# Patient Record
Sex: FEMALE | Race: WHITE | Employment: OTHER | URBAN - METROPOLITAN AREA
[De-identification: names, ages, dates, MRNs, and addresses within clinical notes are randomized per-mention and may not be internally consistent; named-entity substitution may affect disease eponyms.]

---

## 2017-02-16 ENCOUNTER — OFFICE VISIT (OUTPATIENT)
Dept: CARDIOLOGY CLINIC | Age: 77
End: 2017-02-16

## 2017-02-16 VITALS
HEIGHT: 65 IN | HEART RATE: 69 BPM | BODY MASS INDEX: 34.49 KG/M2 | WEIGHT: 207 LBS | SYSTOLIC BLOOD PRESSURE: 146 MMHG | DIASTOLIC BLOOD PRESSURE: 68 MMHG

## 2017-02-16 DIAGNOSIS — I49.1 PAC (PREMATURE ATRIAL CONTRACTION): ICD-10-CM

## 2017-02-16 DIAGNOSIS — I10 ESSENTIAL HYPERTENSION, BENIGN: Primary | ICD-10-CM

## 2017-02-16 DIAGNOSIS — E05.90 HYPERTHYROIDISM: ICD-10-CM

## 2017-02-16 DIAGNOSIS — E78.00 HYPERCHOLESTEROLEMIA: ICD-10-CM

## 2017-02-16 NOTE — MR AVS SNAPSHOT
Visit Information Date & Time Provider Department Dept. Phone Encounter #  
 2/16/2017 10:00 AM Guillermo Rolle MD [facility] [phone] Follow-up Instructions Return in about 6 months (around 8/16/2017). Upcoming Health Maintenance Date Due HEMOGLOBIN A1C Q6M 1938 FOOT EXAM Q1 11/28/1948 MICROALBUMIN Q1 11/28/1948 EYE EXAM RETINAL OR DILATED Q1 11/28/1948 DTaP/Tdap/Td series (1 - Tdap) 11/28/1959 ZOSTER VACCINE AGE 60> 11/28/1998 GLAUCOMA SCREENING Q2Y 11/28/2003 OSTEOPOROSIS SCREENING (DEXA) 11/28/2003 Pneumococcal 65+ Low/Medium Risk (1 of 2 - PCV13) 11/28/2003 MEDICARE YEARLY EXAM 11/28/2003 LIPID PANEL Q1 7/27/2016 INFLUENZA AGE 9 TO ADULT 8/1/2016 Allergies as of 2/16/2017  Review Complete On: 2/16/2017 By: Guillermo Rolle MD  
  
 Severity Noted Reaction Type Reactions Bacitracin    Not Reported This Time Cortisone    Not Reported This Time Erythromycin    Not Reported This Time Lisinopril    Not Reported This Time Lortab [Hydrocodone-acetaminophen]  08/04/2015    Rash Penicillins    Not Reported This Time Prednisone    Not Reported This Time Tetanus Vaccines And Toxoid    Not Reported This Time Current Immunizations  Never Reviewed No immunizations on file. Not reviewed this visit You Were Diagnosed With   
  
 Codes Comments Essential hypertension, benign    -  Primary ICD-10-CM: I10 
ICD-9-CM: 401.1 home records reviewed 
well controlled at home Hypercholesterolemia     ICD-10-CM: E78.00 ICD-9-CM: 272.0 controlled PAC (premature atrial contraction)     ICD-10-CM: I49.1 ICD-9-CM: 427.61 stable 
asymptomatic at present Hyperthyroidism     ICD-10-CM: E05.90 ICD-9-CM: 242.90 tried meds 
not able to tolerate Vitals BP Pulse Height(growth percentile) Weight(growth percentile) BMI OB Status 146/68 69 5' 5\" (1.651 m) 207 lb (93.9 kg) 34.45 kg/m2 Hysterectomy Smoking Status Never Smoker BMI and BSA Data Body Mass Index Body Surface Area 34.45 kg/m 2 2.08 m 2 Preferred Pharmacy Pharmacy Name Phone [facility] [address], [address] [facility] Your Updated Medication List  
  
   
This list is accurate as of: 2/16/17 10:04 AM.  Always use your most recent med list.  
  
  
  
  
 ASPIRIN CHILDRENS 81 mg chewable tablet Generic drug:  aspirin Take 81 mg by mouth daily. atenolol 50 mg tablet Commonly known as:  TENORMIN Take 1 Tab by mouth two (2) times a day. CALCIUM 600 + D 600-125 mg-unit Tab Generic drug:  calcium-cholecalciferol (d3) Take  by mouth daily. Patient taking 4 times a week CRANBERRY CONC-ASCORBIC ACID PO Take  by mouth. dilTIAZem  mg XR capsule Commonly known as:  DILACOR XR  
TAKE ONE CAPSULE BY MOUTH ONCE DAILY  
  
 gabapentin 300 mg capsule Commonly known as:  NEURONTIN Take 300 mg by mouth daily. GLUCOSAMINE 1500 COMPLEX PO Take  by mouth daily. indapamide 2.5 mg tablet Commonly known as:  Christinia Oxford Take  by mouth daily. losartan 50 mg tablet Commonly known as:  COZAAR  
TAKE ONE TABLET BY MOUTH ONCE DAILY  
  
 metFORMIN 500 mg Tg24 24 hour tablet Commonly known as:  GLUMETZA ER  
850 mg daily. pravastatin 40 mg tablet Commonly known as:  PRAVACHOL Take 40 mg by mouth daily. PRESERVISION AREDS PO Take  by mouth two (2) times a day. TYLENOL EXTRA STRENGTH 500 mg tablet Generic drug:  acetaminophen Take  by mouth every six (6) hours as needed. VITAMIN D3 1,000 unit Cap Generic drug:  cholecalciferol Take  by mouth daily. Follow-up Instructions Return in about 6 months (around 8/16/2017). Introducing [facility]! Dear Cortney Holliday: Thank you for requesting a [employer] account. Our records indicate that you already have an active [employer] account. You can access your account anytime at [email or web address] [address]/"[employer] Did you know that you can access your hospital and ER discharge instructions at any time in [employer]? You can also review all of your test results from your hospital stay or ER visit. Additional Information If you have questions, please visit the Frequently Asked Questions section of the [employer] website at [email or web address] [address]/"[employer]/. Remember, [employer] is NOT to be used for urgent needs. For medical emergencies, dial 911. Now available from your iPhone and Android! Please provide this summary of care documentation to your next provider. Your primary care clinician is listed as Alon Howe. If you have any questions after today's visit, please call [phone].

## 2017-02-16 NOTE — PROGRESS NOTES
1. Have you been to the ER, urgent care clinic since your last visit? Hospitalized since your last visit? yes    2. Have you seen or consulted any other health care providers outside of the [address] [place] [address] since your last visit? Include any pap smears or colon screening. Yes, pcp    3. Since your last visit, have you had any of the following symptoms? Swelling in ankles at times    4. Have you had any blood work, X-rays or cardiac testing? Yes, pcp    5. Where do you normally have your labs drawn?   pcp    6. Do you need any refills today?    no

## 2017-02-16 NOTE — PROGRESS NOTES
HISTORY OF PRESENT ILLNESS  Sonja Hodgkin is a 64 y.o. female. HPI Comments:       Hypertension   The history is provided by the patient. This is a chronic problem. The problem occurs constantly. The problem has not changed since onset. Pertinent negatives include no chest pain, no abdominal pain, no headaches and no shortness of breath. Cholesterol Problem   Pertinent negatives include no chest pain, no abdominal pain, no headaches and no shortness of breath. Palpitations    The history is provided by the patient. This is a recurrent problem. The problem has been resolved. The problem occurs rarely. Pertinent negatives include no fever, no chest pain, no claudication, no orthopnea, no PND, no abdominal pain, no nausea, no vomiting, no headaches, no dizziness, no weakness, no cough, no hemoptysis, no shortness of breath and no sputum production. Her past medical history is significant for hypertension. Review of Systems   Constitutional: Negative for chills and fever. HENT: Negative for nosebleeds. Eyes: Negative for blurred vision and double vision. Respiratory: Negative for cough, hemoptysis, sputum production, shortness of breath and wheezing. Cardiovascular: Positive for palpitations. Negative for chest pain, orthopnea, claudication, leg swelling and PND. Gastrointestinal: Negative for abdominal pain, heartburn, nausea and vomiting. Musculoskeletal: Negative for myalgias. Skin: Negative for rash. Neurological: Negative for dizziness, weakness and headaches. Endo/Heme/Allergies: Does not bruise/bleed easily.      Family History   Problem Relation Age of Onset    Heart Disease Other      positive history of ischemic heart disease       Past Medical History   Diagnosis Date    Cardiomegaly     Essential hypertension, benign      stable    Hypercholesterolemia     Obesity, unspecified      Pt has weight loss    Other and unspecified hyperlipidemia      Chol 172, ldl 82, hdl 58, tg 141    Type II or unspecified type diabetes mellitus without mention of complication, not stated as uncontrolled        Past Surgical History   Procedure Laterality Date    Hx gyn       hysterectomy       Social History   Substance Use Topics    Smoking status: Never Smoker    Smokeless tobacco: Never Used    Alcohol use No       Allergies   Allergen Reactions    Bacitracin Not Reported This Time    Cortisone Not Reported This Time    Erythromycin Not Reported This Time    Lisinopril Not Reported This Time    Lortab [Hydrocodone-Acetaminophen] Rash    Penicillins Not Reported This Time    Prednisone Not Reported This Time    Tetanus Vaccines And Toxoid Not Reported This Time       Current Outpatient Prescriptions   Medication Sig    CRANBERRY CONC-ASCORBIC ACID PO Take  by mouth.  losartan (COZAAR) 50 mg tablet TAKE ONE TABLET BY MOUTH ONCE DAILY    dilTIAZem XR (DILACOR XR) 120 mg XR capsule TAKE ONE CAPSULE BY MOUTH ONCE DAILY    atenolol (TENORMIN) 50 mg tablet Take 1 Tab by mouth two (2) times a day.  gabapentin (NEURONTIN) 300 mg capsule Take 300 mg by mouth daily.  calcium-cholecalciferol, d3, (CALCIUM 600 + D) 600-125 mg-unit tab Take  by mouth daily. Patient taking 4 times a week    Cholecalciferol, Vitamin D3, (VITAMIN D3) 1,000 unit cap Take  by mouth daily.  VIT A/VIT C/VIT E/ZINC/COPPER (PRESERVISION AREDS PO) Take  by mouth two (2) times a day.  metFORMIN (GLUMETZA ER) 500 mg TG24 24 hour tablet 850 mg daily.  pravastatin (PRAVACHOL) 40 mg tablet Take 40 mg by mouth daily.  GLUC SIMPSON/CHONDRO SIMPSON A/VIT C/MN (GLUCOSAMINE 1500 COMPLEX PO) Take  by mouth daily.  acetaminophen (TYLENOL EXTRA STRENGTH) 500 mg tablet Take  by mouth every six (6) hours as needed.  aspirin (ASPIRIN CHILDRENS) 81 mg chewable tablet Take 81 mg by mouth daily.  indapamide (LOZOL) 2.5 mg tablet Take  by mouth daily. No current facility-administered medications for this visit. Visit Vitals    /68    Pulse 69    Ht 5' 5\" (1.651 m)    Wt 93.9 kg (207 lb)    BMI 34.45 kg/m2         Physical Exam   Constitutional: She is oriented to person, place, and time. She appears well-developed and well-nourished. obese   HENT:   Head: Normocephalic and atraumatic. Eyes: Conjunctivae are normal.   Neck: Neck supple. No JVD present. No tracheal deviation present. No thyromegaly present. Cardiovascular: Normal rate and regular rhythm. PMI is not displaced. Exam reveals no gallop and no decreased pulses. Murmur heard. Early systolic murmur is present with a grade of 2/6  at the upper right sternal border  Pulmonary/Chest: No respiratory distress. She has no wheezes. She has no rales. She exhibits no tenderness. Abdominal: Soft. There is no tenderness. Musculoskeletal: She exhibits no edema. Neurological: She is alert and oriented to person, place, and time. Skin: Skin is warm. Psychiatric: She has a normal mood and affect. Ms. Leticia Parra has a reminder for a \"due or due soon\" health maintenance. I have asked that she contact her primary care provider for follow-up on this health maintenance. CARDIOLOGY STUDIES 2011 3/1/2008   Myocardial Perfusion Scan Result nl scan, EF 90% normal   Echocardiogram - Complete Result EF 78% mild lvh, normal ef   mri:2015  1. No acute hemorrhage or acute infarction. 2. White matter abnormality is nonspecific but likely ischemic. This does not suggest multiple sclerosis. 3. No other significant intracranial abnormality is appreciated. [facility]:1671:AKRT  Left ventricle: Systolic function was normal. Ejection fraction was  estimated in the range of 55 % to 60 %. There were no regional wall motion  abnormalities. Doppler parameters were consistent with abnormal left  ventricular relaxation (grade 1 diastolic dysfunction). Left atrium: The atrium was mildly dilated. Mitral valve:  There was trivial regurgitation. Aortic valve: The valve was trileaflet. Leaflets exhibited mildly  increased thickness, normal cuspal separation, and sclerosis. Tricuspid valve: There was mild regurgitation. Tricuspid regurgitation  peak velocity: 2.4 m/sec. Pulmonary artery systolic pressure: 26 mmHg. Pulmonic valve: There was trivial regurgitation. 8/2015:holter  Sr,Frequent pac. No a fib  Assessment         ICD-10-CM ICD-9-CM    1. Essential hypertension, benign I10 401.1     home records reviewed  well controlled at home   2. Hypercholesterolemia E78.00 272.0     controlled   3. PAC (premature atrial contraction) I49.1 427.61     stable  asymptomatic at present   4. Hyperthyroidism E05.90 242.90     tried meds  not able to tolerate       Medications Discontinued During This Encounter   Medication Reason    ascorbic acid (VITAMIN C) 500 mg tablet Discontinued by Another Clinician    estradiol (ESTRACE) 0.01 % (0.1 mg/gram) vaginal cream Discontinued by Another Clinician    multivitamin (ONE A DAY) tablet Discontinued by Another Clinician    Omega-3-DHA-EPA-Fish Oil 1,000 (120-180) mg cap Discontinued by Another Clinician       No orders of the defined types were placed in this encounter. Follow-up Disposition:  Return in about 6 months (around 8/16/2017).

## 2017-07-07 ENCOUNTER — OFFICE VISIT (OUTPATIENT)
Dept: CARDIOLOGY CLINIC | Age: 77
End: 2017-07-07

## 2017-07-07 VITALS
WEIGHT: 203 LBS | HEIGHT: 65 IN | DIASTOLIC BLOOD PRESSURE: 72 MMHG | HEART RATE: 75 BPM | BODY MASS INDEX: 33.82 KG/M2 | SYSTOLIC BLOOD PRESSURE: 133 MMHG

## 2017-07-07 DIAGNOSIS — I10 ESSENTIAL HYPERTENSION, BENIGN: ICD-10-CM

## 2017-07-07 DIAGNOSIS — R07.9 CHEST PAIN, UNSPECIFIED TYPE: Primary | ICD-10-CM

## 2017-07-07 DIAGNOSIS — I49.1 PAC (PREMATURE ATRIAL CONTRACTION): ICD-10-CM

## 2017-07-07 DIAGNOSIS — E78.5 OTHER AND UNSPECIFIED HYPERLIPIDEMIA: ICD-10-CM

## 2017-07-07 NOTE — MR AVS SNAPSHOT
Visit Information Date & Time Provider Department Dept. Phone Encounter #  
 7/7/2017 10:00 AM Aramis Aguilar MD [facility] [phone] [id number] Follow-up Instructions Return for F/u after tests. Your Appointments 7/18/2017  8:00 AM  
PROCEDURE with [place] NUC [facility] ([address]) Appt Note: [address] [facility] [address]  
  
   
 [address] [address] [address] [address]  
  
    
 7/21/2017 10:45 AM  
Follow Up with Aramis Aguilar MD  
[facility] ([address]) Appt Note: Post nuclear follow up  
 [address] [facility] [address]  
  
   
 [address] [id number] [address] [address] Upcoming Health Maintenance Date Due HEMOGLOBIN A1C Q6M 1938 FOOT EXAM Q1 11/28/1948 MICROALBUMIN Q1 11/28/1948 EYE EXAM RETINAL OR DILATED Q1 11/28/1948 DTaP/Tdap/Td series (1 - Tdap) 11/28/1959 ZOSTER VACCINE AGE 60> 11/28/1998 GLAUCOMA SCREENING Q2Y 11/28/2003 OSTEOPOROSIS SCREENING (DEXA) 11/28/2003 Pneumococcal 65+ Low/Medium Risk (1 of 2 - PCV13) 11/28/2003 MEDICARE YEARLY EXAM 11/28/2003 LIPID PANEL Q1 7/27/2016 INFLUENZA AGE 9 TO ADULT 8/1/2017 Allergies as of 7/7/2017  Review Complete On: 7/7/2017 By: Aramis Aguilar MD  
  
 Severity Noted Reaction Type Reactions Bacitracin    Not Reported This Time Cortisone    Not Reported This Time Erythromycin    Not Reported This Time Lisinopril    Not Reported This Time Lortab [Hydrocodone-acetaminophen]  08/04/2015    Rash Methimazole  07/07/2017    Other (comments) Neck pain/cough Penicillins    Not Reported This Time Prednisone    Not Reported This Time Propylthiouracil  07/07/2017    Other (comments) Dizziness,elevated blood pressure Tetanus Vaccines And Toxoid    Not Reported This Time Current Immunizations  Never Reviewed No immunizations on file. Not reviewed this visit You Were Diagnosed With   
  
 Codes Comments Chest pain, unspecified type    -  Primary ICD-10-CM: R07.9 ICD-9-CM: 786.50 ? angina,chest wall,  
 Essential hypertension, benign     ICD-10-CM: I10 
ICD-9-CM: 401.1 controlled Other and unspecified hyperlipidemia     ICD-10-CM: E78.5 ICD-9-CM: 272.4 stable PAC (premature atrial contraction)     ICD-10-CM: I49.1 ICD-9-CM: 427.61 occasional 
stable Vitals BP Pulse Height(growth percentile) Weight(growth percentile) BMI OB Status 133/72 75 5' 5\" (1.651 m) 203 lb (92.1 kg) 33.78 kg/m2 Hysterectomy Smoking Status Never Smoker Vitals History BMI and BSA Data Body Mass Index Body Surface Area 33.78 kg/m 2 2.06 m 2 Preferred Pharmacy Pharmacy Name Phone [facility] [address], [address] [facility] Your Updated Medication List  
  
   
This list is accurate as of: 7/7/17 10:32 AM.  Always use your most recent med list.  
  
  
  
  
 ASPIRIN CHILDRENS 81 mg chewable tablet Generic drug:  aspirin Take 81 mg by mouth daily. atenolol 50 mg tablet Commonly known as:  TENORMIN  
TAKE ONE TABLET BY MOUTH TWICE DAILY CALCIUM 600 + D 600-125 mg-unit Tab Generic drug:  calcium-cholecalciferol (d3) Take  by mouth daily. Patient taking 4 times a week CRANBERRY CONC-ASCORBIC ACID PO Take  by mouth. dilTIAZem  mg XR capsule Commonly known as:  DILACOR XR  
TAKE ONE CAPSULE BY MOUTH ONCE DAILY  
  
 gabapentin 300 mg capsule Commonly known as:  NEURONTIN Take 300 mg by mouth three (3) times daily. GLUCOSAMINE 1500 COMPLEX PO Take  by mouth daily. indapamide 2.5 mg tablet Commonly known as:  Julio Alamin Take  by mouth daily. losartan 50 mg tablet Commonly known as:  COZAAR  
TAKE ONE TABLET BY MOUTH ONCE DAILY metFORMIN 500 mg Tg24 24 hour tablet Commonly known as:  GLUMETZA ER  
850 mg daily. pravastatin 40 mg tablet Commonly known as:  PRAVACHOL Take 40 mg by mouth daily. PRESERVISION AREDS PO Take  by mouth two (2) times a day. TYLENOL EXTRA STRENGTH 500 mg tablet Generic drug:  acetaminophen Take  by mouth every six (6) hours as needed. VITAMIN D3 1,000 unit Cap Generic drug:  cholecalciferol Take  by mouth daily. Follow-up Instructions Return for F/u after tests. To-Do List   
 07/14/2017 Nursing:  SCHEDULE NUCLEAR STUDY Introducing [facility]! Dear Anish Kilgoreer: 
Thank you for requesting a [employer] account. Our records indicate that you already have an active [employer] account. You can access your account anytime at [email or web address] Did you know that you can access your hospital and ER discharge instructions at any time in [employer]? You can also review all of your test results from your hospital stay or ER visit. Additional Information If you have questions, please visit the Frequently Asked Questions section of the [employer] website at [email or web address] Remember, [employer] is NOT to be used for urgent needs. For medical emergencies, dial 911. Now available from your iPhone and Android! Please provide this summary of care documentation to your next provider. Your primary care clinician is listed as Juan Arteaga. If you have any questions after today's visit, please call [phone].

## 2017-07-07 NOTE — PROGRESS NOTES
HISTORY OF PRESENT ILLNESS  Susi Arias is a 64 y.o. female. HPI Comments:       Hypertension   The history is provided by the patient. This is a chronic problem. The problem occurs constantly. The problem has not changed since onset. Associated symptoms include chest pain. Pertinent negatives include no abdominal pain, no headaches and no shortness of breath. Cholesterol Problem   Associated symptoms include chest pain. Pertinent negatives include no abdominal pain, no headaches and no shortness of breath. Palpitations    The history is provided by the patient. This is a recurrent problem. The problem has not changed since onset. The problem occurs every several days. The problem is associated with nothing. Associated symptoms include chest pain. Pertinent negatives include no fever, no claudication, no orthopnea, no PND, no abdominal pain, no nausea, no vomiting, no headaches, no dizziness, no weakness, no cough, no hemoptysis, no shortness of breath and no sputum production. Her past medical history is significant for hypertension. Chest Pain (Angina)    The history is provided by the patient. This is a new problem. The current episode started yesterday. The problem has been resolved. The problem occurs rarely. The pain is associated with rest. The pain is present in the substernal region. The pain is mild. The quality of the pain is described as pressure-like. The pain does not radiate. Associated symptoms include palpitations. Pertinent negatives include no abdominal pain, no claudication, no cough, no dizziness, no fever, no headaches, no hemoptysis, no nausea, no orthopnea, no PND, no shortness of breath, no sputum production, no vomiting and no weakness. Review of Systems   Constitutional: Negative for chills and fever. HENT: Negative for nosebleeds. Eyes: Negative for blurred vision and double vision.    Respiratory: Negative for cough, hemoptysis, sputum production, shortness of breath and wheezing. Cardiovascular: Positive for chest pain and palpitations. Negative for orthopnea, claudication, leg swelling and PND. Gastrointestinal: Negative for abdominal pain, heartburn, nausea and vomiting. Musculoskeletal: Negative for myalgias. Skin: Negative for rash. Neurological: Negative for dizziness, weakness and headaches. Endo/Heme/Allergies: Does not bruise/bleed easily. Family History   Problem Relation Age of Onset    Heart Disease Other      positive history of ischemic heart disease       Past Medical History:   Diagnosis Date    Cardiomegaly     Essential hypertension, benign     stable    Hypercholesterolemia     Obesity, unspecified     Pt has weight loss    Other and unspecified hyperlipidemia     Chol 172, ldl 82, hdl 58, tg 141    Type II or unspecified type diabetes mellitus without mention of complication, not stated as uncontrolled        Past Surgical History:   Procedure Laterality Date    HX GYN      hysterectomy       Social History   Substance Use Topics    Smoking status: Never Smoker    Smokeless tobacco: Never Used    Alcohol use No       Allergies   Allergen Reactions    Bacitracin Not Reported This Time    Cortisone Not Reported This Time    Erythromycin Not Reported This Time    Lisinopril Not Reported This Time    Lortab [Hydrocodone-Acetaminophen] Rash    Methimazole Other (comments)     Neck pain/cough    Penicillins Not Reported This Time    Prednisone Not Reported This Time    Propylthiouracil Other (comments)     Dizziness,elevated blood pressure    Tetanus Vaccines And Toxoid Not Reported This Time       Current Outpatient Prescriptions   Medication Sig    atenolol (TENORMIN) 50 mg tablet TAKE ONE TABLET BY MOUTH TWICE DAILY    CRANBERRY CONC-ASCORBIC ACID PO Take  by mouth.     losartan (COZAAR) 50 mg tablet TAKE ONE TABLET BY MOUTH ONCE DAILY    dilTIAZem XR (DILACOR XR) 120 mg XR capsule TAKE ONE CAPSULE BY MOUTH ONCE DAILY    gabapentin (NEURONTIN) 300 mg capsule Take 300 mg by mouth three (3) times daily.  calcium-cholecalciferol, d3, (CALCIUM 600 + D) 600-125 mg-unit tab Take  by mouth daily. Patient taking 4 times a week    Cholecalciferol, Vitamin D3, (VITAMIN D3) 1,000 unit cap Take  by mouth daily.  VIT A/VIT C/VIT E/ZINC/COPPER (PRESERVISION AREDS PO) Take  by mouth two (2) times a day.  metFORMIN (GLUMETZA ER) 500 mg TG24 24 hour tablet 850 mg daily.  pravastatin (PRAVACHOL) 40 mg tablet Take 40 mg by mouth daily.  GLUC SIMPSON/CHONDRO SIMPSON A/VIT C/MN (GLUCOSAMINE 1500 COMPLEX PO) Take  by mouth daily.  acetaminophen (TYLENOL EXTRA STRENGTH) 500 mg tablet Take  by mouth every six (6) hours as needed.  aspirin (ASPIRIN CHILDRENS) 81 mg chewable tablet Take 81 mg by mouth daily.  indapamide (LOZOL) 2.5 mg tablet Take  by mouth daily. No current facility-administered medications for this visit. Visit Vitals    /72    Pulse 75    Ht 5' 5\" (1.651 m)    Wt 92.1 kg (203 lb)    BMI 33.78 kg/m2         Physical Exam   Constitutional: She is oriented to person, place, and time. She appears well-developed and well-nourished. obese   HENT:   Head: Normocephalic and atraumatic. Eyes: Conjunctivae are normal.   Neck: Neck supple. No JVD present. No tracheal deviation present. No thyromegaly present. Cardiovascular: Normal rate and regular rhythm. PMI is not displaced. Exam reveals no gallop and no decreased pulses. Murmur heard. Early systolic murmur is present with a grade of 2/6  at the upper right sternal border  Pulmonary/Chest: No respiratory distress. She has no wheezes. She has no rales. She exhibits no tenderness. Abdominal: Soft. There is no tenderness. Musculoskeletal: She exhibits no edema. Neurological: She is alert and oriented to person, place, and time. Skin: Skin is warm. Psychiatric: She has a normal mood and affect.      Ms. Nidia Pena has a reminder for a \"due or due soon\" health maintenance. I have asked that she contact her primary care provider for follow-up on this health maintenance. CARDIOLOGY STUDIES 6/1/2011 3/1/2008   Myocardial Perfusion Scan Result nl scan, EF 90% normal   Echocardiogram - Complete Result EF 78% mild lvh, normal ef   Some recent data might be hidden   mri:8/2015  1. No acute hemorrhage or acute infarction. 2. White matter abnormality is nonspecific but likely ischemic. This does not suggest multiple sclerosis. 3. No other significant intracranial abnormality is appreciated. BOVFBPU:6/1851:WEUV  Left ventricle: Systolic function was normal. Ejection fraction was  estimated in the range of 55 % to 60 %. There were no regional wall motion  abnormalities. Doppler parameters were consistent with abnormal left  ventricular relaxation (grade 1 diastolic dysfunction). Left atrium: The atrium was mildly dilated. Mitral valve: There was trivial regurgitation. Aortic valve: The valve was trileaflet. Leaflets exhibited mildly  increased thickness, normal cuspal separation, and sclerosis. Tricuspid valve: There was mild regurgitation. Tricuspid regurgitation  peak velocity: 2.4 m/sec. Pulmonary artery systolic pressure: 26 mmHg. Pulmonic valve: There was trivial regurgitation. 8/2015:holter  Sr,Frequent pac. No a fib    I have personally reviewed patients ekg done at other facility. 7/2017  sr,pac  Assessment         ICD-10-CM ICD-9-CM    1. Chest pain, unspecified type R07.9 786.50 SCHEDULE NUCLEAR STUDY    ? angina,chest wall,   2. Essential hypertension, benign I10 401.1     controlled   3. Other and unspecified hyperlipidemia E78.5 272.4     stable   4. PAC (premature atrial contraction) I49.1 427.61     occasional  stable       There are no discontinued medications.     Orders Placed This Encounter    SCHEDULE NUCLEAR STUDY     lexiscan stress test     Standing Status:   Future     Standing Expiration Date:   7/7/2018 Follow-up Disposition:  Return for F/u after tests.

## 2017-07-07 NOTE — LETTER
Audrey Parrish 1940 7/7/2017 Dear Fabby Taylor MD 
 
I had the pleasure of evaluating  Ms. Tara Cleaning in office today. Below are the relevant portions of my assessment and plan of care. ICD-10-CM ICD-9-CM 1. Chest pain, unspecified type R07.9 786.50 SCHEDULE NUCLEAR STUDY ? angina,chest wall,  
2. Essential hypertension, benign I10 401.1   
 controlled 3. Other and unspecified hyperlipidemia E78.5 272.4   
 stable 4. PAC (premature atrial contraction) I49.1 427.61   
 occasional 
stable Current Outpatient Prescriptions Medication Sig Dispense Refill  atenolol (TENORMIN) 50 mg tablet TAKE ONE TABLET BY MOUTH TWICE DAILY 180 Tab 3  CRANBERRY CONC-ASCORBIC ACID PO Take  by mouth.  losartan (COZAAR) 50 mg tablet TAKE ONE TABLET BY MOUTH ONCE DAILY 90 Tab 3  
 dilTIAZem XR (DILACOR XR) 120 mg XR capsule TAKE ONE CAPSULE BY MOUTH ONCE DAILY 90 Cap 3  
 gabapentin (NEURONTIN) 300 mg capsule Take 300 mg by mouth three (3) times daily.  calcium-cholecalciferol, d3, (CALCIUM 600 + D) 600-125 mg-unit tab Take  by mouth daily. Patient taking 4 times a week  Cholecalciferol, Vitamin D3, (VITAMIN D3) 1,000 unit cap Take  by mouth daily.  VIT A/VIT C/VIT E/ZINC/COPPER (PRESERVISION AREDS PO) Take  by mouth two (2) times a day.  metFORMIN (GLUMETZA ER) 500 mg TG24 24 hour tablet 850 mg daily.  pravastatin (PRAVACHOL) 40 mg tablet Take 40 mg by mouth daily.  GLUC SIMPSON/CHONDRO SIMPSON A/VIT C/MN (GLUCOSAMINE 1500 COMPLEX PO) Take  by mouth daily.  acetaminophen (TYLENOL EXTRA STRENGTH) 500 mg tablet Take  by mouth every six (6) hours as needed.  aspirin (ASPIRIN CHILDRENS) 81 mg chewable tablet Take 81 mg by mouth daily.  indapamide (LOZOL) 2.5 mg tablet Take  by mouth daily. Orders Placed This Encounter  SCHEDULE NUCLEAR STUDY  
  lexiscan stress test  
  Standing Status:   Future Standing Expiration Date:   7/7/2018 If you have questions, please do not hesitate to call me. I look forward to following Ms. Indigo Bernardo along with you. Sincerely, Elton Matt MD

## 2017-07-07 NOTE — PROGRESS NOTES
1. Have you been to the ER, urgent care clinic since your last visit? Hospitalized since your last visit? No    2. Have you seen or consulted any other health care providers outside of the [address] [place] [address] since your last visit? Include any pap smears or colon screening. Yes Where: Dr Kenyatta Do     3. Since your last visit, have you had any of the following symptoms? chest pains, palpitations and shortness of breath. 4.  Have you had any blood work, X-rays or cardiac testing? Yes Where: Dr Seven Montanez          5. Where do you normally have your labs drawn? Dr Kenyatta Do    6. Do you need any refills today?    No

## 2017-07-18 ENCOUNTER — CLINICAL SUPPORT (OUTPATIENT)
Dept: CARDIOLOGY CLINIC | Age: 77
End: 2017-07-18

## 2017-07-18 DIAGNOSIS — I49.1 PAC (PREMATURE ATRIAL CONTRACTION): ICD-10-CM

## 2017-07-18 DIAGNOSIS — E78.00 HYPERCHOLESTEROLEMIA: ICD-10-CM

## 2017-07-18 DIAGNOSIS — R07.9 CHEST PAIN, UNSPECIFIED: Primary | ICD-10-CM

## 2017-07-18 DIAGNOSIS — I10 ESSENTIAL HYPERTENSION, BENIGN: ICD-10-CM

## 2017-07-18 NOTE — PROGRESS NOTES
[facility]  [address], [address] [place] [address], [place], [address] [place] [address]  [phone] [place]  [phone] [place]       Name: Rubén Wayne         MRN#: [id number]        YOB: 1940   Gender: female Ht:5'5\" Wt:203 lbs       . Date of Rest/Stress Images: 7/18/2017   Referring Physician: Mary Beth Anderson MD  Ordering Physician: Brielle Lindquist. Jina Metcalf MD, [facility]  Technologist: Howard Luis. LORENA Serrano, C.N.M.T  Diagnosis:  1. Chest pain, unspecified    2. Essential hypertension, benign    3. PAC (premature atrial contraction)    4. Hypercholesterolemia          Rest/Stress Myoview SPECT Myocardial Perfusion Imaging with  Lexiscan Stress and gated SPECT Imaging      PROCEDURE:      Myocardial perfusion imaging was performed at rest approximately 30 mins following the intravenous injection,(Right hand ) of 12.1 mCi of Tc99m Myoview for evaluation of myocardial function and perfusion at rest.    Baseline Data:    Baseline EKG reveals sinus rhythm with PVCs. Baseline heart rate is 63. Baseline blood pressure is 134/76. Procedure: The patient was injected with 0.4 mg IV Lexiscan over a 30 second period. The patient had headache and stomachache with lightheadedness and then received 100 mg of IV Aminophylline with improvement. Heart rate increased from baseline to a heart rate of 96. Blood pressure increased to 166/88. Electrocardiogram showed no significant ST-T changes during the procedure. Rare PVCs. Diagnosis:   1. Negative EKG portion of Lexiscan stress test.   2. Nuclear imaging report to follow. Pharmacological:  Patient was injected with . 4 mg/mL with Lexiscan intravenously over a period 10 to 20 sec. After pharmacologic stress, the patient was injected intravenously with 38.8 mCi of Tc99m Myoview. Gating post stress tomographic imaging performed approximately 45 minutes post tracer injection.  The data was reconstructed in the short, horizontal long and vertical long axis views and tomographic slices were generated. NUCLEAR IMAGING:    Findings:   1. Stress images reveal normal Myoview distrubution in all the LV segments in short axis, vertical and horizontal long axis views. 2. Resting images have a normal uptake. 3. Gated images reveal normal wall motion and the ejection fraction is calculated to be 90%. Conclusion:   1. Normal perfusion scan. 2. Normal wall motion and ejection fraction. 3. Low risk scan. Thank you for the referral.    E-signed and Interpreting Physician:    Nivia Oro.  Dunia Flowers MD, [facility]     Date of interpretation: 7/18/2017  Date of final report: 7/18/2017

## 2017-07-21 ENCOUNTER — OFFICE VISIT (OUTPATIENT)
Dept: CARDIOLOGY CLINIC | Age: 77
End: 2017-07-21

## 2017-07-21 VITALS
WEIGHT: 203 LBS | HEIGHT: 65 IN | BODY MASS INDEX: 33.82 KG/M2 | SYSTOLIC BLOOD PRESSURE: 125 MMHG | DIASTOLIC BLOOD PRESSURE: 56 MMHG | HEART RATE: 70 BPM

## 2017-07-21 DIAGNOSIS — R07.9 CHEST PAIN, UNSPECIFIED TYPE: Primary | ICD-10-CM

## 2017-07-21 DIAGNOSIS — I49.1 PAC (PREMATURE ATRIAL CONTRACTION): ICD-10-CM

## 2017-07-21 DIAGNOSIS — I10 ESSENTIAL HYPERTENSION, BENIGN: ICD-10-CM

## 2017-07-21 RX ORDER — ASPIRIN 325 MG
81 TABLET ORAL DAILY
COMMUNITY
End: 2018-07-25

## 2017-07-21 NOTE — PROGRESS NOTES
1. Have you been to the ER, urgent care clinic since your last visit? Hospitalized since your last visit?     no  2. Have you seen or consulted any other health care providers outside of the [address] [place] [address] since your last visit? Include any pap smears or colon screening. Yes Where: pcp     3. Since your last visit, have you had any of the following symptoms?  no

## 2017-07-21 NOTE — MR AVS SNAPSHOT
Visit Information Date & Time Provider Department Dept. Phone Encounter #  
 7/21/2017 10:45 AM Pierre Richards MD [facility] [place] [phone] [id number] Follow-up Instructions Return in about 6 months (around 1/21/2018). Upcoming Health Maintenance Date Due HEMOGLOBIN A1C Q6M 1940 FOOT EXAM Q1 11/28/1948 MICROALBUMIN Q1 11/28/1948 EYE EXAM RETINAL OR DILATED Q1 11/28/1948 DTaP/Tdap/Td series (1 - Tdap) 11/28/1959 ZOSTER VACCINE AGE 60> 9/28/1998 GLAUCOMA SCREENING Q2Y 11/28/2003 OSTEOPOROSIS SCREENING (DEXA) 11/28/2003 Pneumococcal 65+ Low/Medium Risk (1 of 2 - PCV13) 11/28/2003 MEDICARE YEARLY EXAM 11/28/2003 LIPID PANEL Q1 7/27/2016 INFLUENZA AGE 9 TO ADULT 8/1/2017 Allergies as of 7/21/2017  Review Complete On: 7/21/2017 By: Pierre Richards MD  
  
 Severity Noted Reaction Type Reactions Bacitracin    Not Reported This Time Cortisone    Not Reported This Time Erythromycin    Not Reported This Time Lisinopril    Not Reported This Time Lortab [Hydrocodone-acetaminophen]  08/04/2015    Rash Methimazole  07/07/2017    Other (comments) Neck pain/cough Penicillins    Not Reported This Time Prednisone    Not Reported This Time Propylthiouracil  07/07/2017    Other (comments) Dizziness,elevated blood pressure Tetanus Vaccines And Toxoid    Not Reported This Time Current Immunizations  Never Reviewed No immunizations on file. Not reviewed this visit You Were Diagnosed With   
  
 Codes Comments Chest pain, unspecified type    -  Primary ICD-10-CM: R07.9 ICD-9-CM: 786.50 better 
negative stress test 
medicalmanagment PAC (premature atrial contraction)     ICD-10-CM: I49.1 ICD-9-CM: 427.61 occasional irregular heart beat and palpitation Essential hypertension, benign     ICD-10-CM: I10 
ICD-9-CM: 401.1 controlled Vitals BP Pulse Height(growth percentile) Weight(growth percentile) BMI OB Status 125/56 70 5' 5\" (1.651 m) 203 lb (92.1 kg) 33.78 kg/m2 Hysterectomy Smoking Status Never Smoker Vitals History BMI and BSA Data Body Mass Index Body Surface Area 33.78 kg/m 2 2.06 m 2 Preferred Pharmacy Pharmacy Name Phone [facility] [address], [address] [facility] Your Updated Medication List  
  
   
This list is accurate as of: 7/21/17 10:45 AM.  Always use your most recent med list.  
  
  
  
  
 * ASPIRIN CHILDRENS 81 mg chewable tablet Generic drug:  aspirin Take 81 mg by mouth daily. * aspirin 325 mg tablet Commonly known as:  ASPIRIN Take 325 mg by mouth daily. atenolol 50 mg tablet Commonly known as:  TENORMIN  
TAKE ONE TABLET BY MOUTH TWICE DAILY CALCIUM 600 + D 600-125 mg-unit Tab Generic drug:  calcium-cholecalciferol (d3) Take  by mouth daily. Patient taking 4 times a week CRANBERRY CONC-ASCORBIC ACID PO Take  by mouth. dilTIAZem  mg XR capsule Commonly known as:  DILACOR XR  
TAKE ONE CAPSULE BY MOUTH ONCE DAILY  
  
 gabapentin 300 mg capsule Commonly known as:  NEURONTIN Take 300 mg by mouth three (3) times daily. GLUCOSAMINE 1500 COMPLEX PO Take  by mouth daily. indapamide 2.5 mg tablet Commonly known as:  Lili Washington Take  by mouth daily. losartan 50 mg tablet Commonly known as:  COZAAR  
TAKE ONE TABLET BY MOUTH ONCE DAILY  
  
 metFORMIN 500 mg Tg24 24 hour tablet Commonly known as:  GLUMETZA ER  
850 mg daily. pravastatin 40 mg tablet Commonly known as:  PRAVACHOL Take 40 mg by mouth daily. PRESERVISION AREDS PO Take  by mouth two (2) times a day. TYLENOL EXTRA STRENGTH 500 mg tablet Generic drug:  acetaminophen Take  by mouth every six (6) hours as needed. VITAMIN D3 1,000 unit Cap Generic drug:  cholecalciferol Take  by mouth daily. * Notice: This list has 2 medication(s) that are the same as other medications prescribed for you. Read the directions carefully, and ask your doctor or other care provider to review them with you. Follow-up Instructions Return in about 6 months (around 1/21/2018). Introducing [facility]! Dear Parmjit Ortiz: 
Thank you for requesting a [facility] account. Our records indicate that you already have an active [facility] account. You can access your account anytime at [email or web address] Did you know that you can access your hospital and ER discharge instructions at any time in [employer]? You can also review all of your test results from your hospital stay or ER visit. Additional Information If you have questions, please visit the Frequently Asked Questions section of the [employer] website at [email or web address] Remember, [employer] is NOT to be used for urgent needs. For medical emergencies, dial 911. Now available from your iPhone and Android! Please provide this summary of care documentation to your next provider. Your primary care clinician is listed as Darlin Landers. If you have any questions after today's visit, please call [phone].

## 2017-07-21 NOTE — LETTER
Jess Deal 1940 7/21/2017 Dear Steff Lechuga MD 
 
I had the pleasure of evaluating  Ms. Taya Sharma in office today. Below are the relevant portions of my assessment and plan of care. ICD-10-CM ICD-9-CM 1. Chest pain, unspecified type R07.9 786.50   
 better 
negative stress test 
medicalmanagment 2. PAC (premature atrial contraction) I49.1 427.61   
 occasional irregular heart beat and palpitation 3. Essential hypertension, benign I10 401.1   
 controlled Current Outpatient Prescriptions Medication Sig Dispense Refill  aspirin (ASPIRIN) 325 mg tablet Take 325 mg by mouth daily.  atenolol (TENORMIN) 50 mg tablet TAKE ONE TABLET BY MOUTH TWICE DAILY 180 Tab 3  CRANBERRY CONC-ASCORBIC ACID PO Take  by mouth.  losartan (COZAAR) 50 mg tablet TAKE ONE TABLET BY MOUTH ONCE DAILY 90 Tab 3  
 dilTIAZem XR (DILACOR XR) 120 mg XR capsule TAKE ONE CAPSULE BY MOUTH ONCE DAILY 90 Cap 3  
 gabapentin (NEURONTIN) 300 mg capsule Take 300 mg by mouth three (3) times daily.  calcium-cholecalciferol, d3, (CALCIUM 600 + D) 600-125 mg-unit tab Take  by mouth daily. Patient taking 4 times a week  Cholecalciferol, Vitamin D3, (VITAMIN D3) 1,000 unit cap Take  by mouth daily.  VIT A/VIT C/VIT E/ZINC/COPPER (PRESERVISION AREDS PO) Take  by mouth two (2) times a day.  metFORMIN (GLUMETZA ER) 500 mg TG24 24 hour tablet 850 mg daily.  pravastatin (PRAVACHOL) 40 mg tablet Take 40 mg by mouth daily.  GLUC SIMPSON/CHONDRO SIMPSON A/VIT C/MN (GLUCOSAMINE 1500 COMPLEX PO) Take  by mouth daily.  acetaminophen (TYLENOL EXTRA STRENGTH) 500 mg tablet Take  by mouth every six (6) hours as needed.  indapamide (LOZOL) 2.5 mg tablet Take  by mouth daily.  aspirin (ASPIRIN CHILDRENS) 81 mg chewable tablet Take 81 mg by mouth daily. Orders Placed This Encounter  aspirin (ASPIRIN) 325 mg tablet Sig: Take 325 mg by mouth daily. If you have questions, please do not hesitate to call me. I look forward to following Ms. Taya Sharma along with you. Sincerely, Valentín Red MD

## 2017-07-21 NOTE — PROGRESS NOTES
HISTORY OF PRESENT ILLNESS  Jean Paul Torres is a 64 y.o. female. HPI Comments: Patient is here for follow up of diagnostic tests. Results will be discussed. Palpitations    The history is provided by the patient. This is a recurrent problem. The problem has not changed since onset. The problem occurs every several days. The problem is associated with nothing. Associated symptoms include chest pain. Pertinent negatives include no fever, no claudication, no orthopnea, no PND, no abdominal pain, no nausea, no vomiting, no headaches, no dizziness, no weakness, no cough, no hemoptysis, no shortness of breath and no sputum production. Her past medical history is significant for hypertension. Hypertension   The history is provided by the patient. This is a chronic problem. The problem occurs constantly. The problem has not changed since onset. Associated symptoms include chest pain. Pertinent negatives include no abdominal pain, no headaches and no shortness of breath. Chest Pain (Angina)    The history is provided by the patient. This is a new problem. The current episode started yesterday. The problem has been resolved. The problem occurs rarely. The pain is associated with rest. The pain is present in the substernal region. The pain is mild. The quality of the pain is described as pressure-like. The pain does not radiate. Associated symptoms include palpitations. Pertinent negatives include no abdominal pain, no claudication, no cough, no dizziness, no fever, no headaches, no hemoptysis, no nausea, no orthopnea, no PND, no shortness of breath, no sputum production, no vomiting and no weakness. Review of Systems   Constitutional: Negative for chills and fever. HENT: Negative for nosebleeds. Eyes: Negative for blurred vision and double vision. Respiratory: Negative for cough, hemoptysis, sputum production, shortness of breath and wheezing.     Cardiovascular: Positive for chest pain and palpitations. Negative for orthopnea, claudication, leg swelling and PND. Gastrointestinal: Negative for abdominal pain, heartburn, nausea and vomiting. Musculoskeletal: Negative for myalgias. Skin: Negative for rash. Neurological: Negative for dizziness, weakness and headaches. Endo/Heme/Allergies: Does not bruise/bleed easily. Family History   Problem Relation Age of Onset    Heart Disease Other      positive history of ischemic heart disease       Past Medical History:   Diagnosis Date    Cardiomegaly     Essential hypertension, benign     stable    Hypercholesterolemia     Obesity, unspecified     Pt has weight loss    Other and unspecified hyperlipidemia     Chol 172, ldl 82, hdl 58, tg 141    Type II or unspecified type diabetes mellitus without mention of complication, not stated as uncontrolled        Past Surgical History:   Procedure Laterality Date    HX GYN      hysterectomy       Social History   Substance Use Topics    Smoking status: Never Smoker    Smokeless tobacco: Never Used    Alcohol use No       Allergies   Allergen Reactions    Bacitracin Not Reported This Time    Cortisone Not Reported This Time    Erythromycin Not Reported This Time    Lisinopril Not Reported This Time    Lortab [Hydrocodone-Acetaminophen] Rash    Methimazole Other (comments)     Neck pain/cough    Penicillins Not Reported This Time    Prednisone Not Reported This Time    Propylthiouracil Other (comments)     Dizziness,elevated blood pressure    Tetanus Vaccines And Toxoid Not Reported This Time       Current Outpatient Prescriptions   Medication Sig    aspirin (ASPIRIN) 325 mg tablet Take 325 mg by mouth daily.  atenolol (TENORMIN) 50 mg tablet TAKE ONE TABLET BY MOUTH TWICE DAILY    CRANBERRY CONC-ASCORBIC ACID PO Take  by mouth.     losartan (COZAAR) 50 mg tablet TAKE ONE TABLET BY MOUTH ONCE DAILY    dilTIAZem XR (DILACOR XR) 120 mg XR capsule TAKE ONE CAPSULE BY MOUTH ONCE DAILY  gabapentin (NEURONTIN) 300 mg capsule Take 300 mg by mouth three (3) times daily.  calcium-cholecalciferol, d3, (CALCIUM 600 + D) 600-125 mg-unit tab Take  by mouth daily. Patient taking 4 times a week    Cholecalciferol, Vitamin D3, (VITAMIN D3) 1,000 unit cap Take  by mouth daily.  VIT A/VIT C/VIT E/ZINC/COPPER (PRESERVISION AREDS PO) Take  by mouth two (2) times a day.  metFORMIN (GLUMETZA ER) 500 mg TG24 24 hour tablet 850 mg daily.  pravastatin (PRAVACHOL) 40 mg tablet Take 40 mg by mouth daily.  GLUC SIMPSON/CHONDRO SIMPSON A/VIT C/MN (GLUCOSAMINE 1500 COMPLEX PO) Take  by mouth daily.  acetaminophen (TYLENOL EXTRA STRENGTH) 500 mg tablet Take  by mouth every six (6) hours as needed.  indapamide (LOZOL) 2.5 mg tablet Take  by mouth daily.  aspirin (ASPIRIN CHILDRENS) 81 mg chewable tablet Take 81 mg by mouth daily. No current facility-administered medications for this visit. Visit Vitals    /56    Pulse 70    Ht 5' 5\" (1.651 m)    Wt 92.1 kg (203 lb)    BMI 33.78 kg/m2         Physical Exam   Constitutional: She is oriented to person, place, and time. She appears well-developed and well-nourished. obese   HENT:   Head: Normocephalic and atraumatic. Eyes: Conjunctivae are normal.   Neck: Neck supple. No JVD present. No tracheal deviation present. No thyromegaly present. Cardiovascular: Normal rate and regular rhythm. PMI is not displaced. Exam reveals no gallop and no decreased pulses. Murmur heard. Early systolic murmur is present with a grade of 2/6  at the upper right sternal border  Pulmonary/Chest: No respiratory distress. She has no wheezes. She has no rales. She exhibits no tenderness. Abdominal: Soft. There is no tenderness. Musculoskeletal: She exhibits no edema. Neurological: She is alert and oriented to person, place, and time. Skin: Skin is warm. Psychiatric: She has a normal mood and affect.      Ms. Dallas Brooks has a reminder for a \"due or due soon\" health maintenance. I have asked that she contact her primary care provider for follow-up on this health maintenance. CARDIOLOGY STUDIES 2011 3/1/2008   Myocardial Perfusion Scan Result nl scan, EF 90% normal   Echocardiogram - Complete Result EF 78% mild lvh, normal ef   Some recent data might be hidden   mri:2015  1. No acute hemorrhage or acute infarction. 2. White matter abnormality is nonspecific but likely ischemic. This does not suggest multiple sclerosis. 3. No other significant intracranial abnormality is appreciated. [id number]:DTNQ  Left ventricle: Systolic function was normal. Ejection fraction was  estimated in the range of 55 % to 60 %. There were no regional wall motion  abnormalities. Doppler parameters were consistent with abnormal left  ventricular relaxation (grade 1 diastolic dysfunction). Left atrium: The atrium was mildly dilated. Mitral valve: There was trivial regurgitation. Aortic valve: The valve was trileaflet. Leaflets exhibited mildly  increased thickness, normal cuspal separation, and sclerosis. Tricuspid valve: There was mild regurgitation. Tricuspid regurgitation  peak velocity: 2.4 m/sec. Pulmonary artery systolic pressure: 26 mmHg. Pulmonic valve: There was trivial regurgitation. 2015:holter  Sr,Frequent pac. No a fib    I have personally reviewed patients ekg done at other facility. 2017  Sr,pac    NUCLEAR IMAGIN2017     Findings:   1. Stress images reveal normal Myoview distrubution in all the LV segments in short axis, vertical and horizontal long axis views. 2. Resting images have a normal uptake. 3. Gated images reveal normal wall motion and the ejection fraction is calculated to be 90%. Conclusion:   1. Normal perfusion scan. 2. Normal wall motion and ejection fraction. 3. Low risk scan. Assessment         ICD-10-CM ICD-9-CM    1.  Chest pain, unspecified type R07.9 786.50     better  negative stress test  medicalmanagment   2. PAC (premature atrial contraction) I49.1 427.61     occasional irregular heart beat and palpitation   3. Essential hypertension, benign I10 401.1     controlled       There are no discontinued medications. No orders of the defined types were placed in this encounter. Follow-up Disposition:  Return in about 6 months (around 1/21/2018).

## 2017-10-25 ENCOUNTER — TELEPHONE (OUTPATIENT)
Dept: CARDIOLOGY CLINIC | Age: 77
End: 2017-10-25

## 2017-10-25 DIAGNOSIS — R00.2 PALPITATION: Primary | ICD-10-CM

## 2017-10-25 LAB
ANION GAP SERPL CALC-SCNC: 14.7 MMOL/L
BUN SERPL-MCNC: 16 MG/DL (ref 6–22)
CALCIUM SERPL-MCNC: 10.5 MG/DL (ref 8.4–10.5)
CHLORIDE SERPL-SCNC: 99 MMOL/L (ref 98–110)
CO2 SERPL-SCNC: 25 MMOL/L (ref 20–32)
CREAT SERPL-MCNC: 0.6 MG/DL (ref 0.8–1.4)
GFRAA, 66117: >60
GFRNA, 66118: >60
GLUCOSE SERPL-MCNC: 159 MG/DL (ref 70–99)
MAGNESIUM SERPL-MCNC: 1.6 MG/DL (ref 1.6–2.5)
POTASSIUM SERPL-SCNC: 4.2 MMOL/L (ref 3.5–5.5)
SODIUM SERPL-SCNC: 139 MMOL/L (ref 133–145)

## 2017-10-26 ENCOUNTER — TELEPHONE (OUTPATIENT)
Dept: CARDIOLOGY CLINIC | Age: 77
End: 2017-10-26

## 2017-10-26 NOTE — TELEPHONE ENCOUNTER
----- Message from Wolf Vail MD sent at 10/25/2017  3:37 PM EDT -----  No significant abnormality  High glucose

## 2017-10-26 NOTE — TELEPHONE ENCOUNTER
Patient called to review labs. She voices understanding and acceptance of this advice and will call back if any further questions or concerns.

## 2017-12-13 RX ORDER — DILTIAZEM HYDROCHLORIDE 120 MG/1
CAPSULE, EXTENDED RELEASE ORAL
Qty: 90 CAP | Refills: 3 | Status: SHIPPED | OUTPATIENT
Start: 2017-12-13 | End: 2018-04-24 | Stop reason: SDUPTHER

## 2017-12-13 RX ORDER — ATENOLOL 50 MG/1
TABLET ORAL
Qty: 180 TAB | Refills: 3 | Status: SHIPPED | OUTPATIENT
Start: 2017-12-13 | End: 2018-12-18 | Stop reason: SDUPTHER

## 2017-12-13 RX ORDER — LOSARTAN POTASSIUM 50 MG/1
TABLET ORAL
Qty: 90 TAB | Refills: 3 | Status: SHIPPED | OUTPATIENT
Start: 2017-12-13 | End: 2018-12-18 | Stop reason: SDUPTHER

## 2018-01-19 ENCOUNTER — OFFICE VISIT (OUTPATIENT)
Dept: CARDIOLOGY CLINIC | Age: 78
End: 2018-01-19

## 2018-01-19 VITALS
SYSTOLIC BLOOD PRESSURE: 132 MMHG | HEART RATE: 71 BPM | WEIGHT: 205 LBS | HEIGHT: 65 IN | BODY MASS INDEX: 34.16 KG/M2 | DIASTOLIC BLOOD PRESSURE: 57 MMHG

## 2018-01-19 DIAGNOSIS — I49.1 PAC (PREMATURE ATRIAL CONTRACTION): ICD-10-CM

## 2018-01-19 DIAGNOSIS — I10 ESSENTIAL HYPERTENSION, BENIGN: Primary | ICD-10-CM

## 2018-01-19 DIAGNOSIS — E78.00 HYPERCHOLESTEROLEMIA: ICD-10-CM

## 2018-01-19 DIAGNOSIS — E11.9 TYPE 2 DIABETES MELLITUS WITHOUT COMPLICATION, WITHOUT LONG-TERM CURRENT USE OF INSULIN (HCC): ICD-10-CM

## 2018-01-19 NOTE — LETTER
Jewel Aus 1940 1/19/2018 Dear Dewayne Park MD 
 
I had the pleasure of evaluating  Ms. Emiliano Jacobs in office today. Below are the relevant portions of my assessment and plan of care. ICD-10-CM ICD-9-CM 1. Essential hypertension, benign I10 401.1   
 controlled 2. Hypercholesterolemia E78.00 272.0   
 stable 
ldl 64 
11/2017 3. PAC (premature atrial contraction) I49.1 427.61   
 stable 4. Type 2 diabetes mellitus without complication, without long-term current use of insulin (HCC) E11.9 250.00   
 hg a1c 6 Current Outpatient Prescriptions Medication Sig Dispense Refill  losartan (COZAAR) 50 mg tablet TAKE ONE TABLET BY MOUTH ONCE DAILY 90 Tab 3  
 dilTIAZem XR (DILACOR XR) 120 mg XR capsule TAKE ONE CAPSULE BY MOUTH ONCE DAILY 90 Cap 3  
 atenolol (TENORMIN) 50 mg tablet TAKE ONE TABLET BY MOUTH TWICE DAILY 180 Tab 3  
 aspirin (ASPIRIN) 325 mg tablet Take 325 mg by mouth daily.  CRANBERRY CONC-ASCORBIC ACID PO Take  by mouth.  gabapentin (NEURONTIN) 300 mg capsule Take 300 mg by mouth three (3) times daily.  calcium-cholecalciferol, d3, (CALCIUM 600 + D) 600-125 mg-unit tab Take  by mouth daily. Patient taking 4 times a week  Cholecalciferol, Vitamin D3, (VITAMIN D3) 1,000 unit cap Take  by mouth daily.  VIT A/VIT C/VIT E/ZINC/COPPER (PRESERVISION AREDS PO) Take  by mouth two (2) times a day.  metFORMIN (GLUMETZA ER) 500 mg TG24 24 hour tablet 850 mg daily.  pravastatin (PRAVACHOL) 40 mg tablet Take 40 mg by mouth daily.  GLUC SIMPSON/CHONDRO SIMPSON A/VIT C/MN (GLUCOSAMINE 1500 COMPLEX PO) Take  by mouth daily.  acetaminophen (TYLENOL EXTRA STRENGTH) 500 mg tablet Take  by mouth every six (6) hours as needed.  indapamide (LOZOL) 2.5 mg tablet Take  by mouth daily. No orders of the defined types were placed in this encounter. If you have questions, please do not hesitate to call me.   I look forward to following Ms. Mayda Moya along with you. Sincerely, Nickie Durant MD

## 2018-01-19 NOTE — PROGRESS NOTES
1. Have you been to the ER, urgent care clinic since your last visit? Hospitalized since your last visit?     no    2. Have you seen or consulted any other health care providers outside of the [place] [address] since your last visit? Include any pap smears or colon screening. Yes Where: pcp     3. Since your last visit, have you had any of the following symptoms?    no

## 2018-01-19 NOTE — MR AVS SNAPSHOT
Partha Limon 
 
 
 [address] [facility] 
[phone] Patient: Celina Weber MRN: [id number] :1940 Visit Information Date & Time Provider Department Dept. Phone Encounter #  
 2018 10:15 AM Elke Licea MD [facility] [phone] Follow-up Instructions Return in about 6 months (around 2018). Upcoming Health Maintenance Date Due HEMOGLOBIN A1C Q6M 1940 FOOT EXAM Q1 1948 MICROALBUMIN Q1 1948 EYE EXAM RETINAL OR DILATED Q1 1948 DTaP/Tdap/Td series (1 - Tdap) 1959 ZOSTER VACCINE AGE 60> 1998 GLAUCOMA SCREENING Q2Y 2003 OSTEOPOROSIS SCREENING (DEXA) 2003 Pneumococcal 65+ Low/Medium Risk (1 of 2 - PCV13) 2003 MEDICARE YEARLY EXAM 2003 LIPID PANEL Q1 2016 Influenza Age 5 to Adult 2017 Allergies as of 2018  Review Complete On: 2018 By: Elke Licea MD  
  
 Severity Noted Reaction Type Reactions Bacitracin    Not Reported This Time Cortisone    Not Reported This Time Erythromycin    Not Reported This Time Lisinopril    Not Reported This Time Lortab [Hydrocodone-acetaminophen]  2015    Rash Methimazole  2017    Other (comments) Neck pain/cough Penicillins    Not Reported This Time Prednisone    Not Reported This Time Propylthiouracil  2017    Other (comments) Dizziness,elevated blood pressure Tetanus Vaccines And Toxoid    Not Reported This Time Current Immunizations  Never Reviewed No immunizations on file. Not reviewed this visit You Were Diagnosed With   
  
 Codes Comments Essential hypertension, benign    -  Primary ICD-10-CM: I10 
ICD-9-CM: 401.1 controlled Hypercholesterolemia     ICD-10-CM: E78.00 ICD-9-CM: 272.0 stable 
ldl 64 
2017 PAC (premature atrial contraction)     ICD-10-CM: I49.1 ICD-9-CM: 427.61 stable Type 2 diabetes mellitus without complication, without long-term current use of insulin (HCC)     ICD-10-CM: E11.9 ICD-9-CM: 250.00 hg a1c 6 Vitals BP Pulse Height(growth percentile) Weight(growth percentile) BMI OB Status 132/57 71 5' 5\" (1.651 m) 205 lb (93 kg) 34.11 kg/m2 Hysterectomy Smoking Status Never Smoker Vitals History BMI and BSA Data Body Mass Index Body Surface Area  
 34.11 kg/m 2 2.07 m 2 Preferred Pharmacy Pharmacy Name Phone [address], [address] [facility] Your Updated Medication List  
  
   
This list is accurate as of: 1/19/18 10:33 AM.  Always use your most recent med list.  
  
  
  
  
 aspirin 325 mg tablet Commonly known as:  ASPIRIN Take 325 mg by mouth daily. atenolol 50 mg tablet Commonly known as:  TENORMIN  
TAKE ONE TABLET BY MOUTH TWICE DAILY CALCIUM 600 + D 600-125 mg-unit Tab Generic drug:  calcium-cholecalciferol (d3) Take  by mouth daily. Patient taking 4 times a week CRANBERRY CONC-ASCORBIC ACID PO Take  by mouth. dilTIAZem  mg XR capsule Commonly known as:  DILACOR XR  
TAKE ONE CAPSULE BY MOUTH ONCE DAILY  
  
 gabapentin 300 mg capsule Commonly known as:  NEURONTIN Take 300 mg by mouth three (3) times daily. GLUCOSAMINE 1500 COMPLEX PO Take  by mouth daily. indapamide 2.5 mg tablet Commonly known as:  Waylan Cindy Take  by mouth daily. losartan 50 mg tablet Commonly known as:  COZAAR  
TAKE ONE TABLET BY MOUTH ONCE DAILY  
  
 metFORMIN 500 mg Tg24 24 hour tablet Commonly known as:  GLUMETZA ER  
850 mg daily. pravastatin 40 mg tablet Commonly known as:  PRAVACHOL Take 40 mg by mouth daily. PRESERVISION AREDS PO Take  by mouth two (2) times a day. TYLENOL EXTRA STRENGTH 500 mg tablet Generic drug:  acetaminophen Take  by mouth every six (6) hours as needed. VITAMIN D3 1,000 unit Cap Generic drug:  cholecalciferol Take  by mouth daily. Follow-up Instructions Return in about 6 months (around 7/19/2018). Introducing [facility]! Dear Dexter Becker: 
Thank you for requesting a [employer] account. Our records indicate that you already have an active [employer] account. You can access your account anytime at [email or web address] Did you know that you can access your hospital and ER discharge instructions at any time in [employer]? You can also review all of your test results from your hospital stay or ER visit. Additional Information If you have questions, please visit the Frequently Asked Questions section of the [employer] website at [email or web address] Remember, [employer] is NOT to be used for urgent needs. For medical emergencies, dial 911. Now available from your iPhone and Android! Please provide this summary of care documentation to your next provider. Your primary care clinician is listed as Vonnie Laboy. If you have any questions after today's visit, please call [phone].

## 2018-01-19 NOTE — PROGRESS NOTES
HISTORY OF PRESENT ILLNESS  Darylene Stapler is a 76 y.o. female. HPI Comments:       Hypertension   The history is provided by the patient. This is a chronic problem. The problem occurs constantly. The problem has not changed since onset. Associated symptoms include chest pain. Pertinent negatives include no abdominal pain, no headaches and no shortness of breath. Cholesterol Problem   Associated symptoms include chest pain. Pertinent negatives include no abdominal pain, no headaches and no shortness of breath. Palpitations    The history is provided by the patient. This is a recurrent problem. The problem has not changed since onset. The problem occurs every several days. The problem is associated with nothing. Associated symptoms include chest pain. Pertinent negatives include no fever, no claudication, no orthopnea, no PND, no abdominal pain, no nausea, no vomiting, no headaches, no dizziness, no weakness, no cough, no hemoptysis, no shortness of breath and no sputum production. Her past medical history is significant for hypertension. Chest Pain (Angina)    The history is provided by the patient. This is a new problem. The current episode started yesterday. The problem has been resolved. The problem occurs rarely. The pain is associated with rest. The pain is present in the substernal region. The pain is mild. The quality of the pain is described as pressure-like. The pain does not radiate. Associated symptoms include palpitations. Pertinent negatives include no abdominal pain, no claudication, no cough, no dizziness, no fever, no headaches, no hemoptysis, no nausea, no orthopnea, no PND, no shortness of breath, no sputum production, no vomiting and no weakness. Review of Systems   Constitutional: Negative for chills and fever. HENT: Negative for nosebleeds. Eyes: Negative for blurred vision and double vision.    Respiratory: Negative for cough, hemoptysis, sputum production, shortness of breath and wheezing. Cardiovascular: Positive for chest pain and palpitations. Negative for orthopnea, claudication, leg swelling and PND. Gastrointestinal: Negative for abdominal pain, heartburn, nausea and vomiting. Musculoskeletal: Negative for myalgias. Skin: Negative for rash. Neurological: Negative for dizziness, weakness and headaches. Endo/Heme/Allergies: Does not bruise/bleed easily.      Family History   Problem Relation Age of Onset    Heart Disease Other      positive history of ischemic heart disease       Past Medical History:   Diagnosis Date    Cardiomegaly     Essential hypertension, benign     stable    Hypercholesterolemia     Obesity, unspecified     Pt has weight loss    Other and unspecified hyperlipidemia     Chol 172, ldl 82, hdl 58, tg 141    Type II or unspecified type diabetes mellitus without mention of complication, not stated as uncontrolled        Past Surgical History:   Procedure Laterality Date    HX GYN      hysterectomy       Social History   Substance Use Topics    Smoking status: Never Smoker    Smokeless tobacco: Never Used    Alcohol use No       Allergies   Allergen Reactions    Bacitracin Not Reported This Time    Cortisone Not Reported This Time    Erythromycin Not Reported This Time    Lisinopril Not Reported This Time    Lortab [Hydrocodone-Acetaminophen] Rash    Methimazole Other (comments)     Neck pain/cough    Penicillins Not Reported This Time    Prednisone Not Reported This Time    Propylthiouracil Other (comments)     Dizziness,elevated blood pressure    Tetanus Vaccines And Toxoid Not Reported This Time       Current Outpatient Prescriptions   Medication Sig    losartan (COZAAR) 50 mg tablet TAKE ONE TABLET BY MOUTH ONCE DAILY    dilTIAZem XR (DILACOR XR) 120 mg XR capsule TAKE ONE CAPSULE BY MOUTH ONCE DAILY    atenolol (TENORMIN) 50 mg tablet TAKE ONE TABLET BY MOUTH TWICE DAILY    aspirin (ASPIRIN) 325 mg tablet Take 325 mg by mouth daily.  CRANBERRY CONC-ASCORBIC ACID PO Take  by mouth.  gabapentin (NEURONTIN) 300 mg capsule Take 300 mg by mouth three (3) times daily.  calcium-cholecalciferol, d3, (CALCIUM 600 + D) 600-125 mg-unit tab Take  by mouth daily. Patient taking 4 times a week    Cholecalciferol, Vitamin D3, (VITAMIN D3) 1,000 unit cap Take  by mouth daily.  VIT A/VIT C/VIT E/ZINC/COPPER (PRESERVISION AREDS PO) Take  by mouth two (2) times a day.  metFORMIN (GLUMETZA ER) 500 mg TG24 24 hour tablet 850 mg daily.  pravastatin (PRAVACHOL) 40 mg tablet Take 40 mg by mouth daily.  GLUC SIMPSON/CHONDRO SIMPSON A/VIT C/MN (GLUCOSAMINE 1500 COMPLEX PO) Take  by mouth daily.  acetaminophen (TYLENOL EXTRA STRENGTH) 500 mg tablet Take  by mouth every six (6) hours as needed.  indapamide (LOZOL) 2.5 mg tablet Take  by mouth daily. No current facility-administered medications for this visit. Visit Vitals    /57    Pulse 71    Ht 5' 5\" (1.651 m)    Wt 93 kg (205 lb)    BMI 34.11 kg/m2         Physical Exam   Constitutional: She is oriented to person, place, and time. She appears well-developed and well-nourished. obese   HENT:   Head: Normocephalic and atraumatic. Eyes: Conjunctivae are normal.   Neck: Neck supple. No JVD present. No tracheal deviation present. No thyromegaly present. Cardiovascular: Normal rate and regular rhythm. PMI is not displaced. Exam reveals no gallop and no decreased pulses. Murmur heard. Early systolic murmur is present with a grade of 2/6  at the upper right sternal border  Pulmonary/Chest: No respiratory distress. She has no wheezes. She has no rales. She exhibits no tenderness. Abdominal: Soft. There is no tenderness. Musculoskeletal: She exhibits no edema. Neurological: She is alert and oriented to person, place, and time. Skin: Skin is warm. Psychiatric: She has a normal mood and affect.      Ms. Gabi Auguste has a reminder for a \"due or due soon\" health maintenance. I have asked that she contact her primary care provider for follow-up on this health maintenance. CARDIOLOGY STUDIES 2011 3/1/2008   Myocardial Perfusion Scan Result nl scan, EF 90% normal   Echocardiogram - Complete Result EF 78% mild lvh, normal ef   Some recent data might be hidden   mri:2015  1. No acute hemorrhage or acute infarction. 2. White matter abnormality is nonspecific but likely ischemic. This does not suggest multiple sclerosis. 3. No other significant intracranial abnormality is appreciated. [id number]  Left ventricle: Systolic function was normal. Ejection fraction was  estimated in the range of 55 % to 60 %. There were no regional wall motion  abnormalities. Doppler parameters were consistent with abnormal left  ventricular relaxation (grade 1 diastolic dysfunction). Left atrium: The atrium was mildly dilated. Mitral valve: There was trivial regurgitation. Aortic valve: The valve was trileaflet. Leaflets exhibited mildly  increased thickness, normal cuspal separation, and sclerosis. Tricuspid valve: There was mild regurgitation. Tricuspid regurgitation  peak velocity: 2.4 m/sec. Pulmonary artery systolic pressure: 26 mmHg. Pulmonic valve: There was trivial regurgitation. 2015:holter  Sr,Frequent pac. No a fib    I have personally reviewed patients ekg done at other facility. 2017  Sr,pac    NUCLEAR IMAGIN2017     Findings:   1. Stress images reveal normal Myoview distrubution in all the LV segments in short axis, vertical and horizontal long axis views. 2. Resting images have a normal uptake. 3. Gated images reveal normal wall motion and the ejection fraction is calculated to be 90%. Conclusion:   1. Normal perfusion scan. 2. Normal wall motion and ejection fraction. 3. Low risk scan. Assessment         ICD-10-CM ICD-9-CM    1. Essential hypertension, benign I10 401.1     controlled   2.  Hypercholesterolemia E78.00 272.0     stable  ldl 64  11/2017   3. PAC (premature atrial contraction) I49.1 427.61     stable   4. Type 2 diabetes mellitus without complication, without long-term current use of insulin (HCC) E11.9 250.00     hg a1c 6         Medications Discontinued During This Encounter   Medication Reason    aspirin (ASPIRIN CHILDRENS) 81 mg chewable tablet Not A Current Medication       No orders of the defined types were placed in this encounter. Follow-up Disposition:  Return in about 6 months (around 7/19/2018).

## 2018-01-29 ENCOUNTER — TELEPHONE (OUTPATIENT)
Dept: CARDIOLOGY CLINIC | Age: 78
End: 2018-01-29

## 2018-01-29 ENCOUNTER — OFFICE VISIT (OUTPATIENT)
Dept: ORTHOPEDIC SURGERY | Age: 78
End: 2018-01-29

## 2018-01-29 VITALS
WEIGHT: 207 LBS | HEIGHT: 65 IN | DIASTOLIC BLOOD PRESSURE: 67 MMHG | BODY MASS INDEX: 34.49 KG/M2 | SYSTOLIC BLOOD PRESSURE: 141 MMHG | HEART RATE: 67 BPM

## 2018-01-29 DIAGNOSIS — M54.16 LUMBAR NEURITIS: ICD-10-CM

## 2018-01-29 DIAGNOSIS — M47.817 LUMBOSACRAL SPONDYLOSIS WITHOUT MYELOPATHY: ICD-10-CM

## 2018-01-29 DIAGNOSIS — M51.36 DDD (DEGENERATIVE DISC DISEASE), LUMBAR: ICD-10-CM

## 2018-01-29 DIAGNOSIS — M54.50 LOW BACK PAIN WITHOUT SCIATICA, UNSPECIFIED BACK PAIN LATERALITY, UNSPECIFIED CHRONICITY: Primary | ICD-10-CM

## 2018-01-29 RX ORDER — GABAPENTIN 600 MG/1
600 TABLET ORAL 3 TIMES DAILY
Qty: 90 TAB | Refills: 1 | Status: SHIPPED | OUTPATIENT
Start: 2018-01-29 | End: 2018-02-26 | Stop reason: SDUPTHER

## 2018-01-29 NOTE — TELEPHONE ENCOUNTER
Dr. Kb Welsh,    Patient called stating that she saw Dr Diaz Fail today for her back pain like you suggested. Patient states that she takes  daily at night, along with Naproxen at 8 am and 8 pm and 2 extra strength tylenol at noon. Patient wants to know is this safe for her to take the naproxen and adult asa? Patient just doesn't want to have any bleeding issue on top of everything else. Please Advise.

## 2018-01-29 NOTE — MR AVS SNAPSHOT
[address] [facility] 
 
 
 [address] [facility] 
[phone] Patient: Addison Szymanski MRN: [id number] :1940 Visit Information Date & Time Provider Department Dept. Phone Encounter #  
 2018 10:35 AM Abbey Persaud  [facility], [address] [facility] - [place] [phone] Follow-up Instructions Return in about 4 weeks (around 2018). Your Appointments 2018 10:00 AM  
Follow Up with Fadi Martinez MD  
[facility] ([address] [facility]) Appt Note: 6 month follow up  
 [address] [facility] [address]  
  
   
 [address] [address] [place] [address] Upcoming Health Maintenance Date Due HEMOGLOBIN A1C Q6M 1940 FOOT EXAM Q1 1948 MICROALBUMIN Q1 1948 EYE EXAM RETINAL OR DILATED Q1 1948 DTaP/Tdap/Td series (1 - Tdap) 1959 ZOSTER VACCINE AGE 60> 1998 GLAUCOMA SCREENING Q2Y 2003 OSTEOPOROSIS SCREENING (DEXA) 2003 Pneumococcal 65+ Low/Medium Risk (1 of 2 - PCV13) 2003 MEDICARE YEARLY EXAM 2003 LIPID PANEL Q1 2016 Allergies as of 2018  Review Complete On: 2018 By: Abbey Persaud MD  
  
 Severity Noted Reaction Type Reactions Kenalog [Triamcinolone Acetonide] High 2018    Anaphylaxis Bacitracin    Not Reported This Time Cortisone    Not Reported This Time Erythromycin    Not Reported This Time Lisinopril    Not Reported This Time Lortab [Hydrocodone-acetaminophen]  2015    Rash Methimazole  2017    Other (comments) Neck pain/cough Penicillins    Not Reported This Time Prednisone    Not Reported This Time Propylthiouracil  2017    Other (comments) Dizziness,elevated blood pressure Tetanus Vaccines And Toxoid    Not Reported This Time Current Immunizations  Never Reviewed No immunizations on file. Not reviewed this visit You Were Diagnosed With   
  
 Codes Comments Low back pain without sciatica, unspecified back pain laterality, unspecified chronicity    -  Primary ICD-10-CM: M54.5 ICD-9-CM: 724.2 Lumbosacral spondylosis without myelopathy     ICD-10-CM: G56.071 ICD-9-CM: 721.3 Lumbar neuritis     ICD-10-CM: M54.16 
ICD-9-CM: 724.4 DDD (degenerative disc disease), lumbar     ICD-10-CM: M51.36 
ICD-9-CM: 722.52 Vitals BP Pulse Height(growth percentile) Weight(growth percentile) BMI OB Status 141/67 67 5' 5\" (1.651 m) 207 lb (93.9 kg) 34.45 kg/m2 Hysterectomy Smoking Status Never Smoker Vitals History BMI and BSA Data Body Mass Index Body Surface Area 34.45 kg/m 2 2.08 m 2 Preferred Pharmacy Pharmacy Name Phone [address] [facility] [address] [facility], [address] [facility] Your Updated Medication List  
  
   
This list is accurate as of: 1/29/18 12:04 PM.  Always use your most recent med list.  
  
  
  
  
 aspirin 325 mg tablet Commonly known as:  ASPIRIN Take 325 mg by mouth daily. atenolol 50 mg tablet Commonly known as:  TENORMIN  
TAKE ONE TABLET BY MOUTH TWICE DAILY CALCIUM 600 + D 600-125 mg-unit Tab Generic drug:  calcium-cholecalciferol (d3) Take  by mouth daily. Patient taking 4 times a week CRANBERRY CONC-ASCORBIC ACID PO Take  by mouth. dilTIAZem  mg XR capsule Commonly known as:  DILACOR XR  
TAKE ONE CAPSULE BY MOUTH ONCE DAILY * gabapentin 300 mg capsule Commonly known as:  NEURONTIN Take 300 mg by mouth three (3) times daily. * gabapentin 600 mg tablet Commonly known as:  NEURONTIN Take 1 Tab by mouth three (3) times daily. GLUCOSAMINE 1500 COMPLEX PO Take  by mouth daily. indapamide 2.5 mg tablet Commonly known as:  Dannis Boom Take  by mouth daily. losartan 50 mg tablet Commonly known as:  COZAAR  
TAKE ONE TABLET BY MOUTH ONCE DAILY  
  
 metFORMIN 500 mg Tg24 24 hour tablet Commonly known as:  GLUMETZA ER  
850 mg daily. pravastatin 40 mg tablet Commonly known as:  PRAVACHOL Take 40 mg by mouth daily. PRESERVISION AREDS PO Take  by mouth two (2) times a day. TYLENOL EXTRA STRENGTH 500 mg tablet Generic drug:  acetaminophen Take  by mouth every six (6) hours as needed. VITAMIN D3 1,000 unit Cap Generic drug:  cholecalciferol Take  by mouth daily. * Notice: This list has 2 medication(s) that are the same as other medications prescribed for you. Read the directions carefully, and ask your doctor or other care provider to review them with you. Prescriptions Sent to Pharmacy Refills  
 gabapentin (NEURONTIN) 600 mg tablet 1 Sig: Take 1 Tab by mouth three (3) times daily. Class: Normal  
 Pharmacy: [facility] [address] SARAI LANDERS [address] Ph #: [phone] Route: Oral  
  
We Performed the Following AMB POC XRAY, SPINE, LUMBOSACRAL; 2 O [19728 CPT(R)] REFERRAL TO PHYSICAL THERAPY [FQE76 Custom] Comments:  
 DX:LBP to RLE 
HEP 
LOCATION:In [facility] [address] 
2-3 visits/ 2-3 weeks Follow-up Instructions Return in about 4 weeks (around 2/26/2018). Referral Information Referral ID Referred By Referred To  
  
 [id number] NATHAN OLIVER III Not Available Visits Status Start Date End Date 1 New Request 1/29/18 1/29/19 If your referral has a status of pending review or denied, additional information will be sent to support the outcome of this decision. Introducing [facility]! Dear Angelica Henderson: 
Thank you for requesting a [place] account. Our records indicate that you already have an active [place] account. You can access your account anytime at [email or web address] Did you know that you can access your hospital and ER discharge instructions at any time in [facility]? You can also review all of your test results from your hospital stay or ER visit. Additional Information If you have questions, please visit the Frequently Asked Questions section of the [facility] website at [email or web address] Remember, [facility] is NOT to be used for urgent needs. For medical emergencies, dial 911. Now available from your iPhone and Android! Please provide this summary of care documentation to your next provider. Your primary care clinician is listed as Katerina Zuniga. If you have any questions after today's visit, please call [phone].

## 2018-01-29 NOTE — PROGRESS NOTES
[facility]  [address], [address]  Phone: [phone]  Fax: [phone]        INITIAL CONSULTATION      HISTORY OF PRESENT ILLNESS:  Norris Thomas is a 76 y.o. female whom is referred from Dr. Ananya Lindsay secondary to chronic low back pain extending  into the RLE in a L5/S1 distribution to the ankle x 3 weeks. She rates pain 8/10. Denies injury/trauma. Her pain is aggravated with standing, bending forwards, and extension. Patient additional c/o of right knee pain and she does not have an orthopedist at this time. She also reports numbness on her RUE. Patient had a cortisone injection in the past which she experienced redness and hot breath after the injection. She reports a similar reaction with Prednisone, however they do not appear to be true allergies. Patient takes Neurontin 300 mg TID for her neuropathy. Patient also takes Aspirin for her heart condition as prescribed by her cardiologist. She also treats her pain with Naproxen and extra strength Tylenol with questionable relief. Patient denies a h/o physical therapy/chiropractor. The patient has a history of DM and reports blood sugars are well controlled, consistently remaining below 200. Her last A1C was a 6. Patient denies change in bowel or bladder habits. Patient denies fever, weight loss, or skin changes. The patient is RHD.  reviewed. Body mass index is 34.45 kg/(m^2).         Past Medical History:   Diagnosis Date    Cardiomegaly     Essential hypertension, benign     stable    Hypercholesterolemia     Obesity, unspecified     Pt has weight loss    Other and unspecified hyperlipidemia     Chol 172, ldl 82, hdl 62, tg 141    Type II or unspecified type diabetes mellitus without mention of complication, not stated as uncontrolled           Past Surgical History:   Procedure Laterality Date    HX GYN      hysterectomy         Social History   Substance Use Topics    Smoking status: Never Smoker    Smokeless tobacco: Never Used    Alcohol use No     Work status: N/A  Marital status: The patient is . Current Outpatient Prescriptions   Medication Sig Dispense Refill    gabapentin (NEURONTIN) 600 mg tablet Take 1 Tab by mouth three (3) times daily. 90 Tab 1    losartan (COZAAR) 50 mg tablet TAKE ONE TABLET BY MOUTH ONCE DAILY 90 Tab 3    dilTIAZem XR (DILACOR XR) 120 mg XR capsule TAKE ONE CAPSULE BY MOUTH ONCE DAILY 90 Cap 3    atenolol (TENORMIN) 50 mg tablet TAKE ONE TABLET BY MOUTH TWICE DAILY 180 Tab 3    aspirin (ASPIRIN) 325 mg tablet Take 325 mg by mouth daily.  CRANBERRY CONC-ASCORBIC ACID PO Take  by mouth.  gabapentin (NEURONTIN) 300 mg capsule Take 300 mg by mouth three (3) times daily.  calcium-cholecalciferol, d3, (CALCIUM 600 + D) 600-125 mg-unit tab Take  by mouth daily. Patient taking 4 times a week      Cholecalciferol, Vitamin D3, (VITAMIN D3) 1,000 unit cap Take  by mouth daily.  VIT A/VIT C/VIT E/ZINC/COPPER (PRESERVISION AREDS PO) Take  by mouth two (2) times a day.  metFORMIN (GLUMETZA ER) 500 mg TG24 24 hour tablet 850 mg daily.  pravastatin (PRAVACHOL) 40 mg tablet Take 40 mg by mouth daily.  GLUC SIMPSON/CHONDRO SIMPSON A/VIT C/MN (GLUCOSAMINE 1500 COMPLEX PO) Take  by mouth daily.  acetaminophen (TYLENOL EXTRA STRENGTH) 500 mg tablet Take  by mouth every six (6) hours as needed.  indapamide (LOZOL) 2.5 mg tablet Take  by mouth daily.          Allergies   Allergen Reactions    Kenalog [Triamcinolone Acetonide] Anaphylaxis    Bacitracin Not Reported This Time    Cortisone Not Reported This Time    Erythromycin Not Reported This Time    Lisinopril Not Reported This Time    Lortab [Hydrocodone-Acetaminophen] Rash    Methimazole Other (comments)     Neck pain/cough    Penicillins Not Reported This Time    Prednisone Not Reported This Time    Propylthiouracil Other (comments)     Dizziness,elevated blood pressure  Tetanus Vaccines And Toxoid Not Reported This Time            Family History   Problem Relation Age of Onset    Heart Disease Other      positive history of ischemic heart disease         REVIEW OF SYSTEMS  Constitutional symptoms: Negative  Eyes: Negative  Ears, Nose, Throat, and Mouth: Negative  Cardiovascular: Negative  Respiratory: Negative  Genitourinary: Negative  Integumentary (Skin and/or breast): Negative  Musculoskeletal: Positive for low back pain, RLE, right knee  Extremities: Negative for edema. Endocrine/Rheumatologic: Negative  Hematologic/Lymphatic: Negative  Allergic/Immunologic: Negative  Psychiatric: Negative       PHYSICAL EXAMINATION    Visit Vitals    /67    Pulse 67    Ht 5' 5\" (1.651 m)    Wt 207 lb (93.9 kg)    BMI 34.45 kg/m2       CONSTITUTIONAL: NAD, A&O x 3  HEART: Regular rate and rhythm  ABDOMEN: Positive bowel sounds, soft, nontender, and nondistended  LUNGS: Clear to auscultation bilaterally. SKIN: No rashes noted. RANGE OF MOTION: The patient has full passive range of motion in all four extremities. SENSATION: Sensation is intact to light touch throughout. MOTOR:   Straight Leg Raise: Negative, bilateral  Herrera: Negative, bilateral  Deep tendon reflexes are 1+ at the brachioradialis, biceps, and triceps. Deep tendon reflexes are 0 at the knees and ankles bilaterally. Patient reports right knee pain. I offer to refer her to ortho to further evaluate her right knee, she declined. Shoulder AB/Flex Elbow Flex Wrist Ext Elbow Ext Wrist Flex Hand Intrin Tone   Right +4/5 +4/5 +4/5 +4/5 +4/5 +4/5 +4/5   Left +4/5 +4/5 +4/5 +4/5 +4/5 +4/5 +4/5              Hip Flex Knee Ext Knee Flex Ankle DF GTE Ankle PF Tone   Right +4/5 +4/5 +4/5 +4/5 +4/5 +4/5 +4/5   Left +4/5 +4/5 +4/5 +4/5 +4/5 +4/5 +4/5     RADIOGRAPHS  Preliminary reading of lumbar plain films:  Mild scoliosis convex to the left. Sasabe at L3.  Grade 1 anterolisthesis at L4 on L5 and Grade 1-2 anterolisthesis at L5 on S1. Retrolisthesis at L3 on L4. These are being sent out for official reading by Dr. Hailey Mccann. ASSESSMENT   Diagnoses and all orders for this visit:    1. Low back pain without sciatica, unspecified back pain laterality, unspecified chronicity  -     [68885] L/S 2-3 views  -     REFERRAL TO PHYSICAL THERAPY    2. Lumbosacral spondylosis without myelopathy  -     REFERRAL TO PHYSICAL THERAPY    3. Lumbar neuritis  -     REFERRAL TO PHYSICAL THERAPY    4. DDD (degenerative disc disease), lumbar  -     REFERRAL TO PHYSICAL THERAPY    Other orders  -     gabapentin (NEURONTIN) 600 mg tablet; Take 1 Tab by mouth three (3) times daily. IMPRESSIONS/RECOMMENDATIONS:  Patient present with symptoms consistent with L5/S1 radiculopathy x 3 weeks. Patient reported allergies to Prednisone and cortisone, however they do not appear to be true allergies as the symptoms she experienced appeared to be consistent with an steroid flare. I advised patient to consult with her cardiologist and evaluate her mixing Naproxen and Aspiring. I discussed multiple treatment options. Patient would like to proceed conservatively. I will increase her Neurontin to 600 mg TID. Patient advised to call the office if intolerant to higher dose. I will refer her to physical therapy with an emphasis on HEP. I will see the patient back in 1 month's time or earlier if needed. Written by Denny Reese, as dictated by Casie Urena MD  I examined the patient, reviewed and agree with the note.

## 2018-01-30 NOTE — TELEPHONE ENCOUNTER
Called and left message with patient to change aspirin to 81 mg, d/t increased risk of bleeding taking asa 325 mg and naproxyn. If any questions to call office.

## 2018-01-31 ENCOUNTER — HOSPITAL ENCOUNTER (OUTPATIENT)
Dept: PHYSICAL THERAPY | Age: 78
Discharge: HOME | End: 2018-01-31
Payer: MEDICARE

## 2018-01-31 PROCEDURE — 97110 THERAPEUTIC EXERCISES: CPT | Performed by: PHYSICAL THERAPIST

## 2018-01-31 PROCEDURE — G8979 MOBILITY GOAL STATUS: HCPCS | Performed by: PHYSICAL THERAPIST

## 2018-01-31 PROCEDURE — 97140 MANUAL THERAPY 1/> REGIONS: CPT | Performed by: PHYSICAL THERAPIST

## 2018-01-31 PROCEDURE — G8978 MOBILITY CURRENT STATUS: HCPCS | Performed by: PHYSICAL THERAPIST

## 2018-01-31 PROCEDURE — 97162 PT EVAL MOD COMPLEX 30 MIN: CPT | Performed by: PHYSICAL THERAPIST

## 2018-01-31 NOTE — PROGRESS NOTES
[facility]              [address] [facility]        [place], [address] [place]   [phone] fax    Plan of Care/ Statement of Necessity for Physical Therapy Services    Patient name: Flori Clemens Start of Care: 2018   Referral source: Kate Swenson MD : 1940    Medical Diagnosis: Lumbar pain [M54.5]  Other intervertebral disc degeneration, lumbar region [M51.36]   Onset Date:~1 month ago    Treatment Diagnosis: L sciatica   Prior Hospitalization: see medical history Provider#: [id number]   Medications: Verified on Patient summary List    Comorbidities: allergies, arthritis, back pain. BMI > 30, CHF or heart disease, DM, HBP, prior surgery, stroke/TIA   Prior Level of Function: Hx of LBP, lives w/ daughter, (I) w/ ADLs, no pain down the R LE, hx of neuropathy      The Plan of Care and following information is based on the information from the initial evaluation. Assessment/ key information: Pt is a 77 y/o female w/ c/o increased pain in the low back and R hip traveling down the lateral R leg to just above the R ankle. Reports this began about 1 month ago w/o known DIANA. Denies n/t but notes a slight change in sensation just above the R lateral ankle. Reports pain increases w/ getting up from sitting, bending, and lifting. Pain decreases w/ sitting got ~30 minutes and heat. Reports x-rays show DDD, arthritis, and scoliosis. Denies falls. Pt reports he daughter lives w/ her and can help with ADLs if necessary. Pt presents w/ mild compensated trendelenburg gait, slowed stephanie and no AD. Lumbar AROM WFL w/ pain reported when returning from [facility] and pain w/ R SB. SLS L for 15 seconds w/ 1 HHA, Unable to stand SLS R w/o 2 HHA. MMT R hip flex -4/5, abd 2+/5, ext NT, IR 2/5, ER 3+/5, knee flex -4/5, ext 4+/5; L hip flex 4/5, abd -4/5, ER 4/5, IR 3+/5, knee flex 4/5, ext 4+/5. - slump B, - SLR B, - 90/90 B, mild pain w/ supine bridge.  Noted R LE ER w/ laying supine. Pt will benefit from skilled PT to address the deficits and progress with pt goals. Evaluation Complexity History MEDIUM  Complexity : 1-2 comorbidities / personal factors will impact the outcome/ POC ; Examination MEDIUM Complexity : 3 Standardized tests and measures addressing body structure, function, activity limitation and / or participation in recreation  ;Presentation MEDIUM Complexity : Evolving with changing characteristics  ; Clinical Decision Making MEDIUM Complexity : FOTO score of 26-74  Overall Complexity Rating: MEDIUM  Problem List: pain affecting function, decrease ROM, decrease strength, impaired gait/ balance, decrease ADL/ functional abilitiies, decrease activity tolerance, decrease flexibility/ joint mobility and decrease transfer abilities   Treatment Plan may include any combination of the following: Therapeutic exercise, Therapeutic activities, Neuromuscular re-education, Physical agent/modality, Gait/balance training, Manual therapy, Patient education, Functional mobility training and Stair training  Patient / Family readiness to learn indicated by: asking questions, trying to perform skills and interest  Persons(s) to be included in education: patient (P)  Barriers to Learning/Limitations: None  Patient Goal (s): stop the pain  Patient Self Reported Health Status: good  Rehabilitation Potential: good    Short Term Goals: To be accomplished in 1 weeks:  1. Pt will be independent and complaint w/ HEP to progress gains in PT. Long Term Goals: To be accomplished in 6 weeks:  1. Pt will improve FOTO to > or = to 55 to demo improved function. 2. Pt will increase MMT R hip grossly to > or = to 4/5 for improved stability w/ standing up. 3. Pt will increase core activation as demo'd by supine bridge w/no pain x15 for ease w/ bending to  objects from floor. 4. Pt will report > or = to 60% improvement in symptoms for ease w/ return to pain free ADLs.      Frequency / Duration: Patient to be seen 2 times per week for 6 weeks. Patient/ Caregiver education and instruction: Diagnosis, prognosis, activity modification and exercises   [x]  Plan of care has been reviewed with PTA    G-Codes (GP)  Mobility   Current  CL= 60-79%   Goal  CK= 40-59%    The severity rating is based on clinical judgment and the FOTO score. Certification Period: 1/31/18 - 4/28/18  Sherita Ware, PT 1/31/2018 10:47 AM  ________________________________________________________________________    I certify that the above Therapy Services are being furnished while the patient is under my care. I agree with the treatment plan and certify that this therapy is necessary.     [de-identified] Signature:____________________  Date:____________Time: _________    Please sign and return to [facility]              [address] [facility]        [place], [address] [place]   [phone] fax

## 2018-01-31 NOTE — PROGRESS NOTES
PT DAILY TREATMENT NOTE - [facility]     Patient Name: Guille Silva  Date:2018  : 1940  [x]  Patient  Verified  Payor: [place] MEDICARE / Plan: [place] MEDICARE PART A & B / Product Type: Medicare /    In time:934  Out time:1013  Total Treatment Time (min): 39  Total Timed Codes (min): 23  1:1 Treatment Time ( [address] only): 39   Visit #: 1 of 12    Treatment Area: Lumbar pain [M54.5]  Other intervertebral disc degeneration, lumbar region [M51.36]    SUBJECTIVE  Pain Level (0-10 scale): 4/10  Any medication changes, allergies to medications, adverse drug reactions, diagnosis change, or new procedure performed?: [x] No    [] Yes (see summary sheet for update)  Subjective functional status/changes:   [] No changes reported  HPI: Pt c/o increased pain in the low back and R hip traveling down the lateral R leg to just above the R ankle. Reports this began about 1 month ago w/o known DIANA. Denies n/t but notes a slight change in sensation just above the R lateral ankle. Reports pain increases w/ getting up from sitting, bending, and lifting. Pain decreases w/ sitting got ~30 minutes and heat. Reports x-rays show DDD, arthritis, and scoliosis. Denies falls. Pt reports he daughter lives w/ her and can help with ADLs if necessary.      OBJECTIVE    Modality rationale: decrease pain and increase tissue extensibility to improve the patients ability to improve mobility and function   Min Type Additional Details    [] Estim:  []Unatt       []IFC  []Premod                        []Other:  []w/ice   []w/heat  Position:  Location:    [] Estim: []Att    []TENS instruct  []NMES                    []Other:  []w/US   []w/ice   []w/heat  Position:  Location:    []  Traction: [] Cervical       []Lumbar                       [] Prone          []Supine                       []Intermittent   []Continuous Lbs:  [] before manual  [] after manual    []  Ultrasound: []Continuous   [] Pulsed                           []1MHz   []3MHz W/cm2:  Location:    []  Iontophoresis with dexamethasone         Location: [] Take home patch   [] In clinic   10 w/ HEP instruction []  Ice     [x]  heat  []  Ice massage  []  Laser   []  Anodyne Position: supine  Location: low back and buttock    []  Laser with stim  []  Other:  Position:  Location:    []  Vasopneumatic Device Pressure:       [] lo [] med [] hi   Temperature: [] lo [] med [] hi   [] Skin assessment post-treatment:  []intact []redness- no adverse reaction    []redness  adverse reaction:     16 min [x]Eval                  []Re-Eval       12 min Therapeutic Exercise:  [] See flow sheet : instructed in and demo'd HEP, educated in mechanics and importance of core activation and stability    Rationale: increase ROM, increase strength, improve coordination and increase proprioception to improve the patients ability to decrease pain and improve activity tolerance     min Therapeutic Activity:  []  See flow sheet :   Rationale:   to improve the patients ability to       min Neuromuscular Re-education:  []  See flow sheet :   Rationale:   to improve the patients ability to     11 min Manual Therapy:  TPR/DTM to R hip ER's, piriformis, upper glutes, QL and paraspinals in s/l   Rationale: decrease pain, increase ROM, increase tissue extensibility and decrease trigger points to improve mobility and activity tolerance      min Gait Training:  ___ feet with ___ device on level surfaces with ___ level of assist   Rationale: With   [] TE   [] TA   [] neuro   [] other: Patient Education: [x] Review HEP    [] Progressed/Changed HEP based on:   [] positioning   [] body mechanics   [] transfers   [] heat/ice application    [] other:      Other Objective/Functional Measures: Pt presents w/ mild compensated trendelenburg gait, slowed stephanie and no AD. Lumbar AROM WFL w/ pain reported when returning from FF and pain w/ R SB. SLS L for 15 seconds w/ 1 HHA, Unable to stand SLS R w/o 2 HHA.  MMT R hip flex -4/5, abd 2+/5, ext NT, IR 2/5, ER 3+/5, knee flex -4/5, ext 4+/5; L hip flex 4/5, abd -4/5, ER 4/5, IR 3+/5, knee flex 4/5, ext 4+/5. - slump B, - SLR B, - 90/90 B, mild pain w/ supine bridge. Noted R LE ER w/ laying supine. Pain Level (0-10 scale) post treatment: 0/10    ASSESSMENT/Changes in Function: Focus on improving R ER flexibility and strength in the R IR's. Increase core activation and strength to improve stability and bending tolerance. Patient will continue to benefit from skilled PT services to modify and progress therapeutic interventions, address functional mobility deficits, address ROM deficits, address strength deficits, analyze and address soft tissue restrictions, analyze and cue movement patterns, analyze and modify body mechanics/ergonomics, address imbalance/dizziness and instruct in home and community integration to attain remaining goals. [x]  See Plan of Care  []  See progress note/recertification  []  See Discharge Summary         Progress towards goals / Updated goals:  Short Term Goals: To be accomplished in 1 weeks:  1. Pt will be independent and complaint w/ HEP to progress gains in PT. At eval: initiated HEP  Long Term Goals: To be accomplished in 6 weeks:  1. Pt will improve FOTO to > or = to 55 to demo improved function. At eval: FOTO = 35  2. Pt will increase MMT R hip grossly to > or = to 4/5 for improved stability w/ standing up. At eval: MMT R hip flex -4/5, abd 2+/5, ext NT, IR 2/5, ER 3+/5  3. Pt will increase core activation as demo'd by supine bridge w/no pain x15 for ease w/ bending to  objects from floor. At eval: mild pain w/ supine bridge x1  4. Pt will report > or = to 60% improvement in symptoms for ease w/ return to pain free ADLs.    At eval: 0%    PLAN  []  Upgrade activities as tolerated     []  Continue plan of care  []  Update interventions per flow sheet       []  Discharge due to:_  [x]  Other: 2x/6 weeks      Elena Wallace PT 2018  10:17 AM    Future Appointments  Date Time Provider Merlin Rosario   2018 3:30 PM Riky Dao, PTA [facility] [address]   2018 2:00 PM [address] PT [place] [facility] [address]   2018 2:00 PM Aurea Milian [facility] [address]   2018 10:30 AM Gissel Clark [facility] [address]   2018 3:00 PM Riky Dao, PTA [facility] [address]   2018 12:00 PM [address] PT [place] 1 [facility] [address]   2018 12:00 PM [address] PT [place] 1 [facility] [address]   2018 10:15 AM [address] [place] [address] Street, MD JENNIFER [facility]ORGE SCHED   2018 10:30 AM Riky Dao, PTA [facility] [address]   3/1/2018 1:30 PM Riky Dao, PTA [facility] [address]   3/6/2018 10:30 AM Riky Dao, PTA [facility] [address]   3/8/2018 1:00 PM Riky Dao, PTA [facility] [address]   2018 10:00 AM Stuart Carlos MD [id number] [place] [address]

## 2018-02-01 ENCOUNTER — HOSPITAL ENCOUNTER (OUTPATIENT)
Dept: PHYSICAL THERAPY | Age: 78
Discharge: HOME | End: 2018-02-01
Payer: MEDICARE

## 2018-02-01 PROCEDURE — 97110 THERAPEUTIC EXERCISES: CPT

## 2018-02-01 PROCEDURE — 97140 MANUAL THERAPY 1/> REGIONS: CPT

## 2018-02-01 NOTE — PROGRESS NOTES
PT DAILY TREATMENT NOTE - [facility]     Patient Name: Ian Maher  Date:2018  : 1940  [x]  Patient  Verified  Payor: VA MEDICARE / Plan: VA MEDICARE PART A & B / Product Type: Medicare /    In time:3:22  Out time:4:15  Total Treatment Time (min): 53  Total Timed Codes (min): 43  1:1 Treatment Time ( [address] only): 45   Visit #: 2 of 12    Treatment Area: Lumbar pain [M54.5]  Other intervertebral disc degeneration, lumbar region [M51.36]    SUBJECTIVE  Pain Level (0-10 scale): 7-8  Any medication changes, allergies to medications, adverse drug reactions, diagnosis change, or new procedure performed?: [x] No    [] Yes (see summary sheet for update)  Subjective functional status/changes:   [] No changes reported  Pt reports that she has been doing her home exercises. OBJECTIVE    Modality rationale: decrease pain to improve the patients ability to decrease difficulty while performing tasks.     Min Type Additional Details    [] Estim:  []Unatt       []IFC  []Premod                        []Other:  []w/ice   []w/heat  Position:  Location:    [] Estim: []Att    []TENS instruct  []NMES                    []Other:  []w/US   []w/ice   []w/heat  Position:  Location:    []  Traction: [] Cervical       []Lumbar                       [] Prone          []Supine                       []Intermittent   []Continuous Lbs:  [] before manual  [] after manual    []  Ultrasound: []Continuous   [] Pulsed                           []1MHz   []3MHz W/cm2:  Location:    []  Iontophoresis with dexamethasone         Location: [] Take home patch   [] In clinic   10 []  Ice     [x]  heat  []  Ice massage  []  Laser   []  Anodyne Position: sitting  Location:back     []  Laser with stim  []  Other:  Position:  Location:    []  Vasopneumatic Device Pressure:       [] lo [] med [] hi   Temperature: [] lo [] med [] hi   [] Skin assessment post-treatment:  []intact []redness- no adverse reaction    []redness  adverse reaction: 35 min Therapeutic Exercise:  [x] See flow sheet :   Rationale: increase ROM and increase strength to improve the patients ability to increase tolerance to activities. 8 min Manual Therapy:  TPR/DTM to R hip ER's, piriformis, upper glutes, QL and paraspinals in s/l   Rationale: decrease pain, increase ROM, increase tissue extensibility and decrease trigger points to increase ease with ADLs. With   [] TE   [] TA   [] neuro   [] other: Patient Education: [x] Review HEP    [] Progressed/Changed HEP based on:   [] positioning   [] body mechanics   [] transfers   [] heat/ice application    [] other:      Other Objective/Functional Measures: Pt reports performing HEP. Pain Level (0-10 scale) post treatment: 0    ASSESSMENT/Changes in Function: Initiated exercises per POC. Patient will continue to benefit from skilled PT services to modify and progress therapeutic interventions, address functional mobility deficits, address ROM deficits, address strength deficits and analyze and address soft tissue restrictions to attain remaining goals. []  See Plan of Care  []  See progress note/recertification  []  See Discharge Summary         Progress towards goals / Updated goals:  Short Term Goals: To be accomplished in 1 weeks:  1. Pt will be independent and complaint w/ HEP to progress gains in PT. At eval: initiated HEP  Current: Goal met: Pt reports performing HEP. 2/1/18  Long Term Goals: To be accomplished in 6 weeks:  1. Pt will improve FOTO to > or = to 55 to demo improved function. At eval: FOTO = 35  2. Pt will increase MMT R hip grossly to > or = to 4/5 for improved stability w/ standing up. At eval: MMT R hip flex -4/5, abd 2+/5, ext NT, IR 2/5, ER 3+/5  3. Pt will increase core activation as demo'd by supine bridge w/no pain x15 for ease w/ bending to  objects from floor. At eval: mild pain w/ supine bridge x1  4.  Pt will report > or = to 60% improvement in symptoms for ease w/ return to pain free ADLs.    At eval: 0%    PLAN  []  Upgrade activities as tolerated     [x]  Continue plan of care  []  Update interventions per flow sheet       []  Discharge due to:_  []  Other:_      Calli Benoit PTA 2/1/2018  3:27 PM    Future Appointments  Date Time Provider Merlin Marlene   2/1/2018 3:30 PM Calli Benoit PTA [facility]   2/7/2018 2:00 PM [facility] PT [place] 1 [facility]   2/9/2018 2:00 PM Constantino Cartwright [facility]   2/13/2018 10:30 AM Gissel Clark [facility]   2/16/2018 3:00 PM Calli Benoit PTA [facility]   2/19/2018 12:00 PM [facility] PT [place] 1 [facility]   2/21/2018 12:00 PM [facility] PT [place] 1 [facility]   2/26/2018 10:15 AM Aneesh Sharma MD [facility]   2/27/2018 10:30 AM Calli Benoit PTA [facility]   3/1/2018 1:30 PM Calli Benoit PTA [facility]   3/6/2018 10:30 AM Calli Benoit PTA [facility]   3/8/2018 1:00 PM Calli Benoit PTA [facility]   7/2/2018 10:00 AM Oleksandr Shannon MD [id number] [place] [address]

## 2018-02-07 ENCOUNTER — HOSPITAL ENCOUNTER (OUTPATIENT)
Dept: PHYSICAL THERAPY | Age: 78
Discharge: HOME | End: 2018-02-07
Payer: MEDICARE

## 2018-02-07 PROCEDURE — 97140 MANUAL THERAPY 1/> REGIONS: CPT

## 2018-02-07 PROCEDURE — 97110 THERAPEUTIC EXERCISES: CPT

## 2018-02-07 NOTE — PROGRESS NOTES
PT DAILY TREATMENT NOTE - [facility]     Patient Name: Babak Leyva  Date:2018  : 1940  [x]  Patient  Verified  Payor: VA MEDICARE / Plan: VA MEDICARE PART A & B / Product Type: Medicare /    In time:1:50  Out time:2:40  Total Treatment Time (min): 50  Total Timed Codes (min): 50  1:1 Treatment Time ( [address] only): 50   Visit #: 3 of 12    Treatment Area: Lumbar pain [M54.5]  Other intervertebral disc degeneration, lumbar region [M51.36]    SUBJECTIVE  Pain Level (0-10 scale): /10  Any medication changes, allergies to medications, adverse drug reactions, diagnosis change, or new procedure performed?: [x] No    [] Yes (see summary sheet for update)  Subjective functional status/changes:   [] No changes reported  \"I'm doing good. \"    OBJECTIVE    40 min Therapeutic Exercise:  [] See flow sheet :   Rationale: increase ROM and increase strength to improve the patients ability to perform ADLs without difficulty. 10 min Manual Therapy:  STM, TPR to right piriformis and left S/L position. Rationale: decrease pain, increase ROM, increase tissue extensibility and decrease trigger points to perform ADLs without difficulty. With   [] TE   [] TA   [] neuro   [] other: Patient Education: [x] Review HEP    [] Progressed/Changed HEP based on:   [] positioning   [] body mechanics   [] transfers   [] heat/ice application    [] other:      Other Objective/Functional Measures:      Pain Level (0-10 scale) post treatment: 1/10    ASSESSMENT/Changes in Function: Continued with current ex. Per flow sheet. No p! Was reported during therapy. Pt. Reported a decrease in pain after therapy. Patient will continue to benefit from skilled PT services to modify and progress therapeutic interventions, address functional mobility deficits, address ROM deficits, address strength deficits and analyze and address soft tissue restrictions to attain remaining goals.      []  See Plan of Care  []  See progress note/recertification  []  See Discharge Summary         Progress towards goals / Updated goals:    Short Term Goals: To be accomplished in 1 weeks:  1. Pt will be independent and complaint w/ HEP to progress gains in PT. At eval: initiated HEP  Current: Goal met: Pt reports performing HEP. 2/1/18  Long Term Goals: To be accomplished in 6 weeks:  1. Pt will improve FOTO to > or = to 55 to demo improved function. At eval: FOTO = 35  2. Pt will increase MMT R hip grossly to > or = to 4/5 for improved stability w/ standing up. At eval: MMT R hip flex -4/5, abd 2+/5, ext NT, IR 2/5, ER 3+/5  3. Pt will increase core activation as demo'd by supine bridge w/no pain x15 for ease w/ bending to  objects from floor. At eval: mild pain w/ supine bridge x1  4. Pt will report > or = to 60% improvement in symptoms for ease w/ return to pain free ADLs.    At eval: 0%     PLAN  [x]  Upgrade activities as tolerated     [x]  Continue plan of care  []  Update interventions per flow sheet       []  Discharge due to:_  []  Other:_      Amelia Iyer PTA 2/7/2018  1:40 PM    Future Appointments  Date Time Provider Merlin Rosario   2/7/2018 2:00 PM [facility] PT [place] [id number] [facility]   2/9/2018 2:00 PM Kayode Carlos [facility]   2/13/2018 10:30 AM Gissel Clark [facility]   2/16/2018 3:00 PM Demarcus Jordan PTA [facility]   2/19/2018 12:00 PM [facility] PT [place] 1 [facility]   2/21/2018 12:00 PM [facility] PT [place] 1 [facility]   2/26/2018 10:15 AM Sharmila [place] MD [facility]   2/27/2018 10:30 AM Demarcus Jordan, PTA [facility]   3/1/2018 1:30 PM Demarcus Jordan, PTA [facility]   3/6/2018 10:30 AM Demarcus Jordan, PTA [facility]   3/8/2018 1:00 PM Demarcus Jordan PTA [facility]   7/2/2018 10:00 AM Yara Tarango MD [address] [place] [address]

## 2018-02-09 ENCOUNTER — HOSPITAL ENCOUNTER (OUTPATIENT)
Dept: PHYSICAL THERAPY | Age: 78
Discharge: HOME | End: 2018-02-09
Payer: MEDICARE

## 2018-02-09 PROCEDURE — 97140 MANUAL THERAPY 1/> REGIONS: CPT

## 2018-02-09 PROCEDURE — 97112 NEUROMUSCULAR REEDUCATION: CPT

## 2018-02-09 PROCEDURE — 97110 THERAPEUTIC EXERCISES: CPT

## 2018-02-09 NOTE — PROGRESS NOTES
PT DAILY TREATMENT NOTE - [facility]     Patient Name: Flori Clemens  Date:2018  : 1940  [x]  Patient  Verified  Payor: Jovita Seo / Plan: VA MEDICARE PART A & B / Product Type: Medicare /    In time:200  Out time:238  Total Treatment Time (min): 38  Total Timed Codes (min): 38  1:1 Treatment Time ( [address] only): 45   Visit #: 4 of 12    Treatment Area: Lumbar pain [M54.5]  Other intervertebral disc degeneration, lumbar region [M51.36]    SUBJECTIVE  Pain Level (0-10 scale): 5  Any medication changes, allergies to medications, adverse drug reactions, diagnosis change, or new procedure performed?: [x] No    [] Yes (see summary sheet for update)  Subjective functional status/changes:   [] No changes reported  Patient reports discomfort primarily localized along the R posterolateral gluteal region. Patient reports noting a headache after completion of within clinic treatments with headaches reported to last ~3 hours. OBJECTIVE    10 min Therapeutic Exercise:  [x] See flow sheet : Emphasis placed on improving available hip and lumbar AROM and strength of the gluteal musculature   Rationale: increase ROM and increase strength to improve the patients ability to improve ease with household ADLs    20 min Neuromuscular Re-education:  [x]  See flow sheet : Emphasis placed on improving anterior abdominal musculature recruitment and pelvic proprioceptive awareness   Rationale: increase ROM, increase strength, improve balance and increase proprioception  to improve the patients ability to improve ease with community ambulation. 8 min Manual Therapy:    Supine, R Hip Lateral Grade III/IV Mobilization (w/ belt)  Supine, R Hip AP Grade IV Mobilization (inc deg adduction)   Rationale: decrease pain, increase ROM and increase tissue extensibility to improve ease with stair management.           With   [] TE   [] TA   [] neuro   [] other: Patient Education: [x] Review HEP    [] Progressed/Changed HEP based on:   [] positioning   [] body mechanics   [] transfers   [] heat/ice application    [] other:      Pain Level (0-10 scale) post treatment: 0    ASSESSMENT/Changes in Function: Patient noted with a (+) R hip MAVIS and FADDIR test with initiation of R hip mobilizations secondarily. Secondary to patient subjective report of pain currently experienced with completion of R piriformis stretching independently (adduction/flexion/IR) patient provided with modified stretch to improve tolerance. Patient demonstrates ability to perform supine bridge throughout 75% of available AROM without discomfort. Patient will continue to benefit from skilled PT services to modify and progress therapeutic interventions, address functional mobility deficits, address ROM deficits, address strength deficits, analyze and address soft tissue restrictions, analyze and cue movement patterns and analyze and modify body mechanics/ergonomics to attain remaining goals. []  See Plan of Care  []  See progress note/recertification  []  See Discharge Summary         Progress towards goals / Updated goals:    Short Term Goals: To be accomplished in 1 weeks:  1. Pt will be independent and complaint w/ HEP to progress gains in PT. At eval: initiated HEP  Current: Goal met: Pt reports performing HEP. 2/1/18  Long Term Goals: To be accomplished in 6 weeks:  1. Pt will improve FOTO to > or = to 55 to demo improved function. At eval: FOTO = 35  2. Pt will increase MMT R hip grossly to > or = to 4/5 for improved stability w/ standing up. At eval: MMT R hip flex -4/5, abd 2+/5, ext NT, IR 2/5, ER 3+/5  3. Pt will increase core activation as demo'd by supine bridge w/no pain x15 for ease w/ bending to  objects from floor. At eval: mild pain w/ supine bridge x1  Current: Progressing, Supine bridge x15 throughout 75% of available AROM without pain reproduction, 2/9/2018  4.  Pt will report > or = to 60% improvement in symptoms for ease w/ return to pain free ADLs.    At eval: 0%    PLAN  [x]  Upgrade activities as tolerated     [x]  Continue plan of care  []  Update interventions per flow sheet       []  Discharge due to:_  []  Other:_      Yecenia Felix, PT 2/9/2018  7:01 AM    Future Appointments  Date Time Provider Merlin Rosario   2/9/2018 2:00 PM Yecenia Felix, PT [facility]   2/13/2018 10:30 AM Shaila Grew [facility]   2/16/2018 3:00 PM Cristina Beverage, PTA [facility]   2/19/2018 12:00 PM [facility] PT [place] [facility]   2/21/2018 12:00 PM [facility] PT [place] [facility]   2/26/2018 10:15 AM Raul Hodges MD [place] [facility] SCHED   2/27/2018 10:30 AM Cristina Beverage, PTA [facility]   3/1/2018 1:30 PM Cristina Beverage, PTA [facility]   3/6/2018 10:30 AM Cristina Beverage, PTA [facility]   3/8/2018 1:00 PM Cristina Beverage, PTA [facility]   7/2/2018 10:00 AM Chey Lockett MD [id number] [place] [address]

## 2018-02-13 ENCOUNTER — HOSPITAL ENCOUNTER (OUTPATIENT)
Dept: PHYSICAL THERAPY | Age: 78
Discharge: HOME | End: 2018-02-13
Payer: MEDICARE

## 2018-02-13 PROCEDURE — 97140 MANUAL THERAPY 1/> REGIONS: CPT

## 2018-02-13 PROCEDURE — 97112 NEUROMUSCULAR REEDUCATION: CPT

## 2018-02-13 PROCEDURE — 97110 THERAPEUTIC EXERCISES: CPT

## 2018-02-13 NOTE — PROGRESS NOTES
PT DAILY TREATMENT NOTE - [facility]-16    Patient Name: Lioc Peres  Date:2018  : 1940  [x]  Patient  Verified  Payor: Vinicius Corona / Plan: [place] MEDICARE PART A & B / Product Type: Medicare /    In time:10:51  Out time:11:29  Total Treatment Time (min): 38  Total Timed Codes (min): 38  1:1 Treatment Time ( [address] only): 45   Visit #: 5 of 12    Treatment Area: Lumbar pain [M54.5]  Other intervertebral disc degeneration, lumbar region [M51.36]    SUBJECTIVE  Pain Level (0-10 scale): 3  Any medication changes, allergies to medications, adverse drug reactions, diagnosis change, or new procedure performed?: [x] No    [] Yes (see summary sheet for update)  Subjective functional status/changes:   [] No changes reported  \"Back is feeling pretty good today. \"    Patient reports that she had a reaction to Keflex, which she has been taking for her UTI, to include hives on her legs and headache pressure. She called MD yesterday and was told to stop taking medication. Presents with visible hives on legs today, though, per patient, report, \"it's much better. \"    OBJECTIVE  20 min Therapeutic Exercise:  [x] See flow sheet :   Rationale: increase ROM, increase strength and improve coordination to improve the patients ability to increase ease with ADLs    10 min Neuromuscular Re-education:  [x]  See flow sheet : pelvic awareness activities   Rationale: increase strength, improve coordination, improve balance and increase proprioception  to improve the patients ability to improve pelvic awareness and ease with ADLs     8 min Manual Therapy:  TPR/DTM to right hip external rotators, piriformis, QL and l/s paraspinals   Rationale: decrease pain, increase ROM and increase tissue extensibility to ease ADL tolerance    With   [] TE   [] TA   [] neuro   [] other: Patient Education: [x] Review HEP    [] Progressed/Changed HEP based on:   [] positioning   [] body mechanics   [] transfers   [] heat/ice application    [] other: Other Objective/Functional Measures:   Fair stability with static balance void of visual input     Pain Level (0-10 scale) post treatment: 0    ASSESSMENT/Changes in Function:   Patient reports subjective improvement and pain is low today. Presents with good carryover of cues as evidenced with able to maintain PPT with supine activities. Patient will continue to benefit from skilled PT services to modify and progress therapeutic interventions, address functional mobility deficits, address ROM deficits, address strength deficits, analyze and address soft tissue restrictions, analyze and cue movement patterns, analyze and modify body mechanics/ergonomics and assess and modify postural abnormalities to attain remaining goals. []  See Plan of Care  []  See progress note/recertification  []  See Discharge Summary         Short Term Goals: To be accomplished in 1 weeks:  1. Pt will be independent and complaint w/ HEP to progress gains in PT. At eval: initiated HEP  Current: Goal met: Pt reports performing HEP. 2/1/18  Long Term Goals: To be accomplished in 6 weeks:  1. Pt will improve FOTO to > or = to 55 to demo improved function. At eval: FOTO = 35  2. Pt will increase MMT R hip grossly to > or = to 4/5 for improved stability w/ standing up. At eval: MMT R hip flex -4/5, abd 2+/5, ext NT, IR 2/5, ER 3+/5    3. Pt will increase core activation as demo'd by supine bridge w/no pain x15 for ease w/ bending to  objects from floor. At eval: mild pain w/ supine bridge x1  Current: Progressing, Supine bridge x15 throughout 75% of available AROM without pain reproduction, 2/9/2018  4. Pt will report > or = to 60% improvement in symptoms for ease w/ return to pain free ADLs.    At eval: 0%       PLAN  []  Upgrade activities as tolerated     [x]  Continue plan of care  []  Update interventions per flow sheet       []  Discharge due to:_  []  Other:_      Nallely Pitts 2/13/2018  10:53 AM    Future Appointments  Date Time Provider Merlin Rosario   2/13/2018 11:00 AM Мария Perez [facility]   2/16/2018 3:00 PM Lucy Spivey PTA [facility]   2/19/2018 12:00 PM [facility] PT [place] [facility]   2[facility]21/2018 12:00 PM [facility] PT [place] [facility]   2/26/2018 10:15 AM Monse Ibarra MD [facility]   2/27/2018 10:30 AM Lucy Spivey PTA [facility]   3/1/2018 1:30 PM Lucy Spivey PTA [facility]   3/6/2018 10:30 AM Lucy Spivey PTA [facility]   3/8/2018 1:00 PM Lucy Spivey PTA [facility]   7/2/2018 10:00 AM Elke Licea MD [id number] [place] [address]

## 2018-02-16 ENCOUNTER — HOSPITAL ENCOUNTER (OUTPATIENT)
Dept: PHYSICAL THERAPY | Age: 78
Discharge: HOME | End: 2018-02-16
Payer: MEDICARE

## 2018-02-16 PROCEDURE — 97110 THERAPEUTIC EXERCISES: CPT

## 2018-02-16 PROCEDURE — 97140 MANUAL THERAPY 1/> REGIONS: CPT

## 2018-02-16 NOTE — PROGRESS NOTES
PT DAILY TREATMENT NOTE - [facility]     Patient Name: Coby Serna  Date:2018  : 1940  [x]  Patient  Verified  Payor: Catarina Xavier / Plan: [place] MEDICARE PART A & B / Product Type: Medicare /    In time:2:47  Out time:3:45  Total Treatment Time (min): 58  Total Timed Codes (min): 58  1:1 Treatment Time ( [address] only): 40   Visit #: 6 of 12    Treatment Area: Lumbar pain [M54.5]  Other intervertebral disc degeneration, lumbar region [M51.36]    SUBJECTIVE  Pain Level (0-10 scale): 6  Any medication changes, allergies to medications, adverse drug reactions, diagnosis change, or new procedure performed?: [x] No    [] Yes (see summary sheet for update)  Subjective functional status/changes:   [] No changes reported  Pt reports she has been sore since last therapy session.      OBJECTIVE        Min Type Additional Details    [] Estim:  []Unatt       []IFC  []Premod                        []Other:  []w/ice   []w/heat  Position:  Location:    [] Estim: []Att    []TENS instruct  []NMES                    []Other:  []w/US   []w/ice   []w/heat  Position:  Location:    []  Traction: [] Cervical       []Lumbar                       [] Prone          []Supine                       []Intermittent   []Continuous Lbs:  [] before manual  [] after manual    []  Ultrasound: []Continuous   [] Pulsed                           []1MHz   []3MHz W/cm2:  Location:    []  Iontophoresis with dexamethasone         Location: [] Take home patch   [] In clinic    []  Ice     []  heat  []  Ice massage  []  Laser   []  Anodyne Position:  Location:    []  Laser with stim  []  Other:  Position:  Location:    []  Vasopneumatic Device Pressure:       [] lo [] med [] hi   Temperature: [] lo [] med [] hi   [] Skin assessment post-treatment:  []intact []redness- no adverse reaction    []redness  adverse reaction:           50 min Therapeutic Exercise:  [x] See flow sheet :   Rationale: increase ROM and increase strength to improve the patients ability to increase tolerance to activities.          8 min Manual Therapy:  TPR/DTM to R hip ER's, piriformis, upper glutes, QL and paraspinals in s/l   Rationale: decrease pain, increase ROM, increase tissue extensibility and decrease trigger points to increase ease with ADLs. With   [] TE   [] TA   [] neuro   [] other: Patient Education: [x] Review HEP    [] Progressed/Changed HEP based on:   [] positioning   [] body mechanics   [] transfers   [] heat/ice application    [] other:      Other Objective/Functional Measures: FOTO = 44, increased by 9 since [facility]. Pain Level (0-10 scale) post treatment: 0    ASSESSMENT/Changes in Function: continue to progress pt as tolerated. Patient will continue to benefit from skilled PT services to modify and progress therapeutic interventions, address functional mobility deficits, address ROM deficits, address strength deficits and analyze and address soft tissue restrictions to attain remaining goals. []  See Plan of Care  []  See progress note/recertification  []  See Discharge Summary         Progress towards goals / Updated goals:  Short Term Goals: To be accomplished in 1 weeks:  1. Pt will be independent and complaint w/ HEP to progress gains in PT. At eval: initiated HEP  Current: Goal met: Pt reports performing HEP. 2/1/18  Long Term Goals: To be accomplished in 6 weeks:  1. Pt will improve FOTO to > or = to 55 to demo improved function. At eval: FOTO = 35  Current: Progressing; FOTO = 44, increased by 9 since [facility]. 2/16  2. Pt will increase MMT R hip grossly to > or = to 4/5 for improved stability w/ standing up. At eval: MMT R hip flex -4/5, abd 2+/5, ext NT, IR 2/5, ER 3+/5  3. Pt will increase core activation as demo'd by supine bridge w/no pain x15 for ease w/ bending to  objects from floor. At eval: mild pain w/ supine bridge x1  4.  Pt will report > or = to 60% improvement in symptoms for ease w/ return to pain free ADLs.   At eval: 0%       PLAN  []  Upgrade activities as tolerated     [x]  Continue plan of care  []  Update interventions per flow sheet       []  Discharge due to:_  []  Other:_      Andrei Eden PTA 2/16/2018  4:15 PM    Future Appointments  Date Time Provider Merlin Rosario   2/19/2018 12:00 PM [facility] PT [place] [id number] [facility]   2/21/2018 12:00 PM [facility] PT [place] [address] [facility]   2/26/2018 10:15 AM Colin Mays MD [facility]   2/27/2018 10:30 AM Andrei Eden PTA [facility]   3/1/2018 1:30 PM Andrei Eden PTA [facility]   3/6/2018 10:30 AM Andrei Eden PTA [facility]   3/8/2018 1:00 PM Andrei Eden PTA [facility]   7/2/2018 10:00 AM Mike Parra MD [id number] [place] [address]

## 2018-02-19 ENCOUNTER — HOSPITAL ENCOUNTER (OUTPATIENT)
Dept: PHYSICAL THERAPY | Age: 78
Discharge: HOME | End: 2018-02-19
Payer: MEDICARE

## 2018-02-19 PROCEDURE — 97140 MANUAL THERAPY 1/> REGIONS: CPT

## 2018-02-19 PROCEDURE — 97110 THERAPEUTIC EXERCISES: CPT

## 2018-02-19 NOTE — PROGRESS NOTES
PT DAILY TREATMENT NOTE - [facility]     Patient Name: Author Garcia  Date:2018  : 1940  [x]  Patient  Verified  Payor: Yolette Garnett / Plan: [place] MEDICARE PART A & B / Product Type: Medicare /    In time:12:00  Out time:12:40  Total Treatment Time (min): 40  Total Timed Codes (min): 40  1:1 Treatment Time ([address] only): 30   Visit #: 7 of 12    Treatment Area: Lumbar pain [M54.5]  Other intervertebral disc degeneration, lumbar region [M51.36]    SUBJECTIVE  Pain Level (0-10 scale): 310  Any medication changes, allergies to medications, adverse drug reactions, diagnosis change, or new procedure performed?: [x] No    [] Yes (see summary sheet for update)  Subjective functional status/changes:   [] No changes reported  \"Soreness. \"    OBJECTIVE    20 min Therapeutic Exercise:  [] See flow sheet :   Rationale: increase ROM and increase strength to improve the patients ability to perform ADLs without difficulty. 10 min Neuromuscular Re-education:  []  See flow sheet :balance ex. Rationale: increase strength, improve coordination, improve balance and increase proprioception  to improve the patients ability to perform ADls without difficulty. 10 min Manual Therapy:  TPR/DTM to R hip ER's, piriformis, in left S/L position. Rationale: decrease pain, increase ROM and increase tissue extensibility to perform Adls without difficulty. With   [] TE   [] TA   [] neuro   [] other: Patient Education: [x] Review HEP    [] Progressed/Changed HEP based on:   [] positioning   [] body mechanics   [] transfers   [] heat/ice application    [] other:      Other Objective/Functional Measures:      Pain Level (0-10 scale) post treatment: 2/10    ASSESSMENT/Changes in Function: Continued with ex. Per flow sheet. Pt continues to report improvement during therapy and less pain with ADLs at home.     Patient will continue to benefit from skilled PT services to modify and progress therapeutic interventions, address functional mobility deficits, address ROM deficits, address strength deficits and analyze and address soft tissue restrictions to attain remaining goals. []  See Plan of Care  []  See progress note/recertification  []  See Discharge Summary         Progress towards goals / Updated goals:    Short Term Goals: To be accomplished in 1 weeks:  1. Pt will be independent and complaint w/ HEP to progress gains in PT. At eval: initiated HEP  Current: Goal met: Pt reports performing HEP. 2/1/18  Long Term Goals: To be accomplished in 6 weeks:  1. Pt will improve FOTO to > or = to 55 to demo improved function. At eval: FOTO = 35  Current: Progressing; FOTO = 44, increased by 9 since [facility]. 2/16  2. Pt will increase MMT R hip grossly to > or = to 4/5 for improved stability w/ standing up. At eval: MMT R hip flex -4/5, abd 2+/5, ext NT, IR 2/5, ER 3+/5  3. Pt will increase core activation as demo'd by supine bridge w/no pain x15 for ease w/ bending to  objects from floor. At eval: mild pain w/ supine bridge x1  4. Pt will report > or = to 60% improvement in symptoms for ease w/ return to pain free ADLs.    At eval: 0%     PLAN  [x]  Upgrade activities as tolerated     [x]  Continue plan of care  []  Update interventions per flow sheet       []  Discharge due to:_  []  Other:_      Leeanna Carter PTA 2/19/2018  1:00 PM    Future Appointments  Date Time Provider Merlin Rosario   2/21/2018 12:00 PM [facility] PT [place] [address] [facility]   2/26/2018 10:15 AM Monse Ibarra MD [facility] SCHED   2/27/2018 10:30 AM Lucy Spivey PTA [facility]   3/1/2018 1:30 PM Lucy Spivey PTA [facility]   3/6/2018 10:30 AM Lucy Spivey PTA [facility]   3/8/2018 1:00 PM Lucy Spivey PTA [facility]   7/2/2018 10:00 AM Elke Licea  [facility]

## 2018-02-21 ENCOUNTER — HOSPITAL ENCOUNTER (OUTPATIENT)
Dept: PHYSICAL THERAPY | Age: 78
Discharge: HOME | End: 2018-02-21
Payer: MEDICARE

## 2018-02-21 PROCEDURE — 97110 THERAPEUTIC EXERCISES: CPT

## 2018-02-21 PROCEDURE — 97112 NEUROMUSCULAR REEDUCATION: CPT

## 2018-02-26 ENCOUNTER — OFFICE VISIT (OUTPATIENT)
Dept: ORTHOPEDIC SURGERY | Age: 78
End: 2018-02-26

## 2018-02-26 VITALS
RESPIRATION RATE: 16 BRPM | DIASTOLIC BLOOD PRESSURE: 65 MMHG | HEIGHT: 65 IN | SYSTOLIC BLOOD PRESSURE: 139 MMHG | WEIGHT: 207.2 LBS | BODY MASS INDEX: 34.52 KG/M2 | HEART RATE: 72 BPM

## 2018-02-26 DIAGNOSIS — M54.16 LUMBAR NEURITIS: ICD-10-CM

## 2018-02-26 DIAGNOSIS — M47.817 LUMBOSACRAL SPONDYLOSIS WITHOUT MYELOPATHY: Primary | ICD-10-CM

## 2018-02-26 DIAGNOSIS — M51.36 DDD (DEGENERATIVE DISC DISEASE), LUMBAR: ICD-10-CM

## 2018-02-26 RX ORDER — ACETAMINOPHEN 500 MG
500 TABLET ORAL
COMMUNITY

## 2018-02-26 RX ORDER — GABAPENTIN 600 MG/1
600 TABLET ORAL
Qty: 270 TAB | Refills: 0 | Status: SHIPPED | OUTPATIENT
Start: 2018-02-26 | End: 2018-02-26 | Stop reason: SDUPTHER

## 2018-02-26 RX ORDER — MULTIVITAMIN/IRON/FOLIC ACID 18MG-0.4MG
TABLET ORAL
COMMUNITY
Start: 2018-08-05 | End: 2018-08-10 | Stop reason: ALTCHOICE

## 2018-02-26 RX ORDER — GABAPENTIN 600 MG/1
600 TABLET ORAL
Qty: 270 TAB | Refills: 0 | Status: SHIPPED | OUTPATIENT
Start: 2018-02-26 | End: 2018-04-24 | Stop reason: SDUPTHER

## 2018-02-26 NOTE — PROGRESS NOTES
[facility]  [address]  [address], [place]   Phone: [phone]  Fax: [phone]        PROGRESS NOTE      HISTORY OF PRESENT ILLNESS:  The patient is a 76 y.o. female and was seen today for follow up of chronic low back pain extending  into the RLE in a L5/S1 distribution to the ankle since roughly January 2018. Denies injury/trauma. Her pain is aggravated with standing, bending forwards, and extension. She reports limitations with standing and walking tolerance. Symptoms consistent for spinal stenosis. Patient additional c/o of right knee pain and she does not have an orthopedist at this time. She also reports numbness on her RUE. Patient had a cortisone injection in the past which she experienced redness and hot breath after the injection. She reports a similar reaction with Prednisone; however they do not appear to be true allergies. Patient takes Neurontin 300 mg TID for her neuropathy. Patient also takes Aspirin for her heart condition as prescribed by her cardiologist. She also treats her pain with Naproxen and extra strength Tylenol with questionable relief. Patient denies a h/o physical therapy/chiropractor. The patient has a history of DM and reports blood sugars are well controlled, consistently remaining below 200. Her last A1C was a 6. Patient denies change in bowel or bladder habits. Patient denies fever, weight loss, or skin changes. The patient is RHD. Preliminary reading of lumbar plain films revealed; mild scoliosis convex to the left. [place] at L3. Grade 1 anterolisthesis at L4 on L5 and Grade 1-2 anterolisthesis at L5 on S1. Retrolisthesis at L3 on L4. At her last clinic appointment, patient presented with symptoms consistent with L5/S1 radiculopathy x 3 weeks. Patient reported allergies to Prednisone and cortisone, however they do not appeared to be true allergies as the symptoms she experienced appeared to be consistent with a steroid flare.  I advised patient to consult with her cardiologist and evaluate her mixing Naproxen and Aspirin. I discussed multiple treatment options. Patient would like to proceed conservatively. I increased her Neurontin to 600 mg TID. I referred her to physical therapy with an emphasis on HEP. The patient returns today with pain location and distribution remain unchanged. She rates pain 4-7/10, a decrease since her lat visit (8/10). She additional c/o of right knee pain. She denies right knee surgery. She does not have an orthopedist at this time. Therapy notes reviewed. Pt continues with physical therapy with benefit and performs her HEP daily. She states she had 2 weeks left of therapy. Pt tolerates Neurontin 600 mg TID well with improvement in symptoms.  reviewed. Body mass index is 34.48 kg/(m^2). Past Medical History:   Diagnosis Date    Cardiomegaly     Essential hypertension, benign     stable    Hypercholesterolemia     Obesity, unspecified     Pt has weight loss    Other and unspecified hyperlipidemia     Chol 172, ldl 82, hdl 62, tg 141    Type II or unspecified type diabetes mellitus without mention of complication, not stated as uncontrolled         Social History     Social History    Marital status:      Spouse name: N/A    Number of children: N/A    Years of education: N/A     Occupational History    Not on file. Social History Main Topics    Smoking status: Never Smoker    Smokeless tobacco: Never Used    Alcohol use No    Drug use: No    Sexual activity: Not on file     Other Topics Concern    Not on file     Social History Narrative       Current Outpatient Prescriptions   Medication Sig Dispense Refill    VIT C/VIT E AC/LUT/COPPER/ZINC (PRESERVISION LUTEIN PO) Take  by Mouth Twice Daily.  MELATONIN PO 5 mg.  glucosamine-chondroitin (ELATION) 1,500-1,200 mg/30 mL liqd Take  by Mouth.       acetaminophen (TYLENOL) 500 mg tablet 500 mg.      gabapentin (NEURONTIN) 600 mg tablet Take 1 Tab by mouth three (3) times daily (with meals). Indications: NEUROPATHIC PAIN 270 Tab 0    losartan (COZAAR) 50 mg tablet TAKE ONE TABLET BY MOUTH ONCE DAILY 90 Tab 3    dilTIAZem XR (DILACOR XR) 120 mg XR capsule TAKE ONE CAPSULE BY MOUTH ONCE DAILY 90 Cap 3    atenolol (TENORMIN) 50 mg tablet TAKE ONE TABLET BY MOUTH TWICE DAILY 180 Tab 3    aspirin (ASPIRIN) 325 mg tablet Take 325 mg by mouth daily.  CRANBERRY CONC-ASCORBIC ACID PO Take  by mouth.  calcium-cholecalciferol, d3, (CALCIUM 600 + D) 600-125 mg-unit tab Take  by mouth daily. Patient taking 4 times a week      Cholecalciferol, Vitamin D3, (VITAMIN D3) 1,000 unit cap Take  by mouth daily.  metFORMIN (GLUMETZA ER) 500 mg TG24 24 hour tablet 850 mg daily.  pravastatin (PRAVACHOL) 40 mg tablet Take 40 mg by mouth daily.  GLUC SIMPSON/CHONDRO SIMPSON A/VIT C/MN (GLUCOSAMINE 1500 COMPLEX PO) Take  by mouth daily.  indapamide (LOZOL) 2.5 mg tablet Take  by mouth daily.  gabapentin (NEURONTIN) 300 mg capsule Take 300 mg by mouth three (3) times daily.          Allergies   Allergen Reactions    Kenalog [Triamcinolone Acetonide] Anaphylaxis    Penicillin G Hives    Adhesive Tape-Silicones Swelling    Bacitracin Not Reported This Time    Cephalexin Rash    Ciprofloxacin Other (comments)    Cortisone Not Reported This Time    Erythromycin Not Reported This Time    Hydrocortisone Hives    Lisinopril Not Reported This Time    Lortab [Hydrocodone-Acetaminophen] Rash    Methimazole Other (comments)     Neck pain/cough    Penicillins Not Reported This Time    Prednisolone Other (comments)    Prednisone Not Reported This Time    Propylthiouracil Other (comments)     Dizziness,elevated blood pressure    Sulfamethoxazole-Trimethoprim Other (comments)    Tetanus And Diphtheria Toxoids, Adsorbed, Adult Swelling    Tetanus Vaccines And Toxoid Not Reported This Time          PHYSICAL EXAMINATION    Visit Vitals    /65    Pulse 72    Resp 16    Ht 5' 5\" (1.651 m)    Wt 207 lb 3.2 oz (94 kg)    BMI 34.48 kg/m2       CONSTITUTIONAL: NAD, A&O x 3  SENSATION: Intact to light touch throughout  RANGE OF MOTION: The patient has full passive range of motion in all four extremities. MOTOR:  Straight Leg Raise: Negative, bilateral               Hip Flex Knee Ext Knee Flex Ankle DF GTE Ankle PF Tone   Right +4/5 +4/5 +4/5 +4/5 +4/5 +4/5 +4/5   Left +4/5 +4/5 +4/5 +4/5 +4/5 +4/5 +4/5       ASSESSMENT   Diagnoses and all orders for this visit:    1. Lumbosacral spondylosis without myelopathy  -     gabapentin (NEURONTIN) 600 mg tablet; Take 1 Tab by mouth three (3) times daily (with meals). Indications: NEUROPATHIC PAIN    2. Lumbar neuritis  -     gabapentin (NEURONTIN) 600 mg tablet; Take 1 Tab by mouth three (3) times daily (with meals). Indications: NEUROPATHIC PAIN    3. DDD (degenerative disc disease), lumbar  -     gabapentin (NEURONTIN) 600 mg tablet; Take 1 Tab by mouth three (3) times daily (with meals). Indications: NEUROPATHIC PAIN          IMPRESSION AND PLAN:  Patient continues with low back pain and right sided radiculopathy. She notes an improvement in symptoms with increased Neurontin 600 mg TID. She continues describing limitations with standing and walking tolerance. Patient additionally complaint of right knee pain. I offered to refer to ortho for further evaluation of right knee complaints, she declined at this time. Patient wished to continue her current treatment and refills of her Neurontin 600 mg TID were given. She should continue with physical therapy as prescribed. I encourage patient to perform her HEP daily. I will see the patient back in 1 month's time or earlier if needed. Written by Gerhard Jacinto, as dictated by Madison Cormier MD  I examined the patient, reviewed and agree with the note.

## 2018-02-26 NOTE — MR AVS SNAPSHOT
[address] [facility] 
 
 
 [address] [facility] 
[phone] Patient: Clifford Heart MRN: [id number] :1940 Visit Information Date & Time Provider Department Dept. Phone Encounter #  
 2018 10:15 AM Manuel Alicia  [facility], [address] and [facility] [place] [phone] [id number] Follow-up Instructions Return in about 4 weeks (around 3/26/2018). Follow-up and Disposition History Your Appointments 2018 10:00 AM  
Follow Up with Fanny Milton MD  
[facility] Appt Note: 6 month follow up  
 [address] [facility] [address]  
  
   
 [address] [address] [address] [address] Upcoming Health Maintenance Date Due HEMOGLOBIN A1C Q6M 1940 FOOT EXAM Q1 1948 MICROALBUMIN Q1 1948 EYE EXAM RETINAL OR DILATED Q1 1948 DTaP/Tdap/Td series (1 - Tdap) 1959 ZOSTER VACCINE AGE 60> 1998 GLAUCOMA SCREENING Q2Y 2003 OSTEOPOROSIS SCREENING (DEXA) 2003 Pneumococcal 65+ Low/Medium Risk (1 of 2 - PCV13) 2003 MEDICARE YEARLY EXAM 2003 LIPID PANEL Q1 2016 Allergies as of 2018  Review Complete On: 2018 By: Manuel Alicia MD  
  
 Severity Noted Reaction Type Reactions Kenalog [Triamcinolone Acetonide] High 2018    Anaphylaxis Penicillin G High 2015    Hives Adhesive Tape-silicones Medium     Swelling Bacitracin    Not Reported This Time Cephalexin  2018    Rash Ciprofloxacin  2016    Other (comments) Cortisone    Not Reported This Time Erythromycin    Not Reported This Time Hydrocortisone  2015    Hives Lisinopril    Not Reported This Time Lortab [Hydrocodone-acetaminophen]  2015    Rash Methimazole  2017    Other (comments) Neck pain/cough Penicillins    Not Reported This Time Prednisolone  04/01/2015    Other (comments) Prednisone    Not Reported This Time Propylthiouracil  07/07/2017    Other (comments) Dizziness,elevated blood pressure Sulfamethoxazole-trimethoprim  11/03/2016    Other (comments) Tetanus And Diphtheria Toxoids, Adsorbed, Adult  04/01/2015    Swelling Tetanus Vaccines And Toxoid    Not Reported This Time Current Immunizations  Never Reviewed No immunizations on file. Not reviewed this visit You Were Diagnosed With   
  
 Codes Comments Lumbosacral spondylosis without myelopathy    -  Primary ICD-10-CM: G70.834 ICD-9-CM: 721.3 Lumbar neuritis     ICD-10-CM: M54.16 
ICD-9-CM: 724.4 DDD (degenerative disc disease), lumbar     ICD-10-CM: M51.36 
ICD-9-CM: 722.52 Vitals BP Pulse Resp Height(growth percentile) Weight(growth percentile) BMI  
 139/65 72 16 5' 5\" (1.651 m) 207 lb 3.2 oz (94 kg) 34.48 kg/m2 OB Status Smoking Status Hysterectomy Never Smoker BMI and BSA Data Body Mass Index Body Surface Area 34.48 kg/m 2 2.08 m 2 Preferred Pharmacy Pharmacy Name Phone [facility] [address] [facility], [address] [phone] Your Updated Medication List  
  
   
This list is accurate as of 2/26/18 11:08 AM.  Always use your most recent med list.  
  
  
  
  
 acetaminophen 500 mg tablet Commonly known as:  TYLENOL  
500 mg.  
  
 aspirin 325 mg tablet Commonly known as:  ASPIRIN Take 325 mg by mouth daily. atenolol 50 mg tablet Commonly known as:  TENORMIN  
TAKE ONE TABLET BY MOUTH TWICE DAILY CALCIUM 600 + D 600-125 mg-unit Tab Generic drug:  calcium-cholecalciferol (d3) Take  by mouth daily. Patient taking 4 times a week CRANBERRY CONC-ASCORBIC ACID PO Take  by mouth. dilTIAZem  mg XR capsule Commonly known as:  DILACOR XR  
 TAKE ONE CAPSULE BY MOUTH ONCE DAILY * gabapentin 300 mg capsule Commonly known as:  NEURONTIN Take 300 mg by mouth three (3) times daily. * gabapentin 600 mg tablet Commonly known as:  NEURONTIN Take 1 Tab by mouth three (3) times daily (with meals). Indications: NEUROPATHIC PAIN  
  
 GLUCOSAMINE 1500 COMPLEX PO Take  by mouth daily. glucosamine-chondroitin 1,500-1,200 mg/30 mL Liqd Commonly known as:  ELATION Take  by Mouth. indapamide 2.5 mg tablet Commonly known as:  Carlo Headings Take  by mouth daily. losartan 50 mg tablet Commonly known as:  COZAAR  
TAKE ONE TABLET BY MOUTH ONCE DAILY MELATONIN PO  
5 mg.  
  
 metFORMIN 500 mg Tg24 24 hour tablet Commonly known as:  GLUMETZA ER  
850 mg daily. pravastatin 40 mg tablet Commonly known as:  PRAVACHOL Take 40 mg by mouth daily. PRESERVISION LUTEIN PO Take  by Mouth Twice Daily. VITAMIN D3 1,000 unit Cap Generic drug:  cholecalciferol Take  by mouth daily. * Notice: This list has 2 medication(s) that are the same as other medications prescribed for you. Read the directions carefully, and ask your doctor or other care provider to review them with you. Prescriptions Sent to Pharmacy Refills  
 gabapentin (NEURONTIN) 600 mg tablet 0 Sig: Take 1 Tab by mouth three (3) times daily (with meals). Indications: NEUROPATHIC PAIN Class: Normal  
 Pharmacy: [facility] [address] [place] [address] Ph #: [phone] Route: Oral  
  
Follow-up Instructions Return in about 4 weeks (around 3/26/2018). To-Do List   
 02/27/2018 10:30 AM  
  Appointment with Zacarias Rey PTA at [facility]  [address]  ([phone])  
  
 03/01/2018 1:30 PM  
  Appointment with Zacarias Rey PTA at [address] [place] [address] ([phone]) 03/06/2018 10:30 AM  
  Appointment with Zacarias Rey PTA at [facility]  [address] [facility] ([phone]) 03/08/2018 1:00 PM  
  Appointment with Calli Benoit PTA at [facility] [place] ([phone]) Introducing hospitals & HEALTH SERVICES! Dear Angelica Henderson: 
Thank you for requesting a [employer] account. Our records indicate that you already have an active [employer] account. You can access your account anytime at [email or web address] Did you know that you can access your hospital and ER discharge instructions at any time in [employer]? You can also review all of your test results from your hospital stay or ER visit. Additional Information If you have questions, please visit the Frequently Asked Questions section of the [employer] website at [email or web address] Remember, [employer] is NOT to be used for urgent needs. For medical emergencies, dial 911. Now available from your iPhone and Android! Please provide this summary of care documentation to your next provider. Your primary care clinician is listed as Vicky Daniel. If you have any questions after today's visit, please call [phone].

## 2018-02-27 ENCOUNTER — HOSPITAL ENCOUNTER (OUTPATIENT)
Dept: PHYSICAL THERAPY | Age: 78
Discharge: HOME | End: 2018-02-27
Payer: MEDICARE

## 2018-02-27 PROCEDURE — 97110 THERAPEUTIC EXERCISES: CPT

## 2018-02-27 PROCEDURE — G8979 MOBILITY GOAL STATUS: HCPCS | Performed by: PHYSICAL THERAPIST

## 2018-02-27 PROCEDURE — 97112 NEUROMUSCULAR REEDUCATION: CPT

## 2018-02-27 PROCEDURE — G8978 MOBILITY CURRENT STATUS: HCPCS | Performed by: PHYSICAL THERAPIST

## 2018-02-27 NOTE — PROGRESS NOTES
[facility]              [address]        [place], [address]   [phone] fax    Medicare Progress Report    Patient name: Hildred Saint Start of Care: 18   Referral source: Kala Wright MD : 1940   Medical/Treatment Diagnosis: Lumbar pain [M54.5]  Other intervertebral disc degeneration, lumbar region [M51.36] Onset Date:~1 month prior to initial visit   Prior Hospitalization: see medical history Provider#: [id number]   Medications: Verified on Patient Summary List     Comorbidities: allergies, arthritis, back pain. BMI > 30, CHF or heart disease, DM, HBP, prior surgery, stroke/TIA   Prior Level of Function: Hx of LBP, lives w/ daughter, (I) w/ ADLs, no pain down the R LE, hx of neuropathy   Visits from Start of Care: 9                                                                              Missed Visits: 0    Reporting Period: 18 to 18    Subjective Reports: Pt reports 70% overall improvement since [facility]    Key Functional Changes:   Progress towards goals / Updated goals:  Short Term Goals: To be accomplished in 1 weeks:  1. Pt will be independent and complaint w/ HEP to progress gains in PT. At eval: initiated HEP  Current: Goal met: Pt reports performing HEP  Long Term Goals: To be accomplished in 6 weeks:  1. Pt will improve FOTO to > or = to 55 to demo improved function. At eval: FOTO = 35  Current: Progressing; FOTO = 44, increased by 9 since [facility]  2. Pt will increase MMT R hip grossly to > or = to 4/5 for improved stability w/ standing up. At eval: MMT R hip flex -4/5, abd 2+/5, ext NT, IR 2/5, ER 3+/5  Current: progressing: MMT R hip flex 4/5, abd 3+/5, ext 4/5, IR 3/5. ER 4/5  3. Pt will increase core activation as demo'd by supine bridge w/no pain x15 for ease w/ bending to  objects from floor. At eval: mild pain w/ supine bridge x1  Current: MET.  Pt reports no pain after performing 15 bridges  4. Pt will report > or = to 60% improvement in symptoms for ease w/ return to pain free ADLs. At eval: 0%  Current: MET. Pt. Reports 70% improvement since beginning therapy    Key functional changes: improved pain control and core activation       Problems/ barriers to goal attainment: n/a     Assessment / Recommendations: Pt has progress with LTGs and reports 70% improvement since [facility]. Pt has progressed with R LE strength, but continues to remain limited. Pt is able to perform supine bridges with proper form without having increase in back pain. Pt reports she is only able to stand and cook for an hour or sit and read for an hour before she has increase in back pain. Patient will continue to benefit from skilled PT services to modify and progress therapeutic interventions, address functional mobility deficits, address ROM deficits, address strength deficits and analyze and address soft tissue restrictions to attain remaining goals. Problem List: pain affecting function, decrease ROM, decrease strength, decrease ADL/ functional abilitiies, decrease activity tolerance and decrease flexibility/ joint mobility   Treatment Plan: Therapeutic exercise, Therapeutic activities, Neuromuscular re-education, Physical agent/modality, Gait/balance training, Manual therapy, Patient education and Functional mobility training    Patient Goal (s) has been updated and includes: \"have less pain w/ ADLs\"     Updated Goals to be accomplished in 3 weeks:  Continue with unmet goals per flow sheet    Frequency / Duration: Patient to be seen 2 times per week for 3 weeks:    G-Codes (GP)  Mobility  [id number] Current  CK= 40-59%   Goal  CK= 40-59%    The severity rating is based on clinical judgment and the FOTO score.       Anastacio Leung, PT 2/27/2018 1:39 PM

## 2018-02-27 NOTE — PROGRESS NOTES
PT DAILY TREATMENT NOTE - [facility]     Patient Name: Bryant Richards  Date:2018  : 1940  [x]  Patient  Verified  Payor: Dasia Stager / Plan: [place] MEDICARE PART A & B / Product Type: Medicare /    In time:10:25  Out time:11:04  Total Treatment Time (min): 39  Total Timed Codes (min): 39  1:1 Treatment Time ( [address] only): 44   Visit #: 9 of 12    Treatment Area: Lumbar pain [M54.5]  Other intervertebral disc degeneration, lumbar region [M51.36]    SUBJECTIVE  Pain Level (0-10 scale): 0  Any medication changes, allergies to medications, adverse drug reactions, diagnosis change, or new procedure performed?: [x] No    [] Yes (see summary sheet for update)  Subjective functional status/changes:   [] No changes reported  Pt reports she feels she has gotten a lot better since coming to therapy.      OBJECTIVE    Min Type Additional Details    [] Estim:  []Unatt       []IFC  []Premod                        []Other:  []w/ice   []w/heat  Position:  Location:    [] Estim: []Att    []TENS instruct  []NMES                    []Other:  []w/US   []w/ice   []w/heat  Position:  Location:    []  Traction: [] Cervical       []Lumbar                       [] Prone          []Supine                       []Intermittent   []Continuous Lbs:  [] before manual  [] after manual    []  Ultrasound: []Continuous   [] Pulsed                           []1MHz   []3MHz W/cm2:  Location:    []  Iontophoresis with dexamethasone         Location: [] Take home patch   [] In clinic    []  Ice     []  heat  []  Ice massage  []  Laser   []  Anodyne Position:  Location:    []  Laser with stim  []  Other:  Position:  Location:    []  Vasopneumatic Device Pressure:       [] lo [] med [] hi   Temperature: [] lo [] med [] hi   [] Skin assessment post-treatment:  []intact []redness- no adverse reaction    []redness  adverse reaction:       29 min Therapeutic Exercise:  [x] See flow sheet :   Rationale: increase ROM and increase strength to improve the patients ability to increase tolerance to activities. 10 min Neuromuscular Re-education:  [x]  See flow sheet :   Rationale: increase ROM and increase strength  to improve the patients ability to increase ease with ADLs. With   [] TE   [] TA   [] neuro   [] other: Patient Education: [x] Review HEP    [] Progressed/Changed HEP based on:   [] positioning   [] body mechanics   [] transfers   [] heat/ice application    [] other:      Other Objective/Functional Measures: see goals. Pain Level (0-10 scale) post treatment: 0    ASSESSMENT/Changes in Function: Pt has progress with LTGs. Pt reports 70% improvement since [facility]. Pt has progressed with R LE strength, but continues to remain limited. Pt is able to perform supine bridges with proper form without having increase in back pain. Pt reports she is only able to stand and cook for an hour or sit and read for an hour before she has increase in back pain. Patient will continue to benefit from skilled PT services to modify and progress therapeutic interventions, address functional mobility deficits, address ROM deficits, address strength deficits and analyze and address soft tissue restrictions to attain remaining goals. []  See Plan of Care  [x]  See progress note/recertification  []  See Discharge Summary         Progress towards goals / Updated goals:  Short Term Goals: To be accomplished in 1 weeks:  1. Pt will be independent and complaint w/ HEP to progress gains in PT. At eval: initiated HEP  Current: Goal met: Pt reports performing HEP. 2/1/18  Long Term Goals: To be accomplished in 6 weeks:  1. Pt will improve FOTO to > or = to 55 to demo improved function. At eval: FOTO = 35  Current: Progressing; FOTO = 44, increased by 9 since [facility]. 2/16  2. Pt will increase MMT R hip grossly to > or = to 4/5 for improved stability w/ standing up.    At eval: MMT R hip flex -4/5, abd 2+/5, ext NT, IR 2/5, ER 3+/5  Current: progressing: MMT R hip flex 4/5, abd 3+/5, ext 4/5, IR 3/5. ER 4/5 2/27/18  3. Pt will increase core activation as demo'd by supine bridge w/no pain x15 for ease w/ bending to  objects from floor. At eval: mild pain w/ supine bridge x1  Current: MET. Pt reports no pain after performing 15 bridges. 2/21/18. 4. Pt will report > or = to 60% improvement in symptoms for ease w/ return to pain free ADLs. At eval: 0%  Current: MET. Pt. Reports 70% improvement since beginning therapy. 2/21/18    PLAN  []  Upgrade activities as tolerated     [x]  Continue plan of care  []  Update interventions per flow sheet       []  Discharge due to:_  []  Other:_      Zacarias Rey PTA 2/27/2018  10:38 AM    Future Appointments  Date Time Provider Merlin Rosario   3/1/2018 1:30 PM Zacarias Rey PTA [facility]   3/6/2018 10:30 AM Zacarias Rey PTA [facility]   3/8/2018 1:00 PM Zacarias Rey PTA [facility]   3/19/2018 11:20 AM MD JENNIFER Dacosta [facility]   7/2/2018 10:00 AM Sarah Duff MD [address] [place] [address]

## 2018-03-01 ENCOUNTER — HOSPITAL ENCOUNTER (OUTPATIENT)
Dept: PHYSICAL THERAPY | Age: 78
Discharge: HOME | End: 2018-03-01
Payer: MEDICARE

## 2018-03-01 PROCEDURE — 97110 THERAPEUTIC EXERCISES: CPT

## 2018-03-01 PROCEDURE — 97112 NEUROMUSCULAR REEDUCATION: CPT

## 2018-03-01 NOTE — PROGRESS NOTES
PT DAILY TREATMENT NOTE - [facility]     Patient Name: Michael Common  Date:3/1/2018  : 1940  [x]  Patient  Verified  Payor: [place] MEDICARE / Plan: VA MEDICARE PART A & B / Product Type: Medicare /    In time:1:26  Out time:2:12  Total Treatment Time (min): 46  Total Timed Codes (min): 36  1:1 Treatment Time ( only): 36   Visit #: 1 of 6    Treatment Area: Lumbar pain [M54.5]  Other intervertebral disc degeneration, lumbar region [M51.36]    SUBJECTIVE  Pain Level (0-10 scale): 6  Any medication changes, allergies to medications, adverse drug reactions, diagnosis change, or new procedure performed?: [x] No    [] Yes (see summary sheet for update)  Subjective functional status/changes:   [] No changes reported  Pt reports she did yard work yesterday and she was standing and bending over at the waist. Pt reports she has had pain since doing yard work for 5 mins. OBJECTIVE    Modality rationale: decrease pain to improve the patients ability to decrease difficulty while performing tasks.     Min Type Additional Details    [] Estim:  []Unatt       []IFC  []Premod                        []Other:  []w/ice   []w/heat  Position:  Location:    [] Estim: []Att    []TENS instruct  []NMES                    []Other:  []w/US   []w/ice   []w/heat  Position:  Location:    []  Traction: [] Cervical       []Lumbar                       [] Prone          []Supine                       []Intermittent   []Continuous Lbs:  [] before manual  [] after manual    []  Ultrasound: []Continuous   [] Pulsed                           []1MHz   []3MHz W/cm2:  Location:    []  Iontophoresis with dexamethasone         Location: [] Take home patch   [] In clinic   10 []  Ice     [x]  heat  []  Ice massage  []  Laser   []  Anodyne Position:supine  Location:back     []  Laser with stim  []  Other:  Position:  Location:    []  Vasopneumatic Device Pressure:       [] lo [] med [] hi   Temperature: [] lo [] med [] hi   [] Skin assessment post-treatment:  []intact []redness- no adverse reaction    []redness  adverse reaction:        26 min Therapeutic Exercise:  [x] See flow sheet :   Rationale: increase ROM and increase strength to improve the patients ability to increase tolerance to activities.        10 min Neuromuscular Re-education:  [x]  See flow sheet :   Rationale: increase ROM and increase strength  to improve the patients ability to increase ease with ADLs.               With   [] TE   [] TA   [] neuro   [] other: Patient Education: [x] Review HEP    [] Progressed/Changed HEP based on:   [] positioning   [] body mechanics   [] transfers   [] heat/ice application    [] other:      Other Objective/Functional Measures:      Pain Level (0-10 scale) post treatment: 0    ASSESSMENT/Changes in Function: pt was educated on proper body mechanics while performing yard work and lifting objects. Patient will continue to benefit from skilled PT services to modify and progress therapeutic interventions, address functional mobility deficits, address ROM deficits, address strength deficits and analyze and address soft tissue restrictions to attain remaining goals. []  See Plan of Care  []  See progress note/recertification  []  See Discharge Summary         Progress towards goals / Updated goals:    Long Term Goals: To be accomplished in 6 weeks:  1. Pt will improve FOTO to > or = to 55 to demo improved function. At PN: FOTO = 44, increased by 9 since [facility]. 2/16  2. Pt will increase MMT R hip grossly to > or = to 4/5 for improved stability w/ standing up. At PN:  MMT R hip flex 4/5, abd 3+/5, ext 4/5, IR 3/5. ER 4/5 2/27/18        Goals Met   1. Pt will be independent and complaint w/ HEP to progress gains in PT. At eval: initiated HEP  Current: Goal met: Pt reports performing HEP. 2/1/18  2. Pt will report > or = to 60% improvement in symptoms for ease w/ return to pain free ADLs. At eval: 0%  Current: MET.  Pt. Reports 70% improvement since beginning therapy. 2/21/18  3. Pt will increase core activation as demo'd by supine bridge w/no pain x15 for ease w/ bending to  objects from floor. At eval: mild pain w/ supine bridge x1  Current: MET. Pt reports no pain after performing 15 bridges. 2/21/18.     PLAN  []  Upgrade activities as tolerated     [x]  Continue plan of care  []  Update interventions per flow sheet       []  Discharge due to:_  []  Other:_      Emilie Car, PTA 3/1/2018  1:20 PM    Future Appointments  Date Time Provider Merlin Rosario   3/1/2018 1:30 PM Emilie Car, PTA [facility]   3/6/2018 10:30 AM Emilie Car, PTA [facility]   3/8/2018 1:00 PM Emilie Car, PTA [facility]   3/19/2018 11:20 AM Nunu Busch MD [facility]   7/2/2018 10:00 AM Sanket Scott MD [address] [place] [address]

## 2018-03-06 ENCOUNTER — HOSPITAL ENCOUNTER (OUTPATIENT)
Dept: PHYSICAL THERAPY | Age: 78
Discharge: HOME | End: 2018-03-06
Payer: MEDICARE

## 2018-03-06 PROCEDURE — 97112 NEUROMUSCULAR REEDUCATION: CPT

## 2018-03-06 PROCEDURE — 97110 THERAPEUTIC EXERCISES: CPT

## 2018-03-06 NOTE — PROGRESS NOTES
PT DAILY TREATMENT NOTE - [facility]     Patient Name: Concepción Solis  Date:3/6/2018  : 1940  [x]  Patient  Verified  Payor: [place] MEDICARE / Plan: [place] MEDICARE PART A & B / Product Type: Medicare /    In time:10:27  Out time:11:14  Total Treatment Time (min): 47  Total Timed Codes (min): 37  1:1 Treatment Time ( [address] only): 25   Visit #: 2 of 6    Treatment Area: Lumbar pain [M54.5]  Other intervertebral disc degeneration, lumbar region [M51.36]    SUBJECTIVE  Pain Level (0-10 scale): 4  Any medication changes, allergies to medications, adverse drug reactions, diagnosis change, or new procedure performed?: [x] No    [] Yes (see summary sheet for update)  Subjective functional status/changes:   [] No changes reported  Pt reports she walked a lot with her daughter when she went shopping over the weekend. OBJECTIVE    Modality rationale: decrease pain to improve the patients ability to decrease difficulty while performing tasks.     Min Type Additional Details    [] Estim:  []Unatt       []IFC  []Premod                        []Other:  []w/ice   []w/heat  Position:  Location:    [] Estim: []Att    []TENS instruct  []NMES                    []Other:  []w/US   []w/ice   []w/heat  Position:  Location:    []  Traction: [] Cervical       []Lumbar                       [] Prone          []Supine                       []Intermittent   []Continuous Lbs:  [] before manual  [] after manual    []  Ultrasound: []Continuous   [] Pulsed                           []1MHz   []3MHz W/cm2:  Location:    []  Iontophoresis with dexamethasone         Location: [] Take home patch   [] In clinic   10 []  Ice     [x]  heat  []  Ice massage  []  Laser   []  Anodyne Position:supine  Location:back     []  Laser with stim  []  Other:  Position:  Location:    []  Vasopneumatic Device Pressure:       [] lo [] med [] hi   Temperature: [] lo [] med [] hi   [] Skin assessment post-treatment:  []intact []redness- no adverse reaction []redness  adverse reaction:         27 min Therapeutic Exercise:  [x] See flow sheet :   Rationale: increase ROM and increase strength to improve the patients ability to increase tolerance to activities.         10 min Neuromuscular Re-education:  [x]  See flow sheet :   Rationale: increase ROM and increase strength  to improve the patients ability to increase ease with ADLs.                      With   [] TE   [] TA   [] neuro   [] other: Patient Education: [x] Review HEP    [] Progressed/Changed HEP based on:   [] positioning   [] body mechanics   [] transfers   [] heat/ice application    [] other:      Other Objective/Functional Measures:      Pain Level (0-10 scale) post treatment:2    ASSESSMENT/Changes in Function: Continue to progress pt as tolerated. Patient will continue to benefit from skilled PT services to modify and progress therapeutic interventions, address functional mobility deficits, address ROM deficits, address strength deficits and analyze and address soft tissue restrictions to attain remaining goals. []  See Plan of Care  []  See progress note/recertification  []  See Discharge Summary         Progress towards goals / Updated goals:  Long Term Goals: To be accomplished in 6 weeks:  1. Pt will improve FOTO to > or = to 55 to demo improved function. At PN: FOTO = 44, increased by 9 since [facility]. 2/16  2. Pt will increase MMT R hip grossly to > or = to 4/5 for improved stability w/ standing up. At PN:  MMT R hip flex 4/5, abd 3+/5, ext 4/5, IR 3/5. ER 4/5 2/27/18           Goals Met   1. Pt will be independent and complaint w/ HEP to progress gains in PT. At eval: initiated HEP  Current: Goal met: Pt reports performing HEP. 2/1/18  2. Pt will report > or = to 60% improvement in symptoms for ease w/ return to pain free ADLs. At eval: 0%  Current: MET. Pt. Reports 70% improvement since beginning therapy. 2/21/18  3.  Pt will increase core activation as demo'd by supine bridge w/no pain x15 for ease w/ bending to  objects from floor. At eval: mild pain w/ supine bridge x1  Current: MET. Pt reports no pain after performing 15 bridges. 2/21/18.     PLAN  []  Upgrade activities as tolerated     [x]  Continue plan of care  []  Update interventions per flow sheet       []  Discharge due to:_  []  Other:_      Ginna Shelley PTA 3/6/2018  11:00 AM    Future Appointments  Date Time Provider [facility]   3/8/2018 1:00 PM Ginna Shelley PTA [facility]   3/19/2018 11:20 AM Cristina Fatima MD [facility]   7/2/2018 10:00 AM Xavier Hood MD [address] [place] [address]

## 2018-03-08 ENCOUNTER — HOSPITAL ENCOUNTER (OUTPATIENT)
Dept: PHYSICAL THERAPY | Age: 78
Discharge: HOME | End: 2018-03-08
Payer: MEDICARE

## 2018-03-08 PROCEDURE — 97110 THERAPEUTIC EXERCISES: CPT

## 2018-03-08 PROCEDURE — 97112 NEUROMUSCULAR REEDUCATION: CPT

## 2018-03-08 NOTE — PROGRESS NOTES
PT DAILY TREATMENT NOTE - [facility]     Patient Name: Bryant Richards  Date:3/8/2018  : 1940  [x]  Patient  Verified  Payor: Dasia Stager / Plan: [place] MEDICARE PART A & B / Product Type: Medicare /    In time:12:54  Out time:1:46  Total Treatment Time (min): 52  Total Timed Codes (min): 42  1:1 Treatment Time ( [address] only): 42   Visit #: 3 of 6    Treatment Area: Lumbar pain [M54.5]  Other intervertebral disc degeneration, lumbar region [M51.36]    SUBJECTIVE  Pain Level (0-10 scale): 2  Any medication changes, allergies to medications, adverse drug reactions, diagnosis change, or new procedure performed?: [x] No    [] Yes (see summary sheet for update)  Subjective functional status/changes:   [] No changes reported  Pt reports that her knee is bothering her more than her back is today. OBJECTIVE    Modality rationale: decrease pain to improve the patients ability to decrease difficulty while performing tasks.     Min Type Additional Details    [] Estim:  []Unatt       []IFC  []Premod                        []Other:  []w/ice   []w/heat  Position:  Location:    [] Estim: []Att    []TENS instruct  []NMES                    []Other:  []w/US   []w/ice   []w/heat  Position:  Location:    []  Traction: [] Cervical       []Lumbar                       [] Prone          []Supine                       []Intermittent   []Continuous Lbs:  [] before manual  [] after manual    []  Ultrasound: []Continuous   [] Pulsed                           []1MHz   []3MHz W/cm2:  Location:    []  Iontophoresis with dexamethasone         Location: [] Take home patch   [] In clinic   10 []  Ice     [x]  heat  []  Ice massage  []  Laser   []  Anodyne Position:supine  Location:back     []  Laser with stim  []  Other:  Position:  Location:    []  Vasopneumatic Device Pressure:       [] lo [] med [] hi   Temperature: [] lo [] med [] hi   [] Skin assessment post-treatment:  []intact []redness- no adverse reaction    []redness  adverse reaction:         32 min Therapeutic Exercise:  [x] See flow sheet :   Rationale: increase ROM and increase strength to improve the patients ability to increase tolerance to activities.         10 min Neuromuscular Re-education:  [x]  See flow sheet :   Rationale: increase ROM and increase strength  to improve the patients ability to increase ease with ADLs.                   With   [] TE   [] TA   [] neuro   [] other: Patient Education: [x] Review HEP    [] Progressed/Changed HEP based on:   [] positioning   [] body mechanics   [] transfers   [] heat/ice application    [] other:      Other Objective/Functional Measures: FOTO = 40, increased by 5 since [facility]. Pain Level (0-10 scale) post treatment: 1    ASSESSMENT/Changes in Function: Continue to progress pt as tolerated. Pt reports that she has difficulty performing house chores of bending. Educated pt on proper body mechanics to perform bending activities. Patient will continue to benefit from skilled PT services to modify and progress therapeutic interventions, address functional mobility deficits, address ROM deficits, address strength deficits and analyze and address soft tissue restrictions to attain remaining goals. []  See Plan of Care  []  See progress note/recertification  []  See Discharge Summary         Progress towards goals / Updated goals:  Long Term Goals: To be accomplished in 6 weeks:  1. Pt will improve FOTO to > or = to 55 to demo improved function. At PN: FOTO = 44, increased by 9 since [facility]. 2/16  Current; Progressing: FOTO = 40, increased by 5 since [facility]. 3/8/18  2. Pt will increase MMT R hip grossly to > or = to 4/5 for improved stability w/ standing up. At PN:  MMT R hip flex 4/5, abd 3+/5, ext 4/5, IR 3/5. ER 4/5 2/27/18              Goals Met   1. Pt will be independent and complaint w/ HEP to progress gains in PT. At eval: initiated HEP  Current: Goal met: Pt reports performing HEP. 2/1/18  2.  Pt will report > or = to 60% improvement in symptoms for ease w/ return to pain free ADLs. At eval: 0%  Current: MET. Pt. Reports 70% improvement since beginning therapy. 2/21/18  3. Pt will increase core activation as demo'd by supine bridge w/no pain x15 for ease w/ bending to  objects from floor. At eval: mild pain w/ supine bridge x1  Current: MET. Pt reports no pain after performing 15 bridges. 2/21/18.     PLAN  []  Upgrade activities as tolerated     [x]  Continue plan of care  []  Update interventions per flow sheet       []  Discharge due to:_  []  Other:_      Ml Bobo PTA 3/8/2018  12:59 PM    Future Appointments  Date Time Provider Merlin Magañai   3/8/2018 1:00 PM Ml Bobo PTA [facility]   3/19/2018 11:20 AM Yesi Clark MD [facility]   7/2/2018 10:00 AM Gayle Madera MD [address] [place] [address]

## 2018-03-11 NOTE — PROGRESS NOTES
PT DAILY TREATMENT NOTE - [facility]     Patient Name: Alpesh Zapata  Date:2018  : 1940  [x]  Patient  Verified  Payor: VA MEDICARE / Plan: VA MEDICARE PART A & B / Product Type: Medicare /    In time:11:50  Out time:12:32  Total Treatment Time (min): 42  Total Timed Codes (min): 42  1:1 Treatment Time ( [address] only): 30   Visit #: 8 of 12    Treatment Area: Lumbar pain [M54.5]  Other intervertebral disc degeneration, lumbar region [M51.36]    SUBJECTIVE  Pain Level (0-10 scale): 4/10  Any medication changes, allergies to medications, adverse drug reactions, diagnosis change, or new procedure performed?: [x] No    [] Yes (see summary sheet for update)  Subjective functional status/changes:   [] No changes reported  Pt reports she was doing a lot around her house yesterday for about 2 hours, which may be why she is hurting more today. OBJECTIVE      32 min Therapeutic Exercise:  [] See flow sheet :   Rationale: increase ROM and increase strength to improve the patients ability to performing ex. Per flow sheet. 10 min Neuromuscular Re-education:  []  See flow sheet :balance ex. Rationale: increase strength, improve coordination, improve balance and increase proprioception  to improve the patients ability to perform ADLs without difficulty. With   [] TE   [] TA   [] neuro   [] other: Patient Education: [x] Review HEP    [] Progressed/Changed HEP based on:   [] positioning   [] body mechanics   [] transfers   [] heat/ice application    [] other:      Other Objective/Functional Measures:      Pain Level (0-10 scale) post treatment: 0/10    ASSESSMENT/Changes in Function: Continued with current ex. Per flow sheet. No p! Was reported during or after therapy today. Balance continues to improve as demonstrated during balance ex.     Patient will continue to benefit from skilled PT services to modify and progress therapeutic interventions, address functional mobility deficits, address ROM deficits, address strength deficits, analyze and address soft tissue restrictions and analyze and cue movement patterns to attain remaining goals. []  See Plan of Care  []  See progress note/recertification  []  See Discharge Summary         Progress towards goals / Updated goals:  Short Term Goals: To be accomplished in 1 weeks:  1. Pt will be independent and complaint w/ HEP to progress gains in PT. At [facility]: initiated HEP  Current: Goal met: Pt reports performing HEP. 2/1/18  Long Term Goals: To be accomplished in 6 weeks:  1. Pt will improve FOTO to > or = to 55 to demo improved function. At [facility]: FOTO = 35  Current: Progressing; FOTO = 44, increased by 9 since [facility]. 2/16  2. Pt will increase MMT R hip grossly to > or = to 4/5 for improved stability w/ standing up. At [facility]: MMT R hip flex -4/5, abd 2+/5, ext NT, IR 2/5, ER 3+/5  3. Pt will increase core activation as demo'd by supine bridge w/no pain x15 for ease w/ bending to  objects from floor. At [facility]: mild pain w/ supine bridge x1  Current: MET. Pt reports no pain after performing 15 bridges. 2/21/18. 4. Pt will report > or = to 60% improvement in symptoms for ease w/ return to pain free ADLs. At [facility]: 0%  Current: MET. Pt. Reports 70% improvement since beginning therapy. 2/21/18  PLAN  [x]  Upgrade activities as tolerated     [x]  Continue plan of care  []  Update interventions per flow sheet       []  Discharge due to:_  []  Other:_      Hanny Robles PTA 2/21/2018  12:04 PM    Future Appointments  Date Time Provider Merlin Rosario   2/26/2018 10:15 AM MD Michael Jones   2/27/2018 10:30 AM Evonne Caldera PTA [facility]   3/1/2018 1:30 PM Evonne Caldera PTA [facility]   3/6/2018 10:30 AM Evonne Caldera PTA [facility]   3/8/2018 1:00 PM Lafaye Grams, PTA [facility]   7/2/2018 10:00 AM Lian Chun MD [id number] [place] [address] less than 2 seconds

## 2018-03-13 ENCOUNTER — APPOINTMENT (OUTPATIENT)
Dept: PHYSICAL THERAPY | Age: 78
End: 2018-03-13
Payer: MEDICARE

## 2018-03-15 ENCOUNTER — HOSPITAL ENCOUNTER (OUTPATIENT)
Dept: PHYSICAL THERAPY | Age: 78
Discharge: HOME | End: 2018-03-15
Payer: MEDICARE

## 2018-03-15 PROCEDURE — 97110 THERAPEUTIC EXERCISES: CPT

## 2018-03-15 PROCEDURE — 97112 NEUROMUSCULAR REEDUCATION: CPT

## 2018-03-15 PROCEDURE — G8979 MOBILITY GOAL STATUS: HCPCS

## 2018-03-15 PROCEDURE — G8978 MOBILITY CURRENT STATUS: HCPCS

## 2018-03-15 NOTE — PROGRESS NOTES
[facility]              [address]        [place], [address] [place]   [phone] fax    Continued Plan of Care/ Re-certification for Physical Therapy Services    Patient name: Kalie Dunn Start of Care: 2018   Referral source: Mynor Renteria MD : 1940   Medical/Treatment Diagnosis: Lumbar pain [M54.5]  Other intervertebral disc degeneration, lumbar region [M51.36] Onset Date:~1 month prior to initial visit     Prior Hospitalization: see medical history Provider#: [id number]   Medications: Verified on Patient Summary List    Comorbidities: allergies, arthritis, back pain. BMI > 30, CHF or heart disease, DM, HBP, prior surgery, stroke/TIA   Prior Level of Function: Hx of LBP, lives w/ daughter, (I) w/ ADLs, no pain down the R LE, hx of neuropathy   Visits from Start of Care: 13    Missed Visits: 1    The Plan of Care and following information is based on the patient's current status:    [address] [facility] be accomplished in 6 weeks:  1. Pt will improve FOTO to > or = to 55 to demo improved function. At Last PN: FOTO = 44, increased by 9 since [facility]. At PN: Progressing: FOTO = 40, increased by 5 since [facility]  2. Pt will increase MMT R hip grossly to > or = to 4/5 for improved stability w/ standing up. At Last PN:  MMT R hip flex 4/5, abd 3+/5, ext 4/5, IR 3/5. ER 4/5  At PN: Progressing, R Hip Flexion 5/5, Abduction 3+/5, IR 5/5, ER 5/5, Ext 4/5    Key functional changes: See above goals.       Problems/ barriers to goal attainment: None     Problem List: pain affecting function, decrease ROM, decrease strength, decrease ADL/ functional abilitiies and decrease activity tolerance    Treatment Plan: Therapeutic exercise, Therapeutic activities, Neuromuscular re-education, Physical agent/modality, Gait/balance training, Manual therapy, Patient education, Self Care training, Functional mobility training and Home safety training     Goals for this certification period to be accomplished in 4 weeks: 1. Patient with demonstrate ability to complete standing repeated lumbar flexion x10 without R posterior buttock pain </=1/10 in order to improve ease with gardening. 3/15/2018: Standing Repeated Lumbar Flexion x10 with R Posterior Buttock Pain 4/10    Frequency / Duration: Patient to be seen 2 times per week for 4 weeks:    Assessment / Recommendations:    Since SoC patient has demonstrated a significant functional improvement with patient reporting an overall self-perception of improvement in pain and mobility with 70% improvement in symptoms subjectively reported. Patient noted with continued limitations in R hip abduction and extension strength but noted with an overall improvement in LE strength with improved static stability. Patient reports continued self-perception of diminished balance with patient limiting herself secondarily. Emphasis of continuation of treatment to be placed on improving static and dynamic stability in order to improve ease with household and community ADLs and overall safety.     Patient will continue to benefit from skilled PT services to modify and progress therapeutic interventions, address functional mobility deficits, address ROM deficits, address strength deficits, analyze and address soft tissue restrictions, analyze and cue movement patterns, analyze and modify body mechanics/ergonomics and assess and modify postural abnormalities to attain remaining goals. G-Codes (GP)  Mobility  [id number] Current  CL= 60-79%  [id number] Goal  CK= 40-59%    The severity rating is based on clinical judgment and the FOTO score. Certification Period: 3/15/2018 - 5/14/2018    Rosita Geiger, PT 3/15/2018 12:55 PM    ________________________________________________________________________  I certify that the above Therapy Services are being furnished while the patient is under my care.  I agree with the treatment plan and certify that this therapy is necessary. [] I have read the above and request that my patient continue as recommended.   [] I have read the above report and request that my patient continue therapy with the following changes/special instructions: _______________________________________  [] I have read the above report and request that my patient be discharged from therapy    Physician's Signature:_______________________________Date:___________Time:__________    Please sign and return to [facility] [address]        [place], [address] [place]   [phone] fax

## 2018-03-15 NOTE — PROGRESS NOTES
PT DAILY TREATMENT NOTE - [facility]     Patient Name: Lico Peres  Date:3/15/2018  : 1940  [x]  Patient  Verified  Payor: Vinicius Corona / Plan: [place] MEDICARE PART A & B / Product Type: Medicare /    In time:1158  Out time:0101  Total Treatment Time (min): 63  Total Timed Codes (min): 53  1:1 Treatment Time ( only): 48   Visit #: 4 of 6    Treatment Area: Lumbar pain [M54.5]  Other intervertebral disc degeneration, lumbar region [M51.36]    SUBJECTIVE  Pain Level (0-10 scale): 3  Any medication changes, allergies to medications, adverse drug reactions, diagnosis change, or new procedure performed?: [x] No    [] Yes (see summary sheet for update)  Subjective functional status/changes:   [] No changes reported  Patient reports symptom provocation with forward lumbar bending with patient reporting cessation of yardwork secondarily    OBJECTIVE    Modality rationale: decrease pain and increase tissue extensibility to improve the patients ability to improve ease with sleep   Min Type Additional Details   10 []  Ice     [x]  heat  []  Ice massage Position: Supine  Location: Lumbar, Post-Tx     23 min Therapeutic Exercise:  [x] See flow sheet : Emphasis placed on improving available hip and lumbar AROM and strength of the gluteal musculature   Rationale: increase ROM and increase strength to improve the patients ability to improve ease with household ADLs     30 min Neuromuscular Re-education:  [x]  See flow sheet : Emphasis placed on improving anterior abdominal musculature recruitment and pelvic proprioceptive awareness   Rationale: increase ROM, increase strength, improve balance and increase proprioception  to improve the patients ability to improve ease with community ambulation.         With   [] TE   [] TA   [] neuro   [] other: Patient Education: [x] Review HEP    [] Progressed/Changed HEP based on:   [] positioning   [] body mechanics   [] transfers   [] heat/ice application    [] other: Pain Level (0-10 scale) post treatment: 0    ASSESSMENT/Changes in Function: Since SoC patient has demonstrated a significant functional improvement with patient reporting an overall self-perception of improvement in pain and mobility with 70% improvement in symptoms subjectively reported. Patient noted with continued limitations in R hip abduction and extension strength but noted with an overall improvement in LE strength with improved static stability. Patient reports continued self-perception of diminished balance with patient limiting herself secondarily. Emphasis of continuation of treatment to be placed on improving static and dynamic stability in order to improve ease with household and community ADLs and overall safety. Patient will continue to benefit from skilled PT services to modify and progress therapeutic interventions, address functional mobility deficits, address ROM deficits, address strength deficits, analyze and address soft tissue restrictions, analyze and cue movement patterns, analyze and modify body mechanics/ergonomics and assess and modify postural abnormalities to attain remaining goals. []  See Plan of Care  [x]  See progress note/recertification  []  See Discharge Summary         Progress towards goals / Updated goals:    Long Term Goals: To be accomplished in 6 weeks:  1. Pt will improve FOTO to > or = to 55 to demo improved function. At PN: FOTO = 44, increased by 9 since [facility]. 2/16  Current; Progressing: FOTO = 40, increased by 5 since [facility]. 3/8/18  2. Pt will increase MMT R hip grossly to > or = to 4/5 for improved stability w/ standing up. At PN:  MMT R hip flex 4/5, abd 3+/5, ext 4/5, IR 3/5. ER 4/5 2/27/18  Current: Progressing, R Hip Flexion 5/5, Abduction 3+/5, IR 5/5, ER 5/5, Ext 4/5, 3/15/2018    Updated Goals: 1. Patient with demonstrate ability to complete standing repeated lumbar flexion x10 without R posterior buttock pain </=1/10 in order to improve ease with gardening. 3/15/2018: Standing Repeated Lumbar Flexion x10 with R Posterior Buttock Pain 4/10    Goals Met   1. Pt will be independent and complaint w/ HEP to progress gains in PT. At eval: initiated HEP  Current: Goal met: Pt reports performing HEP. 2/1/18  2. Pt will report > or = to 60% improvement in symptoms for ease w/ return to pain free ADLs. At eval: 0%  Current: MET. Pt. Reports 70% improvement since beginning therapy. 2/21/18  3. Pt will increase core activation as demo'd by supine bridge w/no pain x15 for ease w/ bending to  objects from floor. At eval: mild pain w/ supine bridge x1  Current: MET. Pt reports no pain after performing 15 bridges. 2/21/18.     PLAN  [x]  Upgrade activities as tolerated     [x]  Continue plan of care  []  Update interventions per flow sheet       []  Discharge due to:_  []  Other:_      Bryant Williamson, PT 3/15/2018  7:45 AM    Future Appointments  Date Time Provider Merlin Rosario   3/15/2018 12:00 PM Tobi Holm [facility]   3/19/2018 11:20 AM [facility]   3/21/2018 11:30 AM Laura Dixon PTA [facility]   7/2/2018 10:00 AM Adam King MD [address] [place] [address]

## 2018-03-19 ENCOUNTER — OFFICE VISIT (OUTPATIENT)
Dept: ORTHOPEDIC SURGERY | Age: 78
End: 2018-03-19

## 2018-03-19 VITALS
WEIGHT: 206.6 LBS | BODY MASS INDEX: 34.42 KG/M2 | HEART RATE: 64 BPM | DIASTOLIC BLOOD PRESSURE: 68 MMHG | RESPIRATION RATE: 16 BRPM | SYSTOLIC BLOOD PRESSURE: 140 MMHG | HEIGHT: 65 IN

## 2018-03-19 DIAGNOSIS — M54.16 LUMBAR RADICULOPATHY: ICD-10-CM

## 2018-03-19 DIAGNOSIS — M51.36 DDD (DEGENERATIVE DISC DISEASE), LUMBAR: ICD-10-CM

## 2018-03-19 DIAGNOSIS — M47.817 LUMBOSACRAL SPONDYLOSIS WITHOUT MYELOPATHY: Primary | ICD-10-CM

## 2018-03-19 DIAGNOSIS — M25.561 RIGHT KNEE PAIN, UNSPECIFIED CHRONICITY: ICD-10-CM

## 2018-03-19 RX ORDER — GABAPENTIN 800 MG/1
800 TABLET ORAL
Qty: 90 TAB | Refills: 1 | Status: SHIPPED | OUTPATIENT
Start: 2018-03-19 | End: 2018-07-03 | Stop reason: SDUPTHER

## 2018-03-19 NOTE — PROGRESS NOTES
[facility] SPECIALISTS  [address], [address]  Phone: [phone]  Fax: [phone]        PROGRESS NOTE      HISTORY OF PRESENT ILLNESS:  The patient is a 76 y.o. female and was seen today for follow up of chronic low back pain extending  into the RLE in a L5/S1 distribution to the ankle since roughly January 2018. Denies injury/trauma. Her pain is aggravated with standing, bending forwards, and extension. She reports limitations with standing and walking tolerance. Symptoms consistent for spinal stenosis. Pt has an additional c/o of right knee pain. She also reports numbness on her RUE. Pt had a cortisone injection in the past which she experienced erythema with. She reports a similar reaction with Prednisone; however they do not appear to be true allergies. Patient takes Neurontin 300 mg TID for her neuropathy. Patient also takes Aspirin for her heart condition as prescribed by her cardiologist. She also treats her pain with Naproxen and extra strength Tylenol with questionable relief. Patient denies a h/o physical therapy/chiropractor. The patient has a history of DM and reports blood sugars are well controlled, consistently remaining below 200. Her last A1C was a 6. Patient denies change in bowel or bladder habits. Patient denies fever, weight loss, or skin changes. The patient is RHD. Preliminary reading of lumbar plain films revealed; mild scoliosis convex to the left. [place] at L3. Grade 1 anterolisthesis at L4 on L5 and Grade 1-2 anterolisthesis at L5 on S1. Retrolisthesis at L3 on L4. At her last clinic appointment, patient with persistent low back pain and right sided radiculopathy. She noted an improvement in symptoms with increased Neurontin 600 mg TID. She continued describing limitations with standing and walking tolerance. Patient additionally complained of right knee pain.  I offered to refer to ortho for further evaluation of right knee complaints, she declined at that time. Patient wished to continue her current treatment and refills of her Neurontin 600 mg TID were given. She should continue with physical therapy as prescribed. I encouraged patient to perform her HEP daily. The patient returns today with pain location and distribution remain unchanged. She rates pain 3-4/10, an improvement since her last visit (4-7/10). Pt continues to be enrolled in physical therapy with benefit and performs her HEP daily. She is compliant with Neurontin 600 mg TID.  reviewed. Body mass index is 34.38 kg/(m^2). Past Medical History:   Diagnosis Date    Cardiomegaly     Essential hypertension, benign     stable    Hypercholesterolemia     Obesity, unspecified     Pt has weight loss    Other and unspecified hyperlipidemia     Chol 172, ldl 82, hdl 62, tg 141    Type II or unspecified type diabetes mellitus without mention of complication, not stated as uncontrolled         Social History     Social History    Marital status:      Spouse name: N/A    Number of children: N/A    Years of education: N/A     Occupational History    Not on file. Social History Main Topics    Smoking status: Never Smoker    Smokeless tobacco: Never Used    Alcohol use No    Drug use: No    Sexual activity: Not on file     Other Topics Concern    Not on file     Social History Narrative       Current Outpatient Prescriptions   Medication Sig Dispense Refill    VIT C/VIT E AC/LUT/COPPER/ZINC (PRESERVISION LUTEIN PO) Take  by Mouth Twice Daily.  MELATONIN PO 5 mg.  glucosamine-chondroitin (ELATION) 1,500-1,200 mg/30 mL liqd Take  by Mouth.  acetaminophen (TYLENOL) 500 mg tablet 500 mg.      gabapentin (NEURONTIN) 600 mg tablet Take 1 Tab by mouth three (3) times daily (with meals).  Indications: NEUROPATHIC PAIN 270 Tab 0    losartan (COZAAR) 50 mg tablet TAKE ONE TABLET BY MOUTH ONCE DAILY 90 Tab 3    dilTIAZem XR (DILACOR XR) 120 mg XR capsule TAKE ONE CAPSULE BY MOUTH ONCE DAILY 90 Cap 3    atenolol (TENORMIN) 50 mg tablet TAKE ONE TABLET BY MOUTH TWICE DAILY 180 Tab 3    aspirin (ASPIRIN) 325 mg tablet Take 81 mg by mouth daily.  CRANBERRY CONC-ASCORBIC ACID PO Take  by mouth.  calcium-cholecalciferol, d3, (CALCIUM 600 + D) 600-125 mg-unit tab Take  by mouth daily. Patient taking 4 times a week      Cholecalciferol, Vitamin D3, (VITAMIN D3) 1,000 unit cap Take  by mouth daily.  metFORMIN (GLUMETZA ER) 500 mg TG24 24 hour tablet 850 mg daily.  pravastatin (PRAVACHOL) 40 mg tablet Take 40 mg by mouth daily.  GLUC SIMPSON/CHONDRO SIMPSON A/VIT C/MN (GLUCOSAMINE 1500 COMPLEX PO) Take  by mouth daily.  indapamide (LOZOL) 2.5 mg tablet Take  by mouth daily.  gabapentin (NEURONTIN) 300 mg capsule Take 300 mg by mouth three (3) times daily.          Allergies   Allergen Reactions    Kenalog [Triamcinolone Acetonide] Anaphylaxis    Penicillin G Hives    Adhesive Tape-Silicones Swelling    Bacitracin Not Reported This Time    Cephalexin Rash    Ciprofloxacin Other (comments)    Cortisone Not Reported This Time    Erythromycin Not Reported This Time    Hydrocortisone Hives    Lisinopril Not Reported This Time    Lortab [Hydrocodone-Acetaminophen] Rash    Methimazole Other (comments)     Neck pain/cough    Penicillins Not Reported This Time    Prednisolone Other (comments)    Prednisone Not Reported This Time    Propylthiouracil Other (comments)     Dizziness,elevated blood pressure    Sulfamethoxazole-Trimethoprim Other (comments)    Tetanus And Diphtheria Toxoids, Adsorbed, Adult Swelling    Tetanus Vaccines And Toxoid Not Reported This Time          PHYSICAL EXAMINATION    Visit Vitals    /68    Pulse 64    Resp 16    Ht 5' 5\" (1.651 m)    Wt 206 lb 9.6 oz (93.7 kg)    BMI 34.38 kg/m2       CONSTITUTIONAL: NAD, A&O x 3  SENSATION: Intact to light touch throughout  RANGE OF MOTION: The patient has full passive range of motion in all four extremities. MOTOR:  Straight Leg Raise: Negative, bilateral               Hip Flex Knee Ext Knee Flex Ankle DF GTE Ankle PF Tone   Right +4/5 +4/5 +4/5 +4/5 +4/5 +4/5 +4/5   Left +4/5 +4/5 +4/5 +4/5 +4/5 +4/5 +4/5       ASSESSMENT   Diagnoses and all orders for this visit:    1. Lumbosacral spondylosis without myelopathy  -     gabapentin (NEURONTIN) 800 mg tablet; Take 1 Tab by mouth three (3) times daily (with meals). Indications: NEUROPATHIC PAIN    2. Lumbar radiculopathy  -     gabapentin (NEURONTIN) 800 mg tablet; Take 1 Tab by mouth three (3) times daily (with meals). Indications: NEUROPATHIC PAIN    3. DDD (degenerative disc disease), lumbar  -     gabapentin (NEURONTIN) 800 mg tablet; Take 1 Tab by mouth three (3) times daily (with meals). Indications: NEUROPATHIC PAIN    4. Right knee pain, unspecified chronicity  -     gabapentin (NEURONTIN) 800 mg tablet; Take 1 Tab by mouth three (3) times daily (with meals). Indications: NEUROPATHIC PAIN    Other orders  -     REFERRAL TO ORTHOPEDICS          IMPRESSION AND PLAN:  Patient continues to have right knee and I will be referring patient to Dr. Jer Alejandre for further evaluation. Concerning her low back and RLE radicular type symptoms, I will increase her Neurontin to 800 mg TID. Patient advised to call the office if intolerant to higher dose. I have approved extended physical therapy for the patient as it has been beneficial. I will see the patient back in 1 month's time or earlier if needed. Written by Abner Telles, as dictated by She Carrasco MD  I examined the patient, reviewed and agree with the note.

## 2018-03-19 NOTE — MR AVS SNAPSHOT
[address] [facility] 
 
 
 [address] [facility] 
[phone] Patient: Darylene Stapler MRN: [id number] :1940 Visit Information Date & Time Provider Department Dept. Phone Encounter #  
 3/19/2018 11:20 AM Colin Mays MD [facility] - [facility] [phone] [id number] Follow-up Instructions Return in about 4 weeks (around 2018). Your Appointments 2018 10:00 AM  
Follow Up with Mike Parra MD  
[facility] ([address]) Appt Note: 6 month follow up  
 [address] [facility] [address]  
  
   
 [address] [address] [address] [address] Upcoming Health Maintenance Date Due HEMOGLOBIN A1C Q6M 1940 FOOT EXAM Q1 1948 MICROALBUMIN Q1 1948 EYE EXAM RETINAL OR DILATED Q1 1948 DTaP/Tdap/Td series (1 - Tdap) 1959 ZOSTER VACCINE AGE 60> 1998 GLAUCOMA SCREENING Q2Y 2003 Bone Densitometry (Dexa) Screening 2003 Pneumococcal 65+ Low/Medium Risk (1 of 2 - PCV13) 2003 MEDICARE YEARLY EXAM 2003 LIPID PANEL Q1 2016 Allergies as of 3/19/2018  Review Complete On: 3/19/2018 By: Colin Mays MD  
  
 Severity Noted Reaction Type Reactions Kenalog [Triamcinolone Acetonide] High 2018    Anaphylaxis Penicillin G High 2015    Hives Adhesive Tape-silicones Medium     Swelling Bacitracin    Not Reported This Time Cephalexin  2018    Rash Ciprofloxacin  2016    Other (comments) Cortisone    Not Reported This Time Erythromycin    Not Reported This Time Hydrocortisone  2015    Hives Lisinopril    Not Reported This Time Lortab [Hydrocodone-acetaminophen]  2015    Rash Methimazole  2017    Other (comments) Neck pain/cough Penicillins    Not Reported This Time Prednisolone  04/01/2015    Other (comments) Prednisone    Not Reported This Time Propylthiouracil  07/07/2017    Other (comments) Dizziness,elevated blood pressure Sulfamethoxazole-trimethoprim  11/03/2016    Other (comments) Tetanus And Diphtheria Toxoids, Adsorbed, Adult  04/01/2015    Swelling Tetanus Vaccines And Toxoid    Not Reported This Time Current Immunizations  Never Reviewed No immunizations on file. Not reviewed this visit You Were Diagnosed With   
  
 Codes Comments Lumbosacral spondylosis without myelopathy    -  Primary ICD-10-CM: J47.423 ICD-9-CM: 721.3 Lumbar radiculopathy     ICD-10-CM: M54.16 
ICD-9-CM: 724.4 DDD (degenerative disc disease), lumbar     ICD-10-CM: M51.36 
ICD-9-CM: 722.52 Right knee pain, unspecified chronicity     ICD-10-CM: M25.561 ICD-9-CM: 719.46 Vitals BP Pulse Resp Height(growth percentile) Weight(growth percentile) BMI  
 140/68 64 16 5' 5\" (1.651 m) 206 lb 9.6 oz (93.7 kg) 34.38 kg/m2 OB Status Smoking Status Hysterectomy Never Smoker Vitals History BMI and BSA Data Body Mass Index Body Surface Area  
 34.38 kg/m 2 2.07 m 2 [facility] [address], [address] [place] [address] Road [phone] Your Updated Medication List  
  
   
This list is accurate as of 3/19/18 12:30 PM.  Always use your most recent med list.  
  
  
  
  
 acetaminophen 500 mg tablet Commonly known as:  TYLENOL  
500 mg.  
  
 aspirin 325 mg tablet Commonly known as:  ASPIRIN Take 81 mg by mouth daily. atenolol 50 mg tablet Commonly known as:  TENORMIN  
TAKE ONE TABLET BY MOUTH TWICE DAILY CALCIUM 600 + D 600-125 mg-unit Tab Generic drug:  calcium-cholecalciferol (d3) Take  by mouth daily. Patient taking 4 times a week CRANBERRY CONC-ASCORBIC ACID PO Take  by mouth. dilTIAZem  mg XR capsule Commonly known as:  DILACOR XR  
TAKE ONE CAPSULE BY MOUTH ONCE DAILY * gabapentin 300 mg capsule Commonly known as:  NEURONTIN Take 300 mg by mouth three (3) times daily. * gabapentin 600 mg tablet Commonly known as:  NEURONTIN Take 1 Tab by mouth three (3) times daily (with meals). Indications: NEUROPATHIC PAIN  
  
 * gabapentin 800 mg tablet Commonly known as:  NEURONTIN Take 1 Tab by mouth three (3) times daily (with meals). Indications: NEUROPATHIC PAIN  
  
 GLUCOSAMINE 1500 COMPLEX PO Take  by mouth daily. glucosamine-chondroitin 1,500-1,200 mg/30 mL Liqd Commonly known as:  ELATION Take  by Mouth. indapamide 2.5 mg tablet Commonly known as:  Swetha Mass Take  by mouth daily. losartan 50 mg tablet Commonly known as:  COZAAR  
TAKE ONE TABLET BY MOUTH ONCE DAILY MELATONIN PO  
5 mg.  
  
 metFORMIN 500 mg Tg24 24 hour tablet Commonly known as:  GLUMETZA ER  
850 mg daily. pravastatin 40 mg tablet Commonly known as:  PRAVACHOL Take 40 mg by mouth daily. PRESERVISION LUTEIN PO Take  by Mouth Twice Daily. VITAMIN D3 1,000 unit Cap Generic drug:  cholecalciferol Take  by mouth daily. * Notice: This list has 3 medication(s) that are the same as other medications prescribed for you. Read the directions carefully, and ask your doctor or other care provider to review them with you. Prescriptions Printed Refills  
 gabapentin (NEURONTIN) 800 mg tablet 1 Sig: Take 1 Tab by mouth three (3) times daily (with meals). Indications: NEUROPATHIC PAIN Class: Print Route: Oral  
  
We Performed the Following REFERRAL TO ORTHOPEDICS [QLF812 Custom] Comments: EVAL AND TREAT 
RIGHT KNEE PAIN Follow-up Instructions Return in about 4 weeks (around 4/16/2018). To-Do List   
 03/21/2018 11:30 AM  
  Appointment with Maximus Harris PTA at [facility]  [address] ([phone]) 03/23/2018 9:00 AM  
  Appointment with Lara Cline at [facility] PT [place] [facility] ([phone])  
  
 03/28/2018 9:00 AM  
  Appointment with [facility] PT [place] [facility] at [facility]  N[facility]ed [facility]mel [facility] ([phone])  
  
 03/30/2018 11:00 AM  
  Appointment with Kristina Allen PT at [facility] PT [place] [facility] ([phone])  
  
 04/04/2018 2:30 PM  
  Appointment with Jay Gonzalez PTA at [address]  ([phone]) 04/06/2018 2:00 PM  
  Appointment with Jay Gonzalez PTA at [facility] PT [place] [facility] ([phone])  
  
 04/11/2018 2:00 PM  
  Appointment with Cary Giles PT at [facility] PT [place] [facility] ([phone])  
  
 04/13/2018 9:00 AM  
  Appointment with Jay Gonzalez PTA at [address]  ([phone])  
  
 04/18/2018 9:30 AM  
  Appointment with Jay Gonzalez PTA at [address]  ([phone])  
  
 04/20/2018 9:30 AM  
  Appointment with Cary Giles PT at [address]  ([phone]) Referral Information Referral ID Referred By Referred To  
  
 [id number] [place], [address] Phone: [phone] Fax: [phone] Visits Status Start Date End Date 1 New Request 3/19/18 3/19/19 If your referral has a status of pending review or denied, additional information will be sent to support the outcome of this decision. Introducing [facility] & HEALTH SERVICES! Dear Shanthi Arreguin: 
Thank you for requesting a MyChart account. Our records indicate that you already have an active MyChart account. You can access your account anytime at [email or web address] Did you know that you can access your hospital and ER discharge instructions at any time in Safety[employer]ified? You can also review all of your test results from your hospital stay or ER visit. Additional Information If you have questions, please visit the Frequently Asked Questions section of the Sx[employer]m website at [email or web address] Remember, Sxbbm is NOT to be used for urgent needs. For medical emergencies, dial 911. Now available from your iPhone and Android! Please provide this summary of care documentation to your next provider. Your primary care clinician is listed as Gilda Conde. If you have any questions after today's visit, please call [phone].

## 2018-03-21 ENCOUNTER — HOSPITAL ENCOUNTER (OUTPATIENT)
Dept: PHYSICAL THERAPY | Age: 78
Discharge: HOME | End: 2018-03-21
Payer: MEDICARE

## 2018-03-21 PROCEDURE — 97112 NEUROMUSCULAR REEDUCATION: CPT

## 2018-03-21 PROCEDURE — 97110 THERAPEUTIC EXERCISES: CPT

## 2018-03-21 NOTE — PROGRESS NOTES
PT DAILY TREATMENT NOTE - [facility]     Patient Name: Flori Clemens  Date:3/21/2018  : 1940  [x]  Patient  Verified  Payor: Jovita Seo / Plan: [place] MEDICARE PART A & B / Product Type: Medicare /    In time:11:15  Out time:12:06  Total Treatment Time (min): 51  Total Timed Codes (min): 41  1:1 Treatment Time ( [address] only): 41   Visit #: 1 of 8 (signed PN)    Treatment Area: Lumbar pain [M54.5]  Other intervertebral disc degeneration, lumbar region [M51.36]    SUBJECTIVE  Pain Level (0-10 scale): 2  Any medication changes, allergies to medications, adverse drug reactions, diagnosis change, or new procedure performed?: [x] No    [] Yes (see summary sheet for update)  Subjective functional status/changes:   [] No changes reported  Pt reports that her hip bothers her with when she has to do any bending activities. OBJECTIVE    Modality rationale: decrease pain to improve the patients ability to decrease difficulty while performing tasks.     Min Type Additional Details    [] Estim:  []Unatt       []IFC  []Premod                        []Other:  []w/ice   []w/heat  Position:  Location:    [] Estim: []Att    []TENS instruct  []NMES                    []Other:  []w/US   []w/ice   []w/heat  Position:  Location:    []  Traction: [] Cervical       []Lumbar                       [] Prone          []Supine                       []Intermittent   []Continuous Lbs:  [] before manual  [] after manual    []  Ultrasound: []Continuous   [] Pulsed                           []1MHz   []3MHz W/cm2:  Location:    []  Iontophoresis with dexamethasone         Location: [] Take home patch   [] In clinic   10 []  Ice     [x]  heat  []  Ice massage  []  Laser   []  Anodyne Position:supine  Location:back     []  Laser with stim  []  Other:  Position:  Location:    []  Vasopneumatic Device Pressure:       [] lo [] med [] hi   Temperature: [] lo [] med [] hi   [] Skin assessment post-treatment:  []intact []redness- no adverse reaction    []redness  adverse reaction:         31 min Therapeutic Exercise:  [x] See flow sheet :   Rationale: increase ROM and increase strength to improve the patients ability to increase tolerance to activities.         10 min Neuromuscular Re-education:  [x]  See flow sheet :   Rationale: increase ROM and increase strength  to improve the patients ability to increase ease with ADLs.                   With   [] TE   [] TA   [] neuro   [] other: Patient Education: [x] Review HEP    [] Progressed/Changed HEP based on:   [] positioning   [] body mechanics   [] transfers   [] heat/ice application    [] other:      Other Objective/Functional Measures:      Pain Level (0-10 scale) post treatment: 0    ASSESSMENT/Changes in Function: Continue to progress pt as tolerated. Patient will continue to benefit from skilled PT services to modify and progress therapeutic interventions, address functional mobility deficits, address ROM deficits, address strength deficits and analyze and address soft tissue restrictions to attain remaining goals. []  See Plan of Care  []  See progress note/recertification  []  See Discharge Summary         Progress towards goals / Updated goals:  Long Term Goals: To be accomplished in 6 weeks:  1. Pt will improve FOTO to > or = to 55 to demo improved function. At PN: FOTO = 44, increased by 9 since [facility]. 2/16  Current; Progressing: FOTO = 40, increased by 5 since [facility]. 3/8/18  2. Pt will increase MMT R hip grossly to > or = to 4/5 for improved stability w/ standing up. At PN:  MMT R hip flex 4/5, abd 3+/5, ext 4/5, IR 3/5. ER 4/5 2/27/18  Current: Progressing, R Hip Flexion 5/5, Abduction 3+/5, IR 5/5, ER 5/5, Ext 4/5, 3/15/2018     Updated Goals: 1. Patient with demonstrate ability to complete standing repeated lumbar flexion x10 without R posterior buttock pain </=1/10 in order to improve ease with gardening.   3/15/2018: Standing Repeated Lumbar Flexion x10 with R Posterior Buttock Pain 4/10     Goals Met   1. Pt will be independent and complaint w/ HEP to progress gains in PT. At eval: initiated HEP  Current: Goal met: Pt reports performing HEP. 2/1/18  2. Pt will report > or = to 60% improvement in symptoms for ease w/ return to pain free ADLs. At eval: 0%  Current: MET. Pt. Reports 70% improvement since beginning therapy. 2/21/18  3. Pt will increase core activation as demo'd by supine bridge w/no pain x15 for ease w/ bending to  objects from floor. At eval: mild pain w/ supine bridge x1  Current: MET. Pt reports no pain after performing 15 bridges. 2/21/18.     PLAN  []  Upgrade activities as tolerated     [x]  Continue plan of care  []  Update interventions per flow sheet       []  Discharge due to:_  []  Other:_      Kimberly Wilson PTA 3/21/2018  11:22 AM    Future Appointments  Date Time Provider Merlin Marlene   3/21/2018 11:30 AM Kimberly Wilson PTA [facility]   3/23/2018 9:00 AM Andra Rollins [facility]   3/27/2018 2:45 PM MD LASHONDA Peguero SCHED   3/28/2018 9:00 AM [facility] PT [place] 1 [facility]   3/30/2018 11:00 AM Edith Luna, PT [facility]   4/4/2018 2:30 PM Kimberly Wilson PTA [facility]   4/6/2018 2:00 PM Kimberly Wilson PTA [facility]   4/11/2018 2:00 PM Kenneth Garza, PT [facility]   4/13/2018 9:00 AM Kimberly Wilson PTA [facility]   4/16/2018 1:45 PM MD JENNIFER Brown SCHED   4/18/2018 9:30 AM Kimberly Wilson, PTA [facility]   4/20/2018 9:30 AM Kenneth Garza PT [facility]   7/2/2018 10:00 AM Karime Carrillo MD [id number] [place] [address]

## 2018-03-23 ENCOUNTER — HOSPITAL ENCOUNTER (OUTPATIENT)
Dept: PHYSICAL THERAPY | Age: 78
Discharge: HOME | End: 2018-03-23
Payer: MEDICARE

## 2018-03-23 PROCEDURE — 97110 THERAPEUTIC EXERCISES: CPT | Performed by: PHYSICAL THERAPIST

## 2018-03-23 PROCEDURE — 97112 NEUROMUSCULAR REEDUCATION: CPT | Performed by: PHYSICAL THERAPIST

## 2018-03-23 NOTE — PROGRESS NOTES
PT DAILY TREATMENT NOTE - [facility]     Patient Name: Medhat Astorga  Date:3/23/2018  : 1940  [x]  Patient  Verified  [place]or: [place] MED[place]ARE / Plan: [place] MEDICARE PART A & B / Product Type: Medicare /    In time:856  Out time:949  Total Treatment Time (min): 63  Total Timed Codes (min): 53  1:1 Treatment Time ( [address] only): 39   Visit #: 2 of 8    Treatment Area: Lumbar pain [M54.5]  Other intervertebral disc degeneration, lumbar region [M51.36]    SUBJECTIVE  Pain Level (0-10 scale): 3/10  Any medication changes, allergies to medications, adverse drug reactions, diagnosis change, or new procedure performed?: [x] No    [] Yes (see summary sheet for update)  Subjective functional status/changes:   [] No changes reported  Pt reports she still gets pain w/ standing to do dinner, reports she can stand about 30 min w/o pain.      OBJECTIVE    Modality rationale: decrease pain and increase tissue extensibility to improve the patients ability to improve activity tolerance and mobility    Min Type Additional Details    [] Estim:  []Unatt       []IFC  []Premod                        []Other:  []w/ice   []w/heat  Position:  Location:    [] Estim: []Att    []TENS instruct  []NMES                    []Other:  []w/US   []w/ice   []w/heat  Position:  Location:    []  Traction: [] Cervical       []Lumbar                       [] Prone          []Supine                       []Intermittent   []Continuous Lbs:  [] before manual  [] after manual    []  Ultrasound: []Continuous   [] Pulsed                           []1MHz   []3MHz W/cm2:  Location:    []  Iontophoresis with dexamethasone         Location: [] Take home patch   [] In clinic   10 []  Ice     [x]  heat  []  Ice massage  []  Laser   []  Anodyne Position: supine  Location: back    []  Laser with stim  []  Other:  Position:  Location:    []  Vasopneumatic Device Pressure:       [] lo [] med [] hi   Temperature: [] lo [] med [] hi   [] Skin assessment post-treatment: []intact []redness- no adverse reaction    []redness  adverse reaction:      min []Eval                  []Re-Eval       38 min Therapeutic Exercise:  [x] See flow sheet : increased per flow sheet   Rationale: increase ROM, increase strength and improve coordination to improve the patients ability to decrease pain and improve activity tolerance      min Therapeutic Activity:  []  See flow sheet :   Rationale:   to improve the patients ability to      15 min Neuromuscular Re-education:  [x]  See flow sheet : core and glute activation per flow sheet   Rationale: increase strength, improve coordination, improve balance and increase proprioception  to improve the patients ability to decrease pain and improve standing tolerance      min Manual Therapy:     Rationale:  to      min Gait Training:  ___ feet with ___ device on level surfaces with ___ level of assist   Rationale: With   [] TE   [] TA   [] neuro   [] other: Patient Education: [x] Review HEP    [] Progressed/Changed HEP based on:   [] positioning   [] body mechanics   [] transfers   [] heat/ice application    [] other:      Other Objective/Functional Measures:      Pain Level (0-10 scale) post treatment: 0/10    ASSESSMENT/Changes in Function: reinforced education on importance of core activation w/ prolonged standing to improve toelrance. Patient will continue to benefit from skilled PT services to modify and progress therapeutic interventions, address functional mobility deficits, address ROM deficits, address strength deficits, analyze and address soft tissue restrictions, analyze and cue movement patterns, analyze and modify body mechanics/ergonomics, address imbalance/dizziness and instruct in home and community integration to attain remaining goals. []  See Plan of Care  []  See progress note/recertification  []  See Discharge Summary         Progress towards goals / Updated goals:  Long Term Goals: To be accomplished in 6 weeks:  1. Pt will improve FOTO to > or = to 55 to demo improved function. At PN: FOTO = 44, increased by 9 since [facility]. 2/16  Current; Progressing: FOTO = 40, increased by 5 since [facility]. 3/8/18  2. Pt will increase MMT R hip grossly to > or = to 4/5 for improved stability w/ standing up. At PN:  MMT R hip flex 4/5, abd 3+/5, ext 4/5, IR 3/5. ER 4/5 2/27/18  Current: Progressing, R Hip Flexion 5/5, Abduction 3+/5, IR 5/5, ER 5/5, Ext 4/5, 3/15/2018      Updated Goals:   1.Patient with demonstrate ability to complete standing repeated lumbar flexion x10 without R posterior buttock pain </=1/10 in order to improve ease with gardening. 3/15/2018: Standing Repeated Lumbar Flexion x10 with R Posterior Buttock Pain 4/10  Current: progressing, reports no pain w/ lumbar ext x10 but that she was aware of the movement. 3/23/18      Goals Met   1. Pt will be independent and complaint w/ HEP to progress gains in PT. At eval: initiated HEP  Current: Goal met: Pt reports performing HEP. 2/1/18  2. Pt will report > or = to 60% improvement in symptoms for ease w/ return to pain free ADLs. At eval: 0%  Current: MET. Pt. Reports 70% improvement since beginning therapy. 2/21/18  3. Pt will increase core activation as demo'd by supine bridge w/no pain x15 for ease w/ bending to  objects from floor. At eval: mild pain w/ supine bridge x1  Current: MET. Pt reports no pain after performing 15 bridges. 2/21/18.     PLAN  [x]  Upgrade activities as tolerated     [x]  Continue plan of care  []  Update interventions per flow sheet       []  Discharge due to:_  []  Other:_      Kalie Amaro PT 3/23/2018  9:24 AM    Future Appointments  Date Time Provider Merlin Rosario   3/27/2018 2:45 PM Diana Ohara MD [facility] SCHED   3/29/2018 10:00 AM Shashi Bonner, PT [facility]   3/30/2018 11:00 AM Shashi Bonner, PT [facility]   4/4/2018 2:30 PM Zeyad Fowler, PUMA [facility]   4/6/2018 2:00 PM Zeyad Fowler PTA [facility] 4/11/2018 2:00 PM Cory Ordonez, PT [facility]   4/13/2018 9:00 AM Bernida Liner, PTA [facility]   4/16/2018 1:45 PM Bola Dsouza MD [facility]   4/18/2018 9:30 AM Bernida Liner, PTA [facility]   4/20/2018 9:30 AM Cory Ordonez, PT [facility]   7/2/2018 10:00 AM Homer Quijano MD [address] [place] [address]

## 2018-03-27 ENCOUNTER — OFFICE VISIT (OUTPATIENT)
Dept: ORTHOPEDIC SURGERY | Age: 78
End: 2018-03-27

## 2018-03-27 VITALS
HEART RATE: 72 BPM | BODY MASS INDEX: 34.55 KG/M2 | WEIGHT: 207.4 LBS | SYSTOLIC BLOOD PRESSURE: 140 MMHG | HEIGHT: 65 IN | OXYGEN SATURATION: 97 % | DIASTOLIC BLOOD PRESSURE: 70 MMHG

## 2018-03-27 DIAGNOSIS — M17.11 ARTHRITIS OF KNEE, RIGHT: ICD-10-CM

## 2018-03-27 DIAGNOSIS — M25.561 RIGHT KNEE PAIN, UNSPECIFIED CHRONICITY: Primary | ICD-10-CM

## 2018-03-27 NOTE — PROGRESS NOTES
HISTORY OF PRESENT ILLNESS: Ms. Lily Ramírez is a 64-year-old female who is here for consultation regarding right knee pain. Her pain began just a couple of weeks ago. She has been in physical therapy for Dr. Bill Dowd for her back and some radiating right leg pain. She states the therapy has now aggravated her knee. She has some known osteoarthritis of her right knee. She points to diffuse pain in the right knee. She has a history of reactions to [address] She has had reactions to most medications. She has been on Naproxen for her family physician which she can take and she takes that twice a day. This does help some of her arthritic symptoms. She has not worn a brace on the right knee. She has also reacted to Cortisone shots in the past.  She has reacted to antibiotics. 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 She does understand this. With reference to her knees, she points to diffuse pain in the right knee, not necessarily lateral or medial aspect. She does not wear a brace on the right knee. She does not utilize any ambulatory assist.    Most of her pain is more in the low back, right SI joint down the sciatic nerve. PHYSICAL EXAMINATION:  Reveals a significantly overweight, 64-year-old female in mild discomfort. She does ambulate with an antalgic gait with a decreased stance phase on the right leg. She is not utilizing any ambulatory assist.  With reference to her right knee, she does appear to have a slight valgus deformity of the right knee which is correctable in neutral position passively. She does not have however have palpable medial joint line tenderness. She has slight palpable lateral joint line tenderness. There is crepitus in the patellofemoral joint of the right knee with flexion and extension. Kenyons test is negative.   Lachman test is negative. She has good collateral, as well as cruciate ligament stability to her right knee with no opening to varus or valgus stress testing in full extension or 30º of flexion. Anterior and posterior drawer tests are negative. She has no right calf tenderness or swelling. Neurovascular testing is intact to the lower extremities, including motor strength and sensation, proximally and distally. She has a normal passive range of motion of the right hip without discomfort. Right hip roll test is negative. Sitting straight leg raise test results in pain in the right buttock and right sciatic nerve. RADIOGRAPHS:  X-rays of her knees today reveal osteoarthritis of both knees. Clinically I would have thought she had more arthritis in the lateral compartment, but actually she has more narrowing in the medial compartment. She is not quite bone-on-bone opposition in this medial compartment. Similar findings are in the left knee. She has some spurring of the tibial spines. IMPRESSION: A 49-year-old female with osteoarthritis, both knees, right knee more symptomatic than the left. RECOMMENDATIONS:  At this point her symptoms have been aggravated by the therapy for her back. Treatment remains symptomatic and conservative at this point, but I am not able to do any injections for her due to her reactions. She may continue on Naproxen medication. I have discussed with her a soft brace for the right knee. She will complete her course of therapy over the next four weeks and return to see me in a few months. If her symptoms become worse in the meantime she is to notify me. All of her questions were answered today. Ultimately she may be a candidate for knee arthroplasty surgery as her symptoms and radiographs progress.                  Vitals:    03/27/18 1419   BP: 140/70   Pulse: 72   SpO2: 97%   Weight: 94.1 kg (207 lb 6.4 oz)   Height: 5' 5\" (1.651 m)   PainSc:   5   PainLoc: Knee       Patient Active Problem List   Diagnosis Code    Essential hypertension, benign I10    Cardiomegaly I51.7    Obesity, unspecified E66.9    Other and unspecified hyperlipidemia E78.5    Type II or unspecified type diabetes mellitus without mention of complication, not stated as uncontrolled E11.9    Hypercholesterolemia E78.00    Palpitation R00.2    Hyperthyroidism E05.90    PAC (premature atrial contraction) I49.1    Type 2 diabetes mellitus without complication, without long-term current use of insulin ([facility]) E11.9    Low back pain without sciatica M54.5    Lumbosacral spondylosis without myelopathy M47.817    Lumbar neuritis M54.16    DDD (degenerative disc disease), lumbar M51.36    Lumbar radiculopathy M54.16    Right knee pain M25.561     Patient Active Problem List    Diagnosis Date Noted    Lumbar radiculopathy 03/19/2018    Right knee pain 03/19/2018    Low back pain without sciatica 01/29/2018    Lumbosacral spondylosis without myelopathy 01/29/2018    Lumbar neuritis 01/29/2018    DDD (degenerative disc disease), lumbar 01/29/2018    Type 2 diabetes mellitus without complication, without long-term current use of insulin ([facility] 01/19/2018    PAC (premature atrial contraction) 08/02/2016    Palpitation 02/05/2016    Hyperthyroidism 02/05/2016    Essential hypertension, benign     Cardiomegaly     Obesity, unspecified     Other and unspecified hyperlipidemia     Type II or unspecified type diabetes mellitus without mention of complication, not stated as uncontrolled     Hypercholesterolemia      Current Outpatient Prescriptions   Medication Sig Dispense Refill    gabapentin (NEURONTIN) 800 mg tablet Take 1 Tab by mouth three (3) times daily (with meals). Indications: NEUROPATHIC PAIN 90 Tab 1    VIT C/VIT E AC/LUT/COPPER/ZINC (PRESERVISION LUTEIN PO) Take  by Mouth Twice Daily.  MELATONIN PO 5 mg.  glucosamine-chondroitin (ELATION) 1,500-1,200 mg/30 mL liqd Take  by Mouth.  acetaminophen (TYLENOL) 500 mg tablet 500 mg.      losartan (COZAAR) 50 mg tablet TAKE ONE TABLET BY MOUTH ONCE DAILY 90 Tab 3    dilTIAZem XR (DILACOR XR) 120 mg XR capsule TAKE ONE CAPSULE BY MOUTH ONCE DAILY 90 Cap 3    atenolol (TENORMIN) 50 mg tablet TAKE ONE TABLET BY MOUTH TWICE DAILY 180 Tab 3    aspirin (ASPIRIN) 325 mg tablet Take 81 mg by mouth daily.  CRANBERRY CONC-ASCORBIC ACID PO Take  by mouth.  calcium-cholecalciferol, d3, (CALCIUM 600 + D) 600-125 mg-unit tab Take  by mouth daily. Patient taking 4 times a week      Cholecalciferol, Vitamin D3, (VITAMIN D3) 1,000 unit cap Take  by mouth daily.  metFORMIN (GLUMETZA ER) 500 mg TG24 24 hour tablet 850 mg daily.  pravastatin (PRAVACHOL) 40 mg tablet Take 40 mg by mouth daily.  GLUC SIMPSON/CHONDRO SIMPSON A/VIT C/MN (GLUCOSAMINE 1500 COMPLEX PO) Take  by mouth daily.  indapamide (LOZOL) 2.5 mg tablet Take  by mouth daily.  gabapentin (NEURONTIN) 600 mg tablet Take 1 Tab by mouth three (3) times daily (with meals). Indications: NEUROPATHIC PAIN 270 Tab 0    gabapentin (NEURONTIN) 300 mg capsule Take 300 mg by mouth three (3) times daily.        Allergies   Allergen Reactions    Kenalog [Triamcinolone Acetonide] Anaphylaxis    Penicillin G Hives    Adhesive Tape-Silicones Swelling    Bacitracin Not Reported This Time    Cephalexin Rash    Ciprofloxacin Other (comments)    Cortisone Not Reported This Time    Erythromycin Not Reported This Time    Hydrocortisone Hives    Lisinopril Not Reported This Time    Lortab [Hydrocodone-Acetaminophen] Rash    Methimazole Other (comments)     Neck pain/cough    Penicillins Not Reported This Time    Prednisolone Other (comments)    Prednisone Not Reported This Time    Propylthiouracil Other (comments)     Dizziness,elevated blood pressure    Sulfamethoxazole-Trimethoprim Other (comments)    Tetanus And Diphtheria Toxoids, Adsorbed, Adult Swelling    Tetanus Vaccines And Toxoid Not Reported This Time     Past Medical History:   Diagnosis Date    Cardiomegaly     Essential hypertension, benign     stable    Hypercholesterolemia     Obesity, unspecified     Pt has weight loss    Other and unspecified hyperlipidemia     Chol 172, ldl 82, hdl 62, tg 141    Type II or unspecified type diabetes mellitus without mention of complication, not stated as uncontrolled      Past Surgical History:   Procedure Laterality Date    HX GYN      hysterectomy     Family History   Problem Relation Age of Onset    Heart Disease Other      positive history of ischemic heart disease     Social History   Substance Use Topics    Smoking status: Never Smoker    Smokeless tobacco: Never Used    Alcohol use No

## 2018-03-27 NOTE — MR AVS SNAPSHOT
[address] [facility] 
 
 
 [address] [facility] 
[phone] Patient: Jewel Bower MRN: [id number] :1940 Visit Information Date & Time Provider Department Dept. Phone Encounter #  
 3/27/2018  2:45 PM Lia Pizano  [facility], [address] and [facility] [place] [phone] [id number] Your Appointments 2018  1:45 PM  
Follow Up with Queenie Villegas MD  
[address] [place] [address], [address] and [facility] [place] ([facility] Appt Note: 4 WEEK FOLLOW LOWER BACK  
  [place] [facility] [id number]  
[address]  
  
    
 2018 10:00 AM  
Follow Up with Fidelina Chow MD  
[facility] ([facility]) Appt Note: 6 month follow up  
 [address] [facility] [address]  
  
   
 [address] [place] [address] Upcoming Health Maintenance Date Due HEMOGLOBIN A1C Q6M 1940 FOOT EXAM Q1 1948 MICROALBUMIN Q1 1948 EYE EXAM RETINAL OR DILATED Q1 1948 DTaP/Tdap/Td series (1 - Tdap) 1959 ZOSTER VACCINE AGE 60> 1998 GLAUCOMA SCREENING Q2Y 2003 Bone Densitometry (Dexa) Screening 2003 Pneumococcal 65+ Low/Medium Risk (1 of 2 - PCV13) 2003 LIPID PANEL Q1 2016 MEDICARE YEARLY EXAM 3/19/2018 Allergies as of 3/27/2018  Review Complete On: 3/27/2018 By: Lia Pizano MD  
  
 Severity Noted Reaction Type Reactions Kenalog [Triamcinolone Acetonide] High 2018    Anaphylaxis Penicillin G High 2015    Hives Adhesive Tape-silicones Medium     Swelling Bacitracin    Not Reported This Time Cephalexin  2018    Rash Ciprofloxacin  2016    Other (comments) Cortisone    Not Reported This Time Erythromycin    Not Reported This Time Hydrocortisone  2015    Hives Lisinopril    Not Reported This Time Lortab [Hydrocodone-acetaminophen]  08/04/2015    Rash Methimazole  07/07/2017    Other (comments) Neck pain/cough Penicillins    Not Reported This Time Prednisolone  04/01/2015    Other (comments) Prednisone    Not Reported This Time Propylthiouracil  07/07/2017    Other (comments) Dizziness,elevated blood pressure Sulfamethoxazole-trimethoprim  11/03/2016    Other (comments) Tetanus And Diphtheria Toxoids, Adsorbed, Adult  04/01/2015    Swelling Tetanus Vaccines And Toxoid    Not Reported This Time Current Immunizations  Never Reviewed No immunizations on file. Not reviewed this visit You Were Diagnosed With   
  
 Codes Comments Right knee pain, unspecified chronicity    -  Primary ICD-10-CM: M25.561 ICD-9-CM: 719.46 Vitals BP Pulse Height(growth percentile) Weight(growth percentile) SpO2 BMI  
 140/70 72 5' 5\" (1.651 m) 207 lb 6.4 oz (94.1 kg) 97% 34.51 kg/m2 OB Status Smoking Status Hysterectomy Never Smoker BMI and BSA Data Body Mass Index Body Surface Area 34.51 kg/m 2 2.08 m 2 Preferred Pharmacy Pharmacy Name Phone [facility] [address] [facility], [address] [place] [address] Road [phone] Your Updated Medication List  
  
   
This list is accurate as of 3/27/18  3:09 PM.  Always use your most recent med list.  
  
  
  
  
 acetaminophen 500 mg tablet Commonly known as:  TYLENOL  
500 mg.  
  
 aspirin 325 mg tablet Commonly known as:  ASPIRIN Take 81 mg by mouth daily. atenolol 50 mg tablet Commonly known as:  TENORMIN  
TAKE ONE TABLET BY MOUTH TWICE DAILY CALCIUM 600 + D 600-125 mg-unit Tab Generic drug:  calcium-cholecalciferol (d3) Take  by mouth daily. Patient taking 4 times a week CRANBERRY CONC-ASCORBIC ACID PO Take  by mouth. dilTIAZem  mg XR capsule Commonly known as:  DILACOR XR  
 TAKE ONE CAPSULE BY MOUTH ONCE DAILY * gabapentin 300 mg capsule Commonly known as:  NEURONTIN Take 300 mg by mouth three (3) times daily. * gabapentin 600 mg tablet Commonly known as:  NEURONTIN Take 1 Tab by mouth three (3) times daily (with meals). Indications: NEUROPATHIC PAIN  
  
 * gabapentin 800 mg tablet Commonly known as:  NEURONTIN Take 1 Tab by mouth three (3) times daily (with meals). Indications: NEUROPATHIC PAIN  
  
 GLUCOSAMINE 1500 COMPLEX PO Take  by mouth daily. glucosamine-chondroitin 1,500-1,200 mg/30 mL Liqd Commonly known as:  ELATION Take  by Mouth. indapamide 2.5 mg tablet Commonly known as:  Waylan Cindy Take  by mouth daily. losartan 50 mg tablet Commonly known as:  COZAAR  
TAKE ONE TABLET BY MOUTH ONCE DAILY MELATONIN PO  
5 mg.  
  
 metFORMIN 500 mg Tg24 24 hour tablet Commonly known as:  GLUMETZA ER  
850 mg daily. pravastatin 40 mg tablet Commonly known as:  PRAVACHOL Take 40 mg by mouth daily. PRESERVISION LUTEIN PO Take  by Mouth Twice Daily. VITAMIN D3 1,000 unit Cap Generic drug:  cholecalciferol Take  by mouth daily. * Notice: This list has 3 medication(s) that are the same as other medications prescribed for you. Read the directions carefully, and ask your doctor or other care provider to review them with you. We Performed the Following AMB POC XRAY, KNEE; 1/2 VIEWS [92882 CPT(R)] To-Do List   
 03/29/2018 10:00 AM  
  Appointment with Edwardo Vallejo PT at [facility] [place] [facility] ([phone])  
  
 03/30/2018 11:00 AM  
  Appointment with Edwardo Vallejo PT at [address]  ([phone])  
  
 04/04/2018 2:30 PM  
  Appointment with Elise Prakash PTA at [address]  ([phone])  04/06/2018 2:00 PM  
  Appointment with Elise Prakash PTA at [address]  ([phone])  
  
 04/11/2018 2:00 PM  
 Appointment with Cheryl Mcarthur PT at [facility]  [address] [facility] ([phone])  
  
 04/13/2018 9:00 AM  
  Appointment with Savannah Dubon PTA at [facility]  [address] [facility] ([phone])  
  
 04/18/2018 9:30 AM  
  Appointment with Savannah Dubon PTA at [facility]  [address] [facility] ([phone])  
  
 04/20/2018 9:30 AM  
  Appointment with Cheryl Mcarthur PT at [facility]  [address] [facility] ([phone]) [facility] SERVICES! Dear Vinh Maurer: 
Thank you for requesting a [employer] account. Our records indicate that you already have an active [employer] account. You can access your account anytime at [email or web address] Did you know that you can access your hospital and ER discharge instructions at any time in [employer]? You can also review all of your test results from your hospital stay or ER visit. Additional Information If you have questions, please visit the Frequently Asked Questions section of the [employer] website at [email or web address] Remember, [employer] is NOT to be used for urgent needs. For medical emergencies, dial 911. Now available from your iPhone and Android! Please provide this summary of care documentation to your next provider. Your primary care clinician is listed as Gaurav Lorenzo. If you have any questions after today's visit, please call [phone].

## 2018-03-28 ENCOUNTER — APPOINTMENT (OUTPATIENT)
Dept: PHYSICAL THERAPY | Age: 78
End: 2018-03-28
Payer: MEDICARE

## 2018-03-29 ENCOUNTER — HOSPITAL ENCOUNTER (OUTPATIENT)
Dept: PHYSICAL THERAPY | Age: 78
Discharge: HOME | End: 2018-03-29
Payer: MEDICARE

## 2018-03-29 PROCEDURE — 97112 NEUROMUSCULAR REEDUCATION: CPT

## 2018-03-29 PROCEDURE — 97110 THERAPEUTIC EXERCISES: CPT

## 2018-03-29 NOTE — PROGRESS NOTES
PT DAILY TREATMENT NOTE - [facility]     Patient Name: Isaías Robles  Date:3/29/2018  : 1940  [x]  Patient  Verified  Payor: Tasha Whipple / Plan: [place] MED[place] & B / Product Type: Medicare /    In IEFQ:5492  Out time:0950  Total Treatment Time (min): 62  Total Timed Codes (min): 52  1:1 Treatment Time ([address] only): 34   Visit #: 3 of 8    Treatment Area: Lumbar pain [M54.5]  Other intervertebral disc degeneration, lumbar region [M51.36]    SUBJECTIVE  Pain Level (0-10 scale): 3-4  Any medication changes, allergies to medications, adverse drug reactions, diagnosis change, or new procedure performed?: [x] No    [] Yes (see summary sheet for update)  Subjective functional status/changes:   [] No changes reported  Patient reports pain primarily localized to the anterior and medial aspects of the right knee at present. OBJECTIVE    Modality rationale: decrease pain and increase tissue extensibility to improve the patients ability to improve ease with sleep   Min Type Additional Details   10 []  Ice     [x]  heat  []  Ice massage Position: Supine  Location: Lumbar, Post-Tx     26 min Therapeutic Exercise:  [x] See flow sheet : Emphasis placed on improving available hip and lumbar AROM and strength of the gluteal musculature   Rationale: increase ROM and increase strength to improve the patients ability to improve ease with household ADLs      26 min Neuromuscular Re-education:  [x]  See flow sheet : Emphasis placed on improving anterior abdominal musculature recruitment and pelvic proprioceptive awareness   Rationale: increase ROM, increase strength, improve balance and increase proprioception  to improve the patients ability to improve ease with community ambulation.         With   [] TE   [] TA   [] neuro   [] other: Patient Education: [x] Review HEP    [] Progressed/Changed HEP based on:   [] positioning   [] body mechanics   [] transfers   [] heat/ice application    [] other:       Pain Level (0-10 scale) post treatment: 0    ASSESSMENT/Changes in Function: Patient continues to report right LE pain primarily localized to right knee with patient currently under the care of orthopedist for knee OA. Have discussed with patient physical therapy PoC with patient educated that it may be of benefit to initiate PT services for knee pain to improve gait stability and reduce compensation. Patient educated regarding the potential benefit of utilization of SPC with long-distance ambulation to improve tolerance and reduce right knee pain. Patient without reproduction of concordant sign with repeated lumbar flexion. Patient will continue to benefit from skilled PT services to modify and progress therapeutic interventions, address functional mobility deficits, address ROM deficits, address strength deficits, analyze and address soft tissue restrictions, analyze and cue movement patterns and analyze and modify body mechanics/ergonomics to attain remaining goals. []  See Plan of Care  []  See progress note/recertification  []  See Discharge Summary         Progress towards goals / Updated goals:    Long Term Goals: To be accomplished in 6 weeks:  1. Pt will improve FOTO to > or = to 55 to demo improved function. At PN: FOTO = 44, increased by 9 since [facility]. 2/16  Current; Progressing: FOTO = 40, increased by 5 since [facility]. 3/8/18  2. Pt will increase MMT R hip grossly to > or = to 4/5 for improved stability w/ standing up. At PN:  MMT R hip flex 4/5, abd 3+/5, ext 4/5, IR 3/5. ER 4/5 2/27/18  Current: Progressing, R Hip Flexion 5/5, Abduction 3+/5, IR 5/5, ER 5/5, Ext 4/5, 3/15/2018      Updated Goals:   1.Patient with demonstrate ability to complete standing repeated lumbar flexion x10 without R posterior buttock pain </=1/10 in order to improve ease with gardening.   3/15/2018: Standing Repeated Lumbar Flexion x10 with R Posterior Buttock Pain 4/10  Current: Met, Standing Repeated Lumbar Flexion x10 without symptom reproduction, 3/29/2018      Goals Met   1. Pt will be independent and complaint w/ HEP to progress gains in PT. At eval: initiated HEP  Current: Goal met: Pt reports performing HEP. 2/1/18  2. Pt will report > or = to 60% improvement in symptoms for ease w/ return to pain free ADLs. At eval: 0%  Current: MET. Pt. Reports 70% improvement since beginning therapy. 2/21/18  3. Pt will increase core activation as demo'd by supine bridge w/no pain x15 for ease w/ bending to  objects from floor. At eval: mild pain w/ supine bridge x1  Current: MET. Pt reports no pain after performing 15 bridges.  2/21/18.       PLAN  [x]  Upgrade activities as tolerated     [x]  Continue plan of care  []  Update interventions per flow sheet       []  Discharge due to:_  []  Other:_      Navin Prakash, PT 3/29/2018  7:38 AM    Future Appointments  Date Time Provider Merlin Rosario   3/29/2018 9:00 AM Navin Prakash, PT [facility]   3/30/2018 11:00 AM Adina Campbell, PT [facility]   4/4/2018 2:30 PM Heriberto Perera, PTA [facility]   4/6/2018 2:00 PM Heriberto Perera, PTA [facility]   4/11/2018 2:00 PM Navin Prakash, PT [facility]   4/13/2018 9:00 AM Lakefield Lai, PTA [facility]   4/16/2018 1:45 PM MD JENNIFER Ornelas SCHED   4/18/2018 9:30 AM Heriberto Perera, PTA [facility]   4/20/2018 9:30 AM Navin Prakash, PT [facility]   4/24/2018 1:00 PM MD LASHONDA Walker SCHED   7/2/2018 10:00 AM Alon Coffey MD [id number] [facility]

## 2018-03-30 ENCOUNTER — HOSPITAL ENCOUNTER (OUTPATIENT)
Dept: PHYSICAL THERAPY | Age: 78
Discharge: HOME | End: 2018-03-30
Payer: MEDICARE

## 2018-03-30 PROCEDURE — 97112 NEUROMUSCULAR REEDUCATION: CPT

## 2018-03-30 NOTE — PROGRESS NOTES
PT DAILY TREATMENT NOTE - [facility]     Patient Name: Sherry Done  Date:3/30/2018  : 1940  [x]  Patient  Verified  Payor: [place] MEDICARE / Plan: [place] MEDICARE PART A & B / Product Type: Medicare /    In time:10:56  Out time: 11:57  Total Treatment Time (min): 61  Total Timed Codes (min): 51  1:1 Treatment Time ( [address] only): 19  Visit #: 4 of 8    Treatment Area: Lumbar pain [M54.5]  Other intervertebral disc degeneration, lumbar region [M51.36]    SUBJECTIVE  Pain Level (0-10 scale): 4  Any medication changes, allergies to medications, adverse drug reactions, diagnosis change, or new procedure performed?: [x] No    [] Yes (see summary sheet for update)  Subjective functional status/changes:   [] No changes reported  \"A little more pain today, don't know why\"    OBJECTIVE    Modality rationale: decrease pain and increase tissue extensibility to improve the patients ability to tolerate ADLs   Min Type Additional Details    [] Estim:  []Unatt       []IFC  []Premod                        []Other:  []w/ice   []w/heat  Position:  Location:    [] Estim: []Att    []TENS instruct  []NMES                    []Other:  []w/US   []w/ice   []w/heat  Position:  Location:    []  Traction: [] Cervical       []Lumbar                       [] Prone          []Supine                       []Intermittent   []Continuous Lbs:  [] before manual  [] after manual    []  Ultrasound: []Continuous   [] Pulsed                           []1MHz   []3MHz Location:  W/cm2:    []  Iontophoresis with dexamethasone         Location: [] Take home patch   [] In clinic   10 []  Ice     [x]  heat  []  Ice massage  []  Laser   []  Anodyne Position: supine  Location: back    []  Laser with stim  []  Other: Position:  Location:    []  Vasopneumatic Device Pressure:       [] lo [] med [] hi   Temperature: [] lo [] med [] hi   [] Skin assessment post-treatment:  []intact []redness- no adverse reaction    []redness  adverse reaction:     25 min Therapeutic Exercise:  [x] See flow sheet :    Rationale: increase ROM and increase strength to improve the patients ability to improve ease with household ADLs      26 min Neuromuscular Re-education:  [x]  See flow sheet :    Rationale: increase ROM, increase strength, improve balance and increase proprioception  to improve the patients ability to improve ease with community ambulation. With   [] TE   [] TA   [] neuro   [] other: Patient Education: [x] Review HEP    [] Progressed/Changed HEP based on:   [] positioning   [] body mechanics   [] transfers   [] heat/ice application    [] other:       Other Objective/Functional Measures: Added sitting right sciatic nerve glides to improve the pt's peripheral right LE pain. Pain Level (0-10 scale) post treatment: 1-2    ASSESSMENT/Changes in Function: Reported improvement in pain post session. Limited B hip EXT mobility noted with prone hip EXT exercise. Continue POC as tolerated. Patient will continue to benefit from skilled PT services to modify and progress therapeutic interventions, address functional mobility deficits, address ROM deficits, address strength deficits, analyze and address soft tissue restrictions, analyze and cue movement patterns and analyze and modify body mechanics/ergonomics to attain remaining goals. []  See Plan of Care  []  See progress note/recertification  []  See Discharge Summary         Progress towards goals / Updated goals:  Long Term Goals: To be accomplished in 6 weeks:  1. Pt will improve FOTO to > or = to 55 to demo improved function. At PN: FOTO = 44, increased by 9 since [facility]. 2/16  Current; Progressing: FOTO = 40, increased by 5 since [facility]. 3/8/18  2. Pt will increase MMT R hip grossly to > or = to 4/5 for improved stability w/ standing up. At PN:  MMT R hip flex 4/5, abd 3+/5, ext 4/5, IR 3/5.  ER 4/5 2/27/18  Current: Progressing, R Hip Flexion 5/5, Abduction 3+/5, IR 5/5, ER 5/5, Ext 4/5, 3/15/2018      Updated Goals:   1.Patient with demonstrate ability to complete standing repeated lumbar flexion x10 without R posterior buttock pain </=1/10 in order to improve ease with gardening. 3/15/2018: Standing Repeated Lumbar Flexion x10 with R Posterior Buttock Pain 4/10  Current: Met, Standing Repeated Lumbar Flexion x10 without symptom reproduction, 3/29/2018      Goals Met   1. Pt will be independent and complaint w/ HEP to progress gains in PT. At eval: initiated HEP  Current: Goal met: Pt reports performing HEP. 2/1/18  2. Pt will report > or = to 60% improvement in symptoms for ease w/ return to pain free ADLs. At eval: 0%  Current: MET. Pt. Reports 70% improvement since beginning therapy. 2/21/18  3. Pt will increase core activation as demo'd by supine bridge w/no pain x15 for ease w/ bending to  objects from floor. At eval: mild pain w/ supine bridge x1  Current: MET. Pt reports no pain after performing 15 bridges.  2/21/18.     PLAN  [x]  Upgrade activities as tolerated     [x]  Continue plan of care  [x]  Update interventions per flow sheet       []  Discharge due to:_  []  Other:_      Steven Jurado, PT 3/30/2018  11:56 AM    Future Appointments  Date Time Provider Merlin Rosario   3/30/2018 11:00 AM Steven uJrado PT [facility]   4/4/2018 2:30 PM Margarita Chan PTA [facility]   4/6/2018 2:00 PM Margarita Chan PTA [facility]   4/11/2018 2:00 PM Zaid Ray, PT [facility]   4/13/2018 9:00 AM Margarita Chan PTA [facility]   4/16/2018 1:45 PM [facility]   4/18/2018 9:30 AM Margarita Chan PTA [facility]   4/20/2018 9:30 AM Zaid Ray PT [facility]   4/24/2018 1:00 PM [facility]   7/2/2018 10:00 AM iPerre Richards MD [id number] [place] [address]

## 2018-04-04 ENCOUNTER — HOSPITAL ENCOUNTER (OUTPATIENT)
Dept: PHYSICAL THERAPY | Age: 78
Discharge: HOME | End: 2018-04-04
Payer: MEDICARE

## 2018-04-04 PROCEDURE — 97112 NEUROMUSCULAR REEDUCATION: CPT

## 2018-04-04 PROCEDURE — 97110 THERAPEUTIC EXERCISES: CPT

## 2018-04-04 NOTE — PROGRESS NOTES
PT DAILY TREATMENT NOTE - [facility]     Patient Name: Jess Deal  Date:2018  : 1940  [x]  Patient  Verified  Payor: VA MEDICARE / Plan: VA MEDICARE PART A & B / Product Type: Medicare /    In time:2:28  Out time:3:10  Total Treatment Time (min): 42  Total Timed Codes (min): 42  1:1 Treatment Time ( only): 25   Visit #: 5 of 8    Treatment Area: Lumbar pain [M54.5]  Other intervertebral disc degeneration, lumbar region [M51.36]    SUBJECTIVE  Pain Level (0-10 scale): 3  Any medication changes, allergies to medications, adverse drug reactions, diagnosis change, or new procedure performed?: [x] No    [] Yes (see summary sheet for update)  Subjective functional status/changes:   [] No changes reported  Pt reports that she is just feeling so so and does not think the pain in her back is ever going to go away.      OBJECTIVE        Min Type Additional Details    [] Estim:  []Unatt       []IFC  []Premod                        []Other:  []w/ice   []w/heat  Position:  Location:    [] Estim: []Att    []TENS instruct  []NMES                    []Other:  []w/US   []w/ice   []w/heat  Position:  Location:    []  Traction: [] Cervical       []Lumbar                       [] Prone          []Supine                       []Intermittent   []Continuous Lbs:  [] before manual  [] after manual    []  Ultrasound: []Continuous   [] Pulsed                           []1MHz   []3MHz W/cm2:  Location:    []  Iontophoresis with dexamethasone         Location: [] Take home patch   [] In clinic    []  Ice     []  heat  []  Ice massage  []  Laser   []  Anodyne Position:  Location:    []  Laser with stim  []  Other:  Position:  Location:    []  Vasopneumatic Device Pressure:       [] lo [] med [] hi   Temperature: [] lo [] med [] hi   [] Skin assessment post-treatment:  []intact []redness- no adverse reaction    []redness  adverse reaction:       32 min Therapeutic Exercise:  [x] See flow sheet :   Rationale: increase ROM and increase strength to improve the patients ability to increase tolerance to activities.         10 min Neuromuscular Re-education:  [x]  See flow sheet :balance and core activation exercises. Rationale: increase ROM and increase strength  to improve the patients ability to increase ease with ADLs.                      With   [] TE   [] TA   [] neuro   [] other: Patient Education: [x] Review HEP    [] Progressed/Changed HEP based on:   [] positioning   [] body mechanics   [] transfers   [] heat/ice application    [] other:      Other Objective/Functional Measures:      Pain Level (0-10 scale) post treatment: 1    ASSESSMENT/Changes in Function: Kept reps and exercises the same this session. Pt continues to have difficulty with prone hip extension. Patient will continue to benefit from skilled PT services to modify and progress therapeutic interventions, address functional mobility deficits, address ROM deficits, address strength deficits and analyze and address soft tissue restrictions to attain remaining goals. []  See Plan of Care  []  See progress note/recertification  []  See Discharge Summary         Progress towards goals / Updated goals:  Long Term Goals: To be accomplished in 6 weeks:  1. Pt will improve FOTO to > or = to 55 to demo improved function. At PN: FOTO = 44, increased by 9 since [facility]. 2/16  Current; Progressing: FOTO = 40, increased by 5 since [facility]. 3/8/18  2. Pt will increase MMT R hip grossly to > or = to 4/5 for improved stability w/ standing up. At PN:  MMT R hip flex 4/5, abd 3+/5, ext 4/5, IR 3/5. ER 4/5 2/27/18  Current: Progressing, R Hip Flexion 5/5, Abduction 3+/5, IR 5/5, ER 5/5, Ext 4/5, 3/15/2018      Updated Goals:   1.Patient with demonstrate ability to complete standing repeated lumbar flexion x10 without R posterior buttock pain </=1/10 in order to improve ease with gardening.   3/15/2018: Standing Repeated Lumbar Flexion x10 with R Posterior Buttock Pain 4/10  Current: Met, Standing Repeated Lumbar Flexion x10 without symptom reproduction, 3/29/2018      Goals Met   1. Pt will be independent and complaint w/ HEP to progress gains in PT. At eval: initiated HEP  Current: Goal met: Pt reports performing HEP. 2/1/18  2. Pt will report > or = to 60% improvement in symptoms for ease w/ return to pain free ADLs. At eval: 0%  Current: MET. Pt. Reports 70% improvement since beginning therapy. 2/21/18  3. Pt will increase core activation as demo'd by supine bridge w/no pain x15 for ease w/ bending to  objects from floor. At eval: mild pain w/ supine bridge x1  Current: MET. Pt reports no pain after performing 15 bridges. 2/21/18.     PLAN  []  Upgrade activities as tolerated     [x]  Continue plan of care  []  Update interventions per flow sheet       []  Discharge due to:_  []  Other:_      Paul Andrews, PTA 4/4/2018  2:28 PM    Future Appointments  Date Time Provider Merlin Rosario   4/4/2018 2:30 PM Paul Andrews, PTA [facility]   4/6/2018 2:00 PM Paul Andrews, PTA [facility]   4/11/2018 2:00 PM Viveca Old, PT [facility]   4/13/2018 9:00 AM Apul Andrews, PTA [facility]   4/16/2018 1:45 PM MD JENNIFER Ramírez [facility] SCHED   4/18/2018 9:30 AM Paul Andrews, PTA [facility]   4/20/2018 9:30 AM Viveca Old, PT [facility]   4/24/2018 1:00 PM Lyssa Vicente MD MD [facility] SCHED   7/2/2018 10:00 AM Gwen Reese MD [id number] [place] [address]

## 2018-04-06 ENCOUNTER — HOSPITAL ENCOUNTER (OUTPATIENT)
Dept: PHYSICAL THERAPY | Age: 78
Discharge: HOME | End: 2018-04-06
Payer: MEDICARE

## 2018-04-06 PROCEDURE — 97112 NEUROMUSCULAR REEDUCATION: CPT

## 2018-04-06 PROCEDURE — 97110 THERAPEUTIC EXERCISES: CPT

## 2018-04-06 NOTE — PROGRESS NOTES
PT DAILY TREATMENT NOTE - [facility]     Patient Name: Markel Andino  Date:2018  : 1940  [x]  Patient  Verified  Payor: Paz Ehsan / Plan: [place] MEDICARE PART A & B / Product Type: Medicare /    In time: 2:03 Out time:2:50  Total Treatment Time (min): 47  Total Timed Codes (min): 47  1:1 Treatment Time ( [address] only): 35   Visit #: 6 of 8    Treatment Area: Lumbar pain [M54.5]  Other intervertebral disc degeneration, lumbar region [M51.36]    SUBJECTIVE  Pain Level (0-10 scale): 2  Any medication changes, allergies to medications, adverse drug reactions, diagnosis change, or new procedure performed?: [x] No    [] Yes (see summary sheet for update)  Subjective functional status/changes:   [] No changes reported  Pt reports she always has pain in her back.     OBJECTIVE        Min Type Additional Details    [] Estim:  []Unatt       []IFC  []Premod                        []Other:  []w/ice   []w/heat  Position:  Location:    [] Estim: []Att    []TENS instruct  []NMES                    []Other:  []w/US   []w/ice   []w/heat  Position:  Location:    []  Traction: [] Cervical       []Lumbar                       [] Prone          []Supine                       []Intermittent   []Continuous Lbs:  [] before manual  [] after manual    []  Ultrasound: []Continuous   [] Pulsed                           []1MHz   []3MHz W/cm2:  Location:    []  Iontophoresis with dexamethasone         Location: [] Take home patch   [] In clinic    []  Ice     []  heat  []  Ice massage  []  Laser   []  Anodyne Position:  Location:    []  Laser with stim  []  Other:  Position:  Location:    []  Vasopneumatic Device Pressure:       [] lo [] med [] hi   Temperature: [] lo [] med [] hi   [] Skin assessment post-treatment:  []intact []redness- no adverse reaction    []redness  adverse reaction:       37 min Therapeutic Exercise:  [x] See flow sheet :   Rationale: increase ROM and increase strength to improve the patients ability to increase tolerance to activities.         10 min Neuromuscular Re-education:  [x]  See flow sheet :balance and core activation exercises. Rationale: increase ROM and increase strength  to improve the patients ability to increase ease with ADLs.               With   [] TE   [] TA   [] neuro   [] other: Patient Education: [x] Review HEP    [] Progressed/Changed HEP based on:   [] positioning   [] body mechanics   [] transfers   [] heat/ice application    [] other:      Other Objective/Functional Measures: FOTO = 38, increased by 3 since [facility]. Pain Level (0-10 scale) post treatment: 0    ASSESSMENT/Changes in Function: Continue to progress pt as tolerated. Patient will continue to benefit from skilled PT services to modify and progress therapeutic interventions, address functional mobility deficits, address ROM deficits, address strength deficits and analyze and address soft tissue restrictions to attain remaining goals. []  See Plan of Care  []  See progress note/recertification  []  See Discharge Summary         Progress towards goals / Updated goals:  Long Term Goals: To be accomplished in 6 weeks:  1. Pt will improve FOTO to > or = to 55 to demo improved function. At PN: FOTO = 44, increased by 9 since [facility]. 2/16  Current; Progressing: FOTO = 38, increased by 3 since [facility]. 4/6/18  2. Pt will increase MMT R hip grossly to > or = to 4/5 for improved stability w/ standing up. At PN:  MMT R hip flex 4/5, abd 3+/5, ext 4/5, IR 3/5. ER 4/5 2/27/18  Current: Progressing, R Hip Flexion 5/5, Abduction 3+/5, IR 5/5, ER 5/5, Ext 4/5, 3/15/2018      Updated Goals:   1.Patient with demonstrate ability to complete standing repeated lumbar flexion x10 without R posterior buttock pain </=1/10 in order to improve ease with gardening.   3/15/2018: Standing Repeated Lumbar Flexion x10 with R Posterior Buttock Pain 4/10  Current: Met, Standing Repeated Lumbar Flexion x10 without symptom reproduction, 3/29/2018      Goals Met   1. Pt will be independent and complaint w/ HEP to progress gains in PT. At eval: initiated HEP  Current: Goal met: Pt reports performing HEP. 2/1/18  2. Pt will report > or = to 60% improvement in symptoms for ease w/ return to pain free ADLs. At eval: 0%  Current: MET. Pt. Reports 70% improvement since beginning therapy. 2/21/18  3. Pt will increase core activation as demo'd by supine bridge w/no pain x15 for ease w/ bending to  objects from floor. At eval: mild pain w/ supine bridge x1  Current: MET. Pt reports no pain after performing 15 bridges.  2/21/18.       PLAN  []  Upgrade activities as tolerated     [x]  Continue plan of care  []  Update interventions per flow sheet       []  Discharge due to:_  []  Other:_      Marylene Plain, PTA 4/6/2018  2:07 PM    Future Appointments  Date Time Provider Merlin Marlene   4/11/2018 2:00 PM Timi Golden, PT [facility]   4/13/2018 9:00 AM Marylene Plain, PTA [facility]   4/16/2018 1:45 PM MD JENNIFER Adan SCHED   4/18/2018 9:30 AM Marylene Plain, PTA [facility]   4/20/2018 9:30 AM Timi Golden, PT [facility]   4/24/2018 1:00 PM Johana Chiu MD MD [facility] SCHED   7/2/2018 10:00 AM Misael Guerra MD [id number] [place] [address]

## 2018-04-11 ENCOUNTER — HOSPITAL ENCOUNTER (OUTPATIENT)
Dept: PHYSICAL THERAPY | Age: 78
Discharge: HOME | End: 2018-04-11
Payer: MEDICARE

## 2018-04-11 PROCEDURE — 97112 NEUROMUSCULAR REEDUCATION: CPT

## 2018-04-11 PROCEDURE — 97110 THERAPEUTIC EXERCISES: CPT

## 2018-04-11 NOTE — PROGRESS NOTES
PT DAILY TREATMENT NOTE - [facility]     Patient Name: Clau Oneill  Date:2018  : 1940  [x]  Patient  Verified  Payor: Aura Villarrealon / Plan: [place] MED[place] PART A & B / Product Type: Medicare /    In time:153  Out time:245  Total Treatment Time (min): 52  Total Timed Codes (min): 52  1:1 Treatment Time ( [address] only): 35   Visit #: 7 of 8    Treatment Area: Lumbar pain [M54.5]  Other intervertebral disc degeneration, lumbar region [M51.36]    SUBJECTIVE  Pain Level (0-10 scale): 3  Any medication changes, allergies to medications, adverse drug reactions, diagnosis change, or new procedure performed?: [x] No    [] Yes (see summary sheet for update)  Subjective functional status/changes:   [] No changes reported  Patient reports slight increase in pain this PM secondary to completion of prolonged heavy housework this AM with patient reporting some limitations in completion secondary to pain. OBJECTIVE    22 min Therapeutic Exercise:  [x] See flow sheet : Emphasis placed on improving available hip and lumbar AROM and strength of the gluteal musculature   Rationale: increase ROM and increase strength to improve the patients ability to improve ease with household ADLs      30 min Neuromuscular Re-education:  [x]  See flow sheet : Emphasis placed on improving anterior abdominal musculature recruitment and pelvic proprioceptive awareness   Rationale: increase ROM, increase strength, improve balance and increase proprioception  to improve the patients ability to improve ease with community ambulation.         With   [] TE   [] TA   [] neuro   [] other: Patient Education: [x] Review HEP    [] Progressed/Changed HEP based on:   [] positioning   [] body mechanics   [] transfers   [] heat/ice application    [] other:      Pain Level (0-10 scale) post treatment: 2    ASSESSMENT/Changes in Function: Patient has demonstrated some limitations with progression secondary to chronic right knee pain with patient with plans to be further evaluated by orthopedic MD at end of month secondarily. Patient in agreement to either be placed on hold or discharged after next session 4/13/2018 with patient educated that new evaluation for chronic right knee pain would be beneficial.    Patient will continue to benefit from skilled PT services to modify and progress therapeutic interventions, address functional mobility deficits, address ROM deficits, address strength deficits, analyze and address soft tissue restrictions, analyze and cue movement patterns and analyze and modify body mechanics/ergonomics to attain remaining goals. []  See Plan of Care  []  See progress note/recertification  []  See Discharge Summary         Progress towards goals / Updated goals:    Long Term Goals: To be accomplished in 6 weeks:  1. Pt will improve FOTO to > or = to 55 to demo improved function. At PN: FOTO = 44, increased by 9 since [facility]. 2/16  Current; Progressing: FOTO = 38, increased by 3 since [facility]. 4/6/18  2. Pt will increase MMT R hip grossly to > or = to 4/5 for improved stability w/ standing up. At PN:  MMT R hip flex 4/5, abd 3+/5, ext 4/5, IR 3/5. ER 4/5 2/27/18  Current: Progressing, R Hip Flexion 5/5, Abduction 3+/5, IR 5/5, ER 5/5, Ext 4/5, 4/11/2018      Updated Goals:   1.Patient with demonstrate ability to complete standing repeated lumbar flexion x10 without R posterior buttock pain </=1/10 in order to improve ease with gardening. 3/15/2018: Standing Repeated Lumbar Flexion x10 with R Posterior Buttock Pain 4/10  Current: Met, Standing Repeated Lumbar Flexion x10 without symptom reproduction, 3/29/2018      Goals Met   1. Pt will be independent and complaint w/ HEP to progress gains in PT. At eval: initiated HEP  Current: Goal met: Pt reports performing HEP. 2/1/18  2. Pt will report > or = to 60% improvement in symptoms for ease w/ return to pain free ADLs. At eval: 0%  Current: MET.  Pt. Reports 70% improvement since beginning therapy. 2/21/18  3. Pt will increase core activation as demo'd by supine bridge w/no pain x15 for ease w/ bending to  objects from floor. At eval: mild pain w/ supine bridge x1  Current: MET. Pt reports no pain after performing 15 bridges. 2/21/18.     PLAN  [x]  Upgrade activities as tolerated     [x]  Continue plan of care  []  Update interventions per flow sheet       []  Discharge due to:_  []  Other:_      Bartolome Zelaya PT 4/11/2018  1:32 PM    Future Appointments  Date Time Provider Merlin Magañai   4/11/2018 2:00 PM Bartolome Zelaya, PT [facility]   4/13/2018 9:00 AM Harrison Logan PTA [facility]   4/16/2018 1:45 PM MD ABBY BlackSS [facility] SCHED   4/18/2018 9:30 AM Harrison Logan PTA [facility]   4/20/2018 9:30 AM Bartolome Zelaya PT [facility]   4/24/2018 1:00 PM Brunilda Valera MD [facility] SCHED   7/2/2018 10:00 AM Sharon Juarez MD [id number] [place] [address]

## 2018-04-13 ENCOUNTER — HOSPITAL ENCOUNTER (OUTPATIENT)
Dept: PHYSICAL THERAPY | Age: 78
Discharge: HOME | End: 2018-04-13
Payer: MEDICARE

## 2018-04-13 PROCEDURE — 97110 THERAPEUTIC EXERCISES: CPT

## 2018-04-13 PROCEDURE — G8979 MOBILITY GOAL STATUS: HCPCS | Performed by: PHYSICAL THERAPIST

## 2018-04-13 PROCEDURE — 97112 NEUROMUSCULAR REEDUCATION: CPT

## 2018-04-13 PROCEDURE — G8980 MOBILITY D/C STATUS: HCPCS | Performed by: PHYSICAL THERAPIST

## 2018-04-13 NOTE — PROGRESS NOTES
PT DAILY TREATMENT NOTE - [facility]     Patient Name: Clau Oneill  Date:2018  : 1940  [x]  Patient  Verified  Payor: Aura Wynn / Plan: [place] MEDICARE PART A & B / Product Type: Medicare /    In time:8:57  Out time:9:55  Total Treatment Time (min): 58  Total Timed Codes (min): 48  1:1 Treatment Time ( [address] only): 48   Visit #: 8 of 8    Treatment Area: Lumbar pain [M54.5]  Other intervertebral disc degeneration, lumbar region [M51.36]    SUBJECTIVE  Pain Level (0-10 scale): 3  Any medication changes, allergies to medications, adverse drug reactions, diagnosis change, or new procedure performed?: [x] No    [] Yes (see summary sheet for update)  Subjective functional status/changes:   [] No changes reported  Pt reports that she is able to perform house hold chores easier. OBJECTIVE    Modality rationale: decrease pain to improve the patients ability to decrease difficulty while performing tasks.     Min Type Additional Details    [] Estim:  []Unatt       []IFC  []Premod                        []Other:  []w/ice   []w/heat  Position:  Location:    [] Estim: []Att    []TENS instruct  []NMES                    []Other:  []w/US   []w/ice   []w/heat  Position:  Location:    []  Traction: [] Cervical       []Lumbar                       [] Prone          []Supine                       []Intermittent   []Continuous Lbs:  [] before manual  [] after manual    []  Ultrasound: []Continuous   [] Pulsed                           []1MHz   []3MHz W/cm2:  Location:    []  Iontophoresis with dexamethasone         Location: [] Take home patch   [] In clinic   10 []  Ice     [x]  heat  []  Ice massage  []  Laser   []  Anodyne Position:sitting  Location:back     []  Laser with stim  []  Other:  Position:  Location:    []  Vasopneumatic Device Pressure:       [] lo [] med [] hi   Temperature: [] lo [] med [] hi   [] Skin assessment post-treatment:  []intact []redness- no adverse reaction    []redness  adverse reaction:         38 min Therapeutic Exercise:  [x] See flow sheet :   Rationale: increase ROM and increase strength to improve the patients ability to increase tolerance to activities.         10 min Neuromuscular Re-education:  [x]  See flow sheet :balance and core activation exercises. Rationale: increase ROM and increase strength  to improve the patients ability to increase ease with ADLs. Patricia Dickson            With   [] TE   [] TA   [] neuro   [] other: Patient Education: [x] Review HEP    [] Progressed/Changed HEP based on:   [] positioning   [] body mechanics   [] transfers   [] heat/ice application    [] other:      Other Objective/Functional Measures: see goals. Pain Level (0-10 scale) post treatment: 2    ASSESSMENT/Changes in Function: Pt has met LTGs. Pt is able perform lumbar flexion without increase in back pain. Pt reports that house hold chores have gotten easier to perform without increasing back pain. Pt has progressed with right hip strength, but continues to remain weak with hip abduction. Pt provided with an updated HEP. []  See Plan of Care  []  See progress note/recertification  [x]  See Discharge Summary         Progress towards goals / Updated goals:  Long Term Goals: To be accomplished in 6 weeks:  1. Pt will improve FOTO to > or = to 55 to demo improved function. At PN: FOTO = 44, increased by 9 since [facility]. 2/16  Current; Progressing: FOTO = 38, increased by 3 since [facility]. 4/6/18  2. Pt will increase MMT R hip grossly to > or = to 4/5 for improved stability w/ standing up. At PN:  MMT R hip flex 4/5, abd 3+/5, ext 4/5, IR 3/5. ER 4/5 2/27/18  Current: Progressing, R Hip Flexion 4+5, Abduction 3+/5, IR 5/5, ER 5/5, Ext 4/5, 4/13/2018      Updated Goals:   1.Patient with demonstrate ability to complete standing repeated lumbar flexion x10 without R posterior buttock pain </=1/10 in order to improve ease with gardening.   3/15/2018: Standing Repeated Lumbar Flexion x10 with R Posterior Buttock Pain 4/10  Current: Met, Standing Repeated Lumbar Flexion x10 without symptom reproduction, 3/29/2018      Goals Met   1. Pt will be independent and complaint w/ HEP to progress gains in PT. At eval: initiated HEP  Current: Goal met: Pt reports performing HEP. 2/1/18  2. Pt will report > or = to 60% improvement in symptoms for ease w/ return to pain free ADLs. At eval: 0%  Current: MET. Pt. Reports 70% improvement since beginning therapy. 2/21/18  3. Pt will increase core activation as demo'd by supine bridge w/no pain x15 for ease w/ bending to  objects from floor. At eval: mild pain w/ supine bridge x1  Current: MET. Pt reports no pain after performing 15 bridges. 2/21/18. PLAN  []  Upgrade activities as tolerated     [x]  Continue plan of care  []  Update interventions per flow sheet       [x]  Discharge due to:_Pt has met goals.    []  Other:_      Davis Rodrigues PTA 4/13/2018  8:48 AM    Future Appointments  Date Time Provider Department Center   4/13/2018 9:00 AM Davis Rodrigues PTA [facility]   4/16/2018 1:45 PM MD Michael Aldrich   4/24/2018 1:00 PM MD LASHONDA Torres   7/2/2018 10:00 AM Justino Sousa MD [address] [place] [address]

## 2018-04-13 NOTE — PROGRESS NOTES
[facility]              [address]        [place] [place]   [phone] fax      Discharge Summary  Patient name: Ray Chavez Start of Care: 18   Referral source: Anna Mcgee MD : 1940   Medical/Treatment Diagnosis: Lumbar pain [M54.5]  Other intervertebral disc degeneration, lumbar region [M51.36] Onset Date: ~1 month prior to initial visit     Prior Hospitalization: see medical history Provider#: [id number]   Medications: Verified on Patient Summary List    Comorbidities: allergies, arthritis, back pain. BMI > 30, CHF or heart disease, DM, HBP, prior surgery, stroke/TIA   Prior Level of Function: Hx of LBP, lives w/ daughter, (I) w/ ADLs, no pain down the R LE, hx of neuropathy   Visits from Start of Care: 21    Missed Visits: 0  Reporting Period : 18 to 18      Summary of Care:  Progress towards goals / Updated goals:  Long Term Goals: To be accomplished in 6 weeks:  1. Pt will improve FOTO to > or = to 55 to demo improved function. At PN: FOTO = 44, increased by 9 since [facility]  Current; Progressing: FOTO = 38, increased by 3 since [facility]  2. Pt will increase MMT R hip grossly to > or = to 4/5 for improved stability w/ standing up. At PN:  MMT R hip flex 4/5, abd 3+/5, ext 4/5, IR 3/5. ER 4/5   Current: Progressing, R Hip Flexion 4+5, Abduction 3+/5, IR 5/5, ER 5/5, Ext 4/5    Updated Goals:   1.Patient with demonstrate ability to complete standing repeated lumbar flexion x10 without R posterior buttock pain </=1/10 in order to improve ease with gardening. 3/15/2018: Standing Repeated Lumbar Flexion x10 with R Posterior Buttock Pain 4/10  Current: Met, Standing Repeated Lumbar Flexion x10 without symptom reproduction      Previously met Goals:   1. Pt will be independent and complaint w/ HEP to progress gains in PT. At eval: initiated HEP  Current: Goal met: Pt reports performing HEP. 18  2.  Pt will report > or = to 60% improvement in symptoms for ease w/ return to pain free ADLs. At eval: 0%  Current: MET. Pt. Reports 70% improvement since beginning therapy. 2/21/18  3. Pt will increase core activation as demo'd by supine bridge w/no pain x15 for ease w/ bending to  objects from floor. At eval: mild pain w/ supine bridge x1  Current: MET. Pt reports no pain after performing 15 bridges. 2/21/18.     Pt has progressed well with PT and met LTGs. Pt is able perform lumbar flexion without increase in back pain. Pt reports that house hold chores have gotten easier to perform without increasing back pain. Pt has progressed with right hip strength, but continues to remain weak with hip abduction. Pt provided with an updated HEP. G-Codes (GP)  Mobility   [id number] Goal  CK= 40-59%  D/C  CK= 40-59%    The severity rating is based on clinical judgment and the FOTO Score score.     ASSESSMENT/RECOMMENDATIONS:  [x]Discontinue therapy: [x]Patient has reached or is progressing toward set goals      []Patient is non-compliant or has abdicated      []Due to lack of appreciable progress towards set Juan Manuel Sultana PT 4/13/2018 1:53 PM

## 2018-04-16 ENCOUNTER — OFFICE VISIT (OUTPATIENT)
Dept: ORTHOPEDIC SURGERY | Age: 78
End: 2018-04-16

## 2018-04-16 VITALS
BODY MASS INDEX: 34.99 KG/M2 | HEART RATE: 76 BPM | HEIGHT: 65 IN | DIASTOLIC BLOOD PRESSURE: 58 MMHG | WEIGHT: 210 LBS | RESPIRATION RATE: 16 BRPM | SYSTOLIC BLOOD PRESSURE: 141 MMHG

## 2018-04-16 DIAGNOSIS — M54.16 LUMBAR RADICULOPATHY: ICD-10-CM

## 2018-04-16 DIAGNOSIS — M17.0 OSTEOARTHRITIS OF BOTH KNEES, UNSPECIFIED OSTEOARTHRITIS TYPE: ICD-10-CM

## 2018-04-16 DIAGNOSIS — M51.36 DDD (DEGENERATIVE DISC DISEASE), LUMBAR: ICD-10-CM

## 2018-04-16 DIAGNOSIS — M47.817 LUMBOSACRAL SPONDYLOSIS WITHOUT MYELOPATHY: Primary | ICD-10-CM

## 2018-04-16 NOTE — PROGRESS NOTES
[facility]  [address], [place]   Phone: [phone]  Fax: [phone]        PROGRESS NOTE      HISTORY OF PRESENT ILLNESS:  The patient is a 76 y.o. female and was seen today for follow up of chronic low back pain extending  into the RLE in a L5/S1 distribution to the ankle since roughly January 2018. Denies injury/trauma. Her pain is aggravated with standing, lumbar flexion and extension. She reports limitations with standing and walking tolerance. Symptoms consistent for spinal stenosis. Pt has an additional c/o of right knee pain. She also reports numbness on her RUE. Pt had a cortisone injection in the past which she experienced erythema with. She reports a similar reaction with Prednisone; however they do not appear to be true allergies. Pt takes Neurontin 300 mg TID for her neuropathy. Pt also takes Aspirin for her heart condition as prescribed by her cardiologist. She also treats her pain with Naproxen and extra strength Tylenol with questionable relief. Pt denies a h/o physical therapy/chiropractor. The patient has a history of DM and reports blood sugars are well controlled, consistently remaining below 200. Her last A1C was a 6%. Pt denies change in bowel or bladder habits. Pt denies fever, weight loss, or skin changes. The patient is RHD. Preliminary reading of lumbar plain films revealed; mild scoliosis convex to the left. [place] at L3. Grade 1 anterolisthesis at L4 on L5 and Grade 1-2 anterolisthesis at L5 on S1. Retrolisthesis at L3 on L4. At her last clinic appointment, patient continued to have right knee and I referred her to Dr. Manas Cline for further evaluation. Concerning her low back and RLE radicular type symptoms, I increased her Neurontin to 800 mg TID. I approved extended physical therapy for the patient as it has been beneficial.      The patient returns today with pain location and distribution remain unchanged.  She rates pain 3-6/10, elevated since her last visit (3-4/10). Note from Dr. Kamala Lindquist dated 3/27/18 indicating patient was seen with c/o right knee pain; she has osteoarthritis of both knees by x-ray, right knee more symptomatic than left knee. Of note, cortisone injections were not a great option due to her previous reactions. Pt is tolerating increased Neurontin 800 mg TID with slight additional benefit. She does have concerns of weight gain with Neurontin. Pt is completing her HEP daily. Therapy notes reviewed.  reviewed. Body mass index is 34.95 kg/(m^2). PCP: Steff Lechuga MD      Past Medical History:   Diagnosis Date    Cardiomegaly     Essential hypertension, benign     stable    Hypercholesterolemia     Obesity, unspecified     Pt has weight loss    Other and unspecified hyperlipidemia     Chol 172, ldl 82, hdl 58, tg 141    Type II or unspecified type diabetes mellitus without mention of complication, not stated as uncontrolled         Social History     Social History    Marital status:      Spouse name: N/A    Number of children: N/A    Years of education: N/A     Occupational History    Not on file. Social History Main Topics    Smoking status: Never Smoker    Smokeless tobacco: Never Used    Alcohol use No    Drug use: No    Sexual activity: Not on file     Other Topics Concern    Not on file     Social History Narrative       Current Outpatient Prescriptions   Medication Sig Dispense Refill    gabapentin (NEURONTIN) 800 mg tablet Take 1 Tab by mouth three (3) times daily (with meals). Indications: NEUROPATHIC PAIN 90 Tab 1    VIT C/VIT E AC/LUT/COPPER/ZINC (PRESERVISION LUTEIN PO) Take  by Mouth Twice Daily.  MELATONIN PO 5 mg.  glucosamine-chondroitin (ELATION) 1,500-1,200 mg/30 mL liqd Take  by Mouth.       acetaminophen (TYLENOL) 500 mg tablet 500 mg.      losartan (COZAAR) 50 mg tablet TAKE ONE TABLET BY MOUTH ONCE DAILY 90 Tab 3    dilTIAZem XR (DILACOR XR) 120 mg XR capsule TAKE ONE CAPSULE BY MOUTH ONCE DAILY 90 Cap 3    atenolol (TENORMIN) 50 mg tablet TAKE ONE TABLET BY MOUTH TWICE DAILY 180 Tab 3    aspirin (ASPIRIN) 325 mg tablet Take 81 mg by mouth daily.  CRANBERRY CONC-ASCORBIC ACID PO Take  by mouth.  calcium-cholecalciferol, d3, (CALCIUM 600 + D) 600-125 mg-unit tab Take  by mouth daily. Patient taking 4 times a week      Cholecalciferol, Vitamin D3, (VITAMIN D3) 1,000 unit cap Take  by mouth daily.  metFORMIN (GLUMETZA ER) 500 mg TG24 24 hour tablet 850 mg daily.  pravastatin (PRAVACHOL) 40 mg tablet Take 40 mg by mouth daily.  GLUC SIMPSON/CHONDRO SIMPSON A/VIT C/MN (GLUCOSAMINE 1500 COMPLEX PO) Take  by mouth daily.  indapamide (LOZOL) 2.5 mg tablet Take  by mouth daily.  gabapentin (NEURONTIN) 600 mg tablet Take 1 Tab by mouth three (3) times daily (with meals). Indications: NEUROPATHIC PAIN (Patient not taking: Reported on 4/16/2018) 270 Tab 0    gabapentin (NEURONTIN) 300 mg capsule Take 300 mg by mouth three (3) times daily.          Allergies   Allergen Reactions    Kenalog [Triamcinolone Acetonide] Anaphylaxis    Penicillin G Hives    Adhesive Tape-Silicones Swelling    Bacitracin Not Reported This Time    Cephalexin Rash    Ciprofloxacin Other (comments)    Cortisone Not Reported This Time    Erythromycin Not Reported This Time    Hydrocortisone Hives    Lisinopril Not Reported This Time    Lortab [Hydrocodone-Acetaminophen] Rash    Methimazole Other (comments)     Neck pain/cough    Penicillins Not Reported This Time    Prednisolone Other (comments)    Prednisone Not Reported This Time    Propylthiouracil Other (comments)     Dizziness,elevated blood pressure    Sulfamethoxazole-Trimethoprim Other (comments)    Tetanus And Diphtheria Toxoids, Adsorbed, Adult Swelling    Tetanus Vaccines And Toxoid Not Reported This Time          PHYSICAL EXAMINATION    Visit Vitals    /58    Pulse 76    Resp 16    Ht 5' 5\" (1.651 m)    Wt 95.3 kg (210 lb)    BMI 34.95 kg/m2       CONSTITUTIONAL: NAD, A&O x 3  SENSATION: Intact to light touch throughout  RANGE OF MOTION: The patient has full passive range of motion in all four extremities. MOTOR:  Straight Leg Raise: Negative, bilateral               Hip Flex Knee Ext Knee Flex Ankle DF GTE Ankle PF Tone   Right +4/5 +4/5 +4/5 +4/5 +4/5 +4/5 +4/5   Left +4/5 +4/5 +4/5 +4/5 +4/5 +4/5 +4/5       ASSESSMENT   Diagnoses and all orders for this visit:    1. Lumbosacral spondylosis without myelopathy    2. Lumbar radiculopathy    3. DDD (degenerative disc disease), lumbar    4. Osteoarthritis of both knees, unspecified osteoarthritis type        IMPRESSION AND PLAN:  Patient would like to continue on Neurontin 800 mg TID at this time. She does not need refills. I encourage patient to perform her HEP daily. I will see the patient back in 1 month's time or earlier if needed. Written by Denise Dixon, as dictated by Ashley Leach MD  I examined the patient, reviewed and agree with the note.

## 2018-04-16 NOTE — MR AVS SNAPSHOT
[address] [facility] 
 
 
 [address] [facility] 
[phone] Patient: Ray Chavez MRN: [id number] :1940 Visit Information Date & Time Provider Department Dept. Phone Encounter #  
 2018  1:45 PM Chantel Garcia  [address], [address] and [facility] [place] [phone] Follow-up Instructions Return in about 4 weeks (around 2018). Your Appointments 2018  1:00 PM  
Follow Up with Marlon Lindsey MD  
[address], [address] and [facility] [place] [place] ([facility]) Appt Note: LOWER BACK 4 WK FU  
  [facility] [id number]  
[address] [facility]  
  
    
 2018 10:00 AM  
Follow Up with Anahi Adair MD  
[facility] ([facility]) Appt Note: 6 month follow up  
 [address] [facility] [address]  
  
   
 [address] Marilou Lamar [address] Upcoming Health Maintenance Date Due HEMOGLOBIN A1C Q6M 1940 FOOT EXAM Q1 1948 MICROALBUMIN Q1 1948 EYE EXAM RETINAL OR DILATED Q1 1948 DTaP/Tdap/Td series (1 - Tdap) 1959 ZOSTER VACCINE AGE 60> 1998 GLAUCOMA SCREENING Q2Y 2003 Bone Densitometry (Dexa) Screening 2003 Pneumococcal 65+ Low/Medium Risk (1 of 2 - PCV13) 2003 LIPID PANEL Q1 2016 MEDICARE YEARLY EXAM 3/19/2018 Allergies as of 2018  Review Complete On: 2018 By: Chantel Garcia MD  
  
 Severity Noted Reaction Type Reactions Kenalog [Triamcinolone Acetonide] High 2018    Anaphylaxis Penicillin G High 2015    Hives Adhesive Tape-silicones Medium     Swelling Bacitracin    Not Reported This Time Cephalexin  2018    Rash Ciprofloxacin  2016    Other (comments) Cortisone    Not Reported This Time Erythromycin    Not Reported This Time Hydrocortisone  04/01/2015    Hives Lisinopril    Not Reported This Time Lortab [Hydrocodone-acetaminophen]  08/04/2015    Rash Methimazole  07/07/2017    Other (comments) Neck pain/cough Penicillins    Not Reported This Time Prednisolone  04/01/2015    Other (comments) Prednisone    Not Reported This Time Propylthiouracil  07/07/2017    Other (comments) Dizziness,elevated blood pressure Sulfamethoxazole-trimethoprim  11/03/2016    Other (comments) Tetanus And Diphtheria Toxoids, Adsorbed, Adult  04/01/2015    Swelling Tetanus Vaccines And Toxoid    Not Reported This Time Current Immunizations  Never Reviewed No immunizations on file. Not reviewed this visit You Were Diagnosed With   
  
 Codes Comments Lumbosacral spondylosis without myelopathy    -  Primary ICD-10-CM: Y92.712 ICD-9-CM: 721.3 Lumbar radiculopathy     ICD-10-CM: M54.16 
ICD-9-CM: 724.4 DDD (degenerative disc disease), lumbar     ICD-10-CM: M51.36 
ICD-9-CM: 722.52 Osteoarthritis of both knees, unspecified osteoarthritis type     ICD-10-CM: M17.0 ICD-9-CM: 715.96 Vitals BP Pulse Resp Height(growth percentile) Weight(growth percentile) BMI  
 141/58 76 16 5' 5\" (1.651 m) 210 lb (95.3 kg) 34.95 kg/m2 OB Status Smoking Status Hysterectomy Never Smoker BMI and BSA Data Body Mass Index Body Surface Area 34.95 kg/m 2 2.09 m 2 Preferred Pharmacy Pharmacy Name Phone Patricia Wilks [address] [facility], [address] [place] [address] [phone] Your Updated Medication List  
  
   
This list is accurate as of 4/16/18  2:26 PM.  Always use your most recent med list.  
  
  
  
  
 acetaminophen 500 mg tablet Commonly known as:  TYLENOL  
500 mg.  
  
 aspirin 325 mg tablet Commonly known as:  ASPIRIN Take 81 mg by mouth daily. atenolol 50 mg tablet Commonly known as:  TENORMIN  
TAKE ONE TABLET BY MOUTH TWICE DAILY CALCIUM 600 + D 600-125 mg-unit Tab Generic drug:  calcium-cholecalciferol (d3) Take  by mouth daily. Patient taking 4 times a week CRANBERRY CONC-ASCORBIC ACID PO Take  by mouth. dilTIAZem  mg XR capsule Commonly known as:  DILACOR XR  
TAKE ONE CAPSULE BY MOUTH ONCE DAILY * gabapentin 300 mg capsule Commonly known as:  NEURONTIN Take 300 mg by mouth three (3) times daily. * gabapentin 600 mg tablet Commonly known as:  NEURONTIN Take 1 Tab by mouth three (3) times daily (with meals). Indications: NEUROPATHIC PAIN  
  
 * gabapentin 800 mg tablet Commonly known as:  NEURONTIN Take 1 Tab by mouth three (3) times daily (with meals). Indications: NEUROPATHIC PAIN  
  
 GLUCOSAMINE 1500 COMPLEX PO Take  by mouth daily. glucosamine-chondroitin 1,500-1,200 mg/30 mL Liqd Commonly known as:  ELATION Take  by Mouth. indapamide 2.5 mg tablet Commonly known as:  Doralee Slough Take  by mouth daily. losartan 50 mg tablet Commonly known as:  COZAAR  
TAKE ONE TABLET BY MOUTH ONCE DAILY MELATONIN PO  
5 mg.  
  
 metFORMIN 500 mg Tg24 24 hour tablet Commonly known as:  GLUMETZA ER  
850 mg daily. pravastatin 40 mg tablet Commonly known as:  PRAVACHOL Take 40 mg by mouth daily. PRESERVISION LUTEIN PO Take  by Mouth Twice Daily. VITAMIN D3 1,000 unit Cap Generic drug:  cholecalciferol Take  by mouth daily. * Notice: This list has 3 medication(s) that are the same as other medications prescribed for you. Read the directions carefully, and ask your doctor or other care provider to review them with you. Follow-up Instructions Return in about 4 weeks (around 5/14/2018). Introducing [address]! Dear Yulia Ramirez: 
Thank you for requesting a imageloop account.   Our records indicate that you already have an active [employer] account. You can access your account anytime at [email or web address] Did you know that you can access your hospital and ER discharge instructions at any time in [employer]? You can also review all of your test results from your hospital stay or ER visit. Additional Information If you have questions, please visit the Frequently Asked Questions section of the [employer] website at [email or web address] Remember, [employer] is NOT to be used for urgent needs. For medical emergencies, dial 911. Now available from your iPhone and Android! Please provide this summary of care documentation to your next provider. Your primary care clinician is listed as Mac Cruz. If you have any questions after today's visit, please call [phone].

## 2018-04-18 ENCOUNTER — APPOINTMENT (OUTPATIENT)
Dept: PHYSICAL THERAPY | Age: 78
End: 2018-04-18
Payer: MEDICARE

## 2018-04-20 ENCOUNTER — APPOINTMENT (OUTPATIENT)
Dept: PHYSICAL THERAPY | Age: 78
End: 2018-04-20
Payer: MEDICARE

## 2018-04-24 ENCOUNTER — OFFICE VISIT (OUTPATIENT)
Dept: CARDIOLOGY CLINIC | Age: 78
End: 2018-04-24

## 2018-04-24 VITALS
BODY MASS INDEX: 34.32 KG/M2 | SYSTOLIC BLOOD PRESSURE: 161 MMHG | HEART RATE: 76 BPM | DIASTOLIC BLOOD PRESSURE: 74 MMHG | HEIGHT: 65 IN | WEIGHT: 206 LBS

## 2018-04-24 DIAGNOSIS — I10 ESSENTIAL HYPERTENSION, BENIGN: ICD-10-CM

## 2018-04-24 DIAGNOSIS — R00.2 PALPITATION: ICD-10-CM

## 2018-04-24 DIAGNOSIS — I49.1 PAC (PREMATURE ATRIAL CONTRACTION): Primary | ICD-10-CM

## 2018-04-24 RX ORDER — DILTIAZEM HYDROCHLORIDE 180 MG/1
180 CAPSULE, EXTENDED RELEASE ORAL DAILY
Qty: 90 CAP | Refills: 1 | Status: SHIPPED | OUTPATIENT
Start: 2018-04-24 | End: 2018-09-28 | Stop reason: SDUPTHER

## 2018-04-24 NOTE — LETTER
Lani Carrenodmont 1940 4/24/2018 Dear Rick Gregorio MD 
 
I had the pleasure of evaluating  Ms. Mireya Andrews in office today. Below are the relevant portions of my assessment and plan of care. ICD-10-CM ICD-9-CM 1. PAC (premature atrial contraction) I49.1 427.61   
 occ palpittaion 2. Essential hypertension, benign I10 401.1   
 elevated adjust meds 
increase diltiazem to 180 mg a day 3. Palpitation R00.2 785.1   
 recently worse Current Outpatient Prescriptions Medication Sig Dispense Refill  dilTIAZem XR (DILACOR XR) 180 mg XR capsule Take 1 Cap by mouth daily. 90 Cap 1  
 gabapentin (NEURONTIN) 800 mg tablet Take 1 Tab by mouth three (3) times daily (with meals). Indications: NEUROPATHIC PAIN 90 Tab 1  VIT C/VIT E AC/LUT/COPPER/ZINC (PRESERVISION LUTEIN PO) Take  by Mouth Twice Daily.  MELATONIN PO 5 mg as needed.  glucosamine-chondroitin (ELATION) 1,500-1,200 mg/30 mL liqd Take  by Mouth.  acetaminophen (TYLENOL) 500 mg tablet 500 mg.    
 losartan (COZAAR) 50 mg tablet TAKE ONE TABLET BY MOUTH ONCE DAILY 90 Tab 3  
 atenolol (TENORMIN) 50 mg tablet TAKE ONE TABLET BY MOUTH TWICE DAILY 180 Tab 3  
 aspirin (ASPIRIN) 325 mg tablet Take 81 mg by mouth daily.  CRANBERRY CONC-ASCORBIC ACID PO Take  by mouth.  calcium-cholecalciferol, d3, (CALCIUM 600 + D) 600-125 mg-unit tab Take  by mouth daily. Patient taking 4 times a week  Cholecalciferol, Vitamin D3, (VITAMIN D3) 1,000 unit cap Take  by mouth daily.  metFORMIN (GLUMETZA ER) 500 mg TG24 24 hour tablet 850 mg daily.  pravastatin (PRAVACHOL) 40 mg tablet Take 40 mg by mouth daily.  GLUC SIMPSON/CHONDRO SIMPSON A/VIT C/MN (GLUCOSAMINE 1500 COMPLEX PO) Take  by mouth daily.  indapamide (LOZOL) 2.5 mg tablet Take  by mouth daily. Orders Placed This Encounter  dilTIAZem XR (DILACOR XR) 180 mg XR capsule Sig: Take 1 Cap by mouth daily. Dispense:  90 Cap Refill:  1 If you have questions, please do not hesitate to call me. I look forward to following Ms. Jania Davis along with you. Sincerely, Lucio Gonzalez MD

## 2018-04-24 NOTE — PROGRESS NOTES
1. Have you been to the ER, urgent care clinic since your last visit? Hospitalized since your last visit? No    2. Have you seen or consulted any other health care providers outside of the [address] [place] [address] since your last visit? Include any pap smears or colon screening. Yes Where: [facility]     3. Since your last visit, have you had any of the following symptoms? .         4. Have you had any blood work, X-rays or cardiac testing? 5. Where do you normally have your labs drawn? 6. Do you need any refills today?

## 2018-04-24 NOTE — MR AVS SNAPSHOT
Ramón Burgos 
 
 
 [address] [facility] 
[phone] Patient: Audrey Matamoros MRN: [id number] :1940 Visit Information Date & Time Provider Department Dept. Phone Encounter #  
 2018 12:30 PM Stephanie Palencia MD [facility] [phone] Follow-up Instructions Return for old f/u. Your Appointments 2018 12:30 PM  
Office Visit with Stephanie Palencia MD  
[facility] ([facility]) Appt Note: irregular heart rate this morning and elevated bp,wants to be seen today [address] [facility] [address]  
  
   
 [address] [place] [id number] 2018  9:50 AM  
Follow Up with Sunshine Jacobsen MD  
[facility] ([facility]) Appt Note: 4 WEEK FOLLOW LOWER BACK  
 [address] [place] [id number]  
[address]  
  
    
 2018 10:00 AM  
Follow Up with Stephanie Palencia MD  
[facility] ([facility]) Appt Note: 6 month follow up  
 [address] [facility] [address]  
  
   
 [address] [place] [address] Upcoming Health Maintenance Date Due HEMOGLOBIN A1C Q6M 1940 FOOT EXAM Q1 1948 MICROALBUMIN Q1 1948 EYE EXAM RETINAL OR DILATED Q1 1948 DTaP/Tdap/Td series (1 - Tdap) 1959 ZOSTER VACCINE AGE 60> 1998 GLAUCOMA SCREENING Q2Y 2003 Bone Densitometry (Dexa) Screening 2003 Pneumococcal 65+ Low/Medium Risk (1 of 2 - PCV13) 2003 LIPID PANEL Q1 2016 MEDICARE YEARLY EXAM 3/19/2018 Allergies as of 2018  Review Complete On: 2018 By: Stephanie Palencia MD  
  
 Severity Noted Reaction Type Reactions Kenalog [Triamcinolone Acetonide] High 2018    Anaphylaxis Penicillin G High 04/01/2015    Hives Adhesive Tape-silicones Medium 54/26/0387    Swelling Bacitracin    Not Reported This Time Cephalexin  02/13/2018    Rash Ciprofloxacin  08/03/2016    Other (comments) Cortisone    Not Reported This Time Erythromycin    Not Reported This Time Hydrocortisone  04/01/2015    Hives Lisinopril    Not Reported This Time Lortab [Hydrocodone-acetaminophen]  08/04/2015    Rash Methimazole  07/07/2017    Other (comments) Neck pain/cough Penicillins    Not Reported This Time Prednisolone  04/01/2015    Other (comments) Prednisone    Not Reported This Time Propylthiouracil  07/07/2017    Other (comments) Dizziness,elevated blood pressure Sulfamethoxazole-trimethoprim  11/03/2016    Other (comments) Tetanus And Diphtheria Toxoids, Adsorbed, Adult  04/01/2015    Swelling Tetanus Vaccines And Toxoid    Not Reported This Time Current Immunizations  Never Reviewed No immunizations on file. Not reviewed this visit You Were Diagnosed With   
  
 Codes Comments PAC (premature atrial contraction)    -  Primary ICD-10-CM: I49.1 ICD-9-CM: 427.61 occ palpittaion Essential hypertension, benign     ICD-10-CM: I10 
ICD-9-CM: 401.1 elevated adjust meds 
increase diltiazem to 180 mg a day Palpitation     ICD-10-CM: R00.2 ICD-9-CM: 785.1 recently worse Vitals BP Pulse Height(growth percentile) Weight(growth percentile) BMI OB Status 161/74 76 5' 5\" (1.651 m) 206 lb (93.4 kg) 34.28 kg/m2 Hysterectomy Smoking Status Never Smoker Vitals History BMI and BSA Data Body Mass Index Body Surface Area  
 34.28 kg/m 2 2.07 m 2 Preferred Pharmacy Pharmacy Name Phone [address] [facility], [address] Your Updated Medication List  
  
   
This list is accurate as of 4/24/18 11:44 AM.  Always use your most recent med list.  
  
  
  
 acetaminophen 500 mg tablet Commonly known as:  TYLENOL  
500 mg.  
  
 aspirin 325 mg tablet Commonly known as:  ASPIRIN Take 81 mg by mouth daily. atenolol 50 mg tablet Commonly known as:  TENORMIN  
TAKE ONE TABLET BY MOUTH TWICE DAILY CALCIUM 600 + D 600-125 mg-unit Tab Generic drug:  calcium-cholecalciferol (d3) Take  by mouth daily. Patient taking 4 times a week CRANBERRY CONC-ASCORBIC ACID PO Take  by mouth. dilTIAZem  mg XR capsule Commonly known as:  DILACOR XR Take 1 Cap by mouth daily. gabapentin 800 mg tablet Commonly known as:  NEURONTIN Take 1 Tab by mouth three (3) times daily (with meals). Indications: NEUROPATHIC PAIN  
  
 GLUCOSAMINE 1500 COMPLEX PO Take  by mouth daily. glucosamine-chondroitin 1,500-1,200 mg/30 mL Liqd Commonly known as:  ELATION Take  by Mouth. indapamide 2.5 mg tablet Commonly known as:  Nanine Chute Take  by mouth daily. losartan 50 mg tablet Commonly known as:  COZAAR  
TAKE ONE TABLET BY MOUTH ONCE DAILY MELATONIN PO  
5 mg as needed. metFORMIN 500 mg Tg24 24 hour tablet Commonly known as:  GLUMETZA ER  
850 mg daily. pravastatin 40 mg tablet Commonly known as:  PRAVACHOL Take 40 mg by mouth daily. PRESERVISION LUTEIN PO Take  by Mouth Twice Daily. VITAMIN D3 1,000 unit Cap Generic drug:  cholecalciferol Take  by mouth daily. Prescriptions Sent to Pharmacy Refills  
 dilTIAZem XR (DILACOR XR) 180 mg XR capsule 1 Sig: Take 1 Cap by mouth daily. Class: Normal  
 Pharmacy: [facility] [address] SARAI LANDERS [address] Ph #: [phone] Route: Oral  
  
Follow-up Instructions Return for old f/u. Introducing [facility]! Dear Alicia Betancourt: 
Thank you for requesting a [employer] account. Our records indicate that you already have an active [employer] account.   You can access your account anytime at [email or web address] Did you know that you can access your hospital and ER discharge instructions at any time in [employer]? You can also review all of your test results from your hospital stay or ER visit. Additional Information If you have questions, please visit the Frequently Asked Questions section of the [employer] website at [email or web address] Remember, [employer] is NOT to be used for urgent needs. For medical emergencies, dial 911. Now available from your iPhone and Android! Please provide this summary of care documentation to your next provider. Your primary care clinician is listed as Jaden Noonan. If you have any questions after today's visit, please call [phone].

## 2018-04-24 NOTE — PROGRESS NOTES
HISTORY OF PRESENT ILLNESS  Jean Paul Torres is a 76 y.o. female. HPI Comments:       Hypertension   The history is provided by the patient. This is a chronic problem. The problem occurs constantly. The problem has not changed since onset. Palpitations    The history is provided by the patient. This is a recurrent problem. The problem has been gradually worsening. The problem occurs daily. The problem is associated with nothing. Associated symptoms include irregular heartbeat. Pertinent negatives include no exertional chest pressure and no lower extremity edema. Her past medical history is significant for hypertension. Cholesterol Problem         Review of Systems   Constitutional: Negative for chills. HENT: Negative for nosebleeds. Eyes: Negative for blurred vision and double vision. Respiratory: Negative for wheezing. Cardiovascular: Positive for palpitations. Negative for leg swelling. Gastrointestinal: Negative for heartburn. Musculoskeletal: Negative for myalgias. Skin: Negative for rash. Endo/Heme/Allergies: Does not bruise/bleed easily.      Family History   Problem Relation Age of Onset    Heart Disease Other      positive history of ischemic heart disease       Past Medical History:   Diagnosis Date    Cardiomegaly     Essential hypertension, benign     stable    Hypercholesterolemia     Obesity, unspecified     Pt has weight loss    Other and unspecified hyperlipidemia     Chol 172, ldl 82, hdl 58, tg 141    Type II or unspecified type diabetes mellitus without mention of complication, not stated as uncontrolled        Past Surgical History:   Procedure Laterality Date    HX GYN      hysterectomy       Social History   Substance Use Topics    Smoking status: Never Smoker    Smokeless tobacco: Never Used    Alcohol use No       Allergies   Allergen Reactions    Kenalog [Triamcinolone Acetonide] Anaphylaxis    Penicillin G Hives    Adhesive Tape-Silicones Swelling    Bacitracin Not Reported This Time    Cephalexin Rash    Ciprofloxacin Other (comments)    Cortisone Not Reported This Time    Erythromycin Not Reported This Time    Hydrocortisone Hives    Lisinopril Not Reported This Time    Lortab [Hydrocodone-Acetaminophen] Rash    Methimazole Other (comments)     Neck pain/cough    Penicillins Not Reported This Time    Prednisolone Other (comments)    Prednisone Not Reported This Time    Propylthiouracil Other (comments)     Dizziness,elevated blood pressure    Sulfamethoxazole-Trimethoprim Other (comments)    Tetanus And Diphtheria Toxoids, Adsorbed, Adult Swelling    Tetanus Vaccines And Toxoid Not Reported This Time       Current Outpatient Prescriptions   Medication Sig    dilTIAZem XR (DILACOR XR) 180 mg XR capsule Take 1 Cap by mouth daily.  gabapentin (NEURONTIN) 800 mg tablet Take 1 Tab by mouth three (3) times daily (with meals). Indications: NEUROPATHIC PAIN    VIT C/VIT E AC/LUT/COPPER/ZINC (PRESERVISION LUTEIN PO) Take  by Mouth Twice Daily.  MELATONIN PO 5 mg as needed.  glucosamine-chondroitin (ELATION) 1,500-1,200 mg/30 mL liqd Take  by Mouth.  acetaminophen (TYLENOL) 500 mg tablet 500 mg.    losartan (COZAAR) 50 mg tablet TAKE ONE TABLET BY MOUTH ONCE DAILY    atenolol (TENORMIN) 50 mg tablet TAKE ONE TABLET BY MOUTH TWICE DAILY    aspirin (ASPIRIN) 325 mg tablet Take 81 mg by mouth daily.  CRANBERRY CONC-ASCORBIC ACID PO Take  by mouth.  calcium-cholecalciferol, d3, (CALCIUM 600 + D) 600-125 mg-unit tab Take  by mouth daily. Patient taking 4 times a week    Cholecalciferol, Vitamin D3, (VITAMIN D3) 1,000 unit cap Take  by mouth daily.  metFORMIN (GLUMETZA ER) 500 mg TG24 24 hour tablet 850 mg daily.  pravastatin (PRAVACHOL) 40 mg tablet Take 40 mg by mouth daily.  GLUC SIMPSON/CHONDRO SIMPSON A/VIT C/MN (GLUCOSAMINE 1500 COMPLEX PO) Take  by mouth daily.  indapamide (LOZOL) 2.5 mg tablet Take  by mouth daily.      No current facility-administered medications for this visit. Visit Vitals    /74    Pulse 76    Ht 5' 5\" (1.651 m)    Wt 93.4 kg (206 lb)    BMI 34.28 kg/m2         Physical Exam   Constitutional: She is oriented to person, place, and time. She appears well-developed and well-nourished. obese   HENT:   Head: Normocephalic and atraumatic. Eyes: Conjunctivae are normal.   Neck: Neck supple. No JVD present. No tracheal deviation present. No thyromegaly present. Cardiovascular: Normal rate and regular rhythm. PMI is not displaced. Exam reveals no gallop and no decreased pulses. Murmur heard. Early systolic murmur is present with a grade of 2/6  at the upper right sternal border  Pulmonary/Chest: No respiratory distress. She has no wheezes. She has no rales. She exhibits no tenderness. Abdominal: Soft. There is no tenderness. Musculoskeletal: She exhibits no edema. Neurological: She is alert and oriented to person, place, and time. Skin: Skin is warm. Psychiatric: She has a normal mood and affect. Ms. Kristy Polo has a reminder for a \"due or due soon\" health maintenance. I have asked that she contact her primary care provider for follow-up on this health maintenance. CARDIOLOGY STUDIES 6/1/2011 3/1/2008   Myocardial Perfusion Scan Result nl scan, EF 90% normal   Echocardiogram - Complete Result EF 78% mild lvh, normal ef   Some recent data might be hidden   mri:8/2015  1. No acute hemorrhage or acute infarction. 2. White matter abnormality is nonspecific but likely ischemic. This does not suggest multiple sclerosis. 3. No other significant intracranial abnormality is appreciated. LLONDFE:0/3020:UCGX  Left ventricle: Systolic function was normal. Ejection fraction was  estimated in the range of 55 % to 60 %. There were no regional wall motion  abnormalities.  Doppler parameters were consistent with abnormal left  ventricular relaxation (grade 1 diastolic dysfunction). Left atrium: The atrium was mildly dilated. Mitral valve: There was trivial regurgitation. Aortic valve: The valve was trileaflet. Leaflets exhibited mildly  increased thickness, normal cuspal separation, and sclerosis. Tricuspid valve: There was mild regurgitation. Tricuspid regurgitation  peak velocity: 2.4 m/sec. Pulmonary artery systolic pressure: 26 mmHg. Pulmonic valve: There was trivial regurgitation. 2015:holter  Sr,Frequent pac. No a fib    I have personally reviewed patients ekg done at other facility. 2017  Sr,pac    NUCLEAR IMAGIN2017     Findings:   1. Stress images reveal normal Myoview distrubution in all the LV segments in short axis, vertical and horizontal long axis views. 2. Resting images have a normal uptake. 3. Gated images reveal normal wall motion and the ejection fraction is calculated to be 90%. Conclusion:   1. Normal perfusion scan. 2. Normal wall motion and ejection fraction. 3. Low risk scan. Assessment         ICD-10-CM ICD-9-CM    1. PAC (premature atrial contraction) I49.1 427.61     occ palpittaion   2. Essential hypertension, benign I10 401.1     elevated adjust meds  increase diltiazem to 180 mg a day   3. Palpitation R00.2 785.1     recently worse       Medications Discontinued During This Encounter   Medication Reason    gabapentin (NEURONTIN) 254 mg tablet Duplicate Order    gabapentin (NEURONTIN) 130 mg capsule Duplicate Order    dilTIAZem XR (DILACOR XR) 120 mg XR capsule Reorder       Orders Placed This Encounter    dilTIAZem XR (DILACOR XR) 180 mg XR capsule     Sig: Take 1 Cap by mouth daily. Dispense:  90 Cap     Refill:  1       Follow-up Disposition:  Return for old f/u.

## 2018-04-25 ENCOUNTER — TELEPHONE (OUTPATIENT)
Dept: ORTHOPEDIC SURGERY | Age: 78
End: 2018-04-25

## 2018-04-25 NOTE — TELEPHONE ENCOUNTER
Patient states something happened yesterday and she got a terrible pain on her left side above her waistline. She was bent over and went to the ER at [facility] last night. Dr. Dc Tucker is seeing her for right pain on the lower back.   Patient can be reached at [phone]

## 2018-04-25 NOTE — TELEPHONE ENCOUNTER
I could not locate ER notes but patient has a FU on 4/30 with Dr. Carie Walsh. Please have her bring the discharge summary.

## 2018-04-27 NOTE — TELEPHONE ENCOUNTER
Pt got Lidocaine patches and just put on 1st one. Pt states she will see us on Monday to discuss further options.

## 2018-04-30 ENCOUNTER — DOCUMENTATION ONLY (OUTPATIENT)
Dept: ORTHOPEDIC SURGERY | Age: 78
End: 2018-04-30

## 2018-04-30 ENCOUNTER — OFFICE VISIT (OUTPATIENT)
Dept: ORTHOPEDIC SURGERY | Age: 78
End: 2018-04-30

## 2018-04-30 VITALS
HEIGHT: 65 IN | WEIGHT: 206 LBS | BODY MASS INDEX: 34.32 KG/M2 | DIASTOLIC BLOOD PRESSURE: 67 MMHG | HEART RATE: 84 BPM | SYSTOLIC BLOOD PRESSURE: 134 MMHG

## 2018-04-30 DIAGNOSIS — M51.36 DDD (DEGENERATIVE DISC DISEASE), LUMBAR: ICD-10-CM

## 2018-04-30 DIAGNOSIS — M47.817 LUMBOSACRAL SPONDYLOSIS WITHOUT MYELOPATHY: Primary | ICD-10-CM

## 2018-04-30 DIAGNOSIS — M54.16 LUMBAR RADICULOPATHY: ICD-10-CM

## 2018-04-30 RX ORDER — LIDOCAINE 50 MG/G
PATCH TOPICAL EVERY 24 HOURS
COMMUNITY
End: 2018-08-05 | Stop reason: ALTCHOICE

## 2018-04-30 RX ORDER — DIAZEPAM 10 MG/1
TABLET ORAL
Qty: 1 TAB | Refills: 0 | Status: SHIPPED | OUTPATIENT
Start: 2018-04-30 | End: 2018-05-15 | Stop reason: ALTCHOICE

## 2018-04-30 RX ORDER — TRAMADOL HYDROCHLORIDE 50 MG/1
TABLET ORAL
Qty: 60 TAB | Refills: 0 | Status: SHIPPED | OUTPATIENT
Start: 2018-04-30 | End: 2018-06-14 | Stop reason: SDUPTHER

## 2018-04-30 NOTE — PROGRESS NOTES
MRI Lumbar without is scheduled at [facility], [id number], on 05/11/18, arrive 8:00AM, test 8:30AM. No Medicare pre-authorization required. Patient given prescription for valium at office visit.

## 2018-04-30 NOTE — MR AVS SNAPSHOT
[address] [facility] 
 
 
 [address] [facility] 
[phone] Patient: Brunilda Dowd MRN: [id number] :1940 Visit Information Date & Time Provider Department Dept. Phone Encounter #  
 2018  2:55 PM Marcos Rocha MD [facility] - [place] [phone] [id number] Follow-up Instructions Return for following MRI. Your Appointments 2018 10:00 AM  
Follow Up with Sumeet Rodríguez MD  
[facility] [place] ([facility] Appt Note: 6 month follow up  
 [address] [facility] [address]  
  
   
 [address] [address] [address] [address] Upcoming Health Maintenance Date Due HEMOGLOBIN A1C Q6M 1940 FOOT EXAM Q1 1948 MICROALBUMIN Q1 1948 EYE EXAM RETINAL OR DILATED Q1 1948 DTaP/Tdap/Td series (1 - Tdap) 1959 ZOSTER VACCINE AGE 60> 1998 GLAUCOMA SCREENING Q2Y 2003 Bone Densitometry (Dexa) Screening 2003 Pneumococcal 65+ Low/Medium Risk (1 of 2 - PCV13) 2003 LIPID PANEL Q1 2016 MEDICARE YEARLY EXAM 3/19/2018 Influenza Age 5 to Adult 2018 Allergies as of 2018  Review Complete On: 2018 By: Marcos Rocha MD  
  
 Severity Noted Reaction Type Reactions Kenalog [Triamcinolone Acetonide] High 2018    Anaphylaxis Penicillin G High 2015    Hives Adhesive Tape-silicones Medium     Swelling Bacitracin    Not Reported This Time Cephalexin  2018    Rash Ciprofloxacin  2016    Other (comments) Cortisone    Not Reported This Time Erythromycin    Not Reported This Time Hydrocortisone  2015    Hives Lisinopril    Not Reported This Time Lortab [Hydrocodone-acetaminophen]  2015    Rash Methimazole  2017    Other (comments) Neck pain/cough Penicillins    Not Reported This Time Prednisolone  04/01/2015    Other (comments) Prednisone    Not Reported This Time Propylthiouracil  07/07/2017    Other (comments) Dizziness,elevated blood pressure Sulfamethoxazole-trimethoprim  11/03/2016    Other (comments) Tetanus And Diphtheria Toxoids, Adsorbed, Adult  04/01/2015    Swelling Tetanus Vaccines And Toxoid    Not Reported This Time Current Immunizations  Never Reviewed No immunizations on file. Not reviewed this visit You Were Diagnosed With   
  
 Codes Comments Lumbosacral spondylosis without myelopathy    -  Primary ICD-10-CM: X33.627 ICD-9-CM: 721.3 Lumbar radiculopathy     ICD-10-CM: M54.16 
ICD-9-CM: 724.4 DDD (degenerative disc disease), lumbar     ICD-10-CM: M51.36 
ICD-9-CM: 722.52 Vitals BP Pulse Height(growth percentile) Weight(growth percentile) BMI OB Status 134/67 84 5' 5\" (1.651 m) 206 lb (93.4 kg) 34.28 kg/m2 Hysterectomy Smoking Status Never Smoker Vitals History BMI and BSA Data Body Mass Index Body Surface Area  
 34.28 kg/m 2 2.07 m 2 Preferred Pharmacy Pharmacy Name Phone [address] [facility] [address] [facility], [address] Your Updated Medication List  
  
   
This list is accurate as of 4/30/18  4:04 PM.  Always use your most recent med list.  
  
  
  
  
 acetaminophen 500 mg tablet Commonly known as:  TYLENOL  
500 mg.  
  
 aspirin 325 mg tablet Commonly known as:  ASPIRIN Take 81 mg by mouth daily. atenolol 50 mg tablet Commonly known as:  TENORMIN  
TAKE ONE TABLET BY MOUTH TWICE DAILY CALCIUM 600 + D 600-125 mg-unit Tab Generic drug:  calcium-cholecalciferol (d3) Take  by mouth daily. Patient taking 4 times a week CRANBERRY CONC-ASCORBIC ACID PO Take  by mouth. diazePAM 10 mg tablet Commonly known as:  VALIUM  
 Tale 1 po q 30 minutes prior to MRI. Must have . Indications: anxiety, Sedation  
  
 dilTIAZem  mg XR capsule Commonly known as:  DILACOR XR Take 1 Cap by mouth daily. gabapentin 800 mg tablet Commonly known as:  NEURONTIN Take 1 Tab by mouth three (3) times daily (with meals). Indications: NEUROPATHIC PAIN  
  
 GLUCOSAMINE 1500 COMPLEX PO Take  by mouth daily. glucosamine-chondroitin 1,500-1,200 mg/30 mL Liqd Commonly known as:  ELATION Take  by Mouth. indapamide 2.5 mg tablet Commonly known as:  Jose [place] Take  by mouth daily. lidocaine 5 % Commonly known as:  LIDODERM  
by TransDERmal route every twenty-four (24) hours. Apply patch to the affected area for 12 hours a day and remove for 12 hours a day. losartan 50 mg tablet Commonly known as:  COZAAR  
TAKE ONE TABLET BY MOUTH ONCE DAILY MELATONIN PO  
5 mg as needed. metFORMIN 500 mg Tg24 24 hour tablet Commonly known as:  GLUMETZA ER  
850 mg daily. pravastatin 40 mg tablet Commonly known as:  PRAVACHOL Take 40 mg by mouth daily. PRESERVISION LUTEIN PO Take  by Mouth Twice Daily. traMADol 50 mg tablet Commonly known as:  ULTRAM  
Take 1 po q daily- bid prn pain  Indications: Pain VITAMIN D3 1,000 unit Cap Generic drug:  cholecalciferol Take  by mouth daily. Prescriptions Printed Refills  
 traMADol (ULTRAM) 50 mg tablet 0 Sig: Take 1 po q daily- bid prn pain  Indications: Pain Class: Print  
 diazePAM (VALIUM) 10 mg tablet 0 Sig: Tale 1 po q 30 minutes prior to MRI. Must have . Indications: anxiety, Sedation Class: Print Follow-up Instructions Return for following MRI. To-Do List   
 04/30/2018 Imaging:  MRI LUMB SPINE WO CONT Introducing [facility] & HEALTH SERVICES! Dear Ashish Morrison: 
Thank you for requesting a [employer] account.   Our records indicate that you already have an active [employer] account. You can access your account anytime at [email or web address] Did you know that you can access your hospital and ER discharge instructions at any time in [employer]? You can also review all of your test results from your hospital stay or ER visit. Additional Information If you have questions, please visit the Frequently Asked Questions section of the [employer] website at [email or web address] Remember, [employer] is NOT to be used for urgent needs. For medical emergencies, dial 911. Now available from your iPhone and Android! Please provide this summary of care documentation to your next provider. Your primary care clinician is listed as Rick Gregorio. If you have any questions after today's visit, please call [phone].

## 2018-04-30 NOTE — PROGRESS NOTES
[facility]  [address], [address]  Phone: [phone]  Fax: [phone]        PROGRESS NOTE      HISTORY OF PRESENT ILLNESS:  The patient is a 76 y.o. female and was seen today for follow up of chronic low back pain extending  into the RLE in a L5/S1 distribution to the ankle since roughly January 2018. Denies injury/trauma. Her pain is aggravated with standing, lumbar flexion and extension. She reports limitations with standing and walking tolerance. Symptoms consistent for spinal stenosis. She also reports numbness on her RUE. Pt has an additional c/o of right knee pain. Note from Dr. Elissa Thomas dated 3/27/18 indicating patient was seen with c/o right knee pain; she has osteoarthritis of both knees by x-ray, right knee more symptomatic than left knee. Of note, cortisone injections were not a great option due to her previous reactions. Pt had a cortisone injection in the past which she experienced erythema with. She reports a similar reaction with Prednisone; however they do not appear to be true allergies. Pt takes Neurontin 300 mg TID for her neuropathy. Pt also takes Aspirin for her heart condition as prescribed by her cardiologist. She also treats her pain with Naproxen and extra strength Tylenol with questionable relief. Pt denies a h/o physical therapy/chiropractor. The patient has a history of DM and reports blood sugars are well controlled, consistently remaining below 200. Her last A1C was a 6%. Pt denies change in bowel or bladder habits. Pt denies fever, weight loss, or skin changes. The patient is RHD. Preliminary reading of lumbar plain films revealed; mild scoliosis convex to the left. [place] at L3. Grade 1 anterolisthesis at L4 on L5 and Grade 1-2 anterolisthesis at L5 on S1. Retrolisthesis at L3 on L4. At her last clinic appointment, patient wished to continue on Neurontin 800 mg TID at that time. She did not need refills.  I encouraged patient to perform her HEP daily. The patient returns today with increased left-sided low back pain since 4/24/18. Pt continues to have right-sided low back pain extending into the RLE in an L5/S1 distribution to the ankle. Denies recent injury/trauma. She rates pain 1-10/10, elevated since her last visit (3-6/10). Pt is accompanied by her daughter today. Pt states she has been sleeping on a recliner since 4/24/18. Pt describes dysuria with h/o recurrent UTI; currently treated by PCP for this. ER note from Yrn Chowdhury PA-C dated 4/24/18 indicating patient presented for chronic low back pain, consistent with myofascial pain. At that time, she was treated with Valium and Lidoderm patches. Pt is compliant with Neurontin 800 mg TID. She performs her HEP daily. Lumbar spine XR dated 4/24/2018 per report revealed No acute fractures or listhesis. Degenerative changes in the lower lumbar spine. Questionable pars defect at the L5 level. Facet degenerative changes at the lumbosacral junction. Alignment is maintained.  reviewed. Body mass index is 34.28 kg/(m^2). PCP: Tasha Carrion MD      Past Medical History:   Diagnosis Date    Cardiomegaly     Essential hypertension, benign     stable    Hypercholesterolemia     Obesity, unspecified     Pt has weight loss    Other and unspecified hyperlipidemia     Chol 172, ldl 82, hdl 58, tg 141    Type II or unspecified type diabetes mellitus without mention of complication, not stated as uncontrolled          Social History     Social History    Marital status:      Spouse name: N/A    Number of children: N/A    Years of education: N/A     Occupational History    Not on file.      Social History Main Topics    Smoking status: Never Smoker    Smokeless tobacco: Never Used    Alcohol use No    Drug use: No    Sexual activity: Not on file     Other Topics Concern    Not on file     Social History Narrative       Current Outpatient Prescriptions Medication Sig Dispense Refill    lidocaine (LIDODERM) 5 % by TransDERmal route every twenty-four (24) hours. Apply patch to the affected area for 12 hours a day and remove for 12 hours a day.  traMADol (ULTRAM) 50 mg tablet Take 1 po q daily- bid prn pain  Indications: Pain 60 Tab 0    diazePAM (VALIUM) 10 mg tablet Tale 1 po q 30 minutes prior to MRI. Must have . Indications: anxiety, Sedation 1 Tab 0    dilTIAZem XR (DILACOR XR) 180 mg XR capsule Take 1 Cap by mouth daily. 90 Cap 1    gabapentin (NEURONTIN) 800 mg tablet Take 1 Tab by mouth three (3) times daily (with meals). Indications: NEUROPATHIC PAIN 90 Tab 1    VIT C/VIT E AC/LUT/COPPER/ZINC (PRESERVISION LUTEIN PO) Take  by Mouth Twice Daily.  MELATONIN PO 5 mg as needed.  glucosamine-chondroitin (ELATION) 1,500-1,200 mg/30 mL liqd Take  by Mouth.  acetaminophen (TYLENOL) 500 mg tablet 500 mg.      losartan (COZAAR) 50 mg tablet TAKE ONE TABLET BY MOUTH ONCE DAILY 90 Tab 3    atenolol (TENORMIN) 50 mg tablet TAKE ONE TABLET BY MOUTH TWICE DAILY 180 Tab 3    aspirin (ASPIRIN) 325 mg tablet Take 81 mg by mouth daily.  CRANBERRY CONC-ASCORBIC ACID PO Take  by mouth.  calcium-cholecalciferol, d3, (CALCIUM 600 + D) 600-125 mg-unit tab Take  by mouth daily. Patient taking 4 times a week      Cholecalciferol, Vitamin D3, (VITAMIN D3) 1,000 unit cap Take  by mouth daily.  metFORMIN (GLUMETZA ER) 500 mg TG24 24 hour tablet 850 mg daily.  pravastatin (PRAVACHOL) 40 mg tablet Take 40 mg by mouth daily.  GLUC SIMPSON/CHONDRO SIMPSON A/VIT C/MN (GLUCOSAMINE 1500 COMPLEX PO) Take  by mouth daily.  indapamide (LOZOL) 2.5 mg tablet Take  by mouth daily.          Allergies   Allergen Reactions    Kenalog [Triamcinolone Acetonide] Anaphylaxis    Penicillin G Hives    Adhesive Tape-Silicones Swelling    Bacitracin Not Reported This Time    Cephalexin Rash    Ciprofloxacin Other (comments)    Cortisone Not Reported This Time    Erythromycin Not Reported This Time    Hydrocortisone Hives    Lisinopril Not Reported This Time    Lortab [Hydrocodone-Acetaminophen] Rash    Methimazole Other (comments)     Neck pain/cough    Penicillins Not Reported This Time    Prednisolone Other (comments)    Prednisone Not Reported This Time    Propylthiouracil Other (comments)     Dizziness,elevated blood pressure    Sulfamethoxazole-Trimethoprim Other (comments)    Tetanus And Diphtheria Toxoids, Adsorbed, Adult Swelling    Tetanus Vaccines And Toxoid Not Reported This Time        Ambulates with rolling walker. PHYSICAL EXAMINATION    Visit Vitals    /67    Pulse 84    Ht 5' 5\" (1.651 m)    Wt 93.4 kg (206 lb)    BMI 34.28 kg/m2       CONSTITUTIONAL: NAD, A&O x 3  SENSATION: Intact to light touch throughout  RANGE OF MOTION: The patient has full passive range of motion in all four extremities. MOTOR:  Straight Leg Raise: Negative, bilateral               Hip Flex Knee Ext Knee Flex Ankle DF GTE Ankle PF Tone   Right +4/5 +4/5 +4/5 +4/5 +4/5 +4/5 +4/5   Left +4/5 +4/5 +4/5 +4/5 +4/5 +4/5 +4/5       ASSESSMENT   Diagnoses and all orders for this visit:    1. Lumbosacral spondylosis without myelopathy  -     MRI LUMB SPINE WO CONT; Future  -     traMADol (ULTRAM) 50 mg tablet; Take 1 po q daily- bid prn pain  Indications: Pain  -     diazePAM (VALIUM) 10 mg tablet; Tale 1 po q 30 minutes prior to MRI. Must have . Indications: anxiety, Sedation  -     REFERRAL TO FAMILY PRACTICE    2. Lumbar radiculopathy  -     MRI LUMB SPINE WO CONT; Future  -     traMADol (ULTRAM) 50 mg tablet; Take 1 po q daily- bid prn pain  Indications: Pain  -     diazePAM (VALIUM) 10 mg tablet; Tale 1 po q 30 minutes prior to MRI. Must have .   Indications: anxiety, Sedation  -     REFERRAL TO FAMILY PRACTICE    3. DDD (degenerative disc disease), lumbar  -     MRI LUMB SPINE WO CONT; Future  -     traMADol (ULTRAM) 50 mg tablet; Take 1 po q daily- bid prn pain  Indications: Pain  -     diazePAM (VALIUM) 10 mg tablet; Tale 1 po q 30 minutes prior to MRI. Must have . Indications: anxiety, Sedation  -     REFERRAL TO FAMILY PRACTICE          IMPRESSION AND PLAN:  I will set her up for a lumbar spine w/o MRI. I advised patient to bring copies of films to next visit. I will give her a Rx for Tramadol prn. Continue on Neurontin 800 mg TID. I will see the patient back following MRI. Written by Nick Villegas, as dictated by Juanita Osborne MD  I examined the patient, reviewed and agree with the note.

## 2018-05-15 ENCOUNTER — OFFICE VISIT (OUTPATIENT)
Dept: ORTHOPEDIC SURGERY | Age: 78
End: 2018-05-15

## 2018-05-15 VITALS
HEIGHT: 65 IN | WEIGHT: 206.2 LBS | HEART RATE: 72 BPM | BODY MASS INDEX: 34.35 KG/M2 | DIASTOLIC BLOOD PRESSURE: 76 MMHG | RESPIRATION RATE: 16 BRPM | SYSTOLIC BLOOD PRESSURE: 123 MMHG

## 2018-05-15 DIAGNOSIS — M54.16 LUMBAR RADICULOPATHY: ICD-10-CM

## 2018-05-15 DIAGNOSIS — M47.817 LUMBOSACRAL SPONDYLOSIS WITHOUT MYELOPATHY: ICD-10-CM

## 2018-05-15 DIAGNOSIS — M51.36 DDD (DEGENERATIVE DISC DISEASE), LUMBAR: ICD-10-CM

## 2018-05-15 DIAGNOSIS — M51.26 LUMBAR HERNIATED DISC: Primary | ICD-10-CM

## 2018-05-15 DIAGNOSIS — M48.061 SPINAL STENOSIS, LUMBAR REGION WITHOUT NEUROGENIC CLAUDICATION: ICD-10-CM

## 2018-05-15 RX ORDER — TRIMETHOPRIM 100 MG/1
100 TABLET ORAL 2 TIMES DAILY
COMMUNITY
Start: 2018-05-03 | End: 2018-08-05 | Stop reason: ALTCHOICE

## 2018-05-15 RX ORDER — TRAMADOL HYDROCHLORIDE 50 MG/1
50 TABLET ORAL
Qty: 60 TAB | Refills: 0 | Status: SHIPPED | OUTPATIENT
Start: 2018-05-15 | End: 2018-05-29 | Stop reason: SDUPTHER

## 2018-05-15 NOTE — MR AVS SNAPSHOT
[facility] 
 
 
 [address] 200 [facility] 
[phone] Patient: Sherry Ortega MRN: [id number] :1940 Visit Information Date & Time Provider Department Dept. Phone Encounter #  
 5/15/2018  9:50 AM Ary Chamberlain  [facility] [address] [facility] - [place] [phone] Follow-up Instructions Return if symptoms worsen or fail to improve. Your Appointments 2018  9:30 AM  
SURGERY CONSULT with Tracey Boudreaux MD  
[facility]) Appt Note: Lumbar: Patient aware of date/time/loc and to bring MRI  
 [address] [place] [address]  
[phone]  
  
   
 [address] [place] [address]  
  
    
 2018 10:00 AM  
Follow Up with Jarod Osborn MD  
[facility] [place] ([facility]) Appt Note: 6 month follow up  
 [address] [facility] [address]  
  
   
 [address] [place] [address] Upcoming Health Maintenance Date Due HEMOGLOBIN A1C Q6M 1940 FOOT EXAM Q1 1948 MICROALBUMIN Q1 1948 EYE EXAM RETINAL OR DILATED Q1 1948 DTaP/Tdap/Td series (1 - Tdap) 1959 ZOSTER VACCINE AGE 60> 1998 GLAUCOMA SCREENING Q2Y 2003 Bone Densitometry (Dexa) Screening 2003 Pneumococcal 65+ Low/Medium Risk (1 of 2 - PCV13) 2003 LIPID PANEL Q1 2016 MEDICARE YEARLY EXAM 3/19/2018 Influenza Age 5 to Adult 2018 Allergies as of 5/15/2018  Review Complete On: 5/15/2018 By: Ary Chamberlain MD  
  
 Severity Noted Reaction Type Reactions Kenalog [Triamcinolone Acetonide] High 2018    Anaphylaxis Penicillin G High 2015    Hives Adhesive Tape-silicones Medium     Swelling Bacitracin    Not Reported This Time Cephalexin  02/13/2018    Rash Ciprofloxacin  08/03/2016    Other (comments) Cortisone    Not Reported This Time Erythromycin    Not Reported This Time Hydrocortisone  04/01/2015    Hives Lisinopril    Not Reported This Time Lortab [Hydrocodone-acetaminophen]  08/04/2015    Rash Methimazole  07/07/2017    Other (comments) Neck pain/cough Penicillins    Not Reported This Time Prednisolone  04/01/2015    Other (comments) Prednisone    Not Reported This Time Propylthiouracil  07/07/2017    Other (comments) Dizziness,elevated blood pressure Sulfamethoxazole-trimethoprim  11/03/2016    Other (comments) Tetanus And Diphtheria Toxoids, Adsorbed, Adult  04/01/2015    Swelling Tetanus Vaccines And Toxoid    Not Reported This Time Current Immunizations  Never Reviewed No immunizations on file. Not reviewed this visit You Were Diagnosed With   
  
 Codes Comments Lumbar herniated disc    -  Primary ICD-10-CM: M51.26 
ICD-9-CM: 722.10 Spinal stenosis, lumbar region without neurogenic claudication     ICD-10-CM: M48.061 
ICD-9-CM: 724.02 Lumbosacral spondylosis without myelopathy     ICD-10-CM: L07.393 ICD-9-CM: 721.3 Lumbar radiculopathy     ICD-10-CM: M54.16 
ICD-9-CM: 724.4 DDD (degenerative disc disease), lumbar     ICD-10-CM: M51.36 
ICD-9-CM: 722.52 Vitals BP Pulse Resp Height(growth percentile) Weight(growth percentile) BMI  
 123/76 72 16 5' 5\" (1.651 m) 206 lb 3.2 oz (93.5 kg) 34.31 kg/m2 OB Status Smoking Status Hysterectomy Never Smoker BMI and BSA Data Body Mass Index Body Surface Area  
 34.31 kg/m 2 2.07 m 2 Preferred Pharmacy Pharmacy Name Phone [address], [address] Your Updated Medication List  
  
   
This list is accurate as of 5/15/18 10:42 AM.  Always use your most recent med list.  
  
  
  
  
 acetaminophen 500 mg tablet Commonly known as:  TYLENOL  
500 mg.  
  
 aspirin 325 mg tablet Commonly known as:  ASPIRIN Take 81 mg by mouth daily. atenolol 50 mg tablet Commonly known as:  TENORMIN  
TAKE ONE TABLET BY MOUTH TWICE DAILY CALCIUM 600 + D 600-125 mg-unit Tab Generic drug:  calcium-cholecalciferol (d3) Take  by mouth daily. Patient taking 4 times a week CRANBERRY CONC-ASCORBIC ACID PO Take  by mouth. dilTIAZem  mg XR capsule Commonly known as:  DILACOR XR Take 1 Cap by mouth daily. gabapentin 800 mg tablet Commonly known as:  NEURONTIN Take 1 Tab by mouth three (3) times daily (with meals). Indications: NEUROPATHIC PAIN  
  
 GLUCOSAMINE 1500 COMPLEX PO Take  by mouth daily. glucosamine-chondroitin 1,500-1,200 mg/30 mL Liqd Commonly known as:  ELATION Take  by Mouth. indapamide 2.5 mg tablet Commonly known as:  [place] Six Take  by mouth daily. lidocaine 5 % Commonly known as:  LIDODERM  
by TransDERmal route every twenty-four (24) hours. Apply patch to the affected area for 12 hours a day and remove for 12 hours a day. losartan 50 mg tablet Commonly known as:  COZAAR  
TAKE ONE TABLET BY MOUTH ONCE DAILY MELATONIN PO  
5 mg as needed. metFORMIN 500 mg Tg24 24 hour tablet Commonly known as:  GLUMETZA ER  
850 mg daily. pravastatin 40 mg tablet Commonly known as:  PRAVACHOL Take 40 mg by mouth daily. PRESERVISION LUTEIN PO Take  by Mouth Twice Daily. * traMADol 50 mg tablet Commonly known as:  ULTRAM  
Take 1 po q daily- bid prn pain  Indications: Pain * traMADol 50 mg tablet Commonly known as:  ULTRAM  
Take 1 Tab by mouth every six (6) hours as needed for Pain. Max Daily Amount: 200 mg.  
  
 trimethoprim 100 mg tablet Commonly known as:  TRIMPEX  
100 mg. VITAMIN D3 1,000 unit Cap Generic drug:  cholecalciferol Take  by mouth daily. * Notice: This list has 2 medication(s) that are the same as other medications prescribed for you. Read the directions carefully, and ask your doctor or other care provider to review them with you. Prescriptions Printed Refills  
 traMADol (ULTRAM) 50 mg tablet 0 Sig: Take 1 Tab by mouth every six (6) hours as needed for Pain. Max Daily Amount: 200 mg. Class: Print Route: Oral  
  
Follow-up Instructions Return if symptoms worsen or fail to improve. Introducing [facility] & HEALTH SERVICES! Dear Romeo Bermeo: 
Thank you for requesting a [employer] account. Our records indicate that you already have an active [employer] account. You can access your account anytime at [email or web address] Did you know that you can access your hospital and ER discharge instructions at any time in [employer]? You can also review all of your test results from your hospital stay or ER visit. Additional Information If you have questions, please visit the Frequently Asked Questions section of the [employer] website at [email or web address] Remember, [employer] is NOT to be used for urgent needs. For medical emergencies, dial 911. Now available from your iPhone and Android! Please provide this summary of care documentation to your next provider. Your primary care clinician is listed as Corea Sessions. If you have any questions after today's visit, please call [phone].

## 2018-05-15 NOTE — PROGRESS NOTES
[facility]  [address], [place]   Phone: [phone]  Fax: [phone]        PROGRESS NOTE      HISTORY OF PRESENT ILLNESS:  The patient is a 76 y.o. female and was seen today for follow up of left-sided low back pain since 4/24/18. Her right-sided low back pain extending into the RLE in an L5/S1 distribution to the ankle has resolved. Denies recent injury/trauma. Her pain is aggravated with standing, lumbar flexion and extension. She reports limitations with standing and walking tolerance. Symptoms consistent for spinal stenosis. She also reports numbness on her RUE. Pt has an additional c/o of right knee pain. Note from Dr. Zenia Ruiz dated 3/27/18 indicating patient was seen with c/o right knee pain; she has osteoarthritis of both knees by x-ray, right knee more symptomatic than left knee. Of note, cortisone injections were not a great option due to her previous reactions. Pt had a cortisone injection in the past which she experienced erythema with. She reports a similar reaction with Prednisone; however they do not appear to be true allergies. Pt was initially taking Neurontin for her neuropathy. Pt also takes Aspirin for her heart condition as prescribed by her cardiologist. She also treats her pain with Naproxen and extra strength Tylenol with questionable relief. Pt describes dysuria with h/o recurrent UTI; currently treated by PCP for this. The patient has a history of DM and reports blood sugars are well controlled, consistently remaining below 200. Her last A1C was a 6%. Pt denies change in bowel or bladder habits. Pt denies fever, weight loss, or skin changes. The patient is RHD. Preliminary reading of lumbar plain films revealed; mild scoliosis convex to the left. [place] at L3. Grade 1 anterolisthesis at L4 on L5 and Grade 1-2 anterolisthesis at L5 on S1. Retrolisthesis at L3 on L4.  Lumbar spine XR dated 4/24/2018 per report revealed No acute fractures or listhesis. Degenerative changes in the lower lumbar spine. Questionable pars defect at the L5 level. Facet degenerative changes at the lumbosacral junction. Alignment is maintained. At her last clinic appointment, I set her up for a lumbar spine w/o MRI. I gave her a Rx for Tramadol prn. Continue on Neurontin 800 mg TID. The patient returns today with left buttocks pain without radicular sxs. She rates pain 9/10, elevated since her last visit (1-10/10). Pt is accompanied by her daughter today. She does use Tramadol BID with benefit. Pt is compliant with Neurontin 800 mg TID. She performs her HEP daily. Lumbar spine MRI dated 5/11/2018 reviewed. Per report, severe facet arthropathy at L5-S1 with grade 1 anterolisthesis, although significant nerve root impingement is doubtful at this level. Large leftward disc extrusion at L4-5 with caudal migration creating moderate to marked canal stenosis but severe left lateral recess stenosis with flattening of the left L5 nerve root. Discogenic disease at other levels appearing less likely to be significant. Large bilateral facet joint effusions at L4-5 suggesting the possibility of instability.  Interspinous ligament edema at this level may also be a manifestation of such.  reviewed. Body mass index is 34.31 kg/(m^2). PCP: Owen Rojo MD      Past Medical History:   Diagnosis Date    Cardiomegaly     Essential hypertension, benign     stable    Hypercholesterolemia     Obesity, unspecified     Pt has weight loss    Other and unspecified hyperlipidemia     Chol 172, ldl 82, hdl 58, tg 141    Type II or unspecified type diabetes mellitus without mention of complication, not stated as uncontrolled         Social History     Social History    Marital status:      Spouse name: N/A    Number of children: N/A    Years of education: N/A     Occupational History    Not on file.      Social History Main Topics    Smoking status: Never Smoker  Smokeless tobacco: Never Used    Alcohol use No    Drug use: No    Sexual activity: Not on file     Other Topics Concern    Not on file     Social History Narrative       Current Outpatient Prescriptions   Medication Sig Dispense Refill    trimethoprim (TRIMPEX) 100 mg tablet 100 mg.  traMADol (ULTRAM) 50 mg tablet Take 1 Tab by mouth every six (6) hours as needed for Pain. Max Daily Amount: 200 mg. 60 Tab 0    lidocaine (LIDODERM) 5 % by TransDERmal route every twenty-four (24) hours. Apply patch to the affected area for 12 hours a day and remove for 12 hours a day.  traMADol (ULTRAM) 50 mg tablet Take 1 po q daily- bid prn pain  Indications: Pain 60 Tab 0    dilTIAZem XR (DILACOR XR) 180 mg XR capsule Take 1 Cap by mouth daily. 90 Cap 1    gabapentin (NEURONTIN) 800 mg tablet Take 1 Tab by mouth three (3) times daily (with meals). Indications: NEUROPATHIC PAIN 90 Tab 1    VIT C/VIT E AC/LUT/COPPER/ZINC (PRESERVISION LUTEIN PO) Take  by Mouth Twice Daily.  MELATONIN PO 5 mg as needed.  glucosamine-chondroitin (ELATION) 1,500-1,200 mg/30 mL liqd Take  by Mouth.  acetaminophen (TYLENOL) 500 mg tablet 500 mg.      losartan (COZAAR) 50 mg tablet TAKE ONE TABLET BY MOUTH ONCE DAILY 90 Tab 3    atenolol (TENORMIN) 50 mg tablet TAKE ONE TABLET BY MOUTH TWICE DAILY 180 Tab 3    CRANBERRY CONC-ASCORBIC ACID PO Take  by mouth.  metFORMIN (GLUMETZA ER) 500 mg TG24 24 hour tablet 850 mg daily.  pravastatin (PRAVACHOL) 40 mg tablet Take 40 mg by mouth daily.  GLUC SIMPSON/CHONDRO SIMPSON A/VIT C/MN (GLUCOSAMINE 1500 COMPLEX PO) Take  by mouth daily.  indapamide (LOZOL) 2.5 mg tablet Take  by mouth daily.  aspirin (ASPIRIN) 325 mg tablet Take 81 mg by mouth daily.  calcium-cholecalciferol, d3, (CALCIUM 600 + D) 600-125 mg-unit tab Take  by mouth daily. Patient taking 4 times a week      Cholecalciferol, Vitamin D3, (VITAMIN D3) 1,000 unit cap Take  by mouth daily. Allergies   Allergen Reactions    Kenalog [Triamcinolone Acetonide] Anaphylaxis    Penicillin G Hives    Adhesive Tape-Silicones Swelling    Bacitracin Not Reported This Time    Cephalexin Rash    Ciprofloxacin Other (comments)    Cortisone Not Reported This Time    Erythromycin Not Reported This Time    Hydrocortisone Hives    Lisinopril Not Reported This Time    Lortab [Hydrocodone-Acetaminophen] Rash    Methimazole Other (comments)     Neck pain/cough    Penicillins Not Reported This Time    Prednisolone Other (comments)    Prednisone Not Reported This Time    Propylthiouracil Other (comments)     Dizziness,elevated blood pressure    Sulfamethoxazole-Trimethoprim Other (comments)    Tetanus And Diphtheria Toxoids, Adsorbed, Adult Swelling    Tetanus Vaccines And Toxoid Not Reported This Time        Ambulates with rolling walker. PHYSICAL EXAMINATION    Visit Vitals    /76    Pulse 72    Resp 16    Ht 5' 5\" (1.651 m)    Wt 93.5 kg (206 lb 3.2 oz)    BMI 34.31 kg/m2       CONSTITUTIONAL: NAD, A&O x 3  SENSATION: Intact to light touch throughout  RANGE OF MOTION: The patient has full passive range of motion in all four extremities. MOTOR:  Straight Leg Raise: Negative, bilateral               Hip Flex Knee Ext Knee Flex Ankle DF GTE Ankle PF Tone   Right +4/5 +4/5 +4/5 +4/5 +4/5 +4/5 +4/5   Left +4/5 +4/5 +4/5 +4/5 +4/5 +4/5 +4/5       ASSESSMENT   Diagnoses and all orders for this visit:    1. Lumbar herniated disc  -     traMADol (ULTRAM) 50 mg tablet; Take 1 Tab by mouth every six (6) hours as needed for Pain. Max Daily Amount: 200 mg.    2. Spinal stenosis, lumbar region without neurogenic claudication  -     traMADol (ULTRAM) 50 mg tablet; Take 1 Tab by mouth every six (6) hours as needed for Pain. Max Daily Amount: 200 mg.    3. Lumbosacral spondylosis without myelopathy  -     traMADol (ULTRAM) 50 mg tablet; Take 1 Tab by mouth every six (6) hours as needed for Pain. Max Daily Amount: 200 mg.    4. Lumbar radiculopathy  -     traMADol (ULTRAM) 50 mg tablet; Take 1 Tab by mouth every six (6) hours as needed for Pain. Max Daily Amount: 200 mg.    5. DDD (degenerative disc disease), lumbar  -     traMADol (ULTRAM) 50 mg tablet; Take 1 Tab by mouth every six (6) hours as needed for Pain. Max Daily Amount: 200 mg. IMPRESSION AND PLAN:  I am reluctant to administer lumbar blocks to this patient given poor reaction to Kenalog in the past (anaphylaxis). Her pain is progressive in nature. She is neurologically intact. I will refer her to Dr. Matt Smith for surgical evaluation. She was provided with refills of her Tramadol. Continue on Neurontin 800 mg TID. I encourage patient to perform her HEP daily. I will see the patient back on an as-needed basis. Written by Cele Bloom, as dictated by Sariah Ruggiero MD  I examined the patient, reviewed and agree with the note.

## 2018-05-29 ENCOUNTER — OFFICE VISIT (OUTPATIENT)
Dept: ORTHOPEDIC SURGERY | Age: 78
End: 2018-05-29

## 2018-05-29 VITALS
WEIGHT: 200 LBS | OXYGEN SATURATION: 87 % | TEMPERATURE: 96.9 F | DIASTOLIC BLOOD PRESSURE: 66 MMHG | SYSTOLIC BLOOD PRESSURE: 146 MMHG | HEART RATE: 78 BPM | HEIGHT: 65 IN | BODY MASS INDEX: 33.32 KG/M2

## 2018-05-29 DIAGNOSIS — M51.26 LUMBAR HERNIATED DISC: Primary | ICD-10-CM

## 2018-05-29 DIAGNOSIS — M43.16 SPONDYLOLISTHESIS OF LUMBAR REGION: ICD-10-CM

## 2018-05-29 DIAGNOSIS — M48.062 SPINAL STENOSIS OF LUMBAR REGION WITH NEUROGENIC CLAUDICATION: ICD-10-CM

## 2018-05-29 RX ORDER — TRAMADOL HYDROCHLORIDE 50 MG/1
50 TABLET ORAL
Qty: 60 TAB | Refills: 0 | Status: SHIPPED | OUTPATIENT
Start: 2018-05-29 | End: 2018-05-29 | Stop reason: CLARIF

## 2018-05-29 NOTE — PROGRESS NOTES
[facility]   Pre-Surgical Worksheet    Patient: Chasity Serrano                         MRN: [id number]     Age:  76 y.o.,      Sex: female    YOB: 1940           KARLY: May 29, 2018  PCP: Eden Farley MD    Allergies   Allergen Reactions    Kenalog [Triamcinolone Acetonide] Anaphylaxis    Penicillin G Hives    Adhesive Tape-Silicones Swelling    Bacitracin Not Reported This Time    Cephalexin Rash    Ciprofloxacin Other (comments)    Cortisone Not Reported This Time    Erythromycin Not Reported This Time    Hydrocortisone Hives    Lisinopril Not Reported This Time    Lortab [Hydrocodone-Acetaminophen] Rash    Methimazole Other (comments)     Neck pain/cough    Penicillins Not Reported This Time    Prednisolone Other (comments)    Prednisone Not Reported This Time    Propylthiouracil Other (comments)     Dizziness,elevated blood pressure    Sulfamethoxazole-Trimethoprim Other (comments)    Tetanus And Diphtheria Toxoids, Adsorbed, Adult Swelling    Tetanus Vaccines And Toxoid Not Reported This Time         ICD-10-CM ICD-9-CM    1. Lumbar herniated disc M51.26 722.10 DISCONTINUED: traMADol (ULTRAM) 50 mg tablet   2. Spinal stenosis of lumbar region with neurogenic claudication M48.062 724.03 DISCONTINUED: traMADol (ULTRAM) 50 mg tablet   3. Spondylolisthesis of lumbar region M43.16 738.4 DISCONTINUED: traMADol (ULTRAM) 50 mg tablet       Surgery: L4/5, S1 Lami Fusion. Pain Assessment   Pain Assessment  5/29/2018   Location of Pain Back; Ankle   Pain Location Comment -   Location Modifiers Left;Right   Severity of Pain 10   Quality of Pain Dull;Aching; Throbbing   Quality of Pain Comment -   Duration of Pain Persistent   Frequency of Pain Constant   Aggravating Factors Standing   Aggravating Factors Comment -   Limiting Behavior Yes   Relieving Factors (No Data)   Relieving Factors Comment recliner   Result of Injury No       Visit Vitals    /66  Pulse 78    Temp 96.9 °F (36.1 °C) (Oral)    Ht 5' 5\" (1.651 m)    Wt 200 lb (90.7 kg)    SpO2 (!) 87%    BMI 33.28 kg/m2       ADL Limits: uses a walker and ADL's are extremely affected and needs assistance with ADL's    Spine Surgery?: No:  When N/A. Where N/A. Spinal Injections?: No:   When N/A. Where N/A. Physical Therapy?: Yes  When 1/18. Where In [address] [place] [address]. NSAID's?: Yes    Pain Medications?: Yes  Type: Tramadol 50 mg. In Pain Management: NO, Where: N/A    Current Outpatient Prescriptions   Medication Sig    trimethoprim (TRIMPEX) 100 mg tablet 100 mg.    lidocaine (LIDODERM) 5 % by TransDERmal route every twenty-four (24) hours. Apply patch to the affected area for 12 hours a day and remove for 12 hours a day.  traMADol (ULTRAM) 50 mg tablet Take 1 po q daily- bid prn pain  Indications: Pain    dilTIAZem XR (DILACOR XR) 180 mg XR capsule Take 1 Cap by mouth daily.  gabapentin (NEURONTIN) 800 mg tablet Take 1 Tab by mouth three (3) times daily (with meals). Indications: NEUROPATHIC PAIN    VIT C/VIT E AC/LUT/COPPER/ZINC (PRESERVISION LUTEIN PO) Take  by Mouth Twice Daily.  MELATONIN PO 5 mg as needed.  glucosamine-chondroitin (ELATION) 1,500-1,200 mg/30 mL liqd Take  by Mouth.  acetaminophen (TYLENOL) 500 mg tablet 500 mg.    losartan (COZAAR) 50 mg tablet TAKE ONE TABLET BY MOUTH ONCE DAILY    atenolol (TENORMIN) 50 mg tablet TAKE ONE TABLET BY MOUTH TWICE DAILY    aspirin (ASPIRIN) 325 mg tablet Take 81 mg by mouth daily.  CRANBERRY CONC-ASCORBIC ACID PO Take  by mouth.  calcium-cholecalciferol, d3, (CALCIUM 600 + D) 600-125 mg-unit tab Take  by mouth daily. Patient taking 4 times a week    Cholecalciferol, Vitamin D3, (VITAMIN D3) 1,000 unit cap Take  by mouth daily.  metFORMIN (GLUMETZA ER) 500 mg TG24 24 hour tablet 850 mg daily.  pravastatin (PRAVACHOL) 40 mg tablet Take 40 mg by mouth daily.     GLUC SIMPSON/CHONDRO SIMPSON A/VIT C/MN (GLUCOSAMINE 1500 COMPLEX PO) Take  by mouth daily.  indapamide (LOZOL) 2.5 mg tablet Take  by mouth daily. No current facility-administered medications for this visit.         Past Medical History:   Diagnosis Date    Cardiomegaly     Essential hypertension, benign     stable    Hypercholesterolemia     Obesity, unspecified     Pt has weight loss    Other and unspecified hyperlipidemia     Chol 172, ldl 82, hdl 62, tg 141    Type II or unspecified type diabetes mellitus without mention of complication, not stated as uncontrolled        Past Surgical History:   Procedure Laterality Date    HX GYN      hysterectomy       Social History     Social History    Marital status:      Spouse name: N/A    Number of children: N/A    Years of education: N/A     Social History Main Topics    Smoking status: Never Smoker    Smokeless tobacco: Never Used    Alcohol use No    Drug use: No    Sexual activity: Not Asked     Other Topics Concern    None     Social History Narrative

## 2018-05-29 NOTE — PROGRESS NOTES
[address]  [address], [address]  [facility], [address]  Phone: [phone]  Fax: [phone]  INITIAL CONSULTATION  Patient: Louis Murillo                MRN: [id number]       SSN: [id number]  YOB: 1940        AGE: 76 y.o. SEX: female  Body mass index is 33.28 kg/(m^2). PCP: Krishna Bellamy MD  05/29/18    Chief Complaint   Patient presents with    Back Pain     LUMBAR         HISTORY OF PRESENT ILLNESS, RADIOGRAPHS, and PLAN:         HISTORY OF PRESENT ILLNESS:  Ms. Wilfredo Hassan is seen today at the request of Dr. Krishna Bellamy and Dr. Veronique Wood. Ms. Wilfredo Hassan is a pleasant, 33-year-old female who has had progressive back and leg pain currently, primarily, left leg pain. It is unresponsive to conservative treatment. She has been through injections, physical therapy, and medications. She is a diabetic. She has multiple allergies. She cannot have injections any further. Her pain is hobbling her. She uses a walker. She now recently cannot stand erect because of more progressive pain radiating down her left leg. Her studies demonstrate degenerative spondylolisthesis at both L4-5 and L5-S1. She has an extruded disc herniation on the left at L4-5, severe stenosis, severe facet arthropathy, and spinal stenosis. She suffers from morbid obesity, coronary artery disease, arrhythmia, hypercholesterolemia, hyperlipidemia, and hypertension. PHYSICAL EXAMINATION:  Her physical exam demonstrates paraspinal spasm, scoliosis, and stooped posture. She has good strength of her EHL, tib/ant, hamstrings, and quadriceps. There are no palpable pulses. She has a supple neck and normal upper extremities. ASSESSMENT/PLAN: I discussed the matter at length with her. She has clearly failed conservative treatment and is in severe pain. It is primarily back greater than leg pain.   She has two levels of degenerative spondylolisthesis with an extruded disc herniation and terrible facet arthropathy with severe synovitis at both L4-5 and L5-S1. She will require a two-level decompression and fusion. She has significant cardiac disease. Assuming she is medically clearable, surgery would be a decompression and fusion of those two segments. She has an extruded disc herniation, which we need to do a laminectomy to address that. She needs to be stabilized, decompressed, and fused. I discussed the risks, benefits, complications, and alternatives to surgery. Surgery would be a decompression and fusion at L4-5 and L5-S1. She wishes to proceed. We will proceed with an L4-5 and L5-S1 laminectomy and fusion once the appropriate approvals and clearances take place. cc: Lenord Cooks, M.D. Past Medical History:   Diagnosis Date    Cardiomegaly     Essential hypertension, benign     stable    Hypercholesterolemia     Obesity, unspecified     Pt has weight loss    Other and unspecified hyperlipidemia     Chol 172, ldl 82, hdl 58, tg 141    Type II or unspecified type diabetes mellitus without mention of complication, not stated as uncontrolled        Family History   Problem Relation Age of Onset    Heart Disease Other      positive history of ischemic heart disease       Current Outpatient Prescriptions   Medication Sig Dispense Refill    trimethoprim (TRIMPEX) 100 mg tablet 100 mg.  traMADol (ULTRAM) 50 mg tablet Take 1 Tab by mouth every six (6) hours as needed for Pain. Max Daily Amount: 200 mg. 60 Tab 0    lidocaine (LIDODERM) 5 % by TransDERmal route every twenty-four (24) hours. Apply patch to the affected area for 12 hours a day and remove for 12 hours a day.  traMADol (ULTRAM) 50 mg tablet Take 1 po q daily- bid prn pain  Indications: Pain 60 Tab 0    dilTIAZem XR (DILACOR XR) 180 mg XR capsule Take 1 Cap by mouth daily.  90 Cap 1    gabapentin (NEURONTIN) 800 mg tablet Take 1 Tab by mouth three (3) times daily (with meals). Indications: NEUROPATHIC PAIN 90 Tab 1    VIT C/VIT E AC/LUT/COPPER/ZINC (PRESERVISION LUTEIN PO) Take  by Mouth Twice Daily.  MELATONIN PO 5 mg as needed.  glucosamine-chondroitin (ELATION) 1,500-1,200 mg/30 mL liqd Take  by Mouth.  acetaminophen (TYLENOL) 500 mg tablet 500 mg.      losartan (COZAAR) 50 mg tablet TAKE ONE TABLET BY MOUTH ONCE DAILY 90 Tab 3    atenolol (TENORMIN) 50 mg tablet TAKE ONE TABLET BY MOUTH TWICE DAILY 180 Tab 3    aspirin (ASPIRIN) 325 mg tablet Take 81 mg by mouth daily.  CRANBERRY CONC-ASCORBIC ACID PO Take  by mouth.  calcium-cholecalciferol, d3, (CALCIUM 600 + D) 600-125 mg-unit tab Take  by mouth daily. Patient taking 4 times a week      Cholecalciferol, Vitamin D3, (VITAMIN D3) 1,000 unit cap Take  by mouth daily.  metFORMIN (GLUMETZA ER) 500 mg TG24 24 hour tablet 850 mg daily.  pravastatin (PRAVACHOL) 40 mg tablet Take 40 mg by mouth daily.  GLUC SIMPSON/CHONDRO SIMPSON A/VIT C/MN (GLUCOSAMINE 1500 COMPLEX PO) Take  by mouth daily.  indapamide (LOZOL) 2.5 mg tablet Take  by mouth daily.          Allergies   Allergen Reactions    Kenalog [Triamcinolone Acetonide] Anaphylaxis    Penicillin G Hives    Adhesive Tape-Silicones Swelling    Bacitracin Not Reported This Time    Cephalexin Rash    Ciprofloxacin Other (comments)    Cortisone Not Reported This Time    Erythromycin Not Reported This Time    Hydrocortisone Hives    Lisinopril Not Reported This Time    Lortab [Hydrocodone-Acetaminophen] Rash    Methimazole Other (comments)     Neck pain/cough    Penicillins Not Reported This Time    Prednisolone Other (comments)    Prednisone Not Reported This Time    Propylthiouracil Other (comments)     Dizziness,elevated blood pressure    Sulfamethoxazole-Trimethoprim Other (comments)    Tetanus And Diphtheria Toxoids, Adsorbed, Adult Swelling    Tetanus Vaccines And Toxoid Not Reported This Time Past Surgical History:   Procedure Laterality Date    HX GYN      hysterectomy       Past Medical History:   Diagnosis Date    Cardiomegaly     Essential hypertension, benign     stable    Hypercholesterolemia     Obesity, unspecified     Pt has weight loss    Other and unspecified hyperlipidemia     Chol 172, ldl 82, hdl 58, tg 141    Type II or unspecified type diabetes mellitus without mention of complication, not stated as uncontrolled        Social History     Social History    Marital status:      Spouse name: N/A    Number of children: N/A    Years of education: N/A     Occupational History    Not on file. Social History Main Topics    Smoking status: Never Smoker    Smokeless tobacco: Never Used    Alcohol use No    Drug use: No    Sexual activity: Not on file     Other Topics Concern    Not on file     Social History Narrative           REVIEW OF SYSTEMS:   CONSTITUTIONAL SYMPTOMS:  Negative. EYES:  Negative. EARS, NOSE, THROAT AND MOUTH:  Negative. CARDIOVASCULAR:  Negative. RESPIRATORY:  Negative. GENITOURINARY: Per HPI. GASTROINTESTINAL:  Per HPI. INTEGUMENTARY (SKIN AND/OR BREAST):  Negative. MUSCULOSKELETAL: Per HPI.   ENDOCRINE/RHEUMATOLOGIC:  Negative. NEUROLOGICAL:  Per HPI. HEMATOLOGIC/LYMPHATIC:  Negative. ALLERGIC/IMMUNOLOGIC:  Negative. PSYCHIATRIC:  Negative. PHYSICAL EXAMINATION:   Visit Vitals    /66    Pulse 78    Temp 96.9 °F (36.1 °C) (Oral)    Ht 5' 5\" (1.651 m)    Wt 200 lb (90.7 kg)    SpO2 (!) 87%    BMI 33.28 kg/m2    PAIN SCALE: 10 - Worst pain ever/10    CONSTITUTIONAL: The patient is in no apparent distress and is alert and oriented x 3. HEENT: Normocephalic. Hearing grossly intact. NECK: Supple and symmetric. no tenderness, or masses were felt. RESPIRATORY: No labored breathing. CARDIOVASCULAR: The carotid pulses were normal. Peripheral pulses were 2+.   CHEST: Normal AP diameter and normal contour without any kyphoscoliosis. LYMPHATIC: No lymphadenopathy was appreciated in the neck, axillae or groin. SKIN:  Negative for scars, rashes, lesions, or ulcers on the right upper, right lower, left upper, left lower and trunk. NEUROLOGICAL: Alert and oriented x 3. Ambulation with walker. FWB. EXTREMITIES:  See musculoskeletal.  MUSCULOSKELETAL:   Head and Neck:  Negative for misalignment, asymmetry, crepitation, defects, tenderness masses or effusions.  Left Upper Extremity: Inspection, percussion and palpation preformed. Herreras sign is negative.  Right Upper Extremity: Inspection, percussion and palpation preformed. Herreras sign is negative.  Spine, Ribs and Pelvis: L low back pain. Unable to stand at neutral. Inspection, percussion and palpation preformed. Negative for misalignment, asymmetry, crepitation, defects, tenderness masses or effusions.  Left Lower Extremity: Calf pain. Inspection, percussion and palpation preformed.  Right Lower Extremity: Inspection, percussion and palpation preformed. SPINE EXAM:     Lumbar spine: forward flexed. No rash, ecchymosis, or gross obliquity. No focal atrophy is noted. ASSESSMENT    ICD-10-CM ICD-9-CM    1. Lumbar herniated disc M51.26 722.10 traMADol (ULTRAM) 50 mg tablet   2. Spinal stenosis of lumbar region with neurogenic claudication M48.062 724.03 traMADol (ULTRAM) 50 mg tablet   3. Spondylolisthesis of lumbar region M43.16 738.4 traMADol (ULTRAM) 50 mg tablet       Written by Judy Coffey, as dictated by Abel Purdy MD.    I, Dr. Abel Purdy MD, confirm that all documentation is accurate.

## 2018-05-29 NOTE — PATIENT INSTRUCTIONS
Low Back Arthritis: Exercises  Your Care Instructions  Here are some examples of typical rehabilitation exercises for your condition. Start each exercise slowly. Ease off the exercise if you start to have pain. Your doctor or physical therapist will tell you when you can start these exercises and which ones will work best for you. When you are not being active, find a comfortable position for rest. Some people are comfortable on the floor or a medium-firm bed with a small pillow under their head and another under their knees. Some people prefer to lie on their side with a pillow between their knees. Don't stay in one position for too long. Take short walks (10 to 20 minutes) every 2 to 3 hours. Avoid slopes, hills, and stairs until you feel better. Walk only distances you can manage without pain, especially leg pain. How to do the exercises  Pelvic tilt    1. Lie on your back with your knees bent. 2. \"Brace\" your stomach-tighten your muscles by pulling in and imagining your belly button moving toward your spine. 3. Press your lower back into the floor. You should feel your hips and pelvis rock back. 4. Hold for 6 seconds while breathing smoothly. 5. Relax and allow your pelvis and hips to rock forward. 6. Repeat 8 to 12 times. Back stretches    1. Get down on your hands and knees on the floor. 2. Relax your head and allow it to droop. Round your back up toward the ceiling until you feel a nice stretch in your upper, middle, and lower back. Hold this stretch for as long as it feels comfortable, or about 15 to 30 seconds. 3. Return to the starting position with a flat back while you are on your hands and knees. 4. Let your back sway by pressing your stomach toward the floor. Lift your buttocks toward the ceiling. 5. Hold this position for 15 to 30 seconds. 6. Repeat 2 to 4 times. Follow-up care is a key part of your treatment and safety.  Be sure to make and go to all appointments, and call your doctor if you are having problems. It's also a good idea to know your test results and keep a list of the medicines you take. Where can you learn more? Go to http://sherly-mary.info/. Enter L864 in the search box to learn more about \"Low Back Arthritis: Exercises. \"  Current as of: March 21, 2017  Content Version: 11.4  © 2019-9813 [employer]. Care instructions adapted under license by [employer] (which disclaims liability or warranty for this information). If you have questions about a medical condition or this instruction, always ask your healthcare professional. [address] any warranty or liability for your use of this information. Back Care and Preventing Injuries: Care Instructions  Your Care Instructions    You can hurt your back doing many everyday activities: lifting a heavy box, bending down to garden, exercising at the gym, and even getting out of bed. But you can keep your back strong and healthy by doing some exercises. You also can follow a few tips for sitting, sleeping, and lifting to avoid hurting your back again. Talk to your doctor before you start an exercise program. Ask for help if you want to learn more about keeping your back healthy. Follow-up care is a key part of your treatment and safety. Be sure to make and go to all appointments, and call your doctor if you are having problems. It's also a good idea to know your test results and keep a list of the medicines you take. How can you care for yourself at home? · Stay at a healthy weight to avoid strain on your lower back. · Do not smoke. Smoking increases the risk of osteoporosis, which weakens the spine. If you need help quitting, talk to your doctor about stop-smoking programs and medicines. These can increase your chances of quitting for good.   · Make sure you sleep in a position that maintains your back's normal curves and on a mattress that feels comfortable. Sleep on your side with a pillow between your knees, or sleep on your back with a pillow under your knees. These positions can reduce strain on your back. · When you get out of bed, lie on your side and bend both knees. Drop your feet over the edge of the bed as you push up with both arms. Scoot to the edge of the bed. Make sure your feet are in line with your rear end (buttocks), and then stand up. · If you must stand for a long time, put one foot on a stool, ledge, or box. Exercise to strengthen your back and other muscles  · Get at least 30 minutes of exercise on most days of the week. Walking is a good choice. You also may want to do other activities, such as running, swimming, cycling, or playing tennis or team sports. · Stretch your back muscles. Here are few exercises to try:  Clement Alexi on your back with your knees bent and your feet flat on the floor. Gently pull one bent knee to your chest. Put that foot back on the floor, and then pull the other knee to your chest. Hold for 15 to 30 seconds. Repeat 2 to 4 times. ¨ Do pelvic tilts. Lie on your back with your knees bent. Tighten your stomach muscles. Pull your belly button (navel) in and up toward your ribs. You should feel like your back is pressing to the floor and your hips and pelvis are slightly lifting off the floor. Hold for 6 seconds while breathing smoothly. · Keep your core muscles strong. The muscles of your back, belly (abdomen), and buttocks support your spine. ¨ Pull in your belly, and imagine pulling your navel toward your spine. Hold this for 6 seconds, then relax. Remember to keep breathing normally as you tense your muscles. ¨ Do curl-ups. Always do them with your knees bent. Keep your low back on the floor, and curl your shoulders toward your knees using a smooth, slow motion.  Keep your arms folded across your chest. If this bothers your neck, try putting your hands behind your neck (not your head), with your elbows spread apart.  Keny Child on your back with your knees bent and your feet flat on the floor. Tighten your belly muscles, and then push with your feet and raise your buttocks up a few inches. Hold this position 6 seconds as you continue to breathe normally, then lower yourself slowly to the floor. Repeat 8 to 12 times. ¨ If you like group exercise, try Pilates or yoga. These classes have poses that strengthen the core muscles. Protect your back when you sit  · Place a small pillow, a rolled-up towel, or a lumbar roll in the curve of your back if you need extra support. · Sit in a chair that is low enough to let you place both feet flat on the floor with both knees nearly level with your hips. If your chair or desk is too high, use a foot rest to raise your knees. · When driving, keep your knees nearly level with your hips. Sit straight, and drive with both hands on the steering wheel. Your arms should be in a slightly bent position. · Try a kneeling chair, which helps tilt your hips forward. This takes pressure off your lower back. · Try sitting on an exercise ball. It can rock from side to side, which helps keep your back loose. Lift properly  · Squat down, bending at the hips and knees only. If you need to, put one knee to the floor and extend your other knee in front of you, bent at a right angle (half kneeling). · Press your chest straight forward. This helps keep your upper back straight while keeping a slight arch in your low back. · Hold the load as close to your body as possible, at the level of your navel. · Use your feet to change direction, taking small steps. · Lead with your hips as you change direction. Keep your shoulders in line with your hips as you move. Do not twist your body. · Set down your load carefully, squatting with your knees and hips only. When should you call for help? Watch closely for changes in your health, and be sure to contact your doctor if you have any problems.   Where can you learn more? Go to http://sherly-mary.info/. Enter S810 in the search box to learn more about \"Back Care and Preventing Injuries: Care Instructions. \"  Current as of: March 21, 2017  Content Version: 11.4  © 7679-7723 [employer]. Care instructions adapted under license by [employer] (which disclaims liability or warranty for this information). If you have questions about a medical condition or this instruction, always ask your healthcare professional. [place] [address] any warranty or liability for your use of this information.

## 2018-05-30 ENCOUNTER — TELEPHONE (OUTPATIENT)
Dept: CARDIOLOGY CLINIC | Age: 78
End: 2018-05-30

## 2018-05-30 NOTE — TELEPHONE ENCOUNTER
Called and spoke with Rachel Contreras the  at Dr. Dominic Guan office, Per Dr. Elizabeth Paulino patient is cleared for surgery with low risk. Letter will be faxed to [phone].

## 2018-05-30 NOTE — LETTER
Dear Dr. Isaías Toussaint Re: Lani Taylor : 1940 Ms. Mireya Andrews is cleared from a cardiac standpoint with low risk for back surgery scheduled on 2018. If you have any questions or any further assistance is needed please contact our office. Sincerely, Isma Banuelos M.D.  
 
cc:  Rick Gregorio MD

## 2018-05-30 NOTE — TELEPHONE ENCOUNTER
Patient is having back surgery on 6/14. Dr. Caridad Keith, surgeon   [facility] [phone]. Dr. Layla Guzman office would like to know if patient can be cleared from last office visit.

## 2018-06-07 ENCOUNTER — HOSPITAL ENCOUNTER (OUTPATIENT)
Dept: GENERAL RADIOLOGY | Age: 78
Discharge: HOME | End: 2018-06-07
Payer: MEDICARE

## 2018-06-07 ENCOUNTER — HOSPITAL ENCOUNTER (OUTPATIENT)
Dept: PREADMISSION TESTING | Age: 78
Discharge: HOME | End: 2018-06-07
Payer: MEDICARE

## 2018-06-07 DIAGNOSIS — M51.26 LUMBAR HERNIATED DISC: ICD-10-CM

## 2018-06-07 DIAGNOSIS — M48.062 SPINAL STENOSIS OF LUMBAR REGION WITH NEUROGENIC CLAUDICATION: ICD-10-CM

## 2018-06-07 DIAGNOSIS — M43.16 SPONDYLOLISTHESIS OF LUMBAR REGION: ICD-10-CM

## 2018-06-07 DIAGNOSIS — Z01.818 PREOP EXAMINATION: ICD-10-CM

## 2018-06-07 LAB
ABO + RH BLD: NORMAL
ALBUMIN SERPL-MCNC: 3.8 G/DL (ref 3.4–5)
ALBUMIN/GLOB SERPL: 1.2 {RATIO} (ref 0.8–1.7)
ALP SERPL-CCNC: 89 U/L (ref 45–117)
ALT SERPL-CCNC: 24 U/L (ref 13–56)
ANION GAP SERPL CALC-SCNC: 10 MMOL/L (ref 3–18)
AST SERPL-CCNC: 15 U/L (ref 15–37)
ATRIAL RATE: 69 BPM
BILIRUB SERPL-MCNC: 0.5 MG/DL (ref 0.2–1)
BLOOD GROUP ANTIBODIES SERPL: NORMAL
BUN SERPL-MCNC: 16 MG/DL (ref 7–18)
BUN/CREAT SERPL: 31 (ref 12–20)
CALCIUM SERPL-MCNC: 9.9 MG/DL (ref 8.5–10.1)
CALCULATED P AXIS, ECG09: 0 DEGREES
CALCULATED R AXIS, ECG10: 18 DEGREES
CALCULATED T AXIS, ECG11: 19 DEGREES
CHLORIDE SERPL-SCNC: 98 MMOL/L (ref 100–108)
CO2 SERPL-SCNC: 27 MMOL/L (ref 21–32)
CREAT SERPL-MCNC: 0.51 MG/DL (ref 0.6–1.3)
DIAGNOSIS, 93000: NORMAL
ERYTHROCYTE [DISTWIDTH] IN BLOOD BY AUTOMATED COUNT: 12.8 % (ref 11.6–14.5)
GLOBULIN SER CALC-MCNC: 3.3 G/DL (ref 2–4)
GLUCOSE SERPL-MCNC: 101 MG/DL (ref 74–99)
HCT VFR BLD AUTO: 41.7 % (ref 35–45)
HGB BLD-MCNC: 13.8 G/DL (ref 12–16)
MCH RBC QN AUTO: 27.4 PG (ref 24–34)
MCHC RBC AUTO-ENTMCNC: 33.1 G/DL (ref 31–37)
MCV RBC AUTO: 82.7 FL (ref 74–97)
P-R INTERVAL, ECG05: 256 MS
PLATELET # BLD AUTO: 318 K/UL (ref 135–420)
PMV BLD AUTO: 10.2 FL (ref 9.2–11.8)
POTASSIUM SERPL-SCNC: 3.7 MMOL/L (ref 3.5–5.5)
PROT SERPL-MCNC: 7.1 G/DL (ref 6.4–8.2)
Q-T INTERVAL, ECG07: 404 MS
QRS DURATION, ECG06: 68 MS
QTC CALCULATION (BEZET), ECG08: 432 MS
RBC # BLD AUTO: 5.04 M/UL (ref 4.2–5.3)
SODIUM SERPL-SCNC: 135 MMOL/L (ref 136–145)
SPECIMEN EXP DATE BLD: NORMAL
VENTRICULAR RATE, ECG03: 69 BPM
WBC # BLD AUTO: 7 K/UL (ref 4.6–13.2)

## 2018-06-07 PROCEDURE — 85027 COMPLETE CBC AUTOMATED: CPT | Performed by: ORTHOPAEDIC SURGERY

## 2018-06-07 PROCEDURE — 86900 BLOOD TYPING SEROLOGIC ABO: CPT | Performed by: ORTHOPAEDIC SURGERY

## 2018-06-07 PROCEDURE — 36415 COLL VENOUS BLD VENIPUNCTURE: CPT | Performed by: ORTHOPAEDIC SURGERY

## 2018-06-07 PROCEDURE — 71046 X-RAY EXAM CHEST 2 VIEWS: CPT

## 2018-06-07 PROCEDURE — 80053 COMPREHEN METABOLIC PANEL: CPT | Performed by: ORTHOPAEDIC SURGERY

## 2018-06-07 PROCEDURE — 93005 ELECTROCARDIOGRAM TRACING: CPT

## 2018-06-14 ENCOUNTER — TELEPHONE (OUTPATIENT)
Dept: ORTHOPEDIC SURGERY | Age: 78
End: 2018-06-14

## 2018-06-14 DIAGNOSIS — M51.36 DDD (DEGENERATIVE DISC DISEASE), LUMBAR: ICD-10-CM

## 2018-06-14 DIAGNOSIS — M47.817 LUMBOSACRAL SPONDYLOSIS WITHOUT MYELOPATHY: ICD-10-CM

## 2018-06-14 DIAGNOSIS — M54.16 LUMBAR RADICULOPATHY: ICD-10-CM

## 2018-06-14 RX ORDER — TRAMADOL HYDROCHLORIDE 50 MG/1
TABLET ORAL
Qty: 90 TAB | Refills: 0 | Status: SHIPPED | OUTPATIENT
Start: 2018-06-14 | End: 2018-07-03 | Stop reason: SDUPTHER

## 2018-06-14 NOTE — TELEPHONE ENCOUNTER
Pt is requesting pain medication. Her surgery was supposed to be today but got cancelled, can you find out why surgery was cancelled?

## 2018-06-14 NOTE — TELEPHONE ENCOUNTER
Phoned in Tramadol to the pharmacy on file per patient request. No further action necessary at this time.

## 2018-06-14 NOTE — TELEPHONE ENCOUNTER
Approved Tramadol for 3 week supply. Surgery was cancelled today due to UTI.  She will need to set-up an appointment with an NP to get anymore due to regulations

## 2018-07-02 ENCOUNTER — OFFICE VISIT (OUTPATIENT)
Dept: CARDIOLOGY CLINIC | Age: 78
End: 2018-07-02

## 2018-07-02 VITALS
DIASTOLIC BLOOD PRESSURE: 62 MMHG | WEIGHT: 194 LBS | HEART RATE: 66 BPM | HEIGHT: 65 IN | BODY MASS INDEX: 32.32 KG/M2 | SYSTOLIC BLOOD PRESSURE: 128 MMHG

## 2018-07-02 DIAGNOSIS — M54.50 LOW BACK PAIN WITHOUT SCIATICA, UNSPECIFIED BACK PAIN LATERALITY, UNSPECIFIED CHRONICITY: ICD-10-CM

## 2018-07-02 DIAGNOSIS — E78.00 HYPERCHOLESTEROLEMIA: ICD-10-CM

## 2018-07-02 DIAGNOSIS — I10 ESSENTIAL HYPERTENSION, BENIGN: Primary | ICD-10-CM

## 2018-07-02 DIAGNOSIS — I49.1 PAC (PREMATURE ATRIAL CONTRACTION): ICD-10-CM

## 2018-07-02 NOTE — PROGRESS NOTES
HISTORY OF PRESENT ILLNESS  Pascale Nunez is a 76 y.o. female. HPI Comments:       Hypertension   The history is provided by the patient. This is a chronic problem. The problem occurs constantly. The problem has not changed since onset. Palpitations    The history is provided by the patient. This is a recurrent problem. The problem has been gradually worsening. The problem occurs daily. The problem is associated with nothing. Associated symptoms include irregular heartbeat. Pertinent negatives include no exertional chest pressure and no lower extremity edema. Her past medical history is significant for hypertension. Cholesterol Problem   The history is provided by the patient. This is a chronic problem. The problem occurs constantly. The problem has not changed since onset. Review of Systems   Constitutional: Negative for chills. HENT: Negative for nosebleeds. Eyes: Negative for blurred vision and double vision. Respiratory: Negative for wheezing. Cardiovascular: Positive for palpitations. Negative for leg swelling. Gastrointestinal: Negative for heartburn. Musculoskeletal: Negative for myalgias. Skin: Negative for rash. Endo/Heme/Allergies: Does not bruise/bleed easily.      Family History   Problem Relation Age of Onset    Heart Disease Other      positive history of ischemic heart disease       Past Medical History:   Diagnosis Date    Cardiomegaly     Essential hypertension, benign     stable    Hypercholesterolemia     Obesity, unspecified     Pt has weight loss    Other and unspecified hyperlipidemia     Chol 172, ldl 82, hdl 58, tg 141    Type II or unspecified type diabetes mellitus without mention of complication, not stated as uncontrolled        Past Surgical History:   Procedure Laterality Date    HX GYN      hysterectomy       Social History   Substance Use Topics    Smoking status: Never Smoker    Smokeless tobacco: Never Used    Alcohol use No Allergies   Allergen Reactions    Kenalog [Triamcinolone Acetonide] Anaphylaxis    Penicillin G Hives    Adhesive Tape-Silicones Swelling    Bacitracin Not Reported This Time    Cephalexin Rash    Ciprofloxacin Other (comments)    Cortisone Not Reported This Time    Erythromycin Not Reported This Time    Hydrocortisone Hives    Lisinopril Not Reported This Time    Lortab [Hydrocodone-Acetaminophen] Rash    Macrobid [Nitrofurantoin Monohyd/M-Cryst] Other (comments)     Flushed face,leg swellinig    Methimazole Other (comments)     Neck pain/cough    Penicillins Not Reported This Time    Prednisolone Other (comments)    Prednisone Not Reported This Time    Propylthiouracil Other (comments)     Dizziness,elevated blood pressure    Sulfamethoxazole-Trimethoprim Other (comments)    Tetanus And Diphtheria Toxoids, Adsorbed, Adult Swelling    Tetanus Vaccines And Toxoid Not Reported This Time       Current Outpatient Prescriptions   Medication Sig    traMADol (ULTRAM) 50 mg tablet Take 1 po q daily- bid prn chronic pain  Indications: Pain    trimethoprim (TRIMPEX) 100 mg tablet 100 mg.    lidocaine (LIDODERM) 5 % by TransDERmal route every twenty-four (24) hours. Apply patch to the affected area for 12 hours a day and remove for 12 hours a day.  dilTIAZem XR (DILACOR XR) 180 mg XR capsule Take 1 Cap by mouth daily.  gabapentin (NEURONTIN) 800 mg tablet Take 1 Tab by mouth three (3) times daily (with meals). Indications: NEUROPATHIC PAIN    VIT C/VIT E AC/LUT/COPPER/ZINC (PRESERVISION LUTEIN PO) Take  by Mouth Twice Daily.  MELATONIN PO 5 mg as needed.  glucosamine-chondroitin (ELATION) 1,500-1,200 mg/30 mL liqd Take  by Mouth.  acetaminophen (TYLENOL) 500 mg tablet 500 mg.    losartan (COZAAR) 50 mg tablet TAKE ONE TABLET BY MOUTH ONCE DAILY    atenolol (TENORMIN) 50 mg tablet TAKE ONE TABLET BY MOUTH TWICE DAILY    aspirin (ASPIRIN) 325 mg tablet Take 81 mg by mouth daily.  CRANBERRY CONC-ASCORBIC ACID PO Take  by mouth.  calcium-cholecalciferol, d3, (CALCIUM 600 + D) 600-125 mg-unit tab Take  by mouth daily. Patient taking 4 times a week    Cholecalciferol, Vitamin D3, (VITAMIN D3) 1,000 unit cap Take  by mouth daily.  metFORMIN (GLUMETZA ER) 500 mg TG24 24 hour tablet 850 mg daily.  pravastatin (PRAVACHOL) 40 mg tablet Take 40 mg by mouth daily.  GLUC SIMPSON/CHONDRO SIMPSON A/VIT C/MN (GLUCOSAMINE 1500 COMPLEX PO) Take  by mouth daily.  indapamide (LOZOL) 2.5 mg tablet Take  by mouth daily. No current facility-administered medications for this visit. Visit Vitals    /62    Pulse 66    Ht 5' 5\" (1.651 m)    Wt 88 kg (194 lb)    BMI 32.28 kg/m2         Physical Exam   Constitutional: She is oriented to person, place, and time. She appears well-developed and well-nourished. obese   HENT:   Head: Normocephalic and atraumatic. Eyes: Conjunctivae are normal.   Neck: Neck supple. No JVD present. No tracheal deviation present. No thyromegaly present. Cardiovascular: Normal rate and regular rhythm. PMI is not displaced. Exam reveals no gallop and no decreased pulses. Murmur heard. Early systolic murmur is present with a grade of 2/6  at the upper right sternal border  Pulmonary/Chest: No respiratory distress. She has no wheezes. She has no rales. She exhibits no tenderness. Abdominal: Soft. There is no tenderness. Musculoskeletal: She exhibits no edema. Neurological: She is alert and oriented to person, place, and time. Skin: Skin is warm. Psychiatric: She has a normal mood and affect. Ms. Dariela Vera has a reminder for a \"due or due soon\" health maintenance. I have asked that she contact her primary care provider for follow-up on this health maintenance.     CARDIOLOGY STUDIES 6/1/2011 3/1/2008   Myocardial Perfusion Scan Result nl scan, EF 90% normal   Echocardiogram - Complete Result EF 78% mild lvh, normal ef   Some recent data might be hidden   mri:2015  1. No acute hemorrhage or acute infarction. 2. White matter abnormality is nonspecific but likely ischemic. This does not suggest multiple sclerosis. 3. No other significant intracranial abnormality is appreciated. [facility]:5768:ZHANECP  Left ventricle: Systolic function was normal. Ejection fraction was  estimated in the range of 55 % to 60 %. There were no regional wall motion  abnormalities. Doppler parameters were consistent with abnormal left  ventricular relaxation (grade 1 diastolic dysfunction). Left atrium: The atrium was mildly dilated. Mitral valve: There was trivial regurgitation. Aortic valve: The valve was trileaflet. Leaflets exhibited mildly  increased thickness, normal cuspal separation, and sclerosis. Tricuspid valve: There was mild regurgitation. Tricuspid regurgitation  peak velocity: 2.4 m/sec. Pulmonary artery systolic pressure: 26 mmHg. Pulmonic valve: There was trivial regurgitation. 2015:holter  Sr,Frequent pac. No a fib    I have personally reviewed patients ekg done at other facility. 2017  Sr,pac    NUCLEAR IMAGIN2017     Findings:   1. Stress images reveal normal Myoview distrubution in all the LV segments in short axis, vertical and horizontal long axis views. 2. Resting images have a normal uptake. 3. Gated images reveal normal wall motion and the ejection fraction is calculated to be 90%. Conclusion:   1. Normal perfusion scan. 2. Normal wall motion and ejection fraction. 3. Low risk scan. Assessment         ICD-10-CM ICD-9-CM    1. Essential hypertension, benign I10 401.1     stable   2. Hypercholesterolemia E78.00 272.0     on statin  lab with pcp-3/2018-controlled   3. PAC (premature atrial contraction) I49.1 427.61     stable   4.  Low back pain without sciatica, unspecified back pain laterality, unspecified chronicity M54.5 724.2     for surgery  stable cardiac status  low risk-ok to proceed       There are no discontinued medications. No orders of the defined types were placed in this encounter. Follow-up Disposition:  Return in about 6 months (around 1/2/2019) for Cleared for planned surgery, low risk.

## 2018-07-02 NOTE — LETTER
2018 10:27 AM 
 
Ms. Cyndi Stevenson [address] [address] [address] [address] 
 
 
2018 
[address] [address] [address] [address] Dear Dr. Crystal Valencia: 
 
Re: Cyndi Stevenson : 1940 Ms. Leticia Parra is cleared from a cardiac standpoint with low risk for back surgery scheduled on . If you have any questions or any further assistance is needed please contact our office. Sincerely, Kimberly Miller M.D.  
 
cc:  Ole Mcnally MD

## 2018-07-02 NOTE — MR AVS SNAPSHOT
Nhung Petmariana 
 
 
 [address] [facility] 
[phone] Patient: Pascale Nunez MRN: [id number] :1940 Visit Information Date & Time Provider Department Dept. Phone Encounter #  
 2018 10:00 AM Evelyn Acharya MD [facility] [phone] [id number] Follow-up Instructions Return in about 6 months (around 2019) for Cleared for planned surgery, low risk. Your Appointments 7/3/2018 10:00 AM  
MED REFILL with Chapin Barfield NP  
[facility] Appt Note: med refill [address] [facility] [address]  
[phone]  
  
   
 [address] [facility] [address]  [facility]  
  
    
 1/3/2019 10:00 AM  
ESTABLISHED PATIENT with Evelyn Acharya MD  
[facility] ([facility] Appt Note: 6 month [address] [facility] [address]  
  
   
 [address] [place] [address] Upcoming Health Maintenance Date Due HEMOGLOBIN A1C Q6M 1940 FOOT EXAM Q1 1948 MICROALBUMIN Q1 1948 EYE EXAM RETINAL OR DILATED Q1 1948 DTaP/Tdap/Td series (1 - Tdap) 1959 ZOSTER VACCINE AGE 60> 1998 GLAUCOMA SCREENING Q2Y 2003 Bone Densitometry (Dexa) Screening 2003 Pneumococcal 65+ Low/Medium Risk (1 of 2 - PCV13) 2003 LIPID PANEL Q1 2016 MEDICARE YEARLY EXAM 3/19/2018 Influenza Age 5 to Adult 2018 Allergies as of 2018  Review Complete On: 2018 By: Evelyn Acharya MD  
  
 Severity Noted Reaction Type Reactions Kenalog [Triamcinolone Acetonide] High 2018    Anaphylaxis Penicillin G High 2015    Hives Adhesive Tape-silicones Medium     Swelling Bacitracin    Not Reported This Time Cephalexin  2018    Rash Ciprofloxacin  08/03/2016    Other (comments) Cortisone    Not Reported This Time Erythromycin    Not Reported This Time Hydrocortisone  04/01/2015    Hives Lisinopril    Not Reported This Time Lortab [Hydrocodone-acetaminophen]  08/04/2015    Rash Bonnye Armor [Nitrofurantoin Monohyd/m-cryst]  07/02/2018    Other (comments) Flushed face,leg swellinig Methimazole  07/07/2017    Other (comments) Neck pain/cough Penicillins    Not Reported This Time Prednisolone  04/01/2015    Other (comments) Prednisone    Not Reported This Time Propylthiouracil  07/07/2017    Other (comments) Dizziness,elevated blood pressure Sulfamethoxazole-trimethoprim  11/03/2016    Other (comments) Tetanus And Diphtheria Toxoids, Adsorbed, Adult  04/01/2015    Swelling Tetanus Vaccines And Toxoid    Not Reported This Time Current Immunizations  Never Reviewed No immunizations on file. Not reviewed this visit You Were Diagnosed With   
  
 Codes Comments Essential hypertension, benign    -  Primary ICD-10-CM: I10 
ICD-9-CM: 401.1 stable Hypercholesterolemia     ICD-10-CM: E78.00 ICD-9-CM: 272.0 on statin 
lab with pcp-3/2018-controlled PAC (premature atrial contraction)     ICD-10-CM: I49.1 ICD-9-CM: 427.61 stable Low back pain without sciatica, unspecified back pain laterality, unspecified chronicity     ICD-10-CM: M54.5 ICD-9-CM: 724.2 for surgery 
stable cardiac status 
low risk-ok to proceed Vitals BP Pulse Height(growth percentile) Weight(growth percentile) BMI OB Status 128/62 66 5' 5\" (1.651 m) 194 lb (88 kg) 32.28 kg/m2 Hysterectomy Smoking Status Never Smoker Vitals History BMI and BSA Data Body Mass Index Body Surface Area  
 32.28 kg/m 2 2.01 m 2 Preferred Pharmacy Pharmacy Name Phone [address], [address] Your Updated Medication List  
  
 This list is accurate as of 7/2/18 10:31 AM.  Always use your most recent med list.  
  
  
  
  
 acetaminophen 500 mg tablet Commonly known as:  TYLENOL  
500 mg.  
  
 aspirin 325 mg tablet Commonly known as:  ASPIRIN Take 81 mg by mouth daily. atenolol 50 mg tablet Commonly known as:  TENORMIN  
TAKE ONE TABLET BY MOUTH TWICE DAILY CALCIUM 600 + D 600-125 mg-unit Tab Generic drug:  calcium-cholecalciferol (d3) Take  by mouth daily. Patient taking 4 times a week CRANBERRY CONC-ASCORBIC ACID PO Take  by mouth. dilTIAZem  mg XR capsule Commonly known as:  DILACOR XR Take 1 Cap by mouth daily. gabapentin 800 mg tablet Commonly known as:  NEURONTIN Take 1 Tab by mouth three (3) times daily (with meals). Indications: NEUROPATHIC PAIN  
  
 GLUCOSAMINE 1500 COMPLEX PO Take  by mouth daily. glucosamine-chondroitin 1,500-1,200 mg/30 mL Liqd Commonly known as:  ELATION Take  by Mouth. indapamide 2.5 mg tablet Commonly known as:  Day Sauers Take  by mouth daily. lidocaine 5 % Commonly known as:  LIDODERM  
by TransDERmal route every twenty-four (24) hours. Apply patch to the affected area for 12 hours a day and remove for 12 hours a day. losartan 50 mg tablet Commonly known as:  COZAAR  
TAKE ONE TABLET BY MOUTH ONCE DAILY MELATONIN PO  
5 mg as needed. metFORMIN 500 mg Tg24 24 hour tablet Commonly known as:  GLUMETZA ER  
850 mg daily. pravastatin 40 mg tablet Commonly known as:  PRAVACHOL Take 40 mg by mouth daily. PRESERVISION LUTEIN PO Take  by Mouth Twice Daily. traMADol 50 mg tablet Commonly known as:  ULTRAM  
Take 1 po q daily- bid prn chronic pain  Indications: Pain  
  
 trimethoprim 100 mg tablet Commonly known as:  TRIMPEX  
100 mg. VITAMIN D3 1,000 unit Cap Generic drug:  cholecalciferol Take  by mouth daily. Follow-up Instructions Return in about 6 months (around 1/2/2019) for Cleared for planned surgery, low risk. Introducing [facility]! Dear Ember Gandara: 
Thank you for requesting a [employer] account. Our records indicate that you already have an active [employer] account. You can access your account anytime at [email or web address] Did you know that you can access your hospital and ER discharge instructions at any time in [employer]? You can also review all of your test results from your hospital stay or ER visit. Additional Information If you have questions, please visit the Frequently Asked Questions section of the [employer] website at [email or web address] Remember, [employer] is NOT to be used for urgent needs. For medical emergencies, dial 911. Now available from your iPhone and Android! Please provide this summary of care documentation to your next provider. Your primary care clinician is listed as Berta Dunn. If you have any questions after today's visit, please call [phone].

## 2018-07-02 NOTE — LETTER
Cyndi Agostoay 1940 7/2/2018 Dear MD Bandar Julian MD 
 
I had the pleasure of evaluating  Ms. Leticia Parra in office today. Below are the relevant portions of my assessment and plan of care. ICD-10-CM ICD-9-CM 1. Essential hypertension, benign I10 401.1   
 stable 2. Hypercholesterolemia E78.00 272.0   
 on statin 
lab with pcp-3/2018-controlled 3. PAC (premature atrial contraction) I49.1 427.61   
 stable 4. Low back pain without sciatica, unspecified back pain laterality, unspecified chronicity M54.5 724.2   
 for surgery 
stable cardiac status 
low risk-ok to proceed Current Outpatient Prescriptions Medication Sig Dispense Refill  traMADol (ULTRAM) 50 mg tablet Take 1 po q daily- bid prn chronic pain  Indications: Pain 90 Tab 0  
 trimethoprim (TRIMPEX) 100 mg tablet 100 mg.    
 lidocaine (LIDODERM) 5 % by TransDERmal route every twenty-four (24) hours. Apply patch to the affected area for 12 hours a day and remove for 12 hours a day.  dilTIAZem XR (DILACOR XR) 180 mg XR capsule Take 1 Cap by mouth daily. 90 Cap 1  
 gabapentin (NEURONTIN) 800 mg tablet Take 1 Tab by mouth three (3) times daily (with meals). Indications: NEUROPATHIC PAIN 90 Tab 1  VIT C/VIT E AC/LUT/COPPER/ZINC (PRESERVISION LUTEIN PO) Take  by Mouth Twice Daily.  MELATONIN PO 5 mg as needed.  glucosamine-chondroitin (ELATION) 1,500-1,200 mg/30 mL liqd Take  by Mouth.  acetaminophen (TYLENOL) 500 mg tablet 500 mg.    
 losartan (COZAAR) 50 mg tablet TAKE ONE TABLET BY MOUTH ONCE DAILY 90 Tab 3  
 atenolol (TENORMIN) 50 mg tablet TAKE ONE TABLET BY MOUTH TWICE DAILY 180 Tab 3  
 aspirin (ASPIRIN) 325 mg tablet Take 81 mg by mouth daily.  CRANBERRY CONC-ASCORBIC ACID PO Take  by mouth.  calcium-cholecalciferol, d3, (CALCIUM 600 + D) 600-125 mg-unit tab Take  by mouth daily. Patient taking 4 times a week  Cholecalciferol, Vitamin D3, (VITAMIN D3) 1,000 unit cap Take  by mouth daily.  metFORMIN (GLUMETZA ER) 500 mg TG24 24 hour tablet 850 mg daily.  pravastatin (PRAVACHOL) 40 mg tablet Take 40 mg by mouth daily.  GLUC SIMPSON/CHONDRO SIMPSON A/VIT C/MN (GLUCOSAMINE 1500 COMPLEX PO) Take  by mouth daily.  indapamide (LOZOL) 2.5 mg tablet Take  by mouth daily. No orders of the defined types were placed in this encounter. If you have questions, please do not hesitate to call me. I look forward to following Ms. Norwood Free along with you. Sincerely, Luisana Oviedo MD

## 2018-07-02 NOTE — PROGRESS NOTES
1. Have you been to the ER, urgent care clinic since your last visit? Hospitalized since your last visit? No    2. Have you seen or consulted any other health care providers outside of the [address] [place] [address] since your last visit? Include any pap smears or colon screening. Yes Where: Sports Medicine     3. Since your last visit, have you had any of the following symptoms? .          4. Have you had any blood work, X-rays or cardiac testing? 5. Where do you normally have your labs drawn? 6. Do you need any refills today?

## 2018-07-03 ENCOUNTER — DOCUMENTATION ONLY (OUTPATIENT)
Dept: ORTHOPEDIC SURGERY | Age: 78
End: 2018-07-03

## 2018-07-03 ENCOUNTER — OFFICE VISIT (OUTPATIENT)
Dept: ORTHOPEDIC SURGERY | Age: 78
End: 2018-07-03

## 2018-07-03 VITALS
DIASTOLIC BLOOD PRESSURE: 76 MMHG | SYSTOLIC BLOOD PRESSURE: 152 MMHG | HEIGHT: 65 IN | HEART RATE: 66 BPM | TEMPERATURE: 97.9 F

## 2018-07-03 DIAGNOSIS — M47.817 LUMBOSACRAL SPONDYLOSIS WITHOUT MYELOPATHY: ICD-10-CM

## 2018-07-03 DIAGNOSIS — M51.26 LUMBAR HERNIATED DISC: ICD-10-CM

## 2018-07-03 DIAGNOSIS — Z79.891 LONG-TERM CURRENT USE OF OPIATE ANALGESIC: Primary | ICD-10-CM

## 2018-07-03 DIAGNOSIS — M48.061 SPINAL STENOSIS, LUMBAR REGION WITHOUT NEUROGENIC CLAUDICATION: ICD-10-CM

## 2018-07-03 DIAGNOSIS — Z79.891 LONG-TERM CURRENT USE OF OPIATE ANALGESIC: ICD-10-CM

## 2018-07-03 RX ORDER — GABAPENTIN 800 MG/1
800 TABLET ORAL
Qty: 90 TAB | Refills: 2 | Status: SHIPPED | OUTPATIENT
Start: 2018-07-03 | End: 2018-11-01 | Stop reason: ALTCHOICE

## 2018-07-03 RX ORDER — TRAMADOL HYDROCHLORIDE 50 MG/1
TABLET ORAL
Qty: 90 TAB | Refills: 0 | Status: SHIPPED | OUTPATIENT
Start: 2018-07-15 | End: 2018-07-19 | Stop reason: CLARIF

## 2018-07-03 NOTE — PROGRESS NOTES
Chief complaint/History of Present Illness:  Chief Complaint   Patient presents with    Back Pain     lower    Buttocks pain     right    Follow-up     HPI  Eden Gabriel is a  76 y.o.  female      HISTORY OF PRESENT ILLNESS:  Ms. Nancye Rubinstein comes in today for medication review. She was scheduled to have L4-5 and L5-S1 laminectomy and fusion. However, it had to be canceled due to her having a UTI. Her PCP, Dr. Abdirashid Manrique, is handling that. She states she was started on Bactrim, and she has finished it today. However, her urine has become cloudy the last 2 days. She is due to have that rechecked today. She is on gabapentin 800 mg 3 times a day and tramadol 50 mg 3 times a day. She is not having any side effects from the tramadol except she does have a little bit of constipation that she uses p.r.n. MiraLAX for. She states the medications do help with her back pain, although when she points to her back pain it is more in the right buttock. She rates her pain as 8/10 without medication, 5/10 with it. She denies bowel or bladder dysfunction. No fever. She saw her cardiologist, Dr. Chi Prado, yesterday who has cleared her for surgery. She is diabetic. Her last A1c was 5.9. She denies fever, bowel or bladder dysfunction. She is retired. She is a nonsmoker. PHYSICAL EXAMINATION:  Ms. Nancye Rubinstein is a 68-year-old female. She is alert and oriented. Normal mood and affect. She is slow to get from sitting to standing. She has a slow, full-weightbearing, slightly antalgic gait. She does have a rolling walker with her today but does not use it during the exam.  Strength is 4/5 in bilateral lower extremities. Positive straight leg raise on the right, negative on the left. ASSESSMENT/PLAN:  This is a patient with lumbar herniated disc, spondylolisthesis, and spinal stenosis. She is scheduled now for her L4-5 and L5-S1 laminectomy and fusion on 08/01/2018. Her ORT score is 1.   Her  is appropriate. Her MME is 10. We are getting a UDS today. We gave her a refill of her gabapentin and tramadol. Further refills of tramadol depend upon results of UDS. I also explained to her that, should she get stronger pain medication after surgery, not to take the tramadol with that; she verbalized understanding. Her daughter was with her today. We will see her back for surgery. Review of systems:    Past Medical History:   Diagnosis Date    Cardiomegaly     Essential hypertension, benign     stable    Hypercholesterolemia     Obesity, unspecified     Pt has weight loss    Other and unspecified hyperlipidemia     Chol 172, ldl 82, hdl 62, tg 141    Type II or unspecified type diabetes mellitus without mention of complication, not stated as uncontrolled      Past Surgical History:   Procedure Laterality Date    HX GYN      hysterectomy     Social History     Social History    Marital status:      Spouse name: N/A    Number of children: N/A    Years of education: N/A     Occupational History    Not on file. Social History Main Topics    Smoking status: Never Smoker    Smokeless tobacco: Never Used    Alcohol use No    Drug use: No    Sexual activity: Not on file     Other Topics Concern    Not on file     Social History Narrative     Family History   Problem Relation Age of Onset    Heart Disease Other      positive history of ischemic heart disease       Physical Exam:  Visit Vitals    /76    Pulse 66    Temp 97.9 °F (36.6 °C) (Oral)    Ht 5' 5\" (1.651 m)     Pain Scale: 7/10    Least pain over the last week has been 5/10. Worst pain over the last week has been 8/10. Opioid Risk Tool Reviewed: YES, Score 1  Aberrant behaviors: None. Urine Drug Screen: today. Controlled substance agreement on file: yes.      reviewed:yes  Pill count is consistent with his prescription: n/a  Concomitant use of a benzodiazepine: no  MME 10  Narcan not warranted        Risks and benefits of ongoing opiate therapy have been reviewed with the patient. No pain behaviors. Denies thoughts of harming self or others. Pt has a good risk to benefit ratio which allows the pt to function in a home environment without side effects        has been . reviewed and is appropriate    Pt has a consistent UDS in the record      Diagnoses and all orders for this visit:    1. Long-term current use of opiate analgesic  -     DRUG SCREEN UR - W/ CONFIRM; Future    2. Spinal stenosis, lumbar region without neurogenic claudication  -     DRUG SCREEN UR - W/ CONFIRM; Future    3. Lumbosacral spondylosis without myelopathy  -     DRUG SCREEN UR - W/ CONFIRM; Future  -     gabapentin (NEURONTIN) 800 mg tablet; Take 1 Tab by mouth three (3) times daily (with meals). Indications: NEUROPATHIC PAIN  -     traMADol (ULTRAM) 50 mg tablet; Take 1 po q daily- bid prn chronic pain  Indications: Pain, chronic pain    4. Lumbar herniated disc  -     DRUG SCREEN UR - W/ CONFIRM; Future            Follow-up Disposition:  Return for f/u for surgery.         We have informed Marcella Jason to notify us for immediate appointment if she has any worsening neurogical symptoms or if an emergency situation presents, then call 911

## 2018-07-03 NOTE — PROGRESS NOTES
PTS DAUGHTER NENA VARGAS DROPPED OFF [facility] PAPERWORK FOR DR Prasanth Collins TO COMPLETE. PTS DAUGHTER IS PRIMARY CAREGIVER AND MAY NEED TIME OFF TO TAKE MOTHER TO APPOINTMENTS.  PLEASE MAIL TO PTS HOME WHEN DONE

## 2018-07-06 ENCOUNTER — DOCUMENTATION ONLY (OUTPATIENT)
Dept: ORTHOPEDIC SURGERY | Age: 78
End: 2018-07-06

## 2018-07-06 NOTE — PROGRESS NOTES
Completed FMLA paper-work. Dr. Lisa Obregon reviewed and signed. Given to [facility] for processing.

## 2018-07-15 ENCOUNTER — PATIENT MESSAGE (OUTPATIENT)
Dept: ORTHOPEDIC SURGERY | Age: 78
End: 2018-07-15

## 2018-07-16 NOTE — TELEPHONE ENCOUNTER
Called and spoke with [facility] at [facility] DR SARAI LANDERS. Per Gaetano, due to patient taking medicine less than 3 months, patient will have to fill med using acute pain guidelines, meaning patient can only fill a 7 day supply. Per [facility], patient can  Rx and take to another pharmacy, or they can shred Rx and we can call med into another pharmacy with correct SIG. Please review and advise.

## 2018-07-16 NOTE — TELEPHONE ENCOUNTER
Leonila Marquez NP 7/16/2018 8:09 AM EDT    Call the pharmacy and have the directions changed to tid prn and have them go ahead and fill the rx. Let pt know when this is done please  ----- Message -----   From: Leander Notice   Sent: 7/15/2018 2:17 PM   To: o Nurses  Subject: Prescription Question     I saw Marcin Silva on 7/3/18 and she wrote my Tramadol prescription to take 1 to 2 a day for a quantity of 90. I take this 3 times daily as directed by the physician. The pharmacy will not fill it until 7/27/18. I will be out of my medicine well before then. My pharmacy is [facility] at [address] [place] [address], [address] [place] [address] [address] Their phone number is [phone]. Thank you for your assistance in this matter.

## 2018-07-17 ENCOUNTER — TELEPHONE (OUTPATIENT)
Dept: ORTHOPEDIC SURGERY | Age: 78
End: 2018-07-17

## 2018-07-17 NOTE — TELEPHONE ENCOUNTER
Patient called and is requesting we call the pharmacy and tell them she can take three pills of the Tramadol medication, due to [address] will not fill until we call them. Patient said she has sent two My Chart  messages to Taya Patel. Patient said she will be out of her medication on 7/27/18. [address]. [phone]    Patient tel. [phone].

## 2018-07-17 NOTE — TELEPHONE ENCOUNTER
Please let the patient know Abisai Rivera is out today but we will discuss this with her tomorrow. Please discuss with Abisai Rivera tomorrow. It appears patient takes 1-2 tabs BID PRN but was given #90.

## 2018-07-18 ENCOUNTER — TELEPHONE (OUTPATIENT)
Dept: CARDIOLOGY CLINIC | Age: 78
End: 2018-07-18

## 2018-07-18 NOTE — LETTER
2018 
[address] [place] [id number] [address] [address] [address]. Inman Neighbors: 
 
Re: Eden Gabriel : 1940 Ms. Nancye Rubinstein is cleared from a cardiac standpoint with low risk for laminectomy fusion surgery scheduled on 2018. If you have any questions or any further assistance is needed please contact our office. Sincerely, Yara Prado M.D.  
 
cc:  Abdirashid Manrique MD

## 2018-07-18 NOTE — TELEPHONE ENCOUNTER
Called and lm for pt to call office regarding her Rx for tramadol. Per guidelines [facility] can only issue a 7 day supply,   We can call Rx into her pharmacy of choice with correct SIG and [facility] can shred Rx.

## 2018-07-18 NOTE — TELEPHONE ENCOUNTER
Dr. Juanito Sorto office would like to know if you can clear patient for surgery on 8. 1.18 from her last office visit.  All notes from Dr Juanito Sorto is in [address] [facility] [facility]

## 2018-07-18 NOTE — TELEPHONE ENCOUNTER
Patient called again to see if previous message was reviewed. Please advise patient as soon as possible.

## 2018-07-19 ENCOUNTER — DOCUMENTATION ONLY (OUTPATIENT)
Dept: ORTHOPEDIC SURGERY | Age: 78
End: 2018-07-19

## 2018-07-19 DIAGNOSIS — G89.29 OTHER CHRONIC PAIN: Primary | ICD-10-CM

## 2018-07-19 RX ORDER — TRAMADOL HYDROCHLORIDE 50 MG/1
50 TABLET ORAL
Qty: 90 TAB | Refills: 0 | OUTPATIENT
Start: 2018-07-19 | End: 2018-08-03

## 2018-07-19 NOTE — PROGRESS NOTES
Called and spoke with Aster Pollock at [facility] DR SARAI LANDERS. Per Aster Pollock, she will destroy hard copy of Rx. No further action required at this time.

## 2018-07-19 NOTE — PROGRESS NOTES
Please call the [address] and cancel the [facility] prescription that they would not fill. Have them shred it. Also, I just called in an ultram rx for the patient to the [facility] on [address] in [place]. I had to leave a voicemail message so please call them and confirm that they got the rx and are filling it.

## 2018-07-21 ENCOUNTER — HOSPITAL ENCOUNTER (OUTPATIENT)
Dept: PREADMISSION TESTING | Age: 78
Discharge: HOME | End: 2018-07-21
Payer: MEDICARE

## 2018-07-21 DIAGNOSIS — Z01.818 PRE-OP EVALUATION: ICD-10-CM

## 2018-07-21 LAB
ABO + RH BLD: NORMAL
ANION GAP SERPL CALC-SCNC: 9 MMOL/L (ref 3–18)
BLOOD GROUP ANTIBODIES SERPL: NORMAL
BUN SERPL-MCNC: 13 MG/DL (ref 7–18)
BUN/CREAT SERPL: 24 (ref 12–20)
CALCIUM SERPL-MCNC: 9.5 MG/DL (ref 8.5–10.1)
CHLORIDE SERPL-SCNC: 97 MMOL/L (ref 100–108)
CO2 SERPL-SCNC: 29 MMOL/L (ref 21–32)
CREAT SERPL-MCNC: 0.55 MG/DL (ref 0.6–1.3)
GLUCOSE SERPL-MCNC: 97 MG/DL (ref 74–99)
POTASSIUM SERPL-SCNC: 4.3 MMOL/L (ref 3.5–5.5)
SODIUM SERPL-SCNC: 135 MMOL/L (ref 136–145)
SPECIMEN EXP DATE BLD: NORMAL

## 2018-07-21 PROCEDURE — 36415 COLL VENOUS BLD VENIPUNCTURE: CPT | Performed by: ORTHOPAEDIC SURGERY

## 2018-07-21 PROCEDURE — 86900 BLOOD TYPING SEROLOGIC ABO: CPT | Performed by: ORTHOPAEDIC SURGERY

## 2018-07-21 PROCEDURE — 80048 BASIC METABOLIC PNL TOTAL CA: CPT | Performed by: ORTHOPAEDIC SURGERY

## 2018-07-25 RX ORDER — GUAIFENESIN 100 MG/5ML
81 LIQUID (ML) ORAL DAILY
COMMUNITY
End: 2019-01-03 | Stop reason: SDUPTHER

## 2018-07-30 NOTE — H&P
Pre-Admission History and Physical 
 
Patient: Terrance Houston   MRN: [id number]   SSN: [id number] YOB: 1940   Age: 76 y.o. Sex: female Patient scheduled for: L4/5/S1 Lami/Fusion. Date of surgery: 08/01/18. Location of surgery:  [facility]. Surgeon: Ant Van MD 
 
HPI:  Terrance Houston is a 76 y.o. female with progressive back and leg pain currently, primarily, left leg pain.  It is unresponsive to conservative treatment.  She has been through injections, physical therapy, and medications. She reports a pain level of 10/10. Her studies demonstrate degenerative spondylolisthesis at both L4-5 and L5-S1.  She has an extruded disc herniation on the left at L4-5, severe stenosis, severe facet arthropathy, and spinal stenosis. Pain has impacted the patient's functional ability to perform daily activity. She is being admitted for surgical intervention.    
   
 
 
Past Medical History:  
Diagnosis Date  Cardiomegaly  Essential hypertension, benign   
 stable  Hypercholesterolemia  Hyperthyroidism   
 no meds  Obesity, unspecified Pt has weight loss  Other and unspecified hyperlipidemia Chol 172, ldl 82, hdl 62, tg 141  Type II or unspecified type diabetes mellitus without mention of complication, not stated as uncontrolled Social History Social History  Marital status:  Spouse name: N/A  
 Number of children: N/A  
 Years of education: N/A Social History Main Topics  Smoking status: Never Smoker  Smokeless tobacco: Never Used  Alcohol use No  
 Drug use: No  
 Sexual activity: Not Asked Other Topics Concern  None Social History Narrative Past Surgical History:  
Procedure Laterality Date  HX GYN    
 hysterectomy Family History Problem Relation Age of Onset  Heart Disease Other   
  positive history of ischemic heart disease Allergies Allergen Reactions  Kenalog [Triamcinolone Acetonide] Anaphylaxis  Penicillin G Hives  Adhesive Tape-Silicones Swelling  Bacitracin Not Reported This Time  Betadine [Povidone-Iodine] Other (comments) Caused uncomfortable rash on area where placed  Cephalexin Rash  Ciprofloxacin Other (comments)  Cortisone Not Reported This Time  Erythromycin Not Reported This Time  Hydrocortisone Hives  Lisinopril Not Reported This Time  Lortab [Hydrocodone-Acetaminophen] Rash  Macrobid [Nitrofurantoin Monohyd/M-Cryst] Other (comments) Flushed face,leg swellinig  Methimazole Other (comments) Neck pain/cough  Penicillins Not Reported This Time  Prednisolone Other (comments)  Prednisone Not Reported This Time  Propylthiouracil Other (comments) Dizziness,elevated blood pressure  Sulfamethoxazole-Trimethoprim Other (comments)  Tetanus And Diphtheria Toxoids, Adsorbed, Adult Swelling  Tetanus Vaccines And Toxoid Not Reported This Time Current Outpatient Prescriptions Medication Sig Dispense Refill  aspirin 81 mg chewable tablet Take 81 mg by mouth daily. Indications: myocardial infarction prevention  traMADol (ULTRAM) 50 mg tablet Take 1 Tab by mouth every eight (8) hours as needed. Max Daily Amount: 150 mg. Indications: chronic pain 90 Tab 0  
 gabapentin (NEURONTIN) 800 mg tablet Take 1 Tab by mouth three (3) times daily (with meals). Indications: NEUROPATHIC PAIN 90 Tab 2  
 trimethoprim (TRIMPEX) 100 mg tablet 100 mg two (2) times a day. Indications: BACTERIAL URINARY TRACT INFECTION    
 lidocaine (LIDODERM) 5 % by TransDERmal route every twenty-four (24) hours. Apply patch to the affected area for 12 hours a day and remove for 12 hours a day.  dilTIAZem XR (DILACOR XR) 180 mg XR capsule Take 1 Cap by mouth daily. 90 Cap 1  
 glucosamine-chondroitin (ELATION) 1,500-1,200 mg/30 mL liqd Take  by Mouth.     
 acetaminophen (TYLENOL) 500 mg tablet 500 mg.    
[address] [place] [address] losartan (COZAAR) 50 mg tablet TAKE ONE TABLET BY MOUTH ONCE DAILY 90 Tab 3  
 atenolol (TENORMIN) 50 mg tablet TAKE ONE TABLET BY MOUTH TWICE DAILY 180 Tab 3  CRANBERRY CONC-ASCORBIC ACID PO Take  by mouth.  calcium-cholecalciferol, d3, (CALCIUM 600 + D) 600-125 mg-unit tab Take  by mouth daily. Patient taking 4 times a week  Cholecalciferol, Vitamin D3, (VITAMIN D3) 1,000 unit cap Take  by mouth daily.  metFORMIN (GLUMETZA ER) 500 mg TG24 24 hour tablet 850 mg daily.  pravastatin (PRAVACHOL) 40 mg tablet Take 40 mg by mouth daily.  GLUC SIMPSON/CHONDRO SIMPSON A/VIT C/MN (GLUCOSAMINE 1500 COMPLEX PO) Take  by mouth daily.  indapamide (LOZOL) 2.5 mg tablet Take  by mouth daily.  MELATONIN PO 5 mg as needed. ROS:  Denies chills, fever,night sweats,  bowel or bladder dysfunction, unexplained weight loss/weight gain, chest pain, sob or anxiety. Physical Examination Gen: Well developed, well nourished 76 y.o. female Gen: Well developed, well nourished 76 y.o. female Visit Vitals  /66  Pulse 78  Temp 96.9 °F (36.1 °C) (Oral)  Ht 5' 5\" (1.651 m)  Wt 200 lb (90.7 kg)  SpO2 (!) 87%  BMI 33.28 kg/m2  
 PAIN SCALE: 10 - Worst pain ever/10 
   
CONSTITUTIONAL: The patient is in no apparent distress and is alert and oriented x 3.   
HEENT: Normocephalic. Hearing grossly intact. NECK: Supple and symmetric. no tenderness, or masses were felt. RESPIRATORY: No labored breathing. CARDIOVASCULAR: The carotid pulses were normal. Peripheral pulses were 2+. CHEST: Normal AP diameter and normal contour without any kyphoscoliosis. LYMPHATIC: No lymphadenopathy was appreciated in the neck, axillae or groin. SKIN:  Negative for scars, rashes, lesions, or ulcers on the right upper, right lower, left upper, left lower and trunk. NEUROLOGICAL: Alert and oriented x 3.  Ambulation with walker. FWB. EXTREMITIES:  See musculoskeletal. 
MUSCULOSKELETAL: 
· Head and Neck:  Negative for misalignment, asymmetry, crepitation, defects, tenderness masses or effusions. · Left Upper Extremity: Inspection, percussion and palpation preformed.  Herreras sign is negative. · Right Upper Extremity: Inspection, percussion and palpation preformed.   Herreras sign is negative. · Spine, Ribs and Pelvis: L low back pain. Unable to stand at neutral. Inspection, percussion and palpation preformed. Negative for misalignment, asymmetry, crepitation, defects, tenderness masses or effusions. · Left Lower Extremity: Calf pain. Inspection, percussion and palpation preformed. · Right Lower Extremity: Inspection, percussion and palpation preformed.  
   
   
SPINE EXAM:  
   
Lumbar spine: forward flexed. No rash, ecchymosis, or gross obliquity. No focal atrophy is noted. Assessment and Plan Due to the pt's persistent symptoms unrelieved by conservative measure Pascale Nunez is being admitted to [facility] to undergo surgical intervention. The post-operative plan of care consists of physical therapy, home health and a 2 week f/u office visit. We are pending medical clearance by Dr. Timothy Reilly and Dr. Whitney Paul, cardiologist.  The risks, benefits, complications and alternatives to surgery have been discussed in detail with the patient. The patient understands and agrees to proceed.   
 
Nikita oBlden NP-BC dictating for Lisseth Lee MD

## 2018-07-31 ENCOUNTER — ANESTHESIA EVENT (OUTPATIENT)
Dept: SURGERY | Age: 78
DRG: 460 | End: 2018-07-31
Payer: MEDICARE

## 2018-08-01 ENCOUNTER — HOSPITAL ENCOUNTER (INPATIENT)
Age: 78
LOS: 1 days | Discharge: HOME HEALTH | DRG: 460 | End: 2018-08-03
Attending: ORTHOPAEDIC SURGERY | Admitting: ORTHOPAEDIC SURGERY
Payer: MEDICARE

## 2018-08-01 ENCOUNTER — ANESTHESIA (OUTPATIENT)
Dept: SURGERY | Age: 78
DRG: 460 | End: 2018-08-01
Payer: MEDICARE

## 2018-08-01 ENCOUNTER — APPOINTMENT (OUTPATIENT)
Dept: GENERAL RADIOLOGY | Age: 78
DRG: 460 | End: 2018-08-01
Attending: ORTHOPAEDIC SURGERY
Payer: MEDICARE

## 2018-08-01 DIAGNOSIS — Z98.1 S/P LUMBAR FUSION: Primary | ICD-10-CM

## 2018-08-01 PROBLEM — M48.00 SPINAL STENOSIS: Status: ACTIVE | Noted: 2018-08-01

## 2018-08-01 PROBLEM — M43.10 SPONDYLISTHESIS: Status: ACTIVE | Noted: 2018-08-01

## 2018-08-01 LAB
GLUCOSE BLD STRIP.AUTO-MCNC: 122 MG/DL (ref 70–110)
GLUCOSE BLD STRIP.AUTO-MCNC: 169 MG/DL (ref 70–110)
GLUCOSE BLD STRIP.AUTO-MCNC: 178 MG/DL (ref 70–110)
GLUCOSE BLD STRIP.AUTO-MCNC: 217 MG/DL (ref 70–110)

## 2018-08-01 PROCEDURE — 74011000250 HC RX REV CODE- 250

## 2018-08-01 PROCEDURE — 97530 THERAPEUTIC ACTIVITIES: CPT

## 2018-08-01 PROCEDURE — 77030035236 HC SUT PDS STRATFX BARB J&J -B: Performed by: ORTHOPAEDIC SURGERY

## 2018-08-01 PROCEDURE — 77030002933 HC SUT MCRYL J&J -A: Performed by: ORTHOPAEDIC SURGERY

## 2018-08-01 PROCEDURE — 74011250637 HC RX REV CODE- 250/637: Performed by: ORTHOPAEDIC SURGERY

## 2018-08-01 PROCEDURE — 77030032490 HC SLV COMPR SCD KNE COVD -B: Performed by: ORTHOPAEDIC SURGERY

## 2018-08-01 PROCEDURE — 99218 HC RM OBSERVATION: CPT

## 2018-08-01 PROCEDURE — 0SG0071 FUSION OF LUMBAR VERTEBRAL JOINT WITH AUTOLOGOUS TISSUE SUBSTITUTE, POSTERIOR APPROACH, POSTERIOR COLUMN, OPEN APPROACH: ICD-10-PCS | Performed by: ORTHOPAEDIC SURGERY

## 2018-08-01 PROCEDURE — 74011000250 HC RX REV CODE- 250: Performed by: ORTHOPAEDIC SURGERY

## 2018-08-01 PROCEDURE — 77030019908 HC STETH ESOPH SIMS -A: Performed by: ANESTHESIOLOGY

## 2018-08-01 PROCEDURE — 77030034475 HC MISC IMPL SPN: Performed by: ORTHOPAEDIC SURGERY

## 2018-08-01 PROCEDURE — 77030033138 HC SUT PGA STRATFX J&J -B: Performed by: ORTHOPAEDIC SURGERY

## 2018-08-01 PROCEDURE — 74011250636 HC RX REV CODE- 250/636: Performed by: NURSE ANESTHETIST, CERTIFIED REGISTERED

## 2018-08-01 PROCEDURE — 77030013567 HC DRN WND RESERV BARD -A: Performed by: ORTHOPAEDIC SURGERY

## 2018-08-01 PROCEDURE — 77030020782 HC GWN BAIR PAWS FLX 3M -B: Performed by: ORTHOPAEDIC SURGERY

## 2018-08-01 PROCEDURE — 74011636637 HC RX REV CODE- 636/637: Performed by: NURSE ANESTHETIST, CERTIFIED REGISTERED

## 2018-08-01 PROCEDURE — 77030003029 HC SUT VCRL J&J -B: Performed by: ORTHOPAEDIC SURGERY

## 2018-08-01 PROCEDURE — 77030011640 HC PAD GRND REM COVD -A: Performed by: ORTHOPAEDIC SURGERY

## 2018-08-01 PROCEDURE — 74011000272 HC RX REV CODE- 272: Performed by: ORTHOPAEDIC SURGERY

## 2018-08-01 PROCEDURE — 74011250636 HC RX REV CODE- 250/636: Performed by: NURSE PRACTITIONER

## 2018-08-01 PROCEDURE — 77030008683 HC TU ET CUF COVD -A: Performed by: ANESTHESIOLOGY

## 2018-08-01 PROCEDURE — C1713 ANCHOR/SCREW BN/BN,TIS/BN: HCPCS | Performed by: ORTHOPAEDIC SURGERY

## 2018-08-01 PROCEDURE — 74011250636 HC RX REV CODE- 250/636: Performed by: ORTHOPAEDIC SURGERY

## 2018-08-01 PROCEDURE — 77010033678 HC OXYGEN DAILY

## 2018-08-01 PROCEDURE — 97161 PT EVAL LOW COMPLEX 20 MIN: CPT

## 2018-08-01 PROCEDURE — 76060000037 HC ANESTHESIA 3 TO 3.5 HR: Performed by: ORTHOPAEDIC SURGERY

## 2018-08-01 PROCEDURE — 0SB20ZZ EXCISION OF LUMBAR VERTEBRAL DISC, OPEN APPROACH: ICD-10-PCS | Performed by: ORTHOPAEDIC SURGERY

## 2018-08-01 PROCEDURE — 74011250636 HC RX REV CODE- 250/636

## 2018-08-01 PROCEDURE — 77030018836 HC SOL IRR NACL ICUM -A: Performed by: ORTHOPAEDIC SURGERY

## 2018-08-01 PROCEDURE — 82962 GLUCOSE BLOOD TEST: CPT

## 2018-08-01 PROCEDURE — 77030034850: Performed by: ORTHOPAEDIC SURGERY

## 2018-08-01 PROCEDURE — 77030013079 HC BLNKT BAIR HGGR 3M -A: Performed by: ANESTHESIOLOGY

## 2018-08-01 PROCEDURE — 77030012406 HC DRN WND PENRS BARD -A: Performed by: ORTHOPAEDIC SURGERY

## 2018-08-01 PROCEDURE — 74011250637 HC RX REV CODE- 250/637: Performed by: NURSE ANESTHETIST, CERTIFIED REGISTERED

## 2018-08-01 PROCEDURE — 74011636637 HC RX REV CODE- 636/637: Performed by: NURSE PRACTITIONER

## 2018-08-01 PROCEDURE — 77030012893: Performed by: ORTHOPAEDIC SURGERY

## 2018-08-01 PROCEDURE — 76010000173 HC OR TIME 3 TO 3.5 HR INTENSV-TIER 1: Performed by: ORTHOPAEDIC SURGERY

## 2018-08-01 PROCEDURE — 76210000016 HC OR PH I REC 1 TO 1.5 HR: Performed by: ORTHOPAEDIC SURGERY

## 2018-08-01 PROCEDURE — 74011636637 HC RX REV CODE- 636/637: Performed by: ORTHOPAEDIC SURGERY

## 2018-08-01 DEVICE — SCREW SPNL L45MM DIA6.5MM PEDCL POLYAX 2 LD THRD TOP LD FOR: Type: IMPLANTABLE DEVICE | Site: SPINE LUMBAR | Status: FUNCTIONAL

## 2018-08-01 DEVICE — ROD SPNL L40MM DIA5.5MM POST LUM PEDCL TI CNTOUR SMOOTH: Type: IMPLANTABLE DEVICE | Site: SPINE LUMBAR | Status: FUNCTIONAL

## 2018-08-01 DEVICE — SCREW SPNL L45MM OD5.5MM TI CO CHROM LUM PEDCL POLYAX VAR: Type: IMPLANTABLE DEVICE | Site: SPINE LUMBAR | Status: FUNCTIONAL

## 2018-08-01 RX ORDER — SODIUM CHLORIDE 0.9 % (FLUSH) 0.9 %
5-10 SYRINGE (ML) INJECTION AS NEEDED
Status: DISCONTINUED | OUTPATIENT
Start: 2018-08-01 | End: 2018-08-01 | Stop reason: HOSPADM

## 2018-08-01 RX ORDER — NITROFURANTOIN 25; 75 MG/1; MG/1
100 CAPSULE ORAL 2 TIMES DAILY WITH MEALS
Status: DISCONTINUED | OUTPATIENT
Start: 2018-08-01 | End: 2018-08-04 | Stop reason: HOSPADM

## 2018-08-01 RX ORDER — NITROFURANTOIN 25; 75 MG/1; MG/1
100 CAPSULE ORAL 2 TIMES DAILY
COMMUNITY
End: 2019-01-03

## 2018-08-01 RX ORDER — INSULIN LISPRO 100 [IU]/ML
INJECTION, SOLUTION INTRAVENOUS; SUBCUTANEOUS EVERY 6 HOURS
Status: DISCONTINUED | OUTPATIENT
Start: 2018-08-01 | End: 2018-08-02

## 2018-08-01 RX ORDER — LORAZEPAM 2 MG/ML
1 INJECTION INTRAMUSCULAR
Status: DISCONTINUED | OUTPATIENT
Start: 2018-08-01 | End: 2018-08-04 | Stop reason: HOSPADM

## 2018-08-01 RX ORDER — INSULIN LISPRO 100 [IU]/ML
INJECTION, SOLUTION INTRAVENOUS; SUBCUTANEOUS ONCE
Status: DISCONTINUED | OUTPATIENT
Start: 2018-08-01 | End: 2018-08-01 | Stop reason: HOSPADM

## 2018-08-01 RX ORDER — FENTANYL CITRATE 50 UG/ML
INJECTION, SOLUTION INTRAMUSCULAR; INTRAVENOUS AS NEEDED
Status: DISCONTINUED | OUTPATIENT
Start: 2018-08-01 | End: 2018-08-01 | Stop reason: HOSPADM

## 2018-08-01 RX ORDER — BISACODYL 5 MG
10 TABLET, DELAYED RELEASE (ENTERIC COATED) ORAL DAILY
Status: DISCONTINUED | OUTPATIENT
Start: 2018-08-02 | End: 2018-08-04 | Stop reason: HOSPADM

## 2018-08-01 RX ORDER — SUCCINYLCHOLINE CHLORIDE 20 MG/ML
INJECTION INTRAMUSCULAR; INTRAVENOUS AS NEEDED
Status: DISCONTINUED | OUTPATIENT
Start: 2018-08-01 | End: 2018-08-01 | Stop reason: HOSPADM

## 2018-08-01 RX ORDER — INDAPAMIDE 2.5 MG/1
2.5 TABLET, FILM COATED ORAL DAILY
Status: DISCONTINUED | OUTPATIENT
Start: 2018-08-02 | End: 2018-08-04 | Stop reason: HOSPADM

## 2018-08-01 RX ORDER — NALOXONE HYDROCHLORIDE 0.4 MG/ML
0.1 INJECTION, SOLUTION INTRAMUSCULAR; INTRAVENOUS; SUBCUTANEOUS AS NEEDED
Status: DISCONTINUED | OUTPATIENT
Start: 2018-08-01 | End: 2018-08-04 | Stop reason: HOSPADM

## 2018-08-01 RX ORDER — SODIUM CHLORIDE, SODIUM LACTATE, POTASSIUM CHLORIDE, CALCIUM CHLORIDE 600; 310; 30; 20 MG/100ML; MG/100ML; MG/100ML; MG/100ML
50 INJECTION, SOLUTION INTRAVENOUS CONTINUOUS
Status: DISCONTINUED | OUTPATIENT
Start: 2018-08-01 | End: 2018-08-01 | Stop reason: HOSPADM

## 2018-08-01 RX ORDER — LIDOCAINE HYDROCHLORIDE 10 MG/ML
0.1 INJECTION, SOLUTION EPIDURAL; INFILTRATION; INTRACAUDAL; PERINEURAL AS NEEDED
Status: DISCONTINUED | OUTPATIENT
Start: 2018-08-01 | End: 2018-08-01 | Stop reason: HOSPADM

## 2018-08-01 RX ORDER — SODIUM CHLORIDE 0.9 % (FLUSH) 0.9 %
5-10 SYRINGE (ML) INJECTION AS NEEDED
Status: DISCONTINUED | OUTPATIENT
Start: 2018-08-01 | End: 2018-08-04 | Stop reason: HOSPADM

## 2018-08-01 RX ORDER — ONDANSETRON 2 MG/ML
4 INJECTION INTRAMUSCULAR; INTRAVENOUS AS NEEDED
Status: DISCONTINUED | OUTPATIENT
Start: 2018-08-01 | End: 2018-08-01 | Stop reason: SDUPTHER

## 2018-08-01 RX ORDER — ALBUTEROL SULFATE 0.83 MG/ML
2.5 SOLUTION RESPIRATORY (INHALATION) AS NEEDED
Status: DISCONTINUED | OUTPATIENT
Start: 2018-08-01 | End: 2018-08-01 | Stop reason: HOSPADM

## 2018-08-01 RX ORDER — OXYCODONE HYDROCHLORIDE 5 MG/1
5-10 TABLET ORAL
Status: DISCONTINUED | OUTPATIENT
Start: 2018-08-01 | End: 2018-08-04 | Stop reason: HOSPADM

## 2018-08-01 RX ORDER — MAGNESIUM SULFATE 100 %
4 CRYSTALS MISCELLANEOUS AS NEEDED
Status: DISCONTINUED | OUTPATIENT
Start: 2018-08-01 | End: 2018-08-02 | Stop reason: SDUPTHER

## 2018-08-01 RX ORDER — DEXTROSE 50 % IN WATER (D50W) INTRAVENOUS SYRINGE
25-50 AS NEEDED
Status: DISCONTINUED | OUTPATIENT
Start: 2018-08-01 | End: 2018-08-01 | Stop reason: HOSPADM

## 2018-08-01 RX ORDER — ROCURONIUM BROMIDE 10 MG/ML
INJECTION, SOLUTION INTRAVENOUS AS NEEDED
Status: DISCONTINUED | OUTPATIENT
Start: 2018-08-01 | End: 2018-08-01 | Stop reason: HOSPADM

## 2018-08-01 RX ORDER — SODIUM CHLORIDE 0.9 % (FLUSH) 0.9 %
5-10 SYRINGE (ML) INJECTION EVERY 8 HOURS
Status: DISCONTINUED | OUTPATIENT
Start: 2018-08-01 | End: 2018-08-04 | Stop reason: HOSPADM

## 2018-08-01 RX ORDER — NEOSTIGMINE METHYLSULFATE 1 MG/ML
INJECTION INTRAVENOUS AS NEEDED
Status: DISCONTINUED | OUTPATIENT
Start: 2018-08-01 | End: 2018-08-01 | Stop reason: HOSPADM

## 2018-08-01 RX ORDER — NALOXONE HYDROCHLORIDE 0.4 MG/ML
0.4 INJECTION, SOLUTION INTRAMUSCULAR; INTRAVENOUS; SUBCUTANEOUS AS NEEDED
Status: DISCONTINUED | OUTPATIENT
Start: 2018-08-01 | End: 2018-08-04 | Stop reason: HOSPADM

## 2018-08-01 RX ORDER — EPHEDRINE SULFATE/0.9% NACL/PF 25 MG/5 ML
SYRINGE (ML) INTRAVENOUS AS NEEDED
Status: DISCONTINUED | OUTPATIENT
Start: 2018-08-01 | End: 2018-08-01 | Stop reason: HOSPADM

## 2018-08-01 RX ORDER — ONDANSETRON 2 MG/ML
INJECTION INTRAMUSCULAR; INTRAVENOUS AS NEEDED
Status: DISCONTINUED | OUTPATIENT
Start: 2018-08-01 | End: 2018-08-01 | Stop reason: HOSPADM

## 2018-08-01 RX ORDER — DEXTROSE 50 % IN WATER (D50W) INTRAVENOUS SYRINGE
25-50 AS NEEDED
Status: DISCONTINUED | OUTPATIENT
Start: 2018-08-01 | End: 2018-08-01 | Stop reason: SDUPTHER

## 2018-08-01 RX ORDER — MORPHINE SULFATE 2 MG/ML
2 INJECTION, SOLUTION INTRAMUSCULAR; INTRAVENOUS
Status: COMPLETED | OUTPATIENT
Start: 2018-08-01 | End: 2018-08-01

## 2018-08-01 RX ORDER — DILTIAZEM HYDROCHLORIDE 180 MG/1
180 CAPSULE, COATED, EXTENDED RELEASE ORAL DAILY
Status: DISCONTINUED | OUTPATIENT
Start: 2018-08-02 | End: 2018-08-04 | Stop reason: HOSPADM

## 2018-08-01 RX ORDER — LIDOCAINE HYDROCHLORIDE 20 MG/ML
INJECTION, SOLUTION EPIDURAL; INFILTRATION; INTRACAUDAL; PERINEURAL AS NEEDED
Status: DISCONTINUED | OUTPATIENT
Start: 2018-08-01 | End: 2018-08-01 | Stop reason: HOSPADM

## 2018-08-01 RX ORDER — CELECOXIB 400 MG/1
400 CAPSULE ORAL ONCE
Status: COMPLETED | OUTPATIENT
Start: 2018-08-01 | End: 2018-08-01

## 2018-08-01 RX ORDER — SODIUM CHLORIDE 9 MG/ML
INJECTION, SOLUTION INTRAVENOUS
Status: DISCONTINUED | OUTPATIENT
Start: 2018-08-01 | End: 2018-08-01 | Stop reason: HOSPADM

## 2018-08-01 RX ORDER — ATENOLOL 50 MG/1
50 TABLET ORAL DAILY
Status: DISCONTINUED | OUTPATIENT
Start: 2018-08-02 | End: 2018-08-04 | Stop reason: HOSPADM

## 2018-08-01 RX ORDER — ONDANSETRON 2 MG/ML
4 INJECTION INTRAMUSCULAR; INTRAVENOUS
Status: DISCONTINUED | OUTPATIENT
Start: 2018-08-01 | End: 2018-08-04 | Stop reason: HOSPADM

## 2018-08-01 RX ORDER — MIDAZOLAM HYDROCHLORIDE 1 MG/ML
INJECTION, SOLUTION INTRAMUSCULAR; INTRAVENOUS AS NEEDED
Status: DISCONTINUED | OUTPATIENT
Start: 2018-08-01 | End: 2018-08-01 | Stop reason: HOSPADM

## 2018-08-01 RX ORDER — LOSARTAN POTASSIUM 50 MG/1
50 TABLET ORAL DAILY
Status: DISCONTINUED | OUTPATIENT
Start: 2018-08-02 | End: 2018-08-04 | Stop reason: HOSPADM

## 2018-08-01 RX ORDER — MORPHINE SULFATE 5 MG/ML
INJECTION, SOLUTION INTRAVENOUS
Status: DISCONTINUED | OUTPATIENT
Start: 2018-08-01 | End: 2018-08-02

## 2018-08-01 RX ORDER — KETOROLAC TROMETHAMINE 30 MG/ML
INJECTION, SOLUTION INTRAMUSCULAR; INTRAVENOUS AS NEEDED
Status: DISCONTINUED | OUTPATIENT
Start: 2018-08-01 | End: 2018-08-01 | Stop reason: HOSPADM

## 2018-08-01 RX ORDER — SODIUM CHLORIDE, SODIUM LACTATE, POTASSIUM CHLORIDE, CALCIUM CHLORIDE 600; 310; 30; 20 MG/100ML; MG/100ML; MG/100ML; MG/100ML
INJECTION, SOLUTION INTRAVENOUS
Status: DISCONTINUED | OUTPATIENT
Start: 2018-08-01 | End: 2018-08-01 | Stop reason: HOSPADM

## 2018-08-01 RX ORDER — INSULIN LISPRO 100 [IU]/ML
INJECTION, SOLUTION INTRAVENOUS; SUBCUTANEOUS ONCE
Status: COMPLETED | OUTPATIENT
Start: 2018-08-01 | End: 2018-08-01

## 2018-08-01 RX ORDER — METFORMIN HYDROCHLORIDE 500 MG/1
500 TABLET, EXTENDED RELEASE ORAL
Status: CANCELLED | OUTPATIENT
Start: 2018-08-01

## 2018-08-01 RX ORDER — MORPHINE SULFATE 10 MG/ML
INJECTION, SOLUTION INTRAMUSCULAR; INTRAVENOUS AS NEEDED
Status: DISCONTINUED | OUTPATIENT
Start: 2018-08-01 | End: 2018-08-01 | Stop reason: HOSPADM

## 2018-08-01 RX ORDER — MORPHINE SULFATE 10 MG/ML
INJECTION, SOLUTION INTRAMUSCULAR; INTRAVENOUS
Status: COMPLETED
Start: 2018-08-01 | End: 2018-08-01

## 2018-08-01 RX ORDER — ONDANSETRON 2 MG/ML
4 INJECTION INTRAMUSCULAR; INTRAVENOUS ONCE
Status: DISCONTINUED | OUTPATIENT
Start: 2018-08-01 | End: 2018-08-01 | Stop reason: HOSPADM

## 2018-08-01 RX ORDER — FAMOTIDINE 20 MG/1
20 TABLET, FILM COATED ORAL ONCE
Status: COMPLETED | OUTPATIENT
Start: 2018-08-01 | End: 2018-08-01

## 2018-08-01 RX ORDER — DEXTROSE 50 % IN WATER (D50W) INTRAVENOUS SYRINGE
25-50 AS NEEDED
Status: DISCONTINUED | OUTPATIENT
Start: 2018-08-01 | End: 2018-08-04 | Stop reason: HOSPADM

## 2018-08-01 RX ORDER — INSULIN LISPRO 100 [IU]/ML
INJECTION, SOLUTION INTRAVENOUS; SUBCUTANEOUS
Status: DISCONTINUED | OUTPATIENT
Start: 2018-08-01 | End: 2018-08-04 | Stop reason: HOSPADM

## 2018-08-01 RX ORDER — SODIUM CHLORIDE 9 MG/ML
100 INJECTION, SOLUTION INTRAVENOUS CONTINUOUS
Status: DISCONTINUED | OUTPATIENT
Start: 2018-08-01 | End: 2018-08-02

## 2018-08-01 RX ORDER — MAGNESIUM SULFATE 100 %
4 CRYSTALS MISCELLANEOUS AS NEEDED
Status: DISCONTINUED | OUTPATIENT
Start: 2018-08-01 | End: 2018-08-04 | Stop reason: HOSPADM

## 2018-08-01 RX ORDER — DIPHENHYDRAMINE HYDROCHLORIDE 50 MG/ML
12.5 INJECTION, SOLUTION INTRAMUSCULAR; INTRAVENOUS
Status: DISCONTINUED | OUTPATIENT
Start: 2018-08-01 | End: 2018-08-01 | Stop reason: SDUPTHER

## 2018-08-01 RX ORDER — VANCOMYCIN HYDROCHLORIDE 1 G/20ML
INJECTION, POWDER, LYOPHILIZED, FOR SOLUTION INTRAVENOUS AS NEEDED
Status: DISCONTINUED | OUTPATIENT
Start: 2018-08-01 | End: 2018-08-01 | Stop reason: HOSPADM

## 2018-08-01 RX ORDER — NALOXONE HYDROCHLORIDE 0.4 MG/ML
0.04 INJECTION, SOLUTION INTRAMUSCULAR; INTRAVENOUS; SUBCUTANEOUS AS NEEDED
Status: DISCONTINUED | OUTPATIENT
Start: 2018-08-01 | End: 2018-08-01 | Stop reason: HOSPADM

## 2018-08-01 RX ORDER — PREGABALIN 50 MG/1
50 CAPSULE ORAL ONCE
Status: COMPLETED | OUTPATIENT
Start: 2018-08-01 | End: 2018-08-01

## 2018-08-01 RX ORDER — DIAZEPAM 5 MG/1
5 TABLET ORAL
Status: DISCONTINUED | OUTPATIENT
Start: 2018-08-01 | End: 2018-08-04 | Stop reason: HOSPADM

## 2018-08-01 RX ORDER — SODIUM CHLORIDE 0.9 % (FLUSH) 0.9 %
5-10 SYRINGE (ML) INJECTION EVERY 8 HOURS
Status: DISCONTINUED | OUTPATIENT
Start: 2018-08-01 | End: 2018-08-01 | Stop reason: HOSPADM

## 2018-08-01 RX ORDER — PROPOFOL 10 MG/ML
INJECTION, EMULSION INTRAVENOUS AS NEEDED
Status: DISCONTINUED | OUTPATIENT
Start: 2018-08-01 | End: 2018-08-01 | Stop reason: HOSPADM

## 2018-08-01 RX ORDER — MAGNESIUM SULFATE 100 %
4 CRYSTALS MISCELLANEOUS AS NEEDED
Status: DISCONTINUED | OUTPATIENT
Start: 2018-08-01 | End: 2018-08-01 | Stop reason: HOSPADM

## 2018-08-01 RX ORDER — DIPHENHYDRAMINE HYDROCHLORIDE 50 MG/ML
12.5 INJECTION, SOLUTION INTRAMUSCULAR; INTRAVENOUS
Status: DISCONTINUED | OUTPATIENT
Start: 2018-08-01 | End: 2018-08-04 | Stop reason: HOSPADM

## 2018-08-01 RX ORDER — DIPHENHYDRAMINE HYDROCHLORIDE 50 MG/ML
12.5 INJECTION, SOLUTION INTRAMUSCULAR; INTRAVENOUS
Status: DISCONTINUED | OUTPATIENT
Start: 2018-08-01 | End: 2018-08-01 | Stop reason: HOSPADM

## 2018-08-01 RX ORDER — GABAPENTIN 400 MG/1
800 CAPSULE ORAL 3 TIMES DAILY
Status: DISCONTINUED | OUTPATIENT
Start: 2018-08-01 | End: 2018-08-04 | Stop reason: HOSPADM

## 2018-08-01 RX ORDER — ACETAMINOPHEN 500 MG
1000 TABLET ORAL EVERY 6 HOURS
Status: DISCONTINUED | OUTPATIENT
Start: 2018-08-01 | End: 2018-08-04 | Stop reason: HOSPADM

## 2018-08-01 RX ORDER — GLYCOPYRROLATE 0.2 MG/ML
INJECTION INTRAMUSCULAR; INTRAVENOUS AS NEEDED
Status: DISCONTINUED | OUTPATIENT
Start: 2018-08-01 | End: 2018-08-01 | Stop reason: HOSPADM

## 2018-08-01 RX ADMIN — GLYCOPYRROLATE 0.2 MG: 0.2 INJECTION INTRAMUSCULAR; INTRAVENOUS at 07:27

## 2018-08-01 RX ADMIN — INSULIN LISPRO 4 UNITS: 100 INJECTION, SOLUTION INTRAVENOUS; SUBCUTANEOUS at 18:51

## 2018-08-01 RX ADMIN — MORPHINE SULFATE 2 MG: 2 INJECTION, SOLUTION INTRAMUSCULAR; INTRAVENOUS at 11:33

## 2018-08-01 RX ADMIN — FENTANYL CITRATE 50 MCG: 50 INJECTION, SOLUTION INTRAMUSCULAR; INTRAVENOUS at 10:14

## 2018-08-01 RX ADMIN — NEOSTIGMINE METHYLSULFATE 4 MG: 1 INJECTION INTRAVENOUS at 10:33

## 2018-08-01 RX ADMIN — GABAPENTIN 800 MG: 400 CAPSULE ORAL at 15:52

## 2018-08-01 RX ADMIN — MORPHINE SULFATE: 5 INJECTION, SOLUTION INTRAVENOUS at 11:27

## 2018-08-01 RX ADMIN — SUCCINYLCHOLINE CHLORIDE 140 MG: 20 INJECTION INTRAMUSCULAR; INTRAVENOUS at 07:32

## 2018-08-01 RX ADMIN — MORPHINE SULFATE 2 MG: 2 INJECTION, SOLUTION INTRAMUSCULAR; INTRAVENOUS at 10:53

## 2018-08-01 RX ADMIN — LIDOCAINE HYDROCHLORIDE 80 MG: 20 INJECTION, SOLUTION EPIDURAL; INFILTRATION; INTRACAUDAL; PERINEURAL at 07:32

## 2018-08-01 RX ADMIN — FENTANYL CITRATE 50 MCG: 50 INJECTION, SOLUTION INTRAMUSCULAR; INTRAVENOUS at 08:17

## 2018-08-01 RX ADMIN — INSULIN LISPRO 2 UNITS: 100 INJECTION, SOLUTION INTRAVENOUS; SUBCUTANEOUS at 22:03

## 2018-08-01 RX ADMIN — OXYCODONE HYDROCHLORIDE 10 MG: 5 TABLET ORAL at 15:52

## 2018-08-01 RX ADMIN — MORPHINE SULFATE 2 MG: 2 INJECTION, SOLUTION INTRAMUSCULAR; INTRAVENOUS at 11:03

## 2018-08-01 RX ADMIN — INSULIN LISPRO 3 UNITS: 100 INJECTION, SOLUTION INTRAVENOUS; SUBCUTANEOUS at 11:54

## 2018-08-01 RX ADMIN — PROPOFOL 160 MG: 10 INJECTION, EMULSION INTRAVENOUS at 07:32

## 2018-08-01 RX ADMIN — GLYCOPYRROLATE 0.6 MG: 0.2 INJECTION INTRAMUSCULAR; INTRAVENOUS at 10:33

## 2018-08-01 RX ADMIN — MORPHINE SULFATE 3 MG: 10 INJECTION, SOLUTION INTRAMUSCULAR; INTRAVENOUS at 10:41

## 2018-08-01 RX ADMIN — FENTANYL CITRATE 100 MCG: 50 INJECTION, SOLUTION INTRAMUSCULAR; INTRAVENOUS at 09:01

## 2018-08-01 RX ADMIN — FAMOTIDINE 20 MG: 20 TABLET ORAL at 06:35

## 2018-08-01 RX ADMIN — SODIUM CHLORIDE: 9 INJECTION, SOLUTION INTRAVENOUS at 07:38

## 2018-08-01 RX ADMIN — Medication 300 MG: at 10:00

## 2018-08-01 RX ADMIN — MORPHINE SULFATE 3 MG: 10 INJECTION, SOLUTION INTRAMUSCULAR; INTRAVENOUS at 10:26

## 2018-08-01 RX ADMIN — Medication 10 ML: at 22:07

## 2018-08-01 RX ADMIN — MORPHINE SULFATE 2 MG: 2 INJECTION, SOLUTION INTRAMUSCULAR; INTRAVENOUS at 11:23

## 2018-08-01 RX ADMIN — ACETAMINOPHEN 1000 MG: 500 TABLET, FILM COATED ORAL at 15:52

## 2018-08-01 RX ADMIN — MIDAZOLAM HYDROCHLORIDE 2 MG: 1 INJECTION, SOLUTION INTRAMUSCULAR; INTRAVENOUS at 07:27

## 2018-08-01 RX ADMIN — MORPHINE SULFATE 2 MG: 2 INJECTION, SOLUTION INTRAMUSCULAR; INTRAVENOUS at 11:13

## 2018-08-01 RX ADMIN — ROCURONIUM BROMIDE 35 MG: 10 INJECTION, SOLUTION INTRAVENOUS at 07:41

## 2018-08-01 RX ADMIN — FENTANYL CITRATE 50 MCG: 50 INJECTION, SOLUTION INTRAMUSCULAR; INTRAVENOUS at 10:19

## 2018-08-01 RX ADMIN — MORPHINE SULFATE: 5 INJECTION, SOLUTION INTRAVENOUS at 19:44

## 2018-08-01 RX ADMIN — KETOROLAC TROMETHAMINE 15 MG: 30 INJECTION, SOLUTION INTRAMUSCULAR; INTRAVENOUS at 10:33

## 2018-08-01 RX ADMIN — SODIUM CHLORIDE 1000 MG: 900 INJECTION, SOLUTION INTRAVENOUS at 18:55

## 2018-08-01 RX ADMIN — PREGABALIN 50 MG: 50 CAPSULE ORAL at 06:35

## 2018-08-01 RX ADMIN — ROCURONIUM BROMIDE 10 MG: 10 INJECTION, SOLUTION INTRAVENOUS at 08:30

## 2018-08-01 RX ADMIN — SODIUM CHLORIDE 1000 MG: 900 INJECTION, SOLUTION INTRAVENOUS at 06:35

## 2018-08-01 RX ADMIN — Medication 10 MG: at 08:01

## 2018-08-01 RX ADMIN — GABAPENTIN 800 MG: 400 CAPSULE ORAL at 22:04

## 2018-08-01 RX ADMIN — NITROFURANTOIN (MONOHYDRATE/MACROCRYSTALS) 100 MG: 75; 25 CAPSULE ORAL at 18:55

## 2018-08-01 RX ADMIN — ROCURONIUM BROMIDE 5 MG: 10 INJECTION, SOLUTION INTRAVENOUS at 07:32

## 2018-08-01 RX ADMIN — ROCURONIUM BROMIDE 10 MG: 10 INJECTION, SOLUTION INTRAVENOUS at 09:30

## 2018-08-01 RX ADMIN — SODIUM CHLORIDE, SODIUM LACTATE, POTASSIUM CHLORIDE, AND CALCIUM CHLORIDE 50 ML/HR: 600; 310; 30; 20 INJECTION, SOLUTION INTRAVENOUS at 06:35

## 2018-08-01 RX ADMIN — SODIUM CHLORIDE, SODIUM LACTATE, POTASSIUM CHLORIDE, CALCIUM CHLORIDE: 600; 310; 30; 20 INJECTION, SOLUTION INTRAVENOUS at 07:27

## 2018-08-01 RX ADMIN — CELECOXIB 400 MG: 400 CAPSULE ORAL at 06:35

## 2018-08-01 RX ADMIN — ONDANSETRON 4 MG: 2 INJECTION INTRAMUSCULAR; INTRAVENOUS at 07:27

## 2018-08-01 RX ADMIN — FENTANYL CITRATE 100 MCG: 50 INJECTION, SOLUTION INTRAMUSCULAR; INTRAVENOUS at 07:32

## 2018-08-01 RX ADMIN — ONDANSETRON 4 MG: 2 INJECTION INTRAMUSCULAR; INTRAVENOUS at 10:33

## 2018-08-01 NOTE — PROGRESS NOTES
OR today- L4-5 Fusion Hx: Cardiac Disease, DM-  post-op, pre-op 122. Diabetic orders VSS, LS clear, Apical pulse regular Dressing clean, dry and intact MEGHAN:120cc in PACU, needs to be emptied again. Bloody drainage. Order to take off suction PCA: Morphine UA: 200cc of clear yellow UA now, 150cc in PACU 
PT:due to ambulate, was using walker at home Neuro  Intact. Reports that she was back greater than BLE pain w/ RLE>LLE prior to surgery. Her leg pain feels ok now. Having incisional back pain. General baseline weakness, no focal weakness on exam. 4/5 BLE. Moving all extremities. Plan: She inquired about being DC to home vs rehab. She isn't sure what she wants. I explained that PT needs to come and eval and make recommendation and then we will go off that. She agrees. HH in am due to blood loss.  
 
Valentin Joshi NP

## 2018-08-01 NOTE — OP NOTES
[address] [place] [address] Dr  OPERATIVE REPORT    Alea Nicole  MR#: [id number]  : 1940  ACCOUNT #: [de-identified]   DATE OF SERVICE: 2018    SURGEON:  Quincy Corey MD    ASSISTANT:      PREOPERATIVE DIAGNOSES:  Spinal stenosis, spondylolisthesis, L4-L5. POSTOPERATIVE DIAGNOSES:  Spinal stenosis, spondylolisthesis, L4-L5. PROCEDURE PERFORMED:  L4-L5 laminectomy, medial facetectomy, foraminotomy L4-L5 diskectomy, L4-5 posterolateral fusion, segmental spinal instrumentation L4-L5 with K2M instrumentation. FINDINGS:  There was advanced degenerative changes of the facets with a spondylolisthesis and rotatory instability. L5-S1 had been considered to be addressed as well, but on evaluating it, there were stable facets, clearly a quiet segment for some time, I felt it was inappropriate to disturb it given the patient's age and bone quality. We will just address this most significant segment. OPERATION:  Following induction of endotracheal anesthesia, the patient was turned in prone position on spinal frame. Patient prepped and draped in usual fashion. Midline incision was made, paramedian incision was made in a limited fashion subperiosteal dissection at L4-5 and 1. The 5-1 facets occluded quiet and it was quite stiff and stable. I decided to leave it intact. The 4-5 with advanced degenerative changes, synovitis, gross spondylolisthesis and rotatory instability. Laminectomy was done with progressive medial facetectomy, traumatic severe stenosis was evident. Following meticulous dissection, the neural elements were safely decompressed. Extruded disk herniation was found on the left and decompressed. Once the neural elements were thoroughly decompressed, given the poor bony landmarks, a combination of fluoroscopic imaging, direct palpation and anatomic landmarks were utilized for placement of pedicle instrumentation.   Holes made were all palpated with ball tip probe, tapped, repalpated and instrumentation placed at 4 and 5 pedicles bilaterally and all tapped, repalpated and screws placed. Fixation was acceptable. Pedicles were small and patient's overall soft tissue quality poor. Rods were placed in the screw heads and final tightening and torquing done on the intertransverse regions that decorticated and grafted with combination of cancellous autograft and allograft. Gelfoam placed over the laminectomy site. Lumbodorsal fascia was closed after treating the area with vancomycin powder for infection prophylaxis. Fascia was closed with #1 Vicryl. Subcutaneous tissues were closed with 2-0 Vicryl, skin closed with a 4-0 Monocryl, subcuticular suture and Dermabond. A deep drain had been placed,  dressing placed upon the wound. All counts were correct. ESTIMATED BLOOD LOSS:  150-250 mL. COMPLICATIONS:  There were no complications. IMPLANTS:  k2m    SPECIMENS REMOVED:  No specimens. ANESTHESIA:  General endotracheal.      INSTRUMENTATION:  K2M pedicle screw implants. The patient was brought to recovery room in good and stable condition.       Donna Campa MD       [address] [facility] / Rick [facility]  D: 08/01/2018 10:19     T: 08/01/2018 13:04  JOB #: [id number]

## 2018-08-01 NOTE — BRIEF OP NOTE
BRIEF OPERATIVE NOTE Date of Procedure: 8/1/2018 Preoperative Diagnosis: Spondylolisthesis, lumbar region [M43.16] Spinal stenosis, lumbar region, with neurogenic claudication [M48.062] Postoperative Diagnosis: Spondylolisthesis, lumbar region [M43.16] Spinal stenosis, lumbar region, with neurogenic claudication [M48.062] Procedure(s): 
L4/5 LAMINECTOMY FUSION/C-ARM/K2M Surgeon(s) and Role: Deana Curtis MD - Primary Surgical Assistant:  
 
Surgical Staff: 
Circ-1: Vivi Castro RN 
Circ-2: Jeferson Capellan RN Radiology Technician: [facility] [employer] Sc[employer] Tech-1: Luis Pitch Surg Asst-1: Sundar Ferny Event Time In Incision Start 2931 Incision Close Anesthesia: General  
Estimated Blood Loss: 150 Specimens: * No specimens in log * Findings: severe stenosis, hnp l4/5, autofusion l5/1 Complications: 0 Implants:  
Implant Name Type Inv. Item Serial No.  Lot No. LRB No. Used Action Cancellous Chips   [id number]   N/A 1 Implanted SCR SPNE POLYAXL 6.5X45MM -- [employer] - [id number]  SCR SPNE POLYAXL 6.5X45MM -- [employer]  [employer]  N/A 1 Implanted SCR SPNE POLYAXL 5.5X45MM --  - [id number]  SCR SPNE POLYAXL 5.5X45MM --   [employer]  N/A 1 Implanted SCR SPNE POLYAXL 5.5X45MM --  - [id number]  SCR SPNE POLYAXL 5.5X45MM --   [employer]  N/A 1 Implanted SCR SPNE POLYAXL 5.5X45MM --  - [id number]  SCR SPNE POLYAXL 5.5X45MM --   [employer]  N/A 1 Implanted BRIJESH SPNE CONTRD 5.5X40MM -- VIANNEY - [id number]  BRIJESH SPNE CONTRD 5.5X40MM -- [employer]  [employer]  N/A 1 Implanted BRIJESH SPNE CONTRD 5.5X40MM -- VIANNEY - [id number]   BRIJESH SPNE CONTRD 5.5X40MM -- [employer]   [employer]   N/A 1 Implanted

## 2018-08-01 NOTE — ANESTHESIA PREPROCEDURE EVALUATION
Anesthetic History No history of anesthetic complications Review of Systems / Medical History Patient summary reviewed and pertinent labs reviewed Pulmonary Within defined limits Neuro/Psych Within defined limits Cardiovascular Hypertension: well controlled Dysrhythmias GI/Hepatic/Renal 
Within defined limits Endo/Other Diabetes: well controlled, type 2 Hypothyroidism: well controlled Obesity and arthritis Other Findings Comments: Documentation of current medication Current medications obtained, documented and obtained? YES Risk Factors for Postoperative nausea/vomiting: 
     History of postoperative nausea/vomiting? NO Female? YES Motion sickness? NO Intended opioid administration for postoperative analgesia? YES Smoking Abstinence: 
Current Smoker? NO Elective Surgery? YES Seen preoperatively by anesthesiologist or proxy prior to day of surgery? YES Pt abstained from smoking 24 hours prior to anesthesia? N/A Preventive care/screening for High Blood Pressure: 
Aged 18 years and older: YES Screened for high blood pressure: YES Patients with high blood pressure referred to primary care provider 
 for BP management: YES Physical Exam 
 
Airway Mallampati: II 
TM Distance: 4 - 6 cm Neck ROM: normal range of motion Mouth opening: Normal 
 
 Cardiovascular Dental 
No notable dental hx Pulmonary Abdominal 
GI exam deferred Other Findings Anesthetic Plan ASA: 3 Anesthesia type: general 
 
 
 
 
Induction: Intravenous Anesthetic plan and risks discussed with: Patient

## 2018-08-01 NOTE — IP AVS SNAPSHOT
[address] Patient: Grace Schilder MRN: [id number] :1940 About your hospitalization You were admitted on:  2018 You last received care in the:  [facility] 5 [facility] 1322 You were discharged on:  August 3, 2018 Why you were hospitalized Your primary diagnosis was:  Not on File Your diagnoses also included:  Spondylisthesis, Spinal Stenosis Follow-up Information Follow up With Details Comments Contact Info Allyson Jones MD   [address] [facility] [address] 706 [facility] 
[phone] Your Scheduled Appointments Wednesday August 15, 2018 11:45 AM EDT  
POST OP with Abimael Ceron NP  
[facility] ([address] [place] [address]) . [facility] [address] [facility] [address] [phone] 2018 10:45 AM EDT  
POST OP with Guillermo Warner MD  
[address] [facility], [address] [facility] [address]) [address] [facility] [address] [facility] [address]  
[phone] Discharge Orders None A check quique indicates which time of day the medication should be taken. My Medications START taking these medications Instructions Each Dose to Equal  
 Morning Noon Evening Bedtime DULoxetine 30 mg capsule Commonly known as:  CYMBALTA Your last dose was: Your next dose is: Take 1 Cap by mouth daily. Take 1Tab QHS for 2 weeks, then increase to 2tab QHS 30 mg  
    
   
   
   
  
 oxyCODONE IR 5 mg immediate release tablet Commonly known as:  Dorthea Mccjeniferdy Your last dose was: Your next dose is: Take 1-2 Tabs by mouth every four (4) hours as needed. Max Daily Amount: 60 mg.  
 5-10 mg CONTINUE taking these medications  Instructions Each Dose to Equal  
 Morning Noon Evening Bedtime  
 acetaminophen 500 mg tablet Commonly known as:  TYLENOL Your last dose was: Your next dose is:    
   
   
 500 mg.  
 500 mg  
    
   
   
   
  
 aspirin 81 mg chewable tablet Your last dose was: Your next dose is: Take 81 mg by mouth daily. Indications: myocardial infarction prevention 81 mg  
    
   
   
   
  
 atenolol 50 mg tablet Commonly known as:  TENORMIN Your last dose was: Your next dose is: TAKE ONE TABLET BY MOUTH TWICE DAILY CALCIUM 600 + D 600-125 mg-unit Tab Generic drug:  calcium-cholecalciferol (d3) Your last dose was: Your next dose is: Take  by mouth daily. Patient taking 4 times a week CRANBERRY CONC-ASCORBIC ACID PO Your last dose was: Your next dose is: Take  by mouth. dilTIAZem  mg XR capsule Commonly known as:  DILACOR XR Your last dose was: Your next dose is: Take 1 Cap by mouth daily. 180 mg  
    
   
   
   
  
 gabapentin 800 mg tablet Commonly known as:  NEURONTIN Your last dose was: Your next dose is: Take 1 Tab by mouth three (3) times daily (with meals). Indications: NEUROPATHIC PAIN  
 800 mg GLUCOSAMINE 1500 COMPLEX PO Your last dose was: Your next dose is: Take  by mouth daily. glucosamine-chondroitin 1,500-1,200 mg/30 mL Liqd Commonly known as:  ELATION Your last dose was: Your next dose is: Take  by Mouth. indapamide 2.5 mg tablet Commonly known as:  [place] Your last dose was: Your next dose is: Take  by mouth daily. lidocaine 5 % Commonly known as:  Bard Giraldo Your last dose was: Your next dose is:    
   
   
 by TransDERmal route every twenty-four (24) hours. Apply patch to the affected area for 12 hours a day and remove for 12 hours a day. losartan 50 mg tablet Commonly known as:  COZAAR Your last dose was: Your next dose is: TAKE ONE TABLET BY MOUTH ONCE DAILY MACROBID 100 mg capsule Generic drug:  nitrofurantoin (macrocrystal-monohydrate) Your last dose was: Your next dose is: Take 100 mg by mouth two (2) times a day. Indications: BACTERIAL URINARY TRACT INFECTION  
 100 mg  
    
   
   
   
  
 MELATONIN PO Your last dose was: Your next dose is:    
   
   
 5 mg as needed. 5 mg  
    
   
   
   
  
 metFORMIN 500 mg Tg24 24 hour tablet Commonly known asJodine Palms ER Your last dose was: Your next dose is:    
   
   
 850 mg daily. 850 mg  
    
   
   
   
  
 pravastatin 40 mg tablet Commonly known as:  PRAVACHOL Your last dose was: Your next dose is: Take 40 mg by mouth daily. 40 mg  
    
   
   
   
  
 trimethoprim 100 mg tablet Commonly known as:  TRIMPEX Your last dose was: Your next dose is:    
   
   
 100 mg two (2) times a day. Indications: BACTERIAL URINARY TRACT INFECTION  
 100 mg  
    
   
   
   
  
 VITAMIN D3 1,000 unit Cap Generic drug:  cholecalciferol Your last dose was: Your next dose is: Take  by mouth daily. STOP taking these medications   
 traMADol 50 mg tablet Commonly known as:  ULTRAM  
   
  
  
  
Where to Get Your Medications Information on where to get these meds will be given to you by the nurse or doctor. ! Ask your nurse or doctor about these medications DULoxetine 30 mg capsule  
 oxyCODONE IR 5 mg immediate release tablet Opioid Education Prescription Opioids: What You Need to Know: 
 
 
After general anesthesia or intravenous sedation, for 24 hours or while taking prescription Narcotics: · Limit your activities · Do not drive and operate hazardous machinery · Do not make important personal or business decisions · Do  not drink alcoholic beverages · If you have not urinated within 8 hours after discharge, please contact your surgeon on call. Report the following to your surgeon: 
· Excessive pain, swelling, redness or odor of or around the surgical area · Temperature over 100.5 · Nausea and vomiting lasting longer than 4 hours or if unable to take medications · Any signs of decreased circulation or nerve impairment to extremity: change in color, persistent  numbness, tingling, coldness or increase pain · Any questions What to do at Home: *  Please give a list of your current medications to your Primary Care Provider. *  Please update this list whenever your medications are discontinued, doses are 
    changed, or new medications (including over-the-counter products) are added. *  Please carry medication information at all times in case of emergency situations. These are general instructions for a healthy lifestyle: No smoking/ No tobacco products/ Avoid exposure to second hand smoke Surgeon General's Warning:  Quitting smoking now greatly reduces serious risk to your health. Obesity, smoking, and sedentary lifestyle greatly increases your risk for illness A healthy diet, regular physical exercise & weight monitoring are important for maintaining a healthy lifestyle You may be retaining fluid if you have a history of heart failure or if you experience any of the following symptoms:  Weight gain of 3 pounds or more overnight or 5 pounds in a week, increased swelling in our hands or feet or shortness of breath while lying flat in bed. Please call your doctor as soon as you notice any of these symptoms; do not wait until your next office visit. Recognize signs and symptoms of STROKE: 
 
F-face looks uneven A-arms unable to move or move unevenly S-speech slurred or non-existent T-time-call 911 as soon as signs and symptoms begin-DO NOT go Back to bed or wait to see if you get better-TIME IS BRAIN. Warning Signs of HEART ATTACK Call 911 if you have these symptoms: 
? Chest discomfort. Most heart attacks involve discomfort in the center of the chest that lasts more than a few minutes, or that goes away and comes back. It can feel like uncomfortable pressure, squeezing, fullness, or pain. ? Discomfort in other areas of the upper body. Symptoms can include pain or discomfort in one or both arms, the back, neck, jaw, or stomach. ? Shortness of breath with or without chest discomfort. ? Other signs may include breaking out in a cold sweat, nausea, or lightheadedness. Don't wait more than five minutes to call [address]! Fast action can save your life. Calling 911 is almost always the fastest way to get lifesaving treatment. Emergency Medical Services staff can begin treatment when they arrive  up to an hour sooner than if someone gets to the hospital by car. The discharge information has been reviewed with the patient. The patient verbalized understanding. Discharge medications reviewed with the patient and appropriate educational materials and side effects teaching were provided. ___________________________________________________________________________________________________________________________________ Post-Operative Discharge Instructions after Spine Surgery [facility] [address] [facility] 
[phone] At your 2-week post-operative visit we will remove any skin staples or sutures, if present. (Appointment was made at the time you scheduled your surgery) Call office or physician on-call for any of the following: · Fever greater than 100.5 · Uncontrollable chills · Drainage from incision · Pain not controlled by pain medication · New pain · New weakness or progressive weakness · Food sticking in throat or excessive coughing when swallowing Stop all anti-inflammatories (arthritis medication) such as Ibuprofen, Motrin, Aleve, Voltaren, Relafen, Naproxen, Arthrotec, etc. for 12 weeks ONLY if you had a neck or back Fusion. You may take Tylenol or acetaminophen over the counter. May remove JACKELYN stockings when discharged from the hospital. 
 
May shower on the third day after surgery. Leave dressing on while you shower; the dressing is waterproof as long as the edges are sealed. Change dressing after 1 week, or sooner if saturated with drainage. Replace with a gauze dressing. Abstain from driving until your first post-operative visit. If you must drive, do not take pain medications that may alter your abilities. Lumbar braces and neck collars are for comfort only. Walking is the best therapy after back surgery. Avoid extremes of bending, twisting, or lifting. All post-operative surgical patients should function within the range of the pain. \"If it hurts - do not do it. \"Post-Operative Discharge Instructions after Spine Surgery [facility] [address] [facility] 
[phone] At your 2-week post-operative visit we will remove any skin staples or sutures, if present. (Appointment was made at the time you scheduled your surgery) Call office or physician on-call for any of the following: · Fever greater than 100.5 · Uncontrollable chills · Drainage from incision · Pain not controlled by pain medication · New pain · New weakness or progressive weakness · Food sticking in throat or excessive coughing when swallowing Stop all anti-inflammatories (arthritis medication) such as Ibuprofen, Motrin, Aleve, Voltaren, Relafen, Naproxen, Arthrotec, etc. for 12 weeks ONLY if you had a neck or back Fusion. You may take Tylenol or acetaminophen over the counter. May remove JACKELYN stockings when discharged from the hospital. 
 
May shower on the third day after surgery. Leave dressing on while you shower; the dressing is waterproof as long as the edges are sealed. Change dressing after 1 week, or sooner if saturated with drainage. Replace with a gauze dressing. Abstain from driving until your first post-operative visit. If you must drive, do not take pain medications that may alter your abilities. Lumbar braces and neck collars are for comfort only. Walking is the best therapy after back surgery. Avoid extremes of bending, twisting, or lifting. All post-operative surgical patients should function within the range of the pain. \"If it hurts - do not do it. \" PATIENT DISCHARGE INSTRUCTIONS PATIENT DISCHARGE INSTRUCTIONS Marcos Tracey / [id number] : 1940 Admitted 2018 Discharged: 8/3/2018 · It is important that you take the medication exactly as they are prescribed. · Keep your medication in the bottles provided by the pharmacist and keep a list of the medication names, dosages, and times to be taken in your wallet. · Do not take other medications without consulting your doctor. What to do at [facility] Recommended Diet: Cardiac Diet Recommended Activity: No lifting, Driving, or Strenuous exercise for 2 weeks, keep dressing dry If you experience any of the following symptoms  fever greater than 100.5, wound drainage, redness at incision site, increased pain, new onset of extremity numbness/tingling/weakness or gait disturbance, bladder or bowel dysfunction. , please follow up with the spine center. Signed By: Nicholas Wong NP August 3, 2018 Little Bird Announcement We are excited to announce that we are making your provider's discharge notes available to you in Little Bird. You will see these notes when they are completed and signed by the physician that discharged you from your recent hospital stay. If you have any questions or concerns about any information you see in Little Bird, please call the Health Information Department where you were seen or reach out to your Primary Care Provider for more information about your plan of care. Introducing [facility]! Dear Hawa Marks: 
Thank you for requesting a Little Bird account. Our records indicate that you already have an active Little Bird account. You can access your account anytime at [email or web address] Did you know that you can access your hospital and ER discharge instructions at any time in Little Bird? You can also review all of your test results from your hospital stay or ER visit. Additional Information If you have questions, please visit the Frequently Asked Questions section of the Little Bird website at [email or web address] Remember, Little Bird is NOT to be used for urgent needs. For medical emergencies, dial 911. Now available from your iPhone and Android! Introducing Nitin Murray As a [facility] patient, I wanted to make you aware of our electronic visit tool called Nitin Murray. [facility] 24/7 allows you to connect within minutes with a medical provider 24 hours a day, seven days a week via a mobile device or tablet or logging into a secure website from your computer. You can access Nitin Murray from anywhere in the [place].  
 
A virtual visit might be right for you when you have a simple condition and feel like you just dont want to get out of bed, or cant get away from work for an appointment, when your regular [facility] provider is not available (evenings, weekends or holidays), or when youre out of town and need minor care. Electronic visits cost only $49 and if the [facility] 24/7 provider determines a prescription is needed to treat your condition, one can be electronically transmitted to a nearby pharmacy*. Please take a moment to enroll today if you have not already done so. The enrollment process is free and takes just a few minutes. To enroll, please download the [employer] narendra to your tablet or phone, or visit [email or web address] to enroll on your computer. And, as an [address] [place] [address] patient with a [employer] account, the results of your visits will be scanned into your electronic medical record and your primary care provider will be able to view the scanned results. We urge you to continue to see your regular [facility] provider for your ongoing medical care. And while your primary care provider may not be the one available when you seek a [employer] virtual visit, the peace of mind you get from getting a real diagnosis real time can be priceless. For more information on [employer], view our Frequently Asked Questions (FAQs) at [email or web address]. Sincerely, 
 
Polly Pimentel MD 
Chief Medical Officer Fernando Francois *:  certain medications cannot be prescribed via [employer] Unresulted Labs-Please follow up with your PCP about these lab tests Order Current Status NC XR TECHNOLOGIST SERVICE In process Providers Seen During Your Hospitalization Provider Specialty Primary office phone Jose Antonio Barraza MD Orthopedic Surgery [phone] Your Primary Care Physician (PCP) Primary Care Physician Office Phone Office Fax Alison Hernandez [phone] You are allergic to the following Allergen Reactions Kenalog (Triamcinolone Acetonide) Anaphylaxis Penicillin G Hives Adhesive Tape-Silicones Swelling Bacitracin Not Reported This Time Betadine (Povidone-Iodine) Other (comments) Caused uncomfortable rash on area where placed Cephalexin Rash Ciprofloxacin Other (comments) Cortisone Not Reported This Time Erythromycin Not Reported This Time Hydrocortisone Hives Lisinopril Not Reported This Time Lortab (Hydrocodone-Acetaminophen) Rash Macrobid (Nitrofurantoin Monohyd/M-Cryst) Other (comments) Flushed face,leg swellinig Methimazole Other (comments) Neck pain/cough Penicillins Not Reported This Time Prednisolone Other (comments) Prednisone Not Reported This Time Propylthiouracil Other (comments) Dizziness,elevated blood pressure Sulfamethoxazole-Trimethoprim Other (comments) Tetanus And Diphtheria Toxoids, Adsorbed, Adult Swelling Tetanus Vaccines And Toxoid Not Reported This Time Recent Documentation Height Weight Breastfeeding? BMI OB Status Smoking Status 1.651 m 84.5 kg No 31.01 kg/m2 Hysterectomy Never Smoker Emergency Contacts Name Discharge Info Relation Home Work Mobile [address] [employer] [3] Daughter [21] [phone] Patient Belongings The following personal items are in your possession at time of discharge: 
  Dental Appliances: None  Visual Aid: Glasses      Home Medications: None   Jewelry: None  Clothing: Undergarments, Footwear, Shirt, Pants    Other Valuables: None Please provide this summary of care documentation to your next provider. Signatures-by signing, you are acknowledging that this After Visit Summary has been reviewed with you and you have received a copy.   
  
 
  
    
    
 Patient Signature: ____________________________________________________________ Date:  ____________________________________________________________  
  
Scharlene Alosa Provider Signature:  ____________________________________________________________ Date:  ____________________________________________________________

## 2018-08-01 NOTE — PROGRESS NOTES
Problem: Mobility Impaired (Adult and Pediatric) Goal: *Acute Goals and Plan of Care (Insert Text) Physical Therapy Goals Initiated 8/1/2018 and to be accomplished within 7 day(s) 1. Patient will move from supine to sit and sit to supine , scoot up and down and roll side to side in bed with supervision/set-up. 2.  Patient will transfer from bed to chair and chair to bed with supervision/set-up using the least restrictive device. 3.  Patient will perform sit to stand with supervision/set-up. 4.  Patient will ambulate with supervision/set-up for >150 feet with the least restrictive device. 5.  Patient will ascend/descend 5 stairs with 1-2 handrail(s) with minimal assistance/contact guard assist. 
 
Outcome: Progressing Towards Goal 
physical Therapy EVALUATION Patient: Ilene Powerstingham (86 y.o. female) Date: 8/1/2018 Primary Diagnosis: Spondylolisthesis, lumbar region [M43.16] Spinal stenosis, lumbar region, with neurogenic claudication [M48.062] Procedure(s) (LRB): 
L4/5 LAMINECTOMY FUSION/C-ARM/K2M (N/A) Day of Surgery Precautions:   Fall, Spinal 
 
ASSESSMENT : 
PT orders received and patient cleared by nursing to participate with therapy. Patient is a 76 y.o. female admitted to the hospital due to s/;p L4/5 laminectomy fusion Dr. Milena Rose 8/1/18. Patient consents to PT evaluation and treatment. Pt educated on back precautions, log rolling, ankle pumps, and OOB with nursing assistance 3-5 times each day. Pt performed supine to sit log rolling CGA. Pt initially dizzy and has nausea. Pt also had increased pain to her R hip in sitting as well as at end of therapy with nursing informed. Pt performed sit to stands CGA. Gait training 5 feet to the chair RW CGA for safety with cues for sequencing. Pt ended therapy sitting in recliner with ice donned, pillow under knees for support/decreased pain, and all needs met. Pt's nurse was present at end of therapy.   
 
Patient will benefit from skilled intervention to address the above impairments and increase functional independence. Patients rehabilitation potential is considered to be Good Factors which may influence rehabilitation potential include:  
[]         None noted 
[]         Mental ability/status []         Medical condition 
[]         Home/family situation and support systems 
[]         Safety awareness 
[]         Pain tolerance/management 
[]         Other: PLAN : 
Recommendations and Planned Interventions: 
[x]           Bed Mobility Training             [x]    Neuromuscular Re-Education 
[x]           Transfer Training                   []    Orthotic/Prosthetic Training 
[x]           Gait Training                          []    Modalities [x]           Therapeutic Exercises          []    Edema Management/Control 
[x]           Therapeutic Activities            [x]    Patient and Family Training/Education 
[]           Other (comment): Frequency/Duration: Patient will be followed by physical therapy 1-2 times per day to address goals. Discharge Recommendations: Home Health Further Equipment Recommendations for Discharge: Pt has RW SUBJECTIVE:  
Patient stated I haven't eaten yet.  OBJECTIVE DATA SUMMARY:  
 
Past Medical History:  
Diagnosis Date  Cardiomegaly  Essential hypertension, benign   
 stable  Hypercholesterolemia  Hyperthyroidism   
 no meds  Obesity, unspecified Pt has weight loss  Other and unspecified hyperlipidemia Chol 172, ldl 82, hdl 62, tg 141  Type II or unspecified type diabetes mellitus without mention of complication, not stated as uncontrolled Past Surgical History:  
Procedure Laterality Date  HX GYN    
 hysterectomy Barriers to Learning/Limitations: yes;  altered mental status (i.e.Sedation, Confusion) Compensate with: Visual Cues, Verbal Cues and Tactile Cues Prior Level of Function/Home Situation: Independent with mobility including gait using a RW over the last 2 months due to back pain but prior no AD. Home Situation Home Environment: Private residence # Steps to Enter: 5 Rails to Enter: Yes Hand Rails : Bilateral 
One/Two Story Residence: One story Living Alone: No 
Support Systems:  (lives with daughter) Patient Expects to be Discharged to[de-identified] Private residence Current DME Used/Available at Home: Walker, rolling, Shower chair, Grab bars Critical Behavior: 
Neurologic State: Alert Psychosocial 
Patient Behaviors: Calm; Cooperative Family  Behaviors: Calm; Cooperative Strength:   
Strength: Within functional limits (B LE) Tone & Sensation:  
Tone: Normal (B LE) Sensation: Intact (B LE) Range Of Motion: 
AROM: Within functional limits (B LE) Functional Mobility: 
Bed Mobility: 
Supine to Sit: Contact guard assistance Transfers: 
Sit to Stand: Contact guard assistance Stand to Sit: Contact guard assistance Balance:  
Sitting: Intact Standing: Impaired; With support Standing - Static: Fair Standing - Dynamic : Fair Ambulation/Gait Training: 
Assistive Device: Walker, rolling Ambulation - Level of Assistance: Contact guard assistance Base of Support: Center of gravity altered Speed/Emily: Pace decreased (<100 feet/min) Therapeutic Exercises:  
Reviewed ankle pumps Pain: 
Pre: 4-5/10 back Post: 10/10 R hip and back-nursing informed Activity Tolerance:  
fair Please refer to the flowsheet for vital signs taken during this treatment. After treatment:  
[x] Patient left in no apparent distress sitting up in chair 
[] Patient left sitting on EOB [] Patient left in no apparent distress in bed 
[] Patient declined to be OOB at this time due to   
[x] Call bell left within reach [x] Nursing notified(Sandra) [x] Caregiver present 
[] Bed alarm activated 
[x] Personal items in reach COMMUNICATION/EDUCATION:  
[x]         Fall prevention education was provided and the patient/caregiver indicated understanding.  
[x] Patient/family have participated as able in goal setting and plan of care. [x]         Patient/family agree to work toward stated goals and plan of care. []         Patient understands intent and goals of therapy, but is neutral about his/her participation. []         Patient is unable to participate in goal setting and plan of care. Thank you for this referral. 
Nuvia Rondon, PT, DPT Time Calculation: 23 mins Mobility [id number] Current  CJ= 20-39%   Goal  CI= 1-19%. The severity rating is based on the Level of Assistance required for Functional Mobility and ADLs. Eval Complexity: History: MEDIUM  Complexity : 1-2 comorbidities / personal factors will impact the outcome/ POC Exam:HIGH Complexity : 4+ Standardized tests and measures addressing body structure, function, activity limitation and / or participation in recreation  Presentation: LOW Complexity : Stable, uncomplicated  Clinical Decision Making:Low Complexity   Overall Complexity:LOW

## 2018-08-01 NOTE — PERIOP NOTES
TRANSFER - OUT REPORT: 
 
Verbal report given to [facility] RN(name) on Miles Munoz  being transferred to [address] [place] [address](unit) for routine post - op Report consisted of patients Situation, Background, Assessment and  
Recommendations(SBAR). Information from the following report(s) SBAR, OR Summary, Procedure Summary, Intake/Output and MAR was reviewed with the receiving nurse. Lines:  
Peripheral IV 08/01/18 Right Hand (Active) Site Assessment Clean, dry, & intact 8/1/2018  6:30 AM  
Phlebitis Assessment 0 8/1/2018  6:30 AM  
Infiltration Assessment 0 8/1/2018  6:30 AM  
Dressing Status Clean, dry, & intact 8/1/2018  6:30 AM  
Dressing Type Tape;Transparent 8/1/2018  6:30 AM  
Hub Color/Line Status Pink; Infusing 8/1/2018  6:30 AM  
   
Peripheral IV 08/01/18 Left Hand (Active) Site Assessment Clean, dry, & intact 8/1/2018  8:48 AM  
Phlebitis Assessment 0 8/1/2018  8:48 AM  
Infiltration Assessment 0 8/1/2018  8:48 AM  
Dressing Status Clean, dry, & intact 8/1/2018  8:48 AM  
Dressing Type Tape 8/1/2018  8:48 AM  
Hub Color/Line Status Pink;Patent 8/1/2018  8:48 AM  
  
 
Opportunity for questions and clarification was provided. Patient transported with: 
 O2 @ 3 liters Tech

## 2018-08-01 NOTE — PROGRESS NOTES
conducted a pre-surgery visit with Hollis Burr, who is a 76 y.o.,female. The  provided the following Interventions: 
Initiated a relationship of care and support. Offered prayer and assurance of continued prayers on patient's behalf. Plan: 
Chaplains will continue to follow and will provide pastoral care on an as needed/requested basis.  recommends bedside caregivers page  on duty if patient shows signs of acute spiritual or emotional distress. Britney Campoverde Board Certified [employer] [facility]  
[phone]

## 2018-08-01 NOTE — ANESTHESIA POSTPROCEDURE EVALUATION
Post-Anesthesia Evaluation and Assessment Patient: Sonja Hodgkin MRN: [id number]  SSN: [id number] YOB: 1940  Age: 76 y.o. Sex: female Cardiovascular Function/Vital Signs Visit Vitals  /63  Pulse 72  Temp 36.4 °C (97.6 °F)  Resp 14  
 Ht 5' 5\" (1.651 m)  Wt 84.5 kg (186 lb 6 oz)  SpO2 98%  BMI 31.01 kg/m2 Patient is status post general anesthesia for Procedure(s): 
L4/5 LAMINECTOMY FUSION/C-ARM/K2M. Nausea/Vomiting: None Postoperative hydration reviewed and adequate. Pain: 
Pain Scale 1: Numeric (0 - 10) (08/01/18 1206) Pain Intensity 1: 3 (08/01/18 1206) Managed Neurological Status:  
Neuro (WDL): Within Defined Limits (08/01/18 1042) Neuro LUE Motor Response: Purposeful (08/01/18 1042) LLE Motor Response: Purposeful (08/01/18 1042) RUE Motor Response: Purposeful (08/01/18 1042) RLE Motor Response: Purposeful (08/01/18 1042) At baseline Mental Status and Level of Consciousness: Arousable Pulmonary Status:  
O2 Device: Nasal cannula (08/01/18 1103) Adequate oxygenation and airway patent Complications related to anesthesia: None Post-anesthesia assessment completed. No concerns Signed By: Damian Lara MD   
 August 1, 2018

## 2018-08-01 NOTE — INTERVAL H&P NOTE
H&P Update: 
Jan Tinsley was seen and examined. History and physical has been reviewed. The patient has been examined.  There have been no significant clinical changes since the completion of the originally dated History and Physical. 
 
Signed By: Ivan Ford MD   
 August 1, 2018 6:51 AM

## 2018-08-02 LAB
ANION GAP SERPL CALC-SCNC: 10 MMOL/L (ref 3–18)
BUN SERPL-MCNC: 9 MG/DL (ref 7–18)
BUN/CREAT SERPL: 25 (ref 12–20)
CALCIUM SERPL-MCNC: 7.6 MG/DL (ref 8.5–10.1)
CHLORIDE SERPL-SCNC: 101 MMOL/L (ref 100–108)
CO2 SERPL-SCNC: 27 MMOL/L (ref 21–32)
CREAT SERPL-MCNC: 0.36 MG/DL (ref 0.6–1.3)
ERYTHROCYTE [DISTWIDTH] IN BLOOD BY AUTOMATED COUNT: 14.1 % (ref 11.6–14.5)
EST. AVERAGE GLUCOSE BLD GHB EST-MCNC: 128 MG/DL
GLUCOSE BLD STRIP.AUTO-MCNC: 113 MG/DL (ref 70–110)
GLUCOSE BLD STRIP.AUTO-MCNC: 115 MG/DL (ref 70–110)
GLUCOSE BLD STRIP.AUTO-MCNC: 133 MG/DL (ref 70–110)
GLUCOSE BLD STRIP.AUTO-MCNC: 92 MG/DL (ref 70–110)
GLUCOSE SERPL-MCNC: 111 MG/DL (ref 74–99)
HBA1C MFR BLD: 6.1 % (ref 4.2–5.6)
HCT VFR BLD AUTO: 29.2 % (ref 35–45)
HGB BLD-MCNC: 9.8 G/DL (ref 12–16)
MCH RBC QN AUTO: 27.9 PG (ref 24–34)
MCHC RBC AUTO-ENTMCNC: 33.6 G/DL (ref 31–37)
MCV RBC AUTO: 83.2 FL (ref 74–97)
PLATELET # BLD AUTO: 236 K/UL (ref 135–420)
PMV BLD AUTO: 9.8 FL (ref 9.2–11.8)
POTASSIUM SERPL-SCNC: 2.8 MMOL/L (ref 3.5–5.5)
RBC # BLD AUTO: 3.51 M/UL (ref 4.2–5.3)
SODIUM SERPL-SCNC: 138 MMOL/L (ref 136–145)
WBC # BLD AUTO: 9.3 K/UL (ref 4.6–13.2)

## 2018-08-02 PROCEDURE — 97165 OT EVAL LOW COMPLEX 30 MIN: CPT

## 2018-08-02 PROCEDURE — 74011250636 HC RX REV CODE- 250/636: Performed by: ORTHOPAEDIC SURGERY

## 2018-08-02 PROCEDURE — 99218 HC RM OBSERVATION: CPT

## 2018-08-02 PROCEDURE — 97116 GAIT TRAINING THERAPY: CPT

## 2018-08-02 PROCEDURE — 80048 BASIC METABOLIC PNL TOTAL CA: CPT | Performed by: ORTHOPAEDIC SURGERY

## 2018-08-02 PROCEDURE — 74011250637 HC RX REV CODE- 250/637: Performed by: ORTHOPAEDIC SURGERY

## 2018-08-02 PROCEDURE — G8989 SELF CARE D/C STATUS: HCPCS

## 2018-08-02 PROCEDURE — G8988 SELF CARE GOAL STATUS: HCPCS

## 2018-08-02 PROCEDURE — 74011636637 HC RX REV CODE- 636/637: Performed by: ORTHOPAEDIC SURGERY

## 2018-08-02 PROCEDURE — G8987 SELF CARE CURRENT STATUS: HCPCS

## 2018-08-02 PROCEDURE — 97530 THERAPEUTIC ACTIVITIES: CPT

## 2018-08-02 PROCEDURE — 97535 SELF CARE MNGMENT TRAINING: CPT

## 2018-08-02 PROCEDURE — 83036 HEMOGLOBIN GLYCOSYLATED A1C: CPT | Performed by: NURSE PRACTITIONER

## 2018-08-02 PROCEDURE — 85027 COMPLETE CBC AUTOMATED: CPT | Performed by: ORTHOPAEDIC SURGERY

## 2018-08-02 PROCEDURE — 36415 COLL VENOUS BLD VENIPUNCTURE: CPT | Performed by: ORTHOPAEDIC SURGERY

## 2018-08-02 PROCEDURE — 82962 GLUCOSE BLOOD TEST: CPT

## 2018-08-02 RX ORDER — POTASSIUM CHLORIDE 20 MEQ/1
40 TABLET, EXTENDED RELEASE ORAL 2 TIMES DAILY
Status: DISCONTINUED | OUTPATIENT
Start: 2018-08-02 | End: 2018-08-02

## 2018-08-02 RX ORDER — POTASSIUM CHLORIDE 14.9 MG/ML
20 INJECTION INTRAVENOUS ONCE
Status: DISCONTINUED | OUTPATIENT
Start: 2018-08-02 | End: 2018-08-02

## 2018-08-02 RX ORDER — POTASSIUM CHLORIDE 7.45 MG/ML
10 INJECTION INTRAVENOUS
Status: COMPLETED | OUTPATIENT
Start: 2018-08-02 | End: 2018-08-02

## 2018-08-02 RX ORDER — POTASSIUM CHLORIDE 20 MEQ/1
40 TABLET, EXTENDED RELEASE ORAL EVERY 4 HOURS
Status: COMPLETED | OUTPATIENT
Start: 2018-08-02 | End: 2018-08-02

## 2018-08-02 RX ADMIN — Medication 10 ML: at 05:25

## 2018-08-02 RX ADMIN — Medication 10 ML: at 22:00

## 2018-08-02 RX ADMIN — Medication 10 ML: at 14:00

## 2018-08-02 RX ADMIN — POTASSIUM CHLORIDE 40 MEQ: 20 TABLET, EXTENDED RELEASE ORAL at 12:05

## 2018-08-02 RX ADMIN — POTASSIUM CHLORIDE 10 MEQ: 10 INJECTION, SOLUTION INTRAVENOUS at 09:00

## 2018-08-02 RX ADMIN — OXYCODONE HYDROCHLORIDE 10 MG: 5 TABLET ORAL at 11:01

## 2018-08-02 RX ADMIN — GABAPENTIN 800 MG: 400 CAPSULE ORAL at 21:41

## 2018-08-02 RX ADMIN — POTASSIUM CHLORIDE 10 MEQ: 10 INJECTION, SOLUTION INTRAVENOUS at 08:00

## 2018-08-02 RX ADMIN — ATENOLOL 50 MG: 50 TABLET ORAL at 08:21

## 2018-08-02 RX ADMIN — GABAPENTIN 800 MG: 400 CAPSULE ORAL at 08:20

## 2018-08-02 RX ADMIN — DILTIAZEM HYDROCHLORIDE 180 MG: 180 CAPSULE, COATED, EXTENDED RELEASE ORAL at 08:20

## 2018-08-02 RX ADMIN — POTASSIUM CHLORIDE 40 MEQ: 1500 TABLET, EXTENDED RELEASE ORAL at 08:20

## 2018-08-02 RX ADMIN — BISACODYL 10 MG: 5 TABLET, DELAYED RELEASE ORAL at 08:21

## 2018-08-02 RX ADMIN — LOSARTAN POTASSIUM 50 MG: 50 TABLET ORAL at 08:21

## 2018-08-02 RX ADMIN — OXYCODONE HYDROCHLORIDE 10 MG: 5 TABLET ORAL at 17:12

## 2018-08-02 RX ADMIN — ACETAMINOPHEN 1000 MG: 500 TABLET, FILM COATED ORAL at 17:12

## 2018-08-02 RX ADMIN — NITROFURANTOIN (MONOHYDRATE/MACROCRYSTALS) 100 MG: 75; 25 CAPSULE ORAL at 08:20

## 2018-08-02 RX ADMIN — Medication 10 ML: at 05:23

## 2018-08-02 RX ADMIN — ACETAMINOPHEN 1000 MG: 500 TABLET, FILM COATED ORAL at 11:58

## 2018-08-02 RX ADMIN — SODIUM CHLORIDE 1000 MG: 900 INJECTION, SOLUTION INTRAVENOUS at 05:23

## 2018-08-02 RX ADMIN — NITROFURANTOIN (MONOHYDRATE/MACROCRYSTALS) 100 MG: 75; 25 CAPSULE ORAL at 17:12

## 2018-08-02 RX ADMIN — INSULIN LISPRO 2 UNITS: 100 INJECTION, SOLUTION INTRAVENOUS; SUBCUTANEOUS at 00:00

## 2018-08-02 RX ADMIN — ACETAMINOPHEN 1000 MG: 500 TABLET, FILM COATED ORAL at 05:22

## 2018-08-02 RX ADMIN — GABAPENTIN 800 MG: 400 CAPSULE ORAL at 17:12

## 2018-08-02 RX ADMIN — ACETAMINOPHEN 1000 MG: 500 TABLET, FILM COATED ORAL at 00:47

## 2018-08-02 RX ADMIN — OXYCODONE HYDROCHLORIDE 10 MG: 5 TABLET ORAL at 21:41

## 2018-08-02 RX ADMIN — INDAPAMIDE 2.5 MG: 2.5 TABLET, FILM COATED ORAL at 08:20

## 2018-08-02 NOTE — PROGRESS NOTES
Problem: Self Care Deficits Care Plan (Adult) Goal: *Acute Goals and Plan of Care (Insert Text) Outcome: Resolved/Met Date Met: 08/02/18 Occupational Therapy EVALUATION/discharge Patient: Edelmira Ruiz (46 y.o. female) Date: 8/2/2018 Primary Diagnosis: Spondylolisthesis, lumbar region [M43.16] Spinal stenosis, lumbar region, with neurogenic claudication [M48.062] Procedure(s) (LRB): 
L4/5 LAMINECTOMY FUSION/C-ARM/K2M (N/A) 1 Day Post-Op Precautions:   Fall, Back ASSESSMENT AND RECOMMENDATIONS: 
Based on the objective data described below, the patient presents with is able to perform basic self care tasks without assistance using AE (reacher, LHS, sock aid, [facility], [facility]) given after demonstration/practice. Supervision given for functional standing and transfers. Will defer to PT for mobility training. Back precautions reviewed and patient verbalized/demonstrated understanding. She has a supportive daughter at home during the evenings to assist her prn and all needed DME for bathroom safety. Skilled occupational therapy is not indicated at this time. Discharge Recommendations: None Further Equipment Recommendations for Discharge: N/A Barriers to Learning/Limitations: None Compensate with: visual, verbal, tactile, kinesthetic cues/model COMPLEXITY Eval Complexity: History: LOW Complexity : Brief history review ; Examination: LOW Complexity : 1-3 performance deficits relating to physical, cognitive , or psychosocial skils that result in activity limitations and / or participation restrictions ; Decision Making:LOW Complexity : No comorbidities that affect functional and no verbal or physical assistance needed to complete eval tasks  Assessment: Low Complexity G-CODES:  
 
Self Care  Current  CI= 1-19%  Goal  CI= 1-19%  D/C  CI= 1-19%. The severity rating is based on the Level of Assistance required for Functional Mobility and ADLs.  
 
SUBJECTIVE:  
Patient stated I live with my daughter but she works during the day.  OBJECTIVE DATA SUMMARY:  
 
Past Medical History:  
Diagnosis Date  Cardiomegaly  Essential hypertension, benign   
 stable  Hypercholesterolemia  Hyperthyroidism   
 no meds  Obesity, unspecified Pt has weight loss  Other and unspecified hyperlipidemia Chol 172, ldl 82, hdl 62, tg 141  Type II or unspecified type diabetes mellitus without mention of complication, not stated as uncontrolled Past Surgical History:  
Procedure Laterality Date  HX GYN    
 hysterectomy Prior Level of Function/Home Situation: Pt was independent with basic self care tasks and used a RW for functional mobility PTA. Home Situation Home Environment: Private residence # Steps to Enter: 5 Rails to Enter: Yes Hand Rails : Bilateral 
One/Two Story Residence: One story Living Alone: No 
Support Systems: Child(venkat), Shinto / henrry community, Family member(s), Friends \ neighbors (lives with daughter) Patient Expects to be Discharged to[de-identified] Private residence Current DME Used/Available at Home: Walker, rolling, Shower chair, Grab bars Tub or Shower Type: Tub/Shower combination (with seat/grab bar; grab bar by toilet with raised seat) [x]     Right hand dominant   []     Left hand dominant Cognitive/Behavioral Status: 
Neurologic State: Alert Orientation Level: Oriented X4 Cognition: Appropriate decision making; Follows commands Safety/Judgement: Awareness of environment; Fall prevention Skin: Intact on UEs Edema: None noted in UEs Vision/Perceptual:   
Acuity: Within Defined Limits Coordination: 
Fine Motor Skills-Upper: Left Intact; Right Intact Gross Motor Skills-Upper: Left Intact; Right Intact Balance: 
Sitting: Intact Standing: Impaired; With support Standing - Static: Good Standing - Dynamic : Fair (+) Strength: 
Strength: Within functional limits (UEs) Tone & Sensation: 
Tone: Normal (UEs) Sensation: Intact (UEs) Range of Motion: 
AROM: Within functional limits (UEs) Functional Mobility and Transfers for ADLs: 
Bed Mobility: 
Supine to Sit: Minimum assistance Sit to Supine: Stand-by assistance;Contact guard assistance Transfers: 
Sit to Stand: Supervision Toilet Transfer : Supervision ADL Assessment: 
Feeding: Independent Oral Facial Hygiene/Grooming: Independent Bathing: Supervision Upper Body Dressing: Independent Lower Body Dressing: Supervision Toileting: Modified independent ADL Intervention: 
Patient practiced LB dressing with use of AE given (reacher, sock aid) after demonstration while seated. She stood with supervision to pull underwear up over her hips. No assist needed after practice. Patient also given a long handled sponge and long handled shoe horn after demonstration. She maneuvered to the bathroom with supervision for toileting. Patient managed her clothing and performed hygiene without assistance. Cognitive Retraining Safety/Judgement: Awareness of environment; Fall prevention Pain: 
Pt reports 5/10 pain or discomfort prior to treatment, in RLE. Pain meds given prior to session.   
Pt reports 5/10 pain or discomfort post treatment, in RLE. Patient resting in chair at end of session. Activity Tolerance:  
Good Please refer to the flowsheet for vital signs taken during this treatment. After treatment:  
[x]  Patient left in no apparent distress sitting up in chair 
[]  Patient left in no apparent distress in bed 
[x]  Call bell left within reach [x]  Nursing notified 
[]  Caregiver present 
[]  Bed alarm activated COMMUNICATION/EDUCATION:  
Communication/Collaboration: 
[x]      Home safety education was provided and the patient/caregiver indicated understanding. [x]      Patient/family have participated as able and agree with findings and recommendations.  
[]      Patient is unable to participate in plan of care at this time. 
 
Isabel Elva, MS OTR/L Time Calculation: 40 mins

## 2018-08-02 NOTE — PROGRESS NOTES
Problem: Mobility Impaired (Adult and Pediatric) Goal: *Acute Goals and Plan of Care (Insert Text) Physical Therapy Goals Initiated 8/1/2018 and to be accomplished within 7 day(s) 1. Patient will move from supine to sit and sit to supine , scoot up and down and roll side to side in bed with supervision/set-up. 2.  Patient will transfer from bed to chair and chair to bed with supervision/set-up using the least restrictive device. 3.  Patient will perform sit to stand with supervision/set-up. 4.  Patient will ambulate with supervision/set-up for >150 feet with the least restrictive device. 5.  Patient will ascend/descend 5 stairs with 1-2 handrail(s) with minimal assistance/contact guard assist. 
  
Outcome: Progressing Towards Goal 
physical Therapy TREATMENT Patient: Pascale Nunez (26 y.o. female) Date: 8/2/2018 Diagnosis: Spondylolisthesis, lumbar region [M43.16] Spinal stenosis, lumbar region, with neurogenic claudication [M48.062] <principal problem not specified> Procedure(s) (LRB): 
L4/5 LAMINECTOMY FUSION/C-ARM/K2M (N/A) 1 Day Post-Op Precautions: Fall, Back Chart, physical therapy assessment, plan of care and goals were reviewed. OBJECTIVE/ ASSESSMENT: 
Patient found sitting in b/s chair willing to work with PT. Pt ambulated into hallway this tx with RW. Pt is able to increase distance greatly and attempt stair negotiation. Pt ascends stairs with her left LE first as she reports pain into her right hip. Pt descends stairs with right LE first. Pt returned to room where she practiced log roll technique. Pt performs well when given tactile cues. Pt was returned to supine post tx and left with needs in reach, and icepack in place as pt is reporting back pain across her LB. Pt progressing well this tx and plans to DC home with help from family. Education: transfers, gait, log roll. Progression toward goals: 
[x]      Improving appropriately and progressing toward goals [] Improving slowly and progressing toward goals 
[]      Not making progress toward goals and plan of care will be adjusted PLAN: 
Patient continues to benefit from skilled intervention to address the above impairments. Continue treatment per established plan of care. Discharge Recommendations:  Home Health Further Equipment Recommendations for Discharge:  rolling walker SUBJECTIVE:  
Patient stated I'm afraid this pain is coming from my hip.  OBJECTIVE DATA SUMMARY:  
Critical Behavior: 
Neurologic State: Alert Orientation Level: Oriented X4 Cognition: Appropriate decision making, Follows commands Safety/Judgement: Awareness of environment, Fall prevention Functional Mobility Training: 
Bed Mobility: 
Supine to Sit: Minimum assistance Sit to Supine: Stand-by assistance;Contact guard assistance Transfers: 
Sit to Stand: Supervision Stand to Sit: Supervision Balance: 
Sitting: Intact Standing: Impaired; With support Standing - Static: Good Standing - Dynamic : Fair (+) Ambulation/Gait Training: 
Distance (ft): 150 Feet (ft) Assistive Device: Walker, rolling Ambulation - Level of Assistance: Stand-by assistance;Supervision Gait Abnormalities: Decreased step clearance Base of Support: Center of gravity altered Speed/Emily: Slow;Pace decreased (<100 feet/min) Step Length: Left shortened;Right shortened Pain: 
Pre tx pain: 8 Post tx pain: 8 Intervention: Ice and rest 
Activity Tolerance:  
Fair Please refer to the flowsheet for vital signs taken during this treatment. After treatment:  
[] Patient left in no apparent distress sitting up in chair 
[x] Patient left in no apparent distress in bed 
[x] Call bell left within reach 
[] Nursing notified 
[] Caregiver present 
[] Bed alarm activated 
[x] SCDs applied 
[x] Ice applied Ab Burris PTA Time Calculation: 42 mins Mobility [id number] Current  CJ= 20-39%.   The severity rating is based on the Level of Assistance required for Functional Mobility and ADLs. Mobility   Goal  CI= 1-19%. The severity rating is based on the Level of Assistance required for Functional Mobility and ADLs.

## 2018-08-02 NOTE — ROUTINE PROCESS
Patient is alert and oriented times three with no signs or symptoms of distress. Dressing is clean dry and intact and neuro is intact.

## 2018-08-02 NOTE — ROUTINE PROCESS
Patient is alert and oriented times three with no signs or symptoms of distress. Dressing is clean dry and intact. And neuro is intact

## 2018-08-02 NOTE — ROUTINE PROCESS
Bedside and Verbal shift change report given to MATHEW Ramírez (oncoming nurse) by Arjun Salvador RN 
 (offgoing nurse). Report included the following information SBAR, Kardex, Intake/Output and MAR.

## 2018-08-02 NOTE — PROGRESS NOTES
Patients arrives via bed alert awake and oriented, area to back dressing of op site dry and intact draining serosanguinous drainage. No s/s of distress, CMS+

## 2018-08-02 NOTE — PROGRESS NOTES
Care Management Specialist reviewed over the [facility] MATA and Observation Letter with patient. Patient signed the letter. Patient given a copy of letter by bedside. Signed copy placed into patient chart.

## 2018-08-02 NOTE — ROUTINE PROCESS
Patient is alert and oriented times three with no signs or symptoms of distress. Neuro intact and dressing is clean dry and intact.

## 2018-08-02 NOTE — PROGRESS NOTES
Surgery: Post op day #1, L4-5 fusion VSS Wound: Dressing clean, dry and intact MEGHAN: 90 out today, we will leave this inplace over night. PT: 150 w/ RW. DC recommendation: HH w/ RW. Neuro intact. Moving all extremities. 4/5 strength to BLE. Pre op pain: Reports that she had back pain greater than BLE pain w/ RLE>LLE prior to surgery. Post op pain: Patient states she had a sudden onset of right leg pain, very severe from her buttock to her ankle when she stood up this morning. She states it lasted about 3 hours. It took about 30 minutes for the pain to decrease after her pain medication. Dr. Diogo Braswell was on the floor and saw the patient also. We will observe the patient over night. If the pain returns or worsens, we will consider a Lumbar CT. Delonte Tarango NP

## 2018-08-02 NOTE — ROUTINE PROCESS
Bedside and Verbal shift change report given to Billie (oncoming nurse) by  [employer], RN (offgoing nurse). Report included the following information SBAR, Kardex, Intake/Output and MAR.

## 2018-08-02 NOTE — ROUTINE PROCESS
Bedside and Verbal shift change report given to Yfn Montalvo RN (oncoming nurse) by US Shearer RN (offgoing nurse). Report included the following information SBAR, Kardex, Intake/Output and MAR.

## 2018-08-03 ENCOUNTER — APPOINTMENT (OUTPATIENT)
Dept: CT IMAGING | Age: 78
DRG: 460 | End: 2018-08-03
Attending: ORTHOPAEDIC SURGERY
Payer: MEDICARE

## 2018-08-03 VITALS
HEIGHT: 65 IN | HEART RATE: 87 BPM | OXYGEN SATURATION: 98 % | RESPIRATION RATE: 16 BRPM | DIASTOLIC BLOOD PRESSURE: 89 MMHG | BODY MASS INDEX: 31.05 KG/M2 | WEIGHT: 186.38 LBS | SYSTOLIC BLOOD PRESSURE: 168 MMHG | TEMPERATURE: 98.3 F

## 2018-08-03 LAB
ANION GAP SERPL CALC-SCNC: 5 MMOL/L (ref 3–18)
BUN SERPL-MCNC: 7 MG/DL (ref 7–18)
BUN/CREAT SERPL: 18 (ref 12–20)
CALCIUM SERPL-MCNC: 8.3 MG/DL (ref 8.5–10.1)
CHLORIDE SERPL-SCNC: 105 MMOL/L (ref 100–108)
CO2 SERPL-SCNC: 29 MMOL/L (ref 21–32)
CREAT SERPL-MCNC: 0.38 MG/DL (ref 0.6–1.3)
GLUCOSE BLD STRIP.AUTO-MCNC: 122 MG/DL (ref 70–110)
GLUCOSE SERPL-MCNC: 117 MG/DL (ref 74–99)
POTASSIUM SERPL-SCNC: 3.3 MMOL/L (ref 3.5–5.5)
SODIUM SERPL-SCNC: 139 MMOL/L (ref 136–145)

## 2018-08-03 PROCEDURE — 65270000029 HC RM PRIVATE

## 2018-08-03 PROCEDURE — 80048 BASIC METABOLIC PNL TOTAL CA: CPT | Performed by: ORTHOPAEDIC SURGERY

## 2018-08-03 PROCEDURE — 36415 COLL VENOUS BLD VENIPUNCTURE: CPT | Performed by: ORTHOPAEDIC SURGERY

## 2018-08-03 PROCEDURE — 72131 CT LUMBAR SPINE W/O DYE: CPT

## 2018-08-03 PROCEDURE — 99218 HC RM OBSERVATION: CPT

## 2018-08-03 PROCEDURE — 82962 GLUCOSE BLOOD TEST: CPT

## 2018-08-03 PROCEDURE — 74011250637 HC RX REV CODE- 250/637: Performed by: ORTHOPAEDIC SURGERY

## 2018-08-03 RX ORDER — OXYCODONE HYDROCHLORIDE 5 MG/1
5-10 TABLET ORAL
Qty: 30 TAB | Refills: 0 | Status: SHIPPED | OUTPATIENT
Start: 2018-08-03 | End: 2018-08-07 | Stop reason: SDUPTHER

## 2018-08-03 RX ORDER — DULOXETIN HYDROCHLORIDE 30 MG/1
30 CAPSULE, DELAYED RELEASE ORAL DAILY
Qty: 60 CAP | Refills: 2 | Status: SHIPPED | OUTPATIENT
Start: 2018-08-03 | End: 2018-09-18 | Stop reason: SINTOL

## 2018-08-03 RX ADMIN — OXYCODONE HYDROCHLORIDE 10 MG: 5 TABLET ORAL at 03:49

## 2018-08-03 RX ADMIN — ACETAMINOPHEN 1000 MG: 500 TABLET, FILM COATED ORAL at 12:15

## 2018-08-03 RX ADMIN — GABAPENTIN 800 MG: 400 CAPSULE ORAL at 08:58

## 2018-08-03 RX ADMIN — OXYCODONE HYDROCHLORIDE 10 MG: 5 TABLET ORAL at 14:13

## 2018-08-03 RX ADMIN — BISACODYL 10 MG: 5 TABLET, DELAYED RELEASE ORAL at 08:58

## 2018-08-03 RX ADMIN — DILTIAZEM HYDROCHLORIDE 180 MG: 180 CAPSULE, COATED, EXTENDED RELEASE ORAL at 08:59

## 2018-08-03 RX ADMIN — Medication 10 ML: at 06:00

## 2018-08-03 RX ADMIN — OXYCODONE HYDROCHLORIDE 10 MG: 5 TABLET ORAL at 08:59

## 2018-08-03 RX ADMIN — NITROFURANTOIN (MONOHYDRATE/MACROCRYSTALS) 100 MG: 75; 25 CAPSULE ORAL at 08:58

## 2018-08-03 RX ADMIN — ACETAMINOPHEN 1000 MG: 500 TABLET, FILM COATED ORAL at 07:17

## 2018-08-03 NOTE — PROGRESS NOTES
conducted a Follow up consultation and Spiritual Assessment for Gregory Jaeger, who is a 76 y.o.,female. The  provided the following Interventions: 
Continued the relationship of care and support. Listened empathically. Offered prayer and assurance of continued prayer on patients behalf. Chart reviewed. The following outcomes were achieved: 
Patient expressed gratitude for 's visit. Assessment: 
There are no further spiritual or Spiritism issues which require Spiritual Care Services interventions at this time. Plan: 
Chaplains will continue to follow and will provide pastoral care on an as needed/requested basis.  recommends bedside caregivers page  on duty if patient shows signs of acute spiritual or emotional distress. [address] [facility] Certified [place] [facility]  
[phone]

## 2018-08-03 NOTE — PROGRESS NOTES
Problem: Mobility Impaired (Adult and Pediatric) Goal: *Acute Goals and Plan of Care (Insert Text) Physical Therapy Goals Initiated 8/1/2018 and to be accomplished within 7 day(s) 1. Patient will move from supine to sit and sit to supine , scoot up and down and roll side to side in bed with supervision/set-up. 2.  Patient will transfer from bed to chair and chair to bed with supervision/set-up using the least restrictive device. 3.  Patient will perform sit to stand with supervision/set-up. 4.  Patient will ambulate with supervision/set-up for >150 feet with the least restrictive device. 5.  Patient will ascend/descend 5 stairs with 1-2 handrail(s) with minimal assistance/contact guard assist. 
  
 
1st attempt at 747. Pt scheduled for ct this am to check instrumentation. Will follow up w/ pt once results of ct are received and reviewed. LETTY Choi

## 2018-08-03 NOTE — DISCHARGE INSTRUCTIONS
DISCHARGE SUMMARY from Nurse    PATIENT INSTRUCTIONS:    After general anesthesia or intravenous sedation, for 24 hours or while taking prescription Narcotics:  · Limit your activities  · Do not drive and operate hazardous machinery  · Do not make important personal or business decisions  · Do  not drink alcoholic beverages  · If you have not urinated within 8 hours after discharge, please contact your surgeon on call. Report the following to your surgeon:  · Excessive pain, swelling, redness or odor of or around the surgical area  · Temperature over 100.5  · Nausea and vomiting lasting longer than 4 hours or if unable to take medications  · Any signs of decreased circulation or nerve impairment to extremity: change in color, persistent  numbness, tingling, coldness or increase pain  · Any questions    What to do at Home:      *  Please give a list of your current medications to your Primary Care Provider. *  Please update this list whenever your medications are discontinued, doses are      changed, or new medications (including over-the-counter products) are added. *  Please carry medication information at all times in case of emergency situations. These are general instructions for a healthy lifestyle:    No smoking/ No tobacco products/ Avoid exposure to second hand smoke  Surgeon General's Warning:  Quitting smoking now greatly reduces serious risk to your health. Obesity, smoking, and sedentary lifestyle greatly increases your risk for illness    A healthy diet, regular physical exercise & weight monitoring are important for maintaining a healthy lifestyle    You may be retaining fluid if you have a history of heart failure or if you experience any of the following symptoms:  Weight gain of 3 pounds or more overnight or 5 pounds in a week, increased swelling in our hands or feet or shortness of breath while lying flat in bed.   Please call your doctor as soon as you notice any of these symptoms; do not wait until your next office visit. Recognize signs and symptoms of STROKE:    F-face looks uneven    A-arms unable to move or move unevenly    S-speech slurred or non-existent    T-time-call 911 as soon as signs and symptoms begin-DO NOT go       Back to bed or wait to see if you get better-TIME IS BRAIN. Warning Signs of HEART ATTACK     Call 911 if you have these symptoms:   Chest discomfort. Most heart attacks involve discomfort in the center of the chest that lasts more than a few minutes, or that goes away and comes back. It can feel like uncomfortable pressure, squeezing, fullness, or pain.  Discomfort in other areas of the upper body. Symptoms can include pain or discomfort in one or both arms, the back, neck, jaw, or stomach.  Shortness of breath with or without chest discomfort.  Other signs may include breaking out in a cold sweat, nausea, or lightheadedness. Don't wait more than five minutes to call 911 - MINUTES MATTER! Fast action can save your life. Calling 911 is almost always the fastest way to get lifesaving treatment. Emergency Medical Services staff can begin treatment when they arrive -- up to an hour sooner than if someone gets to the hospital by car. The discharge information has been reviewed with the patient. The patient verbalized understanding. Discharge medications reviewed with the patient and appropriate educational materials and side effects teaching were provided. ___________________________________________________________________________________________________________________________________  Post-Operative Discharge Instructions after [address] [place] [address] [facility]  [phone]      At your 2-week post-operative visit we will remove any skin staples or sutures, if present.   (Appointment was made at the time you scheduled your surgery)    Call office or physician on-call for any of the following:  · Fever greater than 100.5  · Uncontrollable chills  · Drainage from incision  · Pain not controlled by pain medication  · New pain  · New weakness or progressive weakness  · Food sticking in throat or excessive coughing when swallowing    Stop all anti-inflammatories (arthritis medication) such as Ibuprofen, Motrin, Aleve, Voltaren, Relafen, Naproxen, Arthrotec, etc. for 12 weeks ONLY if you had a neck or back Fusion. You may take Tylenol or acetaminophen over the counter. May remove JACKELYN stockings when discharged from the hospital.    May shower on the third day after surgery. Leave dressing on while you shower; the dressing is waterproof as long as the edges are sealed. Change dressing after 1 week, or sooner if saturated with drainage. Replace with a gauze dressing. Abstain from driving until your first post-operative visit. If you must drive, do not take pain medications that may alter your abilities. Lumbar braces and neck collars are for comfort only. Walking is the best therapy after back surgery. Avoid extremes of bending, twisting, or lifting. All post-operative surgical patients should function within the range of the pain. \"If it hurts - do not do it. \"Post-Operative Discharge Instructions after [address] [place] [address] [facility]  [phone]      At your 2-week post-operative visit we will remove any skin staples or sutures, if present. (Appointment was made at the time you scheduled your surgery)    Call office or physician on-call for any of the following:  · Fever greater than 100.5  · Uncontrollable chills  · Drainage from incision  · Pain not controlled by pain medication  · New pain  · New weakness or progressive weakness  · Food sticking in throat or excessive coughing when swallowing    Stop all anti-inflammatories (arthritis medication) such as Ibuprofen, Motrin, Aleve, Voltaren, Relafen, Naproxen, Arthrotec, etc. for 12 weeks ONLY if you had a neck or back Fusion.  You may take Tylenol or acetaminophen over the counter. May remove JACKELYN stockings when discharged from the hospital.    May shower on the third day after surgery. Leave dressing on while you shower; the dressing is waterproof as long as the edges are sealed. Change dressing after 1 week, or sooner if saturated with drainage. Replace with a gauze dressing. Abstain from driving until your first post-operative visit. If you must drive, do not take pain medications that may alter your abilities. Lumbar braces and neck collars are for comfort only. Walking is the best therapy after back surgery. Avoid extremes of bending, twisting, or lifting. All post-operative surgical patients should function within the range of the pain. \"If it hurts - do not do it. \"    PATIENT DISCHARGE INSTRUCTIONS      PATIENT DISCHARGE INSTRUCTIONS    Antione Garcia / [id number] : 1940    Admitted 2018 Discharged: 8/3/2018       · It is important that you take the medication exactly as they are prescribed. · Keep your medication in the bottles provided by the pharmacist and keep a list of the medication names, dosages, and times to be taken in your wallet. · Do not take other medications without consulting your doctor. What to do at Home    Recommended Diet: Cardiac Diet    Recommended Activity: No lifting, Driving, or Strenuous exercise for 2 weeks, keep dressing dry    If you experience any of the following symptoms  fever greater than 100.5, wound drainage, redness at incision site, increased pain, new onset of extremity numbness/tingling/weakness or gait disturbance, bladder or bowel dysfunction. , please follow up with the spine center.         Signed By: Ismael Sharpe NP     August 3, 2018

## 2018-08-03 NOTE — PROGRESS NOTES
Reason for Admission:  SPONTANEOUS LUMBAR  
                
RRAT Score:  12 Plan for utilizing home health:   ORDERED Likelihood of Readmission:  LOW Transition of Care Plan:      [address] Care Management Interventions PCP Verified by CM: Yes (DR. Rosa Maria Teixeira) Palliative Care Criteria Met (RRAT>21 & CHF Dx)?: No 
Mode of Transport at Discharge: Other (see comment) (FAMILY WILL TRANSPORT) Transition of Care Consult (CM Consult): Home Health [address]: Yes Physical Therapy Consult: Yes Occupational Therapy Consult: Yes Current Support Network: Own Home, Family Lives Nearby (PT'S ADULT DAUGHTER LIVES WITH DAUGHTER) Confirm Follow Up Transport: Family (DAUGHTER ) Plan discussed with Pt/Family/Caregiver: Yes (PT BEING DISCHARGE TO [address] [place] [address] AND FAMILY SUPPORT ) [place] of Choice Offered: Yes [employer] Information Provided?: No 
Discharge Location Discharge Placement: Home with home health [address] pt   Provided pt chose [address] [facility]. Pt's family at bedside will provide assistance at discharge. Pt is placed in referral for home health and request faxed to [facility] for notification as well. Pt's family will transport this pt home. Home health request faxed to [phone].

## 2018-08-03 NOTE — DISCHARGE SUMMARY
Discharge  Summary     Patient: Leander Palacio MRN: [id number]  SSN: [id number]    YOB: 1940  Age: 76 y.o.   Sex: female       Admit Date: 8/1/2018    Discharge Date: 8/3/2018      Admission Diagnoses: Spondylolisthesis, lumbar region [M43.16]  Spinal stenosis, lumbar region, with neurogenic claudication [M48.062]    Discharge Diagnoses:   Problem List as of 8/3/2018  Date Reviewed: 7/3/2018          Codes Class Noted - Resolved    Spondylisthesis ICD-10-CM: M43.10  ICD-9-CM: 756.12  8/1/2018 - Present        Spinal stenosis ICD-10-CM: M48.00  ICD-9-CM: 724.00  8/1/2018 - Present        Long-term current use of opiate analgesic ICD-10-CM: Z79.891  ICD-9-CM: V58.69  7/3/2018 - Present        Spinal stenosis of lumbar region with neurogenic claudication ICD-10-CM: M48.062  ICD-9-CM: 724.03  5/29/2018 - Present        Spondylolisthesis of lumbar region ICD-10-CM: M43.16  ICD-9-CM: 738.4  5/29/2018 - Present        Lumbar herniated disc ICD-10-CM: M51.26  ICD-9-CM: 722.10  5/15/2018 - Present        Spinal stenosis, lumbar region without neurogenic claudication ICD-10-CM: M48.061  ICD-9-CM: 724.02  5/15/2018 - Present        Osteoarthritis of both knees ICD-10-CM: M17.0  ICD-9-CM: 715.96  4/16/2018 - Present        Lumbar radiculopathy ICD-10-CM: M54.16  ICD-9-CM: 724.4  3/19/2018 - Present        Right knee pain ICD-10-CM: M25.561  ICD-9-CM: 719.46  3/19/2018 - Present        Low back pain without sciatica ICD-10-CM: M54.5  ICD-9-CM: 724.2  1/29/2018 - Present        Lumbosacral spondylosis without myelopathy ICD-10-CM: M47.817  ICD-9-CM: 721.3  1/29/2018 - Present        DDD (degenerative disc disease), lumbar ICD-10-CM: M51.36  ICD-9-CM: 722.52  1/29/2018 - Present        Type 2 diabetes mellitus without complication, without long-term current use of insulin ([facility] ICD-10-CM: E11.9  ICD-9-CM: 250.00  1/19/2018 - Present    Overview Signed 1/19/2018 10:29 AM by Raulito Cruz MD     hg a1c 6               PAC (premature atrial contraction) ICD-10-CM: I49.1  ICD-9-CM: 427.61  8/2/2016 - Present    Overview Signed 8/2/2016  9:46 AM by Jose Sol MD     stable on atenolol             Palpitation ICD-10-CM: R00.2  ICD-9-CM: 785.1  2/5/2016 - Present    Overview Signed 2/5/2016  9:57 AM by Jose Sol MD     will increase atenolol to 50 bid             Hyperthyroidism ICD-10-CM: E05.90  ICD-9-CM: 242.90  2/5/2016 - Present    Overview Signed 2/5/2016  9:58 AM by Jose Sol MD     was tried on ptu and methimazole-could not tolerate  ecdocrine and surgery evaluation in progress             Essential hypertension, benign ICD-10-CM: I10  ICD-9-CM: 401.1  Unknown - Present    Overview Signed 6/25/2013  1:37 PM by Freddie Forman     stable             Cardiomegaly ICD-10-CM: I51.7  ICD-9-CM: 429.3  Unknown - Present        Obesity, unspecified ICD-10-CM: E66.9  ICD-9-CM: 278.00  Unknown - Present    Overview Signed 6/25/2013  1:38 PM by Freddie Forman     Pt has weight loss             Other and unspecified hyperlipidemia ICD-10-CM: E78.5  ICD-9-CM: 272.4  Unknown - Present    Overview Signed 6/25/2013  1:39 PM by Freddie Forman     Chol 172, ldl 82, hdl 58, tg 141             Type II or unspecified type diabetes mellitus without mention of complication, not stated as uncontrolled ICD-10-CM: E11.9  ICD-9-CM: 250.00  Unknown - Present        Hypercholesterolemia ICD-10-CM: E78.00  ICD-9-CM: 272.0  Unknown - Present        RESOLVED: Lumbar neuritis ICD-10-CM: M54.16  ICD-9-CM: 724.4  1/29/2018 - 4/30/2018               Discharge Condition: Good    Procedure: L4-L5 laminectomy, medial facetectomy, foraminotomy L4-L5 diskectomy, L4-5 posterolateral fusion, segmental spinal instrumentation L4-L5 with K2M instrumentation. Hospital Course: Pt experienced nerve flare on POD 2. CT scan done with good alignment and fixation.  Pain in RLE after surgery was described as a different kind of pain as compared to before surgery, more of an ache vs numbness and burning pain. She had no other neurological symptoms and continued w/ good strength. Her pain was in the same distribution as before surgery. Otherwise, Normal hospital course for this procedure. Tolerated surgical intervention well. VSS Throughout. N . Incision dry and intact, tolerating PO intake, voiding adequately. Ambulatory with walker . Disposition: home    Discharge Medications:   Current Discharge Medication List      START taking these medications    Details   oxyCODONE IR (ROXICODONE) 5 mg immediate release tablet Take 1-2 Tabs by mouth every four (4) hours as needed. Max Daily Amount: 60 mg.  Qty: 30 Tab, Refills: 0    Associated Diagnoses: S/P lumbar fusion      DULoxetine (CYMBALTA) 30 mg capsule Take 1 Cap by mouth daily. Take 1Tab QHS for 2 weeks, then increase to 2tab QHS  Qty: 60 Cap, Refills: 2         CONTINUE these medications which have NOT CHANGED    Details   nitrofurantoin, macrocrystal-monohydrate, (MACROBID) 100 mg capsule Take 100 mg by mouth two (2) times a day. Indications: BACTERIAL URINARY TRACT INFECTION      aspirin 81 mg chewable tablet Take 81 mg by mouth daily. Indications: myocardial infarction prevention      gabapentin (NEURONTIN) 800 mg tablet Take 1 Tab by mouth three (3) times daily (with meals). Indications: NEUROPATHIC PAIN  Qty: 90 Tab, Refills: 2    Associated Diagnoses: Lumbosacral spondylosis without myelopathy      lidocaine (LIDODERM) 5 % by TransDERmal route every twenty-four (24) hours. Apply patch to the affected area for 12 hours a day and remove for 12 hours a day. dilTIAZem XR (DILACOR XR) 180 mg XR capsule Take 1 Cap by mouth daily. Qty: 90 Cap, Refills: 1      glucosamine-chondroitin (ELATION) 1,500-1,200 mg/30 mL liqd Take  by Mouth.       acetaminophen (TYLENOL) 500 mg tablet 500 mg.      losartan (COZAAR) 50 mg tablet TAKE ONE TABLET BY MOUTH ONCE DAILY  Qty: 90 Tab, Refills: 3 atenolol (TENORMIN) 50 mg tablet TAKE ONE TABLET BY MOUTH TWICE DAILY  Qty: 180 Tab, Refills: 3      CRANBERRY CONC-ASCORBIC ACID PO Take  by mouth.      calcium-cholecalciferol, d3, (CALCIUM 600 + D) 600-125 mg-unit tab Take  by mouth daily. Patient taking 4 times a week      Cholecalciferol, Vitamin D3, (VITAMIN D3) 1,000 unit cap Take  by mouth daily. metFORMIN (GLUMETZA ER) 500 mg TG24 24 hour tablet 850 mg daily. pravastatin (PRAVACHOL) 40 mg tablet Take 40 mg by mouth daily. GLUC SIMPSON/CHONDRO SIMPSON A/VIT C/MN (GLUCOSAMINE 1500 COMPLEX PO) Take  by mouth daily. indapamide (LOZOL) 2.5 mg tablet Take  by mouth daily. trimethoprim (TRIMPEX) 100 mg tablet 100 mg two (2) times a day. Indications: BACTERIAL URINARY TRACT INFECTION    Associated Diagnoses: Lumbar herniated disc; Spinal stenosis, lumbar region without neurogenic claudication; Lumbosacral spondylosis without myelopathy; Lumbar radiculopathy; DDD (degenerative disc disease), lumbar      MELATONIN PO 5 mg as needed. STOP taking these medications       traMADol (ULTRAM) 50 mg tablet Comments:   Reason for Stopping: Follow-up Appointments   Procedures    FOLLOW UP VISIT Appointment in: Two Weeks     Standing Status:   Standing     Number of Occurrences:   1     Order Specific Question:   Appointment in     Answer:    Two Weeks       Signed By: Sarah Nixon NP     August 3, 2018

## 2018-08-03 NOTE — PROGRESS NOTES
Problem: Mobility Impaired (Adult and Pediatric) Goal: *Acute Goals and Plan of Care (Insert Text) Physical Therapy Goals Initiated 8/1/2018 and to be accomplished within 7 day(s) 1. Patient will move from supine to sit and sit to supine , scoot up and down and roll side to side in bed with supervision/set-up. 2.  Patient will transfer from bed to chair and chair to bed with supervision/set-up using the least restrictive device. 3.  Patient will perform sit to stand with supervision/set-up. 4.  Patient will ambulate with supervision/set-up for >150 feet with the least restrictive device. 5.  Patient will ascend/descend 5 stairs with 1-2 handrail(s) with minimal assistance/contact guard assist. 
  
 
2nd PT tx attempt. Ct remains in process for review. Will follow up at later date when results are reviewed. LETTY Simpson

## 2018-08-03 NOTE — PROGRESS NOTES
Rounded on patient post spine surgery. Activity: Reinforced importance of getting OOB for all meals, going to bathroom to help prevent blood clots. VTE prophylaxis: Instructed patient to use their SCD's when not up and walking. To use while in bed and in the chair. Educated re: ankle pumps to assist with circulation when in hospital and at home. Medications: Reviewed pain medications patient is taking and the importance of keeping pain under control to help with getting OOB and therapy. Reminded the patient to always eat a snack with their pain medication to help to prevent nausea. Encouraged patient to monitor for constipation and to take a stool softner/laxative while recovering and on pain medication. Instructed not to go over 3 days without a bowel movement. Patient educated to stay on stool softener and or mild laxative while on narcotics along with drinking 8 glasses of water a day (unless on a fluid restriction). Incentive Spirometry: Reinforced use of incentive spirometer with return demonstration by patient. Wound Care: Dressing to surgical site intact. Patient instructed not to take dressing off at home. Patient given CHG wash to use in hospital and at home. Stressed importance of using a clean towel and washcloth daily. Reminded to put on clean clothes and night clothes daily. Instructed to call nursing staff while in the hospital if dressing coming loose or feels wet. Instructed to call Home health agency or Dr Alejandro Pearson office for concerns about dressing at home. Ice Protocol: Ice gel in place per protocol. Patient Safety: Call light  and personal belongings in reach. . Patient  reminded to call for help toget OOB or when leaving bathroom for safety. Phone and other items also within reach per patient's request. Reviewed back safety:no bending, lifting, twisting and no lifting anything greater than 1/2 gallon of milk. Patient reminded to log roll to get OOB.   
Diet: Educated patient on the importance of eating three well balanced meals a day with protein to promote bone/muscle healing. Reminded patient to drink lots of fluids to protect kidneys from all the medications being taken currently with recovery. Patient given post op educational material to remind them to do all the things discussed to prevent post op complications and have a successful recovery. Patient  verbalized understand of all information and education discussed. Patient  given the opportunity for asking questions.

## 2018-08-03 NOTE — PROGRESS NOTES
vss afeb Neuro intact No slr Continued rle pain similar to pre op pain to foot- patient says can be more severe Difficult anatomy and xray intraop- will check instrumentation with ct this am 
Abilio polanco 
contieloise PT

## 2018-08-03 NOTE — PROGRESS NOTES
POD 2 L4-5 Fusion Hx: Cardiac Disease, DM-  post-op, pre-op 122. Diabetic orders VSS, LS clear, Apical pulse regular Dressing clean, dry and intact UA: Voiding w/out difficulty PT: ambulating and up to chair w/ staff and walker Neuro  Intact. Reports that she was back greater than BLE pain w/ RLE>LLE prior to surgery. She reported more intense RLE pain this morning, CT LS done today. Per Dr. Minda Contreras, CT scan looks good with good alignment of hardware. Her leg pain feels \"different\" than before surgery, it is more of a \"deep ache\" and less of a numbness. Suspect post-op nerve flare. 4/5 BLE. Moving all extremities. Plan: DC-Home w/ HH. Pt can't have steroids due to allergy. She will continue Gabapentin and Cymbalta for nerve pain.  
 
Chele Haney NP

## 2018-08-04 ENCOUNTER — HOME HEALTH ADMISSION (OUTPATIENT)
Dept: HOME HEALTH SERVICES | Facility: HOME HEALTH | Age: 78
End: 2018-08-04
Payer: MEDICARE

## 2018-08-05 ENCOUNTER — HOME CARE VISIT (OUTPATIENT)
Dept: SCHEDULING | Facility: HOME HEALTH | Age: 78
End: 2018-08-05
Payer: MEDICARE

## 2018-08-05 PROCEDURE — 400013 HH SOC

## 2018-08-05 PROCEDURE — 3331090002 HH PPS REVENUE DEBIT

## 2018-08-05 PROCEDURE — A6255 ABSORPT DRG >16<=48 IN W/BDR: HCPCS

## 2018-08-05 PROCEDURE — 3331090001 HH PPS REVENUE CREDIT

## 2018-08-05 PROCEDURE — G0151 HHCP-SERV OF PT,EA 15 MIN: HCPCS

## 2018-08-06 ENCOUNTER — HOME CARE VISIT (OUTPATIENT)
Dept: SCHEDULING | Facility: HOME HEALTH | Age: 78
End: 2018-08-06
Payer: MEDICARE

## 2018-08-06 VITALS
OXYGEN SATURATION: 95 % | HEART RATE: 78 BPM | DIASTOLIC BLOOD PRESSURE: 76 MMHG | TEMPERATURE: 97 F | SYSTOLIC BLOOD PRESSURE: 120 MMHG

## 2018-08-06 PROCEDURE — G0157 HHC PT ASSISTANT EA 15: HCPCS

## 2018-08-06 PROCEDURE — 3331090002 HH PPS REVENUE DEBIT

## 2018-08-06 PROCEDURE — 3331090001 HH PPS REVENUE CREDIT

## 2018-08-07 ENCOUNTER — PATIENT MESSAGE (OUTPATIENT)
Dept: ORTHOPEDIC SURGERY | Age: 78
End: 2018-08-07

## 2018-08-07 ENCOUNTER — HOME CARE VISIT (OUTPATIENT)
Dept: HOME HEALTH SERVICES | Facility: HOME HEALTH | Age: 78
End: 2018-08-07
Payer: MEDICARE

## 2018-08-07 DIAGNOSIS — Z98.1 S/P LUMBAR FUSION: ICD-10-CM

## 2018-08-07 PROCEDURE — 3331090002 HH PPS REVENUE DEBIT

## 2018-08-07 PROCEDURE — 3331090001 HH PPS REVENUE CREDIT

## 2018-08-07 RX ORDER — OXYCODONE HYDROCHLORIDE 5 MG/1
5-10 TABLET ORAL
Qty: 20 TAB | Refills: 0 | Status: SHIPPED | OUTPATIENT
Start: 2018-08-07 | End: 2018-08-15 | Stop reason: ALTCHOICE

## 2018-08-07 NOTE — TELEPHONE ENCOUNTER
From: Sonja Hodgkin  To: Latina Essex, MD  Sent: 8/7/2018 1:06 PM EDT  Subject: Prescription Question    I had surgery on 8/1/18 by Dr. Melita Nyhan and was given a prescription on 8/3/18 for 30 tablets of Roxicodone. This will not last me until I see the nurse practitioner on 8/15/18. I am down to 11 pills and need a pain medicine prescription. My daughter goes back to work Thursday. Is it possible to have a prescription sent to [facility] on [employer] in [place]? Their phone number is [phone].

## 2018-08-08 ENCOUNTER — HOME CARE VISIT (OUTPATIENT)
Dept: SCHEDULING | Facility: HOME HEALTH | Age: 78
End: 2018-08-08
Payer: MEDICARE

## 2018-08-08 ENCOUNTER — DOCUMENTATION ONLY (OUTPATIENT)
Dept: ORTHOPEDIC SURGERY | Age: 78
End: 2018-08-08

## 2018-08-08 PROCEDURE — 3331090002 HH PPS REVENUE DEBIT

## 2018-08-08 PROCEDURE — 3331090001 HH PPS REVENUE CREDIT

## 2018-08-08 NOTE — PROGRESS NOTES
Pt daughter brought form back requesting a revision on some dates. . Form has been placed on np [employer] desk       Received blank copy will mail to home once completed

## 2018-08-09 ENCOUNTER — DOCUMENTATION ONLY (OUTPATIENT)
Dept: ORTHOPEDIC SURGERY | Age: 78
End: 2018-08-09

## 2018-08-09 ENCOUNTER — HOME CARE VISIT (OUTPATIENT)
Dept: SCHEDULING | Facility: HOME HEALTH | Age: 78
End: 2018-08-09
Payer: MEDICARE

## 2018-08-09 VITALS
DIASTOLIC BLOOD PRESSURE: 70 MMHG | SYSTOLIC BLOOD PRESSURE: 120 MMHG | HEART RATE: 82 BPM | TEMPERATURE: 97.7 F | OXYGEN SATURATION: 96 %

## 2018-08-09 VITALS — DIASTOLIC BLOOD PRESSURE: 69 MMHG | HEART RATE: 80 BPM | TEMPERATURE: 95.9 F | SYSTOLIC BLOOD PRESSURE: 100 MMHG

## 2018-08-09 PROCEDURE — 3331090001 HH PPS REVENUE CREDIT

## 2018-08-09 PROCEDURE — G0157 HHC PT ASSISTANT EA 15: HCPCS

## 2018-08-09 PROCEDURE — G0152 HHCP-SERV OF OT,EA 15 MIN: HCPCS

## 2018-08-09 PROCEDURE — 3331090002 HH PPS REVENUE DEBIT

## 2018-08-10 PROCEDURE — 3331090002 HH PPS REVENUE DEBIT

## 2018-08-10 PROCEDURE — 3331090001 HH PPS REVENUE CREDIT

## 2018-08-11 PROCEDURE — 3331090001 HH PPS REVENUE CREDIT

## 2018-08-11 PROCEDURE — 3331090002 HH PPS REVENUE DEBIT

## 2018-08-12 PROCEDURE — 3331090002 HH PPS REVENUE DEBIT

## 2018-08-12 PROCEDURE — 3331090001 HH PPS REVENUE CREDIT

## 2018-08-13 ENCOUNTER — HOME CARE VISIT (OUTPATIENT)
Dept: HOME HEALTH SERVICES | Facility: HOME HEALTH | Age: 78
End: 2018-08-13
Payer: MEDICARE

## 2018-08-13 PROCEDURE — 3331090001 HH PPS REVENUE CREDIT

## 2018-08-13 PROCEDURE — 3331090002 HH PPS REVENUE DEBIT

## 2018-08-14 ENCOUNTER — HOME CARE VISIT (OUTPATIENT)
Dept: SCHEDULING | Facility: HOME HEALTH | Age: 78
End: 2018-08-14
Payer: MEDICARE

## 2018-08-14 ENCOUNTER — HOME CARE VISIT (OUTPATIENT)
Dept: HOME HEALTH SERVICES | Facility: HOME HEALTH | Age: 78
End: 2018-08-14
Payer: MEDICARE

## 2018-08-14 VITALS
SYSTOLIC BLOOD PRESSURE: 104 MMHG | DIASTOLIC BLOOD PRESSURE: 59 MMHG | TEMPERATURE: 97.1 F | HEART RATE: 85 BPM | OXYGEN SATURATION: 97 %

## 2018-08-14 PROCEDURE — 3331090002 HH PPS REVENUE DEBIT

## 2018-08-14 PROCEDURE — G0157 HHC PT ASSISTANT EA 15: HCPCS

## 2018-08-14 PROCEDURE — 3331090001 HH PPS REVENUE CREDIT

## 2018-08-15 ENCOUNTER — OFFICE VISIT (OUTPATIENT)
Dept: ORTHOPEDIC SURGERY | Age: 78
End: 2018-08-15

## 2018-08-15 VITALS
OXYGEN SATURATION: 98 % | TEMPERATURE: 97.6 F | HEART RATE: 66 BPM | SYSTOLIC BLOOD PRESSURE: 118 MMHG | DIASTOLIC BLOOD PRESSURE: 70 MMHG

## 2018-08-15 VITALS
HEART RATE: 86 BPM | TEMPERATURE: 97.7 F | SYSTOLIC BLOOD PRESSURE: 112 MMHG | RESPIRATION RATE: 18 BRPM | DIASTOLIC BLOOD PRESSURE: 70 MMHG | HEIGHT: 65 IN

## 2018-08-15 DIAGNOSIS — M43.16 SPONDYLOLISTHESIS OF LUMBAR REGION: ICD-10-CM

## 2018-08-15 DIAGNOSIS — M48.062 SPINAL STENOSIS OF LUMBAR REGION WITH NEUROGENIC CLAUDICATION: Primary | ICD-10-CM

## 2018-08-15 DIAGNOSIS — M79.89 LEFT LEG SWELLING: ICD-10-CM

## 2018-08-15 DIAGNOSIS — Z98.1 S/P LUMBAR FUSION: ICD-10-CM

## 2018-08-15 PROCEDURE — 3331090001 HH PPS REVENUE CREDIT

## 2018-08-15 PROCEDURE — 3331090002 HH PPS REVENUE DEBIT

## 2018-08-15 RX ORDER — OXYCODONE HYDROCHLORIDE 5 MG/1
5 TABLET ORAL
Qty: 20 TAB | Refills: 0 | Status: SHIPPED | OUTPATIENT
Start: 2018-08-15 | End: 2019-01-03

## 2018-08-15 NOTE — PROGRESS NOTES
[address]  [address], [address]  [place], [address] [place] [address] Street  Phone: [phone]  Fax: [phone]  PROGRESS NOTE-Post Op  Patient: Tammy Gleason                MRN: [id number]       SSN: [id number]  YOB: 1940        AGE: 76 y.o. SEX: female  There is no height or weight on file to calculate BMI. PCP: Shaq Storm MD  08/15/18    Chief Complaint   Patient presents with    Follow-up    Back Pain       HISTORY OF PRESENT ILLNESS:  Tammy Gleason is a 76 y.o. female with history of back greater than BLE R>L pain  who had L4-5 PLF surgery 2 weeks ago for stenosis and spondylolisthesis. After surgery she had an increase in her RLE radicular pain. We got a CT scan in the hospital, that showed good alignment. Pain prior to surgery was mostly in the back and some BLE leg pain in the L4-5 distribution from buttock to calfs and described as aching, burning, numbing and throbbing. Pain is worse with standing and walking and affects sleep and recreational activities. Pain is better with relaxation, pain medication and lying down. Pt states she has been using Roxicodone 5mg 1-2x daily PRN, Cymbalta 30mg, Gabapentin 800mg TID with moderate, relief. Today, her Pain in RLE and back has significantly improved since surgery. She no longer has the radicular pain in her RLE or LLE. She has some residual pain in the right ankle and incisional back pain. She also has some LLE swelling, +2. Patient denies any fevers, wound drainage, bladder/bowel dysfunction, new onset weakness, new neuropathic pain, or other neurological deficits. Pt desires to continue with evaluation of back pain and postoperative care    ASSESSMENT   Diagnoses and all orders for this visit:    1. Spinal stenosis of lumbar region with neurogenic claudication    2. Spondylolisthesis of lumbar region    3. S/P lumbar fusion  -     DUPLEX LOWER EXT VENOUS LEFT;  Future  -     oxyCODONE IR (ROXICODONE) 5 mg immediate release tablet; Take 1 Tab by mouth every eight (8) hours as needed for Pain. Max Daily Amount: 15 mg.    4. Left leg swelling  -     DUPLEX LOWER EXT VENOUS LEFT; Future       IMPRESSION AND PLAN:  This is a pt who had a lumbar fusion surgery 2 weeks ago. She is doing well with resolution of her severe back pain and LLE radicular pain. Her RLE radicular pain has significantly improved, it only bothers her at night and around the ankle. She has some LLE edema that we will do a duplex, it doesn't look like a DVT, just fluid-but we will check. Her incision is healing nicely. 1) Pt was given information on s/p lumbar fusion post-operative and wound care. 2) Reviewed activity and to avoid NSAIDs x 3 months  3) Continue Gabapentin and Cymbalta  4) Wean off Roxicodone, refill today  5) LLE Duplex for swelling r/o DVT  6) Ms. Nirmal Dan has a reminder for a \"due or due soon\" health maintenance. I have asked that she contact her primary care provider, Luis Handy MD, for follow-up on this health maintenance. 7) we have informed the patient to notify us for immediate appointment if he has any worsening neurogical symptoms or if an emergency situation presents, then call 911  8)  demonstrated consistency with prescribing. 9) Pt will follow-up in 4 wks w/ Dr. Melita Nyhan. SUBJECTIVE    Smoking Status non-smoker  Work: retired    Pain Scale: 4/10    Pain Assessment  7/3/2018   Location of Pain Back;Buttocks   Pain Location Comment -   Location Modifiers Right   Severity of Pain 7   Quality of Pain Aching   Quality of Pain Comment -   Duration of Pain Persistent   Frequency of Pain Intermittent   Aggravating Factors Walking   Aggravating Factors Comment -   Limiting Behavior -   Relieving Factors -   Relieving Factors Comment -   Result of Injury -           REVIEW OF SYSTEMS  Constitutional: Negative for fever, chills, or weight change.    Respiratory: Negative for cough or shortness of breath. Cardiovascular: Negative for chest pain or palpitations. Gastrointestinal: Negative for incontinence, acid reflux, change in bowel habits, or constipation. Genitourinary: Negative for incontinence, dysuria and flank pain. Musculoskeletal: Positive for Back and RLE pain. See HPI. Skin: Negative for rash. Neurological:RLE L4 radiculopathy. See HPI. Endo/Heme/Allergies: Negative. Psychiatric/Behavioral: Negative. PHYSICAL EXAMINATION  There were no vitals taken for this visit. Accompanied by Daughter and cousin. Constitutional:  Well developed, well nourished, in no acute distress. Psychiatric: Affect and mood are appropriate. Integumentary: No rashes or abrasions noted on exposed areas. Wound: Mid-low back: Incision healing well, no drainage, no erythema, no hernia, no seroma, no swelling, no dehiscence, incision well approximated. Cardiovascular/Peripheral Vascular: +2 radial & pedal pulses. +2 LLE edema, good color, warm to touch, good pulses  Lymphatic:  No evidence of lymphedema. No cervical lymphadenopathy. SPINE/MUSCULOSKELETAL EXAM    Lumbar spine:  No rash, ecchymosis, or gross obliquity. No fasciculations. No focal atrophy is noted. Range of motion is not tested with  . Tenderness to palpation bilateral low back. No tenderness to palpation at the sciatic notch. Sensation grossly intact to light touch. Straight leg raise negative      MOTOR:     Hip Flex Quads Hamstrings Ankle DF EHL Ankle PF   Right +4/5 +4/5 +4/5 +4/5 +4/5 +4/5   Left +4/5 +4/5 +4/5 +4/5 +4/5 +4/5       Presents in wheelchair, used walker to get into office    Ambulation with walker. FWB.       PAST MEDICAL HISTORY   Past Medical History:   Diagnosis Date    Cardiomegaly     Essential hypertension, benign     stable    Hypercholesterolemia     Hyperthyroidism     no meds    Obesity, unspecified     Pt has weight loss    Other and unspecified hyperlipidemia     Chol 172, ldl 82, hdl 58, tg 141    Type II or unspecified type diabetes mellitus without mention of complication, not stated as uncontrolled        Past Surgical History:   Procedure Laterality Date    HX GYN      hysterectomy   . MEDICATIONS      Current Outpatient Prescriptions   Medication Sig Dispense Refill    oxyCODONE IR (ROXICODONE) 5 mg immediate release tablet Take 1 Tab by mouth every eight (8) hours as needed for Pain. Max Daily Amount: 15 mg. 20 Tab 0    gluc-condr-om3-dha-epa-fish-st (GLUCOSAMINE CHONDROITIN PLUS) [id number] mg cap Take 1 Tab by mouth daily. Indications: supplement      DULoxetine (CYMBALTA) 30 mg capsule Take 1 Cap by mouth daily. Take 1Tab QHS for 2 weeks, then increase to 2tab QHS (Patient taking differently: Take 1 Cap by mouth daily. Take 1Tab QHS for 2 weeks, then increase to 2tab QHS  Indications: NEUROPATHIC PAIN) 60 Cap 2    nitrofurantoin, macrocrystal-monohydrate, (MACROBID) 100 mg capsule Take 100 mg by mouth two (2) times a day. Indications: BACTERIAL URINARY TRACT INFECTION      aspirin 81 mg chewable tablet Take 81 mg by mouth daily. Indications: myocardial infarction prevention      gabapentin (NEURONTIN) 800 mg tablet Take 1 Tab by mouth three (3) times daily (with meals). Indications: NEUROPATHIC PAIN 90 Tab 2    dilTIAZem XR (DILACOR XR) 180 mg XR capsule Take 1 Cap by mouth daily. (Patient taking differently: Take 180 mg by mouth daily. Indications: hypertension) 90 Cap 1    MELATONIN PO Take 5 mg by mouth as needed (sleep).  acetaminophen (TYLENOL) 500 mg tablet Take 500 mg by mouth every six (6) hours as needed for Pain.  losartan (COZAAR) 50 mg tablet TAKE ONE TABLET BY MOUTH ONCE DAILY 90 Tab 3    atenolol (TENORMIN) 50 mg tablet TAKE ONE TABLET BY MOUTH TWICE DAILY (Patient taking differently: Take 50 mg by mouth two (2) times a day.  TAKE ONE TABLET BY MOUTH TWICE DAILY  Indications: hypertension) 180 Tab 3    CRANBERRY CONC-ASCORBIC ACID PO Take 500 mg by mouth daily. Indications: UTI prevention      calcium-cholecalciferol, d3, (CALCIUM 600 + D) 600-125 mg-unit tab Take 600 mg by mouth daily. Patient taking 4 times a week  Indications: Osteoporosis      Cholecalciferol, Vitamin D3, (VITAMIN D3) 1,000 unit cap Take 1,000 Units by mouth daily. Indications: Vitamin D Deficiency      metFORMIN (GLUMETZA ER) 500 mg TG24 24 hour tablet Take 850 mg by mouth daily. Indications: type 2 diabetes mellitus      pravastatin (PRAVACHOL) 40 mg tablet Take 40 mg by mouth daily. Indications: hypercholesterolemia      indapamide (LOZOL) 2.5 mg tablet Take 2.5 mg by mouth daily. Indications: Edema          ALLERGIES    Allergies   Allergen Reactions    Kenalog [Triamcinolone Acetonide] Anaphylaxis    Penicillin G Hives    Adhesive Tape-Silicones Swelling    Bacitracin Not Reported This Time    Betadine [Povidone-Iodine] Other (comments)     Caused uncomfortable rash on area where placed    Cephalexin Rash    Ciprofloxacin Other (comments)    Cortisone Not Reported This Time    Erythromycin Not Reported This Time    Hydrocortisone Hives    Lisinopril Not Reported This Time    Lortab [Hydrocodone-Acetaminophen] Rash    Macrobid [Nitrofurantoin Monohyd/M-Cryst] Other (comments)     Flushed face,leg swellinig    Methimazole Other (comments)     Neck pain/cough    Penicillins Not Reported This Time    Prednisolone Other (comments)    Prednisone Not Reported This Time    Propylthiouracil Other (comments)     Dizziness,elevated blood pressure    Sulfamethoxazole-Trimethoprim Other (comments)    Tetanus And Diphtheria Toxoids, Adsorbed, Adult Swelling    Tetanus Vaccines And Toxoid Not Reported This Time          SOCIAL HISTORY    Social History     Social History    Marital status:      Spouse name: N/A    Number of children: N/A    Years of education: N/A     Occupational History    Not on file.      Social History Main Topics    Smoking status: Never Smoker    Smokeless tobacco: Never Used    Alcohol use No    Drug use: No    Sexual activity: Not on file     Other Topics Concern    Not on file     Social History Narrative       FAMILY HISTORY    Family History   Problem Relation Age of Onset    Heart Disease Other      positive history of ischemic heart disease         Valentin Joshi NP

## 2018-08-15 NOTE — PATIENT INSTRUCTIONS
Lumbar Spinal Fusion: What to Expect at Home  Your Recovery    After surgery, you can expect your back to feel stiff and sore. You may have trouble sitting or standing in one position for very long and may need pain medicine in the weeks after your surgery. It may take 4 to 6 weeks to get back to doing simple activities, such as light housework. It may take 6 months to a year for your back to get better completely. You may need to wear a back brace while your back heals. And your doctor may have you go to physical therapy. If your job doesn't require physical labor, you will probably be able to go back to work after 1 or 2 months. If your job involves light physical labor, it may take 3 to 6 months. Most people whose jobs involved heavy labor can never return to those jobs. This care sheet gives you a general idea about how long it will take for you to recover. But each person recovers at a different pace. Follow the steps below to get better as quickly as possible. How can you care for yourself at home? Activity    · Rest when you feel tired. Getting enough sleep will help you recover.     · Try to walk each day. Start by walking a little more than you did the day before. Bit by bit, increase the amount you walk. Walking boosts blood flow and helps prevent pneumonia and constipation. Walking may also decrease your muscle soreness after surgery.     · If advised by your doctor, you may need to avoid lifting anything that would cause excessive strain on your back.  This may include a child, heavy grocery bags and milk containers, a heavy briefcase or backpack, cat litter or dog food bags, or a vacuum .     · Avoid strenuous activities, such as bicycle riding, jogging, weight lifting, or aerobic exercise, until your doctor says it is okay.     · Do not drive for 2 to 4 weeks after your surgery or until your doctor says it is okay.     · Avoid riding in a car for more than 30 minutes at a time for 2 to 4 weeks after surgery. If you must ride in a car for a longer distance, stop often to walk and stretch your legs.     · Try to change your position about every 30 minutes while sitting or standing. This will help decrease your back pain while you are healing.     · You will probably need to take at least 4 to 6 weeks off from work. It depends on the type of work you do and how you feel.     · You may have sex as soon as you feel able, but avoid positions that put stress on your back or cause pain. Diet    · You can eat your normal diet. If your stomach is upset, try bland, low-fat foods like plain rice, broiled chicken, toast, and yogurt.     · Drink plenty of fluids (unless your doctor tells you not to).     · You may notice that your bowel movements are not regular right after your surgery. This is common. Try to avoid constipation and straining with bowel movements. You may want to take a fiber supplement every day. If you have not had a bowel movement after a couple of days, ask your doctor about taking a mild laxative. Medicines    · Be safe with medicines. Take pain medicines exactly as directed. ¨ If the doctor gave you a prescription medicine for pain, take it as prescribed. ¨ If you are not taking a prescription pain medicine, ask your doctor if you can take an over-the-counter medicine.     · If your doctor prescribed antibiotics, take them as directed. Do not stop taking them just because you feel better. You need to take the full course of antibiotics.     · If you think your pain medicine is making you sick to your stomach:  ¨ Take your medicine after meals (unless your doctor has told you not to). ¨ Ask your doctor for a different pain medicine. Incision care    · You will be given specific instructions about how to care for the cuts (incisions) the doctor made. The instructions will depend on the type of materials used to close the cut.    Exercise    · Do back exercises as instructed by your doctor.     · Your doctor may advise you to work with a physical therapist to improve the strength and flexibility of your back. Other instructions    · To reduce stiffness and help sore muscles, use a warm water bottle, a heating pad set on low, or a warm cloth on your back. Do not put heat right over the incision. Do not go to sleep with a heating pad on your skin. Follow-up care is a key part of your treatment and safety. Be sure to make and go to all appointments, and call your doctor if you are having problems. It's also a good idea to know your test results and keep a list of the medicines you take. When should you call for help? Call 911 anytime you think you may need emergency care. For example, call if:    · You passed out (lost consciousness).     · You have sudden chest pain and shortness of breath, or you cough up blood.     · You are unable to move a leg at all.   [facility] your doctor now or seek immediate medical care if:    · You have pain that does not get better after you take pain pills.     · You have new or worse symptoms in your legs or buttocks. Symptoms may include:  ¨ Numbness or tingling. ¨ Weakness. ¨ Pain.     · You lose bladder or bowel control.     · You have loose stitches, or your incision comes open.     · You have blood or fluid draining from the incision.     · You have signs of infection, such as:  ¨ Increased pain, swelling, warmth, or redness. ¨ Pus draining from the incision. ¨ A fever.    Watch closely for any changes in your health, and be sure to contact your doctor if:    · You do not have a bowel movement after taking a laxative.     · You are not getting better as expected. Where can you learn more? Go to http://sherly-mary.info/. Enter C522 in the search box to learn more about \"Lumbar Spinal Fusion: What to Expect at Home. \"  Current as of: November 29, 2017  Content Version: 11.7  © 0410-6166 [employer].  Care instructions adapted under license by [employer] (which disclaims liability or warranty for this information). If you have questions about a medical condition or this instruction, always ask your healthcare professional. [address] any warranty or liability for your use of this information.

## 2018-08-15 NOTE — MR AVS SNAPSHOT
[address] [facility] 
 
 
 [address] [facility] [address] 
[phone] Patient: Pascale Nunez MRN: [id number] :1940 Visit Information Date & Time Provider Department Dept. Phone Encounter #  
 8/15/2018 11:45 AM Nikita Bolden NP [facility] [phone] [id number] Follow-up Instructions Return in about 4 weeks (around 2018). Your Appointments 2018 10:45 AM  
POST OP with Donna Clinton MD  
[address] [facility], [address] [facility] ([facility] Appt Note: 6wk fu sx 8-1  
 [address] [facility] [id number] [phone]  
  
   
 [address] [facility],[address] [facility]  
  
    
 1/3/2019 10:00 AM  
ESTABLISHED PATIENT with Evelyn Acharya MD  
[facility] ([facility]) Appt Note: 6 month [address] [facility] [address]  
  
   
 [address] [address] [address] [address] Upcoming Health Maintenance Date Due  
 FOOT EXAM Q1 1950 MICROALBUMIN Q1 1948 EYE EXAM RETINAL OR DILATED Q1 1948 DTaP/Tdap/Td series (1 - Tdap) 1959 ZOSTER VACCINE AGE 60> 1998 GLAUCOMA SCREENING Q2Y 2003 Bone Densitometry (Dexa) Screening 2003 Pneumococcal 65+ Low/Medium Risk (1 of 2 - PCV13) 2003 LIPID PANEL Q1 2016 MEDICARE YEARLY EXAM 3/19/2018 Influenza Age 5 to Adult 2018 HEMOGLOBIN A1C Q6M 2019 Allergies as of 8/15/2018  Review Complete On: 8/15/2018 By: Nikita Bolden NP Severity Noted Reaction Type Reactions Kenalog [Triamcinolone Acetonide] High 2018    Anaphylaxis Penicillin G High 2015    Hives Adhesive Tape-silicones Medium     Swelling Bacitracin    Not Reported This Time Betadine [Povidone-iodine]  2018    Other (comments) Caused uncomfortable rash on area where placed Cephalexin  02/13/2018    Rash Ciprofloxacin  08/03/2016    Other (comments) Cortisone    Not Reported This Time Erythromycin    Not Reported This Time Hydrocortisone  04/01/2015    Hives Lisinopril    Not Reported This Time Lortab [Hydrocodone-acetaminophen]  08/04/2015    Rash Denny Gregoryant [Nitrofurantoin Monohyd/m-cryst]  07/02/2018    Other (comments) Flushed face,leg swellinig Methimazole  07/07/2017    Other (comments) Neck pain/cough Penicillins    Not Reported This Time Prednisolone  04/01/2015    Other (comments) Prednisone    Not Reported This Time Propylthiouracil  07/07/2017    Other (comments) Dizziness,elevated blood pressure Sulfamethoxazole-trimethoprim  11/03/2016    Other (comments) Tetanus And Diphtheria Toxoids, Adsorbed, Adult  04/01/2015    Swelling Tetanus Vaccines And Toxoid    Not Reported This Time Current Immunizations  Never Reviewed No immunizations on file. Not reviewed this visit You Were Diagnosed With   
  
 Codes Comments Spinal stenosis of lumbar region with neurogenic claudication    -  Primary ICD-10-CM: G85.742 
ICD-9-CM: 724.03 Spondylolisthesis of lumbar region     ICD-10-CM: M43.16 
ICD-9-CM: 738.4 S/P lumbar fusion     ICD-10-CM: Z98.1 ICD-9-CM: V45.4 Left leg swelling     ICD-10-CM: M79.89 ICD-9-CM: 729.81 Vitals OB Status Smoking Status Hysterectomy Never Smoker Preferred Pharmacy Pharmacy Name Phone [address] [facility], [address] [facility] Your Updated Medication List  
  
   
This list is accurate as of 8/15/18 11:51 AM.  Always use your most recent med list.  
  
  
  
  
 acetaminophen 500 mg tablet Commonly known as:  TYLENOL Take 500 mg by mouth every six (6) hours as needed for Pain. aspirin 81 mg chewable tablet Take 81 mg by mouth daily. Indications: myocardial infarction prevention  
  
 atenolol 50 mg tablet Commonly known as:  TENORMIN  
TAKE ONE TABLET BY MOUTH TWICE DAILY CALCIUM 600 + D 600-125 mg-unit Tab Generic drug:  calcium-cholecalciferol (d3) Take 600 mg by mouth daily. Patient taking 4 times a week  Indications: Osteoporosis CRANBERRY CONC-ASCORBIC ACID PO Take 500 mg by mouth daily. Indications: UTI prevention  
  
 dilTIAZem  mg XR capsule Commonly known as:  DILACOR XR Take 1 Cap by mouth daily. DULoxetine 30 mg capsule Commonly known as:  CYMBALTA Take 1 Cap by mouth daily. Take 1Tab QHS for 2 weeks, then increase to 2tab QHS  
  
 gabapentin 800 mg tablet Commonly known as:  NEURONTIN Take 1 Tab by mouth three (3) times daily (with meals). Indications: NEUROPATHIC PAIN  
  
 GLUCOSAMINE CHONDROITIN PLUS [id number] mg Cap Generic drug:  gluc-condr-om3-dha-epa-fish-st  
Take 1 Tab by mouth daily. Indications: supplement  
  
 indapamide 2.5 mg tablet Commonly known as:  Woodroe Brick Take 2.5 mg by mouth daily. Indications: Edema  
  
 losartan 50 mg tablet Commonly known as:  COZAAR  
TAKE ONE TABLET BY MOUTH ONCE DAILY MACROBID 100 mg capsule Generic drug:  nitrofurantoin (macrocrystal-monohydrate) Take 100 mg by mouth two (2) times a day. Indications: BACTERIAL URINARY TRACT INFECTION  
  
 MELATONIN PO Take 5 mg by mouth as needed (sleep). metFORMIN 500 mg Tg24 24 hour tablet Commonly known asWaymond Patricia ER Take 850 mg by mouth daily. Indications: type 2 diabetes mellitus  
  
 oxyCODONE IR 5 mg immediate release tablet Commonly known as:  Earl Fuelling Take 1 Tab by mouth every eight (8) hours as needed for Pain. Max Daily Amount: 15 mg.  
  
 pravastatin 40 mg tablet Commonly known as:  PRAVACHOL Take 40 mg by mouth daily. Indications: hypercholesterolemia VITAMIN D3 1,000 unit Cap Generic drug:  cholecalciferol Take 1,000 Units by mouth daily. Indications: Vitamin D Deficiency Prescriptions Printed Refills  
 oxyCODONE IR (ROXICODONE) 5 mg immediate release tablet 0 Sig: Take 1 Tab by mouth every eight (8) hours as needed for Pain. Max Daily Amount: 15 mg.  
 Class: Print Route: Oral  
  
Follow-up Instructions Return in about 4 weeks (around 9/12/2018). To-Do List   
 08/16/2018 1:30 PM  
  Appointment with Dyana Beatty OT at [address] [place] [address] SCHEDULING/INTAKE  
  
 08/17/2018 To Be Determined Appointment with Nena Mendoza PT at [address] [place] [address] [facility]  
  
 08/20/2018 To Be Determined Appointment with Nena Mendoza PT at [address] [place] [address] SCHEDULING/INTAKE  
  
 08/21/2018 1:00 PM  
  Appointment with Dyana Beatty OT at [address] [place] [address] [facility]  
  
 08/22/2018 To Be Determined Appointment with Nena Mendoza PT at [address] [place] [address] [facility]  
  
 08/22/2018 Vascular/US:  DUPLEX LOWER EXT VENOUS LEFT   
  
 08/23/2018 1:00 PM  
  Appointment with Dyana Beatty OT at [address] [place] [address] SCHEDULING/INTAKE  
  
 08/27/2018 To Be Determined Appointment with Krystal Giron PTA at [address] [place] [address] SCHEDULING/INTAKE  
  
 08/28/2018 1:00 PM  
  Appointment with Dyana Beatty OT at [address] [place] [address] [facility]  
  
 08/29/2018 To Be Determined Appointment with Lo Cruz, PT at [address] [place] [address] SCHEDULING/INTAKE  
  
 08/30/2018 1:00 PM  
  Appointment with Dyana Betaty OT at [address] [place] [address] Patient Instructions Lumbar Spinal Fusion: What to Expect at Home Your Recovery After surgery, you can expect your back to feel stiff and sore.  You may have trouble sitting or standing in one position for very long and may need pain medicine in the weeks after your surgery. It may take 4 to 6 weeks to get back to doing simple activities, such as light housework. It may take 6 months to a year for your back to get better completely. You may need to wear a back brace while your back heals. And your doctor may have you go to physical therapy. If your job doesn't require physical labor, you will probably be able to go back to work after 1 or 2 months. If your job involves light physical labor, it may take 3 to 6 months. Most people whose jobs involved heavy labor can never return to those jobs. This care sheet gives you a general idea about how long it will take for you to recover. But each person recovers at a different pace. Follow the steps below to get better as quickly as possible. How can you care for yourself at home? Activity 
  · Rest when you feel tired. Getting enough sleep will help you recover.  
  · Try to walk each day. Start by walking a little more than you did the day before. Bit by bit, increase the amount you walk. Walking boosts blood flow and helps prevent pneumonia and constipation. Walking may also decrease your muscle soreness after surgery.  
  · If advised by your doctor, you may need to avoid lifting anything that would cause excessive strain on your back. This may include a child, heavy grocery bags and milk containers, a heavy briefcase or backpack, cat litter or dog food bags, or a vacuum .  
  · Avoid strenuous activities, such as bicycle riding, jogging, weight lifting, or aerobic exercise, until your doctor says it is okay.  
  · Do not drive for 2 to 4 weeks after your surgery or until your doctor says it is okay.  
  · Avoid riding in a car for more than 30 minutes at a time for 2 to 4 weeks after surgery. If you must ride in a car for a longer distance, stop often to walk and stretch your legs.   · Try to change your position about every 30 minutes while sitting or standing. This will help decrease your back pain while you are healing.  
  · You will probably need to take at least 4 to 6 weeks off from work. It depends on the type of work you do and how you feel.  
  · You may have sex as soon as you feel able, but avoid positions that put stress on your back or cause pain. Diet 
  · You can eat your normal diet. If your stomach is upset, try bland, low-fat foods like plain rice, broiled chicken, toast, and yogurt.  
  · Drink plenty of fluids (unless your doctor tells you not to).  
  · You may notice that your bowel movements are not regular right after your surgery. This is common. Try to avoid constipation and straining with bowel movements. You may want to take a fiber supplement every day. If you have not had a bowel movement after a couple of days, ask your doctor about taking a mild laxative. Medicines 
  · Be safe with medicines. Take pain medicines exactly as directed. ¨ If the doctor gave you a prescription medicine for pain, take it as prescribed. ¨ If you are not taking a prescription pain medicine, ask your doctor if you can take an over-the-counter medicine.  
  · If your doctor prescribed antibiotics, take them as directed. Do not stop taking them just because you feel better. You need to take the full course of antibiotics.  
  · If you think your pain medicine is making you sick to your stomach: 
¨ Take your medicine after meals (unless your doctor has told you not to). ¨ Ask your doctor for a different pain medicine. Incision care 
  · You will be given specific instructions about how to care for the cuts (incisions) the doctor made. The instructions will depend on the type of materials used to close the cut.   
Exercise 
  · Do back exercises as instructed by your doctor.  
  · Your doctor may advise you to work with a physical therapist to improve the strength and flexibility of your back. Other instructions 
  · To reduce stiffness and help sore muscles, use a warm water bottle, a heating pad set on low, or a warm cloth on your back. Do not put heat right over the incision. Do not go to sleep with a heating pad on your skin. Follow-up care is a key part of your treatment and safety. Be sure to make and go to all appointments, and call your doctor if you are having problems. It's also a good idea to know your test results and keep a list of the medicines you take. When should you call for help? Call 911 anytime you think you may need emergency care. For example, call if: 
  · You passed out (lost consciousness).  
  · You have sudden chest pain and shortness of breath, or you cough up blood.  
  · You are unable to move a leg at all.  
[facility] your doctor now or seek immediate medical care if: 
  · You have pain that does not get better after you take pain pills.  
  · You have new or worse symptoms in your legs or buttocks. Symptoms may include: ¨ Numbness or tingling. ¨ Weakness. ¨ Pain.  
  · You lose bladder or bowel control.  
  · You have loose stitches, or your incision comes open.  
  · You have blood or fluid draining from the incision.  
  · You have signs of infection, such as: 
¨ Increased pain, swelling, warmth, or redness. ¨ Pus draining from the incision. ¨ A fever.  
 Watch closely for any changes in your health, and be sure to contact your doctor if: 
  · You do not have a bowel movement after taking a laxative.  
  · You are not getting better as expected. Where can you learn more? Go to http://sherly-mary.info/. Enter B421 in the search box to learn more about \"Lumbar Spinal Fusion: What to Expect at Home. \" Current as of: November 29, 2017 Content Version: 11.7 © 2272-4475 [employer].  Care instructions adapted under license by [address] (which disclaims liability or warranty for this information). If you have questions about a medical condition or this instruction, always ask your healthcare professional. [address] any warranty or liability for your use of this information. Introducing [facility] [place] [facility]! Dear Tia Dear: 
Thank you for requesting a [employer] account. Our records indicate that you already have an active [employer] account. You can access your account anytime at [email or web address] Did you know that you can access your hospital and ER discharge instructions at any time in So[employer] Mobile? You can also review all of your test results from your hospital stay or ER visit. Additional Information If you have questions, please visit the Frequently Asked Questions section of the [employer] website at [email or web address] Remember, [employer] Mobile is NOT to be used for urgent needs. For medical emergencies, dial 911. Now available from your iPhone and Android! Please provide this summary of care documentation to your next provider. Your primary care clinician is listed as Mary Alice Booker. If you have any questions after today's visit, please call [phone].

## 2018-08-16 ENCOUNTER — DOCUMENTATION ONLY (OUTPATIENT)
Dept: ORTHOPEDIC SURGERY | Age: 78
End: 2018-08-16

## 2018-08-16 ENCOUNTER — HOME CARE VISIT (OUTPATIENT)
Dept: HOME HEALTH SERVICES | Facility: HOME HEALTH | Age: 78
End: 2018-08-16
Payer: MEDICARE

## 2018-08-16 ENCOUNTER — HOME CARE VISIT (OUTPATIENT)
Dept: SCHEDULING | Facility: HOME HEALTH | Age: 78
End: 2018-08-16
Payer: MEDICARE

## 2018-08-16 PROCEDURE — G0152 HHCP-SERV OF OT,EA 15 MIN: HCPCS

## 2018-08-16 PROCEDURE — G0157 HHC PT ASSISTANT EA 15: HCPCS

## 2018-08-16 PROCEDURE — 3331090002 HH PPS REVENUE DEBIT

## 2018-08-16 PROCEDURE — 3331090001 HH PPS REVENUE CREDIT

## 2018-08-17 ENCOUNTER — HOME CARE VISIT (OUTPATIENT)
Dept: SCHEDULING | Facility: HOME HEALTH | Age: 78
End: 2018-08-17
Payer: MEDICARE

## 2018-08-17 ENCOUNTER — TELEPHONE (OUTPATIENT)
Dept: ORTHOPEDIC SURGERY | Age: 78
End: 2018-08-17

## 2018-08-17 PROCEDURE — G0157 HHC PT ASSISTANT EA 15: HCPCS

## 2018-08-17 PROCEDURE — 3331090002 HH PPS REVENUE DEBIT

## 2018-08-17 PROCEDURE — 3331090001 HH PPS REVENUE CREDIT

## 2018-08-18 VITALS
DIASTOLIC BLOOD PRESSURE: 74 MMHG | OXYGEN SATURATION: 98 % | SYSTOLIC BLOOD PRESSURE: 118 MMHG | HEART RATE: 71 BPM | HEART RATE: 78 BPM | SYSTOLIC BLOOD PRESSURE: 110 MMHG | DIASTOLIC BLOOD PRESSURE: 63 MMHG | TEMPERATURE: 98.2 F | OXYGEN SATURATION: 98 % | TEMPERATURE: 98.1 F

## 2018-08-18 PROCEDURE — 3331090002 HH PPS REVENUE DEBIT

## 2018-08-18 PROCEDURE — 3331090001 HH PPS REVENUE CREDIT

## 2018-08-19 PROCEDURE — 3331090001 HH PPS REVENUE CREDIT

## 2018-08-19 PROCEDURE — 3331090002 HH PPS REVENUE DEBIT

## 2018-08-20 ENCOUNTER — HOME CARE VISIT (OUTPATIENT)
Dept: HOME HEALTH SERVICES | Facility: HOME HEALTH | Age: 78
End: 2018-08-20
Payer: MEDICARE

## 2018-08-20 PROCEDURE — G0157 HHC PT ASSISTANT EA 15: HCPCS

## 2018-08-20 PROCEDURE — 3331090001 HH PPS REVENUE CREDIT

## 2018-08-20 PROCEDURE — 3331090002 HH PPS REVENUE DEBIT

## 2018-08-21 ENCOUNTER — HOME CARE VISIT (OUTPATIENT)
Dept: SCHEDULING | Facility: HOME HEALTH | Age: 78
End: 2018-08-21
Payer: MEDICARE

## 2018-08-21 VITALS
OXYGEN SATURATION: 98 % | SYSTOLIC BLOOD PRESSURE: 118 MMHG | TEMPERATURE: 98.6 F | DIASTOLIC BLOOD PRESSURE: 60 MMHG | HEART RATE: 78 BPM

## 2018-08-21 PROCEDURE — 3331090001 HH PPS REVENUE CREDIT

## 2018-08-21 PROCEDURE — G0152 HHCP-SERV OF OT,EA 15 MIN: HCPCS

## 2018-08-21 PROCEDURE — 3331090002 HH PPS REVENUE DEBIT

## 2018-08-22 VITALS — DIASTOLIC BLOOD PRESSURE: 84 MMHG | OXYGEN SATURATION: 96 % | SYSTOLIC BLOOD PRESSURE: 128 MMHG | HEART RATE: 84 BPM

## 2018-08-22 PROCEDURE — 3331090001 HH PPS REVENUE CREDIT

## 2018-08-22 PROCEDURE — 3331090002 HH PPS REVENUE DEBIT

## 2018-08-23 ENCOUNTER — HOME CARE VISIT (OUTPATIENT)
Dept: SCHEDULING | Facility: HOME HEALTH | Age: 78
End: 2018-08-23
Payer: MEDICARE

## 2018-08-23 PROCEDURE — 3331090002 HH PPS REVENUE DEBIT

## 2018-08-23 PROCEDURE — G0157 HHC PT ASSISTANT EA 15: HCPCS

## 2018-08-23 PROCEDURE — 3331090001 HH PPS REVENUE CREDIT

## 2018-08-24 ENCOUNTER — HOME CARE VISIT (OUTPATIENT)
Dept: SCHEDULING | Facility: HOME HEALTH | Age: 78
End: 2018-08-24
Payer: MEDICARE

## 2018-08-24 VITALS
HEART RATE: 81 BPM | OXYGEN SATURATION: 98 % | DIASTOLIC BLOOD PRESSURE: 60 MMHG | SYSTOLIC BLOOD PRESSURE: 105 MMHG | TEMPERATURE: 97.8 F

## 2018-08-24 VITALS — OXYGEN SATURATION: 97 % | HEART RATE: 75 BPM | TEMPERATURE: 95.8 F

## 2018-08-24 PROCEDURE — G0152 HHCP-SERV OF OT,EA 15 MIN: HCPCS

## 2018-08-24 PROCEDURE — 3331090002 HH PPS REVENUE DEBIT

## 2018-08-24 PROCEDURE — 3331090001 HH PPS REVENUE CREDIT

## 2018-08-25 PROCEDURE — 3331090001 HH PPS REVENUE CREDIT

## 2018-08-25 PROCEDURE — 3331090002 HH PPS REVENUE DEBIT

## 2018-08-26 PROCEDURE — 3331090001 HH PPS REVENUE CREDIT

## 2018-08-26 PROCEDURE — 3331090002 HH PPS REVENUE DEBIT

## 2018-08-27 PROCEDURE — 3331090002 HH PPS REVENUE DEBIT

## 2018-08-27 PROCEDURE — 3331090001 HH PPS REVENUE CREDIT

## 2018-08-28 ENCOUNTER — HOME CARE VISIT (OUTPATIENT)
Dept: SCHEDULING | Facility: HOME HEALTH | Age: 78
End: 2018-08-28
Payer: MEDICARE

## 2018-08-28 VITALS
OXYGEN SATURATION: 98 % | TEMPERATURE: 96.4 F | DIASTOLIC BLOOD PRESSURE: 70 MMHG | HEART RATE: 88 BPM | SYSTOLIC BLOOD PRESSURE: 120 MMHG

## 2018-08-28 PROCEDURE — 3331090002 HH PPS REVENUE DEBIT

## 2018-08-28 PROCEDURE — 3331090001 HH PPS REVENUE CREDIT

## 2018-08-28 PROCEDURE — G0157 HHC PT ASSISTANT EA 15: HCPCS

## 2018-08-28 PROCEDURE — G0152 HHCP-SERV OF OT,EA 15 MIN: HCPCS

## 2018-08-29 ENCOUNTER — HOME CARE VISIT (OUTPATIENT)
Dept: SCHEDULING | Facility: HOME HEALTH | Age: 78
End: 2018-08-29
Payer: MEDICARE

## 2018-08-29 VITALS
HEART RATE: 65 BPM | SYSTOLIC BLOOD PRESSURE: 126 MMHG | DIASTOLIC BLOOD PRESSURE: 68 MMHG | OXYGEN SATURATION: 98 % | TEMPERATURE: 97.9 F

## 2018-08-29 PROCEDURE — 3331090001 HH PPS REVENUE CREDIT

## 2018-08-29 PROCEDURE — 3331090002 HH PPS REVENUE DEBIT

## 2018-08-29 PROCEDURE — G0151 HHCP-SERV OF PT,EA 15 MIN: HCPCS

## 2018-08-30 ENCOUNTER — HOME CARE VISIT (OUTPATIENT)
Dept: SCHEDULING | Facility: HOME HEALTH | Age: 78
End: 2018-08-30
Payer: MEDICARE

## 2018-08-30 VITALS
SYSTOLIC BLOOD PRESSURE: 118 MMHG | DIASTOLIC BLOOD PRESSURE: 70 MMHG | OXYGEN SATURATION: 97 % | HEART RATE: 81 BPM | TEMPERATURE: 96.5 F

## 2018-08-30 PROCEDURE — G0152 HHCP-SERV OF OT,EA 15 MIN: HCPCS

## 2018-08-30 PROCEDURE — 3331090001 HH PPS REVENUE CREDIT

## 2018-08-30 PROCEDURE — 3331090002 HH PPS REVENUE DEBIT

## 2018-08-31 PROCEDURE — 3331090001 HH PPS REVENUE CREDIT

## 2018-08-31 PROCEDURE — 3331090002 HH PPS REVENUE DEBIT

## 2018-09-01 PROCEDURE — 3331090001 HH PPS REVENUE CREDIT

## 2018-09-01 PROCEDURE — 3331090002 HH PPS REVENUE DEBIT

## 2018-09-02 PROCEDURE — 3331090001 HH PPS REVENUE CREDIT

## 2018-09-02 PROCEDURE — 3331090002 HH PPS REVENUE DEBIT

## 2018-09-03 PROCEDURE — 3331090001 HH PPS REVENUE CREDIT

## 2018-09-03 PROCEDURE — 3331090002 HH PPS REVENUE DEBIT

## 2018-09-04 PROCEDURE — 3331090001 HH PPS REVENUE CREDIT

## 2018-09-04 PROCEDURE — 3331090002 HH PPS REVENUE DEBIT

## 2018-09-05 PROCEDURE — 3331090002 HH PPS REVENUE DEBIT

## 2018-09-05 PROCEDURE — 3331090001 HH PPS REVENUE CREDIT

## 2018-09-06 PROCEDURE — 3331090002 HH PPS REVENUE DEBIT

## 2018-09-06 PROCEDURE — 3331090001 HH PPS REVENUE CREDIT

## 2018-09-07 PROCEDURE — 3331090002 HH PPS REVENUE DEBIT

## 2018-09-07 PROCEDURE — 3331090001 HH PPS REVENUE CREDIT

## 2018-09-08 PROCEDURE — 3331090002 HH PPS REVENUE DEBIT

## 2018-09-08 PROCEDURE — 3331090001 HH PPS REVENUE CREDIT

## 2018-09-09 PROCEDURE — 3331090002 HH PPS REVENUE DEBIT

## 2018-09-09 PROCEDURE — 3331090001 HH PPS REVENUE CREDIT

## 2018-09-10 ENCOUNTER — TELEPHONE (OUTPATIENT)
Dept: ORTHOPEDIC SURGERY | Age: 78
End: 2018-09-10

## 2018-09-10 ENCOUNTER — HOME CARE VISIT (OUTPATIENT)
Dept: SCHEDULING | Facility: HOME HEALTH | Age: 78
End: 2018-09-10
Payer: MEDICARE

## 2018-09-10 VITALS
DIASTOLIC BLOOD PRESSURE: 73 MMHG | SYSTOLIC BLOOD PRESSURE: 115 MMHG | OXYGEN SATURATION: 96 % | TEMPERATURE: 96.7 F | HEART RATE: 78 BPM

## 2018-09-10 PROCEDURE — 3331090003 HH PPS REVENUE ADJ

## 2018-09-10 PROCEDURE — G0152 HHCP-SERV OF OT,EA 15 MIN: HCPCS

## 2018-09-10 PROCEDURE — 3331090001 HH PPS REVENUE CREDIT

## 2018-09-10 PROCEDURE — 3331090002 HH PPS REVENUE DEBIT

## 2018-09-10 NOTE — TELEPHONE ENCOUNTER
We typically wait until the 6 week FU prior to ordering out patient PT. She has this FU on Friday at which time we can evaluate her and obtain xrays.

## 2018-09-10 NOTE — TELEPHONE ENCOUNTER
[facility] calling to requested Out Patient OT PT for patient to go to [facility]. Patient being D/C today doing very well. Please fax orders to [facility] [phone]  Please have them call patient with date.  Patient can be reached at [phone]

## 2018-09-11 PROCEDURE — 3331090001 HH PPS REVENUE CREDIT

## 2018-09-11 PROCEDURE — 3331090002 HH PPS REVENUE DEBIT

## 2018-09-11 NOTE — TELEPHONE ENCOUNTER
Spoke with patient, informed of NP Buttery message below. Patient stated understanding. No further action needed at this time.

## 2018-09-12 PROCEDURE — 3331090001 HH PPS REVENUE CREDIT

## 2018-09-12 PROCEDURE — 3331090002 HH PPS REVENUE DEBIT

## 2018-09-13 PROCEDURE — 3331090001 HH PPS REVENUE CREDIT

## 2018-09-13 PROCEDURE — 3331090002 HH PPS REVENUE DEBIT

## 2018-09-14 PROCEDURE — 3331090001 HH PPS REVENUE CREDIT

## 2018-09-14 PROCEDURE — 3331090002 HH PPS REVENUE DEBIT

## 2018-09-15 PROCEDURE — 3331090001 HH PPS REVENUE CREDIT

## 2018-09-15 PROCEDURE — 3331090002 HH PPS REVENUE DEBIT

## 2018-09-16 PROCEDURE — 3331090001 HH PPS REVENUE CREDIT

## 2018-09-16 PROCEDURE — 3331090002 HH PPS REVENUE DEBIT

## 2018-09-17 PROCEDURE — 3331090002 HH PPS REVENUE DEBIT

## 2018-09-17 PROCEDURE — 3331090001 HH PPS REVENUE CREDIT

## 2018-09-18 ENCOUNTER — OFFICE VISIT (OUTPATIENT)
Dept: ORTHOPEDIC SURGERY | Age: 78
End: 2018-09-18

## 2018-09-18 VITALS
TEMPERATURE: 97.8 F | OXYGEN SATURATION: 98 % | BODY MASS INDEX: 30.75 KG/M2 | HEIGHT: 65 IN | SYSTOLIC BLOOD PRESSURE: 124 MMHG | WEIGHT: 184.6 LBS | DIASTOLIC BLOOD PRESSURE: 72 MMHG | RESPIRATION RATE: 19 BRPM | HEART RATE: 89 BPM

## 2018-09-18 DIAGNOSIS — Z98.1 S/P LUMBAR FUSION: Primary | ICD-10-CM

## 2018-09-18 DIAGNOSIS — M43.16 SPONDYLOLISTHESIS OF LUMBAR REGION: ICD-10-CM

## 2018-09-18 PROBLEM — E11.40 TYPE 2 DIABETES MELLITUS WITH DIABETIC NEUROPATHY (HCC): Status: ACTIVE | Noted: 2018-09-18

## 2018-09-18 NOTE — PROGRESS NOTES
I saw pt w/ Dr. Jose Cotton today. She is doing well but continues to have RLE weakness. She is asking about a rollator walker vs can, she has a standard walker and is doing ok with that. I recommended that she go to PT and be evaluated for the proper device they think she needs and then we will order it, as I am not sure if insurance will pay for both. She still has difficulty showering and has borrowed a shower chair from a friend, but it is old and wobbly and not safe, so we have ordered a new one. She reports that the Cymbalta gave her a rash. I advised that she stop that and that she continue with the Gabapentin for now and we can taper her off that later.

## 2018-09-18 NOTE — PROGRESS NOTES
[address] 
[address], [address] Phone: [phone] Fax: [phone] PROGRESS NOTE Patient: Edelmira Ruiz                MRN: [id number]       SSN: [id number] YOB: 1940        AGE: 76 y.o. SEX: female Body mass index is 30.72 kg/(m^2). PCP: Nikki Ruiz MD 
09/18/18 Chief Complaint Patient presents with  Back Pain Lower  Hip Pain Bilateral   
 Leg Pain Right  Surgical Follow-up 6 WKS HISTORY OF PRESENT ILLNESS, RADIOGRAPHS, and PLAN:  
 
HISTORY OF PRESENT ILLNESS:  Progress note. Ms. Pedro Chapa returns today. She is 7 weeks status post L4-5 decompression, posterolateral fusion. She is ecstatic. Her pain is gone. She had some lower extremity swelling, but had a negative duplex. She still feels weak in her right leg, but without pain. She uses a walker to ambulate. We are going to progress her with physical therapy. We have stopped her Cymbalta. She has stopped the narcotics. Overall, we are thrilled. ASSESSMENT/PLAN:  We will see her back in 6 weeks to monitor her progress. cc:   Nikki Ruiz M.D. Past Medical History:  
Diagnosis Date  Cardiomegaly  Essential hypertension, benign   
 stable  Hypercholesterolemia  Hyperthyroidism   
 no meds  Obesity, unspecified Pt has weight loss  Other and unspecified hyperlipidemia Chol 172, ldl 82, hdl 62, tg 141  Type II or unspecified type diabetes mellitus without mention of complication, not stated as uncontrolled Family History Problem Relation Age of Onset  Heart Disease Other   
  positive history of ischemic heart disease Current Outpatient Prescriptions Medication Sig Dispense Refill  sulfamethoxazole-trimethoprim (BACTRIM;SEPTRA) 200-40 mg/5 mL suspension Take 100 mg by mouth daily.  gluc-condr-om3-dha-epa-fish-st (GLUCOSAMINE CHONDROITIN PLUS) 149-809-07-54 mg cap Take 1 Tab by mouth daily. Indications: supplement  nitrofurantoin, macrocrystal-monohydrate, (MACROBID) 100 mg capsule Take 100 mg by mouth two (2) times a day. Indications: BACTERIAL URINARY TRACT INFECTION  aspirin 81 mg chewable tablet Take 81 mg by mouth daily. Indications: myocardial infarction prevention  gabapentin (NEURONTIN) 800 mg tablet Take 1 Tab by mouth three (3) times daily (with meals). Indications: NEUROPATHIC PAIN 90 Tab 2  
 dilTIAZem XR (DILACOR XR) 180 mg XR capsule Take 1 Cap by mouth daily. (Patient taking differently: Take 180 mg by mouth daily. Indications: hypertension) 90 Cap 1  MELATONIN PO Take 5 mg by mouth as needed (sleep).  acetaminophen (TYLENOL) 500 mg tablet Take 500 mg by mouth every six (6) hours as needed for Pain.  losartan (COZAAR) 50 mg tablet TAKE ONE TABLET BY MOUTH ONCE DAILY 90 Tab 3  
 atenolol (TENORMIN) 50 mg tablet TAKE ONE TABLET BY MOUTH TWICE DAILY (Patient taking differently: Take 50 mg by mouth two (2) times a day. TAKE ONE TABLET BY MOUTH TWICE DAILY  Indications: hypertension) 180 Tab 3  CRANBERRY CONC-ASCORBIC ACID PO Take 500 mg by mouth daily. Indications: UTI prevention  calcium-cholecalciferol, d3, (CALCIUM 600 + D) 600-125 mg-unit tab Take 600 mg by mouth daily. Patient taking 4 times a week  Indications: Osteoporosis  Cholecalciferol, Vitamin D3, (VITAMIN D3) 1,000 unit cap Take 1,000 Units by mouth daily. Indications: Vitamin D Deficiency  metFORMIN (GLUMETZA ER) 500 mg TG24 24 hour tablet Take 850 mg by mouth daily. Indications: type 2 diabetes mellitus  pravastatin (PRAVACHOL) 40 mg tablet Take 40 mg by mouth daily. Indications: hypercholesterolemia  indapamide (LOZOL) 2.5 mg tablet Take 2.5 mg by mouth daily. Indications: Edema  oxyCODONE IR (ROXICODONE) 5 mg immediate release tablet Take 1 Tab by mouth every eight (8) hours as needed for Pain. Max Daily Amount: 15 mg. 20 Tab 0 Allergies Allergen Reactions  Kenalog [Triamcinolone Acetonide] Anaphylaxis  Penicillin G Hives  Adhesive Tape-Silicones Swelling  Bacitracin Not Reported This Time  Betadine [Povidone-Iodine] Other (comments) Caused uncomfortable rash on area where placed  Cephalexin Rash  Ciprofloxacin Other (comments)  Cortisone Not Reported This Time  Cymbalta [Duloxetine] Rash  Erythromycin Not Reported This Time  Hydrocortisone Hives  Lisinopril Not Reported This Time  Lortab [Hydrocodone-Acetaminophen] Rash  Macrobid [Nitrofurantoin Monohyd/M-Cryst] Other (comments) Flushed face,leg swellinig  Methimazole Other (comments) Neck pain/cough  Penicillins Not Reported This Time  Prednisolone Other (comments)  Prednisone Not Reported This Time  Propylthiouracil Other (comments) Dizziness,elevated blood pressure  Sulfamethoxazole-Trimethoprim Other (comments)  Tetanus And Diphtheria Toxoids, Adsorbed, Adult Swelling  Tetanus Vaccines And Toxoid Not Reported This Time Past Surgical History:  
Procedure Laterality Date  HX GYN    
 hysterectomy Past Medical History:  
Diagnosis Date  Cardiomegaly  Essential hypertension, benign   
 stable  Hypercholesterolemia  Hyperthyroidism   
 no meds  Obesity, unspecified Pt has weight loss  Other and unspecified hyperlipidemia Chol 172, ldl 82, hdl 62, tg 141  Type II or unspecified type diabetes mellitus without mention of complication, not stated as uncontrolled Social History Social History  Marital status:  Spouse name: N/A  
 Number of children: N/A  
 Years of education: N/A Occupational History  Not on file. Social History Main Topics  Smoking status: Never Smoker  Smokeless tobacco: Never Used  Alcohol use No  
 Drug use:  No  
  Sexual activity: Not on file Other Topics Concern  Not on file Social History Narrative REVIEW OF SYSTEMS: 
 CONSTITUTIONAL SYMPTOMS:  Negative. EYES:  Negative. EARS, NOSE, THROAT AND MOUTH:  Negative. CARDIOVASCULAR:  Negative. RESPIRATORY:  Negative. GENITOURINARY: Per HPI. GASTROINTESTINAL:  Per HPI. INTEGUMENTARY (SKIN AND/OR BREAST):  Negative. MUSCULOSKELETAL: Per HPI. ENDOCRINE/RHEUMATOLOGIC:  Negative. NEUROLOGICAL:  Per HPI. HEMATOLOGIC/LYMPHATIC:  Negative. ALLERGIC/IMMUNOLOGIC:  Negative. PSYCHIATRIC:  Negative. PHYSICAL EXAMINATION:  
Visit Vitals  /72  Pulse 89  Temp 97.8 °F (36.6 °C)  Resp 19  
 Ht 5' 5\" (1.651 m)  Wt 184 lb 9.6 oz (83.7 kg)  SpO2 98%  BMI 30.72 kg/m2 PAIN SCALE: 0 - No pain/10 CONSTITUTIONAL: The patient is in no apparent distress and is alert and oriented x 3. HEENT: Normocephalic. Hearing grossly intact. NECK: Supple and symmetric. no tenderness, or masses were felt. RESPIRATORY: No labored breathing. CARDIOVASCULAR: The carotid pulses were normal. Peripheral pulses were 2+. CHEST: Normal AP diameter and normal contour without any kyphoscoliosis. LYMPHATIC: No lymphadenopathy was appreciated in the neck, axillae or groin. SKIN:   Negative for scars, rashes, lesions, or ulcers on the right upper, right lower, left upper, left lower and trunk. NEUROLOGICAL: Alert and oriented x 3. Ambulation with walker. FWB. EXTREMITIES: See musculoskeletal. 
MUSCULOSKELETAL: 
? Head and Neck:  Negative for misalignment, asymmetry, crepitation, defects, tenderness masses or effusions. ? Left Upper Extremity: Inspection, percussion and palpation performed. Herreras sign is negative. ? Right Upper Extremity: Inspection, percussion and palpation performed. Herreras sign is negative. ? Spine, Ribs and Pelvis: Inspection, percussion and palpation performed. Negative for misalignment, asymmetry, crepitation, defects, tenderness masses or effusions. ? Left Lower Extremity: Inspection, percussion and palpation performed. Negative straight leg raise. ? Right Lower Extremity: Weakness. Inspection, percussion and palpation performed. Negative straight leg raise. SPINE EXAM:  
 
Cervical spine: Neck is midline. Normal muscle tone. No focal atrophy is noted. Lumbar spine: No rash, ecchymosis, or gross obliquity. No focal atrophy is noted. ASSESSMENT 
  ICD-10-CM ICD-9-CM 1. S/P lumbar fusion Z98.1 V45.4 AMB POC XRAY, SPINE, LUMBOSACRAL; 2 O  
   REFERRAL TO PHYSICAL THERAPY AMB SUPPLY ORDER 2. Spondylolisthesis of lumbar region M43.16 738.4 AMB POC XRAY, SPINE, LUMBOSACRAL; 2 O  
   REFERRAL TO PHYSICAL THERAPY AMB SUPPLY ORDER Written by Chuy Gutierrez, as dictated by Asia Vaughan MD. 
 
I, Dr. Asia Vaughan MD, confirm that all documentation is accurate.

## 2018-09-18 NOTE — MR AVS SNAPSHOT
[address] [facility] [address] [facility] [address] 
[phone] Patient: Manoj Hernandez MRN: [id number] :1940 Visit Information Date & Time Provider Department Dept. Phone Encounter #  
 2018  3:15 PM Marquis Jacobo MD [facility] [phone] [id number] Follow-up Instructions Return in about 6 weeks (around 10/30/2018) for with NP. Follow-up and Disposition History Your Appointments 1/3/2019 10:00 AM  
ESTABLISHED PATIENT with Emily Murphy MD  
[facility] ([facility]) Appt Note: 6 month [address] [place] [address] [address]  
  
   
 [address] [address] [place] [address] Upcoming Health Maintenance Date Due  
 FOOT EXAM Q1 1950 MICROALBUMIN Q1 1950 EYE EXAM RETINAL OR DILATED Q1 1948 DTaP/Tdap/Td series (1 - Tdap) 1959 ZOSTER VACCINE AGE 60> 1998 GLAUCOMA SCREENING Q2Y 2003 Bone Densitometry (Dexa) Screening 2003 Pneumococcal 65+ Low/Medium Risk (1 of 2 - PCV13) 2003 LIPID PANEL Q1 2016 MEDICARE YEARLY EXAM 3/19/2018 Influenza Age 5 to Adult 2018 HEMOGLOBIN A1C Q6M 2019 Allergies as of 2018  Review Complete On: 2018 By: Grayson Travis RN Severity Noted Reaction Type Reactions Kenalog [Triamcinolone Acetonide] High 2018    Anaphylaxis Penicillin G High 2015    Hives Adhesive Tape-silicones Medium     Swelling Bacitracin    Not Reported This Time Betadine [Povidone-iodine]  2018    Other (comments) Caused uncomfortable rash on area where placed Cephalexin  2018    Rash Ciprofloxacin  2016    Other (comments) Cortisone    Not Reported This Time Cymbalta [Duloxetine]  2018    Rash Erythromycin    Not Reported This Time Hydrocortisone  04/01/2015    Hives Lisinopril    Not Reported This Time Lortab [Hydrocodone-acetaminophen]  08/04/2015    Rash [facility] [Nitrofurantoin Monohyd/m-cryst]  07/02/2018    Other (comments) Flushed face,leg swellinig Methimazole  07/07/2017    Other (comments) Neck pain/cough Penicillins    Not Reported This Time Prednisolone  04/01/2015    Other (comments) Prednisone    Not Reported This Time Propylthiouracil  07/07/2017    Other (comments) Dizziness,elevated blood pressure Sulfamethoxazole-trimethoprim  11/03/2016    Other (comments) Tetanus And Diphtheria Toxoids, Adsorbed, Adult  04/01/2015    Swelling Tetanus Vaccines And Toxoid    Not Reported This Time Current Immunizations  Never Reviewed No immunizations on file. Not reviewed this visit You Were Diagnosed With   
  
 Codes Comments S/P lumbar fusion    -  Primary ICD-10-CM: Z98.1 ICD-9-CM: V45.4 Spondylolisthesis of lumbar region     ICD-10-CM: M43.16 
ICD-9-CM: 738.4 Vitals BP Pulse Temp Resp Height(growth percentile) Weight(growth percentile) 124/72 89 97.8 °F (36.6 °C) 19 5' 5\" (1.651 m) 184 lb 9.6 oz (83.7 kg) SpO2 BMI OB Status Smoking Status 98% 30.72 kg/m2 Hysterectomy Never Smoker BMI and BSA Data Body Mass Index Body Surface Area 30.72 kg/m 2 1.96 m 2 Preferred Pharmacy Pharmacy Name Phone [facility] [address] [facility], [address] Your Updated Medication List  
  
   
This list is accurate as of 9/18/18  4:21 PM.  Always use your most recent med list.  
  
  
  
  
 acetaminophen 500 mg tablet Commonly known as:  TYLENOL Take 500 mg by mouth every six (6) hours as needed for Pain. aspirin 81 mg chewable tablet Take 81 mg by mouth daily. Indications: myocardial infarction prevention  
  
 atenolol 50 mg tablet Commonly known as:  TENORMIN  
 TAKE ONE TABLET BY MOUTH TWICE DAILY CALCIUM 600 + D 600-125 mg-unit Tab Generic drug:  calcium-cholecalciferol (d3) Take 600 mg by mouth daily. Patient taking 4 times a week  Indications: Osteoporosis CRANBERRY CONC-ASCORBIC ACID PO Take 500 mg by mouth daily. Indications: UTI prevention  
  
 dilTIAZem  mg XR capsule Commonly known as:  DILACOR XR Take 1 Cap by mouth daily. gabapentin 800 mg tablet Commonly known as:  NEURONTIN Take 1 Tab by mouth three (3) times daily (with meals). Indications: NEUROPATHIC PAIN  
  
 GLUCOSAMINE CHONDROITIN PLUS 785-164-41-54 mg Cap Generic drug:  gluc-condr-om3-dha-epa-fish-st  
Take 1 Tab by mouth daily. Indications: supplement  
  
 indapamide 2.5 mg tablet Commonly known as:  Arland Hacker Take 2.5 mg by mouth daily. Indications: Edema  
  
 losartan 50 mg tablet Commonly known as:  COZAAR  
TAKE ONE TABLET BY MOUTH ONCE DAILY MACROBID 100 mg capsule Generic drug:  nitrofurantoin (macrocrystal-monohydrate) Take 100 mg by mouth two (2) times a day. Indications: BACTERIAL URINARY TRACT INFECTION  
  
 MELATONIN PO Take 5 mg by mouth as needed (sleep). metFORMIN 500 mg Tg24 24 hour tablet Commonly known asKzarien [place] ER Take 850 mg by mouth daily. Indications: type 2 diabetes mellitus  
  
 oxyCODONE IR 5 mg immediate release tablet Commonly known as:  Charolet Music Take 1 Tab by mouth every eight (8) hours as needed for Pain. Max Daily Amount: 15 mg.  
  
 pravastatin 40 mg tablet Commonly known as:  PRAVACHOL Take 40 mg by mouth daily. Indications: hypercholesterolemia  
  
 sulfamethoxazole-trimethoprim 200-40 mg/5 mL suspension Commonly known as:  BACTRIM;SEPTRA Take 100 mg by mouth daily. VITAMIN D3 1,000 unit Cap Generic drug:  cholecalciferol Take 1,000 Units by mouth daily. Indications: Vitamin D Deficiency We Performed the Following AMB POC XRAY, SPINE, LUMBOSACRAL; 2 O [36606 CPT(R)] AMB SUPPLY ORDER [[id number] Custom] Comments:  
 Shower chair REFERRAL TO PHYSICAL THERAPY [KPQ67 Custom] Comments:  
 Eval and treat for back pain and RLE weakness. Eval for appropriate assistive devices walker vs cane. 6 wks S/P Lumbar Fusion Follow-up Instructions Return in about 6 weeks (around 10/30/2018) for with NP. Referral Information Referral ID Referred By Referred To  
  
 [id number] [place] [facility]  [address] [facility]   
   [id number] [facility]   
   [address], [address] Phone: [phone] Fax: [phone] Visits Status Start Date End Date 1 New Request 9/18/18 9/18/19 If your referral has a status of pending review or denied, additional information will be sent to support the outcome of this decision. Introducing [facility]! Dear Natanael Beaulieu: 
Thank you for requesting a FoxyTunes account. Our records indicate that you already have an active FoxyTunes account. You can access your account anytime at [email or web address] Did you know that you can access your hospital and ER discharge instructions at any time in FoxyTunes? You can also review all of your test results from your hospital stay or ER visit. Additional Information If you have questions, please visit the Frequently Asked Questions section of the FoxyTunes website at [email or web address] Remember, FoxyTunes is NOT to be used for urgent needs. For medical emergencies, dial 911. Now available from your iPhone and Android! Please provide this summary of care documentation to your next provider. Your primary care clinician is listed as Shaq Storm. If you have any questions after today's visit, please call [phone].

## 2018-09-20 DIAGNOSIS — M79.89 LEFT LEG SWELLING: ICD-10-CM

## 2018-09-20 DIAGNOSIS — Z98.1 S/P LUMBAR FUSION: ICD-10-CM

## 2018-09-27 ENCOUNTER — HOSPITAL ENCOUNTER (OUTPATIENT)
Dept: PHYSICAL THERAPY | Age: 78
Discharge: HOME | End: 2018-09-27
Payer: MEDICARE

## 2018-09-27 PROCEDURE — 97162 PT EVAL MOD COMPLEX 30 MIN: CPT

## 2018-09-27 PROCEDURE — 97530 THERAPEUTIC ACTIVITIES: CPT

## 2018-09-27 PROCEDURE — G8979 MOBILITY GOAL STATUS: HCPCS

## 2018-09-27 PROCEDURE — 97116 GAIT TRAINING THERAPY: CPT

## 2018-09-27 PROCEDURE — 97110 THERAPEUTIC EXERCISES: CPT

## 2018-09-27 PROCEDURE — G8978 MOBILITY CURRENT STATUS: HCPCS

## 2018-09-27 NOTE — PROGRESS NOTES
[facility] [address] [facility], [address] [place]  
[phone] fax Plan of Care/ Statement of Necessity for Physical Therapy Services Patient name: Curtiss Apgar Start of Care: 2018 Referral source: Gay Deshpande MD : 1940 Medical Diagnosis: Low back pain [M54.5] Onset Date:DoS 2018 Treatment Diagnosis: s/p L4-L5 Decompression and Posterolateral Fusion Prior Hospitalization: see medical history Provider#: [id number] Medications: Verified on Patient summary List  
 Comorbidities: Chronic Right Knee Pain, Allergies, Arthritis, Back Pain, Premature Atrial Contractions, Enlarged Heart, Diabetes Type II, HTN Prior Level of Function: Lives with daughter (works full-time), (I) Functional ADLs, Hx of Neuropathic, (I) Driving, Retired, (I) Self-care ADLs, (I) Ambulation without AD The Plan of Care and following information is based on the information from the initial evaluation. Assessment: 
 
Patient presents s/p L4-L5 decompression and posterolateral fusion secondary to chronic right LE radicular symptoms. [facility] with patient denying any falls. Patient presents without any (+) red flag s/s with patient demonstrating an uncomplicated post-surgical recovery. Patient lives in a 1-story home with daughter with [address] with patient reporting at present (I) with self-care ADLs with modification with assistance required with household ADLs. Current ambulation tolerance of 15 minutes with use of FWW with patient at present (I) with short-distance driving. Prior to surgery patient reports ambulation without AD with some activity restrictions and ambulation intolerance secondary to chronic right knee pain and radicular right LE symptoms.  Patient reports minimal discomfort with all pain localized to surgical site without radiation with patient reporting sleep disturbances secondarily but no requirement to utilize pain medication. Patient denies LE N/T but does report isolated right LE weakness with instability reported with ambulation without FWW. Patient with PMH significant for type II diabetes and peripheral neuropathy. Patient demonstrates a decreased gait speed with maintenance of a forward flexed posture with diminished stride length bilaterally with ambulation with noted right antalgic gait pattern with ambulation without AD. Diminished LE functional strength demonstrated as noted by sit-to-stand with UE assistance required with diminished SLS stability, right > left, with reproduction of localized lumbar pain with right SLS. Patient reports primary goal is to improve functional mobility, independence and safety with emphasis of within clinic treatment to be placed on promotion of functional strength and mobility to allow patient to progress to least-restrictive AD and increase community involvement. With concern regarding progression and presence of chronic right knee pain with patient limited pre-surgery secondarily.  
  
Patient will continue to benefit from skilled PT services to modify and progress therapeutic interventions, address functional mobility deficits, address ROM deficits, address strength deficits, analyze and address soft tissue restrictions, analyze and cue movement patterns, analyze and modify body mechanics/ergonomics, assess and modify postural abnormalities, address imbalance/dizziness and instruct in home and community integration to attain remaining goals. Key Information: 
 
BP: 118/74 mmHg Posture: Increased forward lumbar flexion 
  
Gait:   With FWW; Increased lumbar forward flexion with decreased stride length bilaterally - Without AD; Right antalgic gait pattern with decreased stride length and increased lumbar flexion Functional Tests: 1. 30-second-sit-to-stand: 7 repetitions (1 UE assist required 2. TUG (10 feet): 29 seconds (no AD; CGA required) 3. Rhomberg: EO 30 sec / EC 30 sec 4. Sharpened Rhomberg Stance: EO (right LE forward) 23 sec / EO (left LE forward) 18 sec 5. SLS: Left 4 sec / Right 1 sec 
  
Neuro Screen [] WNL Dermatome: Intact and symmetrical sensation bilaterally to light touch L1-S2 Reflexes: L/R Patellar 2+, L/R Achilles 0+  
  
LE MMT (seated)  
    Left Right Hip Flexion 4+ 4+  
  Extension NT NT  
  Abduction NT NT  
  ER 4+ 4  
  IR 5 5 Knee Flexion 5 5  
  Extension 5 5 Ankle Dorsiflexion 5 5  
  Hallux Ext 5 5  
  Ankle Eversion 5 5 *Assessed in supine Evaluation Complexity History MEDIUM  Complexity : 1-2 comorbidities / personal factors will impact the outcome/ POC ; Examination MEDIUM Complexity : 3 Standardized tests and measures addressing body structure, function, activity limitation and / or participation in recreation  ;Presentation MEDIUM Complexity : Evolving with changing characteristics  ; Clinical Decision Making MEDIUM Complexity : FOTO score of 26-74 Overall Complexity Rating: MEDIUM Problem List: pain affecting function, decrease ROM, decrease strength, impaired gait/ balance, decrease ADL/ functional abilitiies, decrease activity tolerance, decrease flexibility/ joint mobility and decrease transfer abilities Treatment Plan may include any combination of the following: Therapeutic exercise, Therapeutic activities, Neuromuscular re-education, Physical agent/modality, Gait/balance training, Manual therapy, Patient education, Self Care training, Functional mobility training and Home safety training Patient / Family readiness to learn indicated by: asking questions, trying to perform skills and interest 
Persons(s) to be included in education: patient (P) Barriers to Learning/Limitations: None Patient Goal (s): I want to get rid of this walker Patient Self Reported Health Status: good Rehabilitation Potential: good Short Term Goals: To be accomplished in 3 weeks: 1. Patient will subjectively report full compliance with prescribed HEP. 2. Patient will demonstrate left/right seated hip flexion MMT 5/5 to improve ease with curb management. 3. Patient will demonstrate 30-second-sit-to-stand (without UE assist) >/=10 repetitions to improve ease with functional transfers. Long Term Goals: To be accomplished in 6 weeks: 1. Patient will demonstrate a significant functional improvement as demonstrated by a score of >/= 50 on FOTO. 2. Patient will demonstrate TUG (10 feet) without use of AD </=20 seconds to demonstrate a reduced falls risk. 3. Patient will demonstrate left/right SLS >/=20 seconds without UE assist to improve ease with ambulation on uneven ground. Frequency / Duration: Patient to be seen 2 times per week for 8 weeks. Patient/ Caregiver education and instruction: Diagnosis, prognosis, self care, activity modification and exercises 
 [x]  Plan of care has been reviewed with PTA 
 
G-Codes (GP) Mobility  Current  CL= 60-79%   Goal  CK= 40-59% The severity rating is based on clinical judgment and the FOTO score. Certification Period: 9/27/2018 - 11/26/2018 Ciaran Estrada, PT 9/27/2018 7:56 AM 
________________________________________________________________________ I certify that the above Therapy Services are being furnished while the patient is under my care. I agree with the treatment plan and certify that this therapy is necessary. [de-identified] Signature:____________Date:_________TIME:________ 
 
[employer], Date and Time must be completed for valid certification ** Please sign and return to In [facility] [address] [facility], [address] [place]  
[phone] fax

## 2018-09-27 NOTE — PROGRESS NOTES
PT DAILY TREATMENT NOTE - [facility]  Patient Name: Reyna Pate Date:2018 : 1940 [x]  Patient  Verified [place]or: [place] / Plan: Zoey Juarez / Product Type: Medicare / In [place] Total Treatment Time (min): 62 Total Timed Codes (min): 38 
1:1 Treatment Time ([address] only): 62 Visit #: 1  16 Treatment Area: Low back pain [M54.5] Physical Therapy Evaluation - Lumbar SUBJECTIVE Any medication changes, allergies to medications, adverse drug reactions, diagnosis change, or new procedure performed?: [x] No    [] Yes (see summary sheet for update) Subjective functional status/changes:    
PLOF: Lives with daughter (works full-time), (I) Functional ADLs, Hx of Neuropathic, (I) Driving, Retired, (I) Self-care ADLs, (I) Ambulation without AD Current Functional Status: (I) Self-care ADLs, Assistance with household ADLs, Driving short-distances, FWW with all ambulation Work Hx: Retired Living Situation: Lives in [facility] (5 [address]ERT[address] with bilateral handrail) Comorbidities: Chronic Right Knee Pain, Allergies, Arthritis, Back Pain, Premature Atrial Contractions, Enlarged Heart, Diabetes Type II, HTN Medications: No medication usage reported Pain Intensity (0-10, VAS): Current 0, Worst 3, Best 0 Patient Goals: \"I want to get rid of this walker\" OBJECTIVE EXAMINATION 
 
BP: 118/74 mmHg Posture: Increased forward lumbar flexion Gait: With FWW; Increased lumbar forward flexion with decreased stride length bilaterally Functional Tests: 1. 30-second-sit-to-stand: 7 repetitions (1 UE assist required 2. TUG (10 feet): 29 seconds (no AD; CGA required) 3. Rhomberg: EO 30 sec / EC 30 sec 4. Sharpened Rhomberg Stance: EO (right LE forward) 23 sec / EO (left LE forward) 18 sec 5. SLS: Left 4 sec / Right 1 sec Neuro Screen [] WNL Dermatome: Intact and symmetrical sensation bilaterally to light touch L1-S2 
 Reflexes: L/R Patellar 2+, L/R Achilles 0+ LE MMT (seated) Left Right Hip Flexion 4+ 4+ Extension NT NT  
 Abduction NT NT  
 ER 4+ 4 IR 5 5 Knee Flexion 5 5 Extension 5 5 Ankle Dorsiflexion 5 5 Hallux Ext 5 5 Ankle Eversion 5 5 *Assessed in supine OBJECTIVE 24 min [x]Eval                  []Re-Eval  
 
15 min Therapeutic Exercise:  [x] See flow sheet : Patient educated in completion of prescribed HEP and provided with written HEP instructions, Patient educated regarding diagnosis and PT PoC Rationale: increase ROM and increase strength to improve the patients ability to improve safety and independence with household ADLs 8 min Therapeutic Activity:  [x]  See flow sheet : Review of patient safety with completion of household ADLs and short-distance ambulation with use of FWW, Review of proper mechanics with functional transfers and car transfers, Reviewed modifications with self-care ADLs to reduce falls risk Rationale: increase ROM and increase strength  to improve the patients ability to improve safety with community ADLs 15 min Gait Trainin. Gait Training x80': Emphasis placed on maintenance of an erect posture with a symmetrical stride length bilaterally and foot clearance 2. Stairs: Review of stair ascent/descent with use of bilateral handrails and use of single handrail and FWW folded 3. Sit-to-Stand: Review of proper mechanics with functional transfers to reduce falls risk Rationale: Performed in order to reduce safety with community ambulation With 
 [] TE 
 [] TA 
 [] neuro 
 [] other: Patient Education: [x] Review HEP [] Progressed/Changed HEP based on:  
[] positioning   [] body mechanics   [] transfers   [] heat/ice application   
[] other:   
 
Pain Level (0-10 scale) post treatment: 0 
 
ASSESSMENT/Changes in Function: Patient presents s/p L4-L5 decompression and posterolateral fusion secondary to chronic right LE radicular symptoms. [facility] with patient denying any falls. Patient presents without any (+) red flag s/s with patient demonstrating an uncomplicated post-surgical recovery. Patient lives in a 1-story home with daughter with 5 [address] with patient reporting at present (I) with self-care ADLs with modification with assistance required with household ADLs. Current ambulation tolerance of 15 minutes with use of FWW with patient at present (I) with short-distance driving. Prior to surgery patient reports ambulation without AD with some activity restrictions and ambulation intolerance secondary to chronic right knee pain and radicular right LE symptoms. Patient reports minimal discomfort with all pain localized to surgical site without radiation with patient reporting sleep disturbances secondarily but no requirement to utilize pain medication. Patient denies LE N/T but does report isolated right LE weakness with instability reported with ambulation without FWW. Patient with PMH significant for type II diabetes and peripheral neuropathy. Patient demonstrates a decreased gait speed with maintenance of a forward flexed posture with diminished stride length bilaterally with ambulation with noted right antalgic gait pattern with ambulation without AD. Diminished LE functional strength demonstrated as noted by sit-to-stand with UE assistance required with diminished SLS stability, right > left, with reproduction of localized lumbar pain with right SLS. Patient reports primary goal is to improve functional mobility, independence and safety with emphasis of within clinic treatment to be placed on promotion of functional strength and mobility to allow patient to progress to least-restrictive AD and increase community involvement. With concern regarding progression and presence of chronic right knee pain with patient limited pre-surgery secondarily. Patient will continue to benefit from skilled PT services to modify and progress therapeutic interventions, address functional mobility deficits, address ROM deficits, address strength deficits, analyze and address soft tissue restrictions, analyze and cue movement patterns, analyze and modify body mechanics/ergonomics, assess and modify postural abnormalities, address imbalance/dizziness and instruct in home and community integration to attain remaining goals. [x]  See Plan of Care 
[]  See progress note/recertification 
[]  See Discharge Summary Progress towards goals / Updated goals: 
 
Short Term Goals: To be accomplished in 3 weeks: 1. Patient will subjectively report full compliance with prescribed HEP. Eval: HEP provided 2. Patient will demonstrate left/right seated hip flexion MMT 5/5 to improve ease with curb management. Eval: Left Hip Flexion (seated) 4+/5, Right Hip Flexion (seated) 4+/5 3. Patient will demonstrate 30-second-sit-to-stand (without UE assist) >/=10 repetitions to improve ease with functional transfers. Eval: 30-second-sit-to-stand (1 HHA) = 7 repetitions Long Term Goals: To be accomplished in 6 weeks: 1. Patient will demonstrate a significant functional improvement as demonstrated by a score of >/= 50 on FOTO. Eval: FOTO = 34 
2. Patient will demonstrate TUG (10 feet) without use of AD </=20 seconds to demonstrate a reduced falls risk. Eval: TUG (10 feet) = 29 seconds (no AD; CGA required) 3. Patient will demonstrate left/right SLS >/=20 seconds without UE assist to improve ease with ambulation on uneven ground. Eval: Left SLS 4 sec / Right SLS 1 sec PLAN [x]  Upgrade activities as tolerated     []  Continue plan of care 
[]  Update interventions per flow sheet      
[]  Discharge due to:_ 
[]  Other:_   
 
Levi Cardoso, PT 9/27/2018  7:55 AM 
 
Future Appointments Date Time Provider Merlin Rosario 9/27/2018 9:00 AM Maylin Loya, PT [facility]  
11/1/2018 3:20 PM Aldo Bess  [address]  
1/3/2019 10:00 AM [place] [id number] [facility]

## 2018-09-28 ENCOUNTER — HOSPITAL ENCOUNTER (OUTPATIENT)
Dept: PHYSICAL THERAPY | Age: 78
Discharge: HOME | End: 2018-09-28
Payer: MEDICARE

## 2018-09-28 PROCEDURE — 97110 THERAPEUTIC EXERCISES: CPT

## 2018-09-28 PROCEDURE — 97112 NEUROMUSCULAR REEDUCATION: CPT

## 2018-09-28 RX ORDER — DILTIAZEM HYDROCHLORIDE 180 MG/1
CAPSULE, EXTENDED RELEASE ORAL
Qty: 90 CAP | Refills: 1 | Status: SHIPPED | OUTPATIENT
Start: 2018-09-28 | End: 2019-04-08 | Stop reason: SDUPTHER

## 2018-09-28 NOTE — PROGRESS NOTES
PT DAILY TREATMENT NOTE - [facility]  Patient Name: Marysol Puga Date:2018 : 1940 [x]  Patient  Verified Payor: [place] MED[place] / Plan: Zoey Bobbyy / Product Type: Medicare / In time:1156  Out time:1241 Total Treatment Time (min): 45 Total Timed Codes (min): 35 
1:1 Treatment Time ( only): 25 Visit #: 2 of 16 Treatment Area: Low back pain [M54.5] SUBJECTIVE Pain Level (0-10 scale): 0 Any medication changes, allergies to medications, adverse drug reactions, diagnosis change, or new procedure performed?: [x] No    [] Yes (see summary sheet for update) Subjective functional status/changes:   [] No changes reported Patient reports completion of HEP. OBJECTIVE Modality rationale: decrease inflammation and decrease pain to improve the patients ability to improve ease with sleep Min Type Additional Details 10 [x]  Ice     []  heat 
[]  Ice massage Position: Supine Location: Lumbar, Post-Tx 20 min Therapeutic Exercise:  [x] See flow sheet : Emphasis placed on improving LE strength Rationale: increase ROM and increase strength to improve the patients ability to improve ease with household ADLs 10 min Neuromuscular Re-education:  [x]  See flow sheet : Emphasis placed on improving static and dynamic LE stability and balance and improving LE proprioceptive and kinesthetic awareness Rationale: increase ROM, increase strength, improve balance and increase proprioception  to improve the patients ability to improve ease with community ambulation 5 min Gait Trainin. Gait Training: x36' with FWW (CGA); Emphasis placed on maintenance of an erect posture Rationale: Performed to reduce falls risk With 
 [] TE 
 [] TA 
 [] neuro 
 [] other: Patient Education: [x] Review HEP [] Progressed/Changed HEP based on:  
[] positioning   [] body mechanics   [] transfers   [] heat/ice application   
[] other: Pain Level (0-10 scale) post treatment: 0 
 
ASSESSMENT/Changes in Function: Continue to promote an erect posture to promote improved ease with progression to least restrictive assistive device. Patient objectively noted with continued significant intolerance to right stance phase of gait and single-limb stability with right knee valgus collapse with patient with PMH significant for chronic right knee pain. Patient will continue to benefit from skilled PT services to modify and progress therapeutic interventions, address functional mobility deficits, address ROM deficits, address strength deficits, analyze and address soft tissue restrictions, analyze and cue movement patterns and analyze and modify body mechanics/ergonomics to attain remaining goals. []  See Plan of Care 
[]  See progress note/recertification 
[]  See Discharge Summary Progress towards goals / Updated goals: 
 
Short Term Goals: To be accomplished in 3 weeks: 1. Patient will subjectively report full compliance with prescribed HEP. Eval: HEP provided 2. Patient will demonstrate left/right seated hip flexion MMT 5/5 to improve ease with curb management. Eval: Left Hip Flexion (seated) 4+/5, Right Hip Flexion (seated) 4+/5 3. Patient will demonstrate 30-second-sit-to-stand (without UE assist) >/=10 repetitions to improve ease with functional transfers. Eval: 30-second-sit-to-stand (1 HHA) = 7 repetitions 
  
Long Term Goals: To be accomplished in 6 weeks: 1. Patient will demonstrate a significant functional improvement as demonstrated by a score of >/= 50 on FOTO. Eval: FOTO = 34 
2. Patient will demonstrate TUG (10 feet) without use of AD </=20 seconds to demonstrate a reduced falls risk. Eval: TUG (10 feet) = 29 seconds (no AD; CGA required) 3. Patient will demonstrate left/right SLS >/=20 seconds without UE assist to improve ease with ambulation on uneven ground. Eval: Left SLS 4 sec / Right SLS 1 sec PLAN 
 [x]  Upgrade activities as tolerated     [x]  Continue plan of care 
[]  Update interventions per flow sheet      
[]  Discharge due to:_ 
[]  Other:_   
 
Kingston Brown, PT 9/28/2018  7:55 AM 
 
Future Appointments Date Time Provider Merlin Marlene 9/28/2018 12:00 PM Kingston Brown, PT [facility]  
10/1/2018 9:00 AM Verrancho Dick, PTA [facility]  
10/4/2018 2:00 PM Kingston Brown, PT [facility]  
10/8/2018 9:30 AM Verrancho Dick, PTA [facility]  
10/11/2018 12:00 PM Kingston Brown, PT [facility]  
10/15/2018 9:30 AM Verrancho Dick, PTA [facility]  
10/18/2018 1:30 PM Azul Dick, PTA [facility]  
10/22/2018 9:30 AM Verrancho Soon, PTA [facility]  
10/25/2018 10:00 AM Kingston Brown, PT [facility]  
10/29/2018 9:30 AM Azul Dick, PTA [facility]  
11/1/2018 10:00 AM Kingston Brown, PT [facility]  
11/1/2018 3:20 PM Zully Craft  [address]  
11/5/2018 10:00 AM Verrancho Soon, PTA [facility]  
11/8/2018 2:00 PM Kingston Brown, PT [facility]  
11/12/2018 10:00 AM Verrancho Dick, PTA [facility]  
11/15/2018 10:00 AM Kingston Brown PT [facility]  
1/3/2019 10:00 AM Scottie Callejas MD [id number] [place] [address]

## 2018-10-01 ENCOUNTER — HOSPITAL ENCOUNTER (OUTPATIENT)
Dept: PHYSICAL THERAPY | Age: 78
Discharge: HOME | End: 2018-10-01
Payer: MEDICARE

## 2018-10-01 PROCEDURE — 97110 THERAPEUTIC EXERCISES: CPT

## 2018-10-01 PROCEDURE — 97116 GAIT TRAINING THERAPY: CPT

## 2018-10-01 PROCEDURE — 97112 NEUROMUSCULAR REEDUCATION: CPT

## 2018-10-01 NOTE — PROGRESS NOTES
PT DAILY TREATMENT NOTE - [facility]  Patient Name: Matthew Reid Date:10/1/2018 : 1940 [x]  Patient  Verified Payor: [place] MEDICARE / Plan: Zoey Bobbyy / Product Type: Medicare / In time:8:48  Out time:9:43 Total Treatment Time (min): 55 Total Timed Codes (min): 45 
1:1 Treatment Time ( only): 45 Visit #: 3 of 16 Treatment Area: Low back pain [M54.5] SUBJECTIVE Pain Level (0-10 scale): 0 Any medication changes, allergies to medications, adverse drug reactions, diagnosis change, or new procedure performed?: [x] No    [] Yes (see summary sheet for update) Subjective functional status/changes:   [] No changes reported Pt reports she wants to get rid of her walker. OBJECTIVE Modality rationale: decrease inflammation and decrease pain to improve the patients ability to improve ease with sleep Min Type Additional Details  
 [] Estim:  []Unatt       []IFC  []Premod []Other:  []w/ice   []w/heat Position: Location:  
 [] Estim: []Att    []TENS instruct  []NMES []Other:  []w/US   []w/ice   []w/heat Position: Location:  
 []  Traction: [] Cervical       []Lumbar 
                     [] Prone          []Supine []Intermittent   []Continuous Lbs: 
[] before manual 
[] after manual  
 []  Ultrasound: []Continuous   [] Pulsed []1MHz   []3MHz W/cm2: 
Location:  
 []  Iontophoresis with dexamethasone Location: [] Take home patch  
[] In clinic  
10 []  Ice     []  heat 
[]  Ice massage 
[]  Laser  
[]  Anodyne Position: Location:  
 []  Laser with stim 
[]  Other:  Position: Location:  
 []  Vasopneumatic Device Pressure:       [] lo [] med [] hi  
Temperature: [] lo [] med [] hi  
[] Skin assessment post-treatment:  []intact []redness- no adverse reaction 
  []redness  adverse reaction:  
 
 
25 min Therapeutic Exercise:  [x] See flow sheet :  
 Rationale: increase ROM and increase strength to improve the patients ability to increase tolerance to activities. 10 min Neuromuscular Re-education:  [x]  See flow sheet :balance and correct posture Rationale: improve coordination, improve balance and increase proprioception  to improve the patients ability to increase ease with ADLs. 10 min Gait Training:  160 feet with SBQC device on level surfaces with CGA level of assist  
Rationale: Increase independence with ambulation and increase in safety With 
 [] TE 
 [] TA 
 [] neuro 
 [] other: Patient Education: [x] Review HEP [] Progressed/Changed HEP based on:  
[] positioning   [] body mechanics   [] transfers   [] heat/ice application   
[] other:   
 
Other Objective/Functional Measures:   
 
Pain Level (0-10 scale) post treatment: 0 
 
ASSESSMENT/Changes in Function: Progressed pt onto a SBQC with ambulation. Pt ambulated steady without LOB or increase in pain with SBQC. Pt plans to bring her cane next session. Patient will continue to benefit from skilled PT services to modify and progress therapeutic interventions, address functional mobility deficits, address ROM deficits, address strength deficits and analyze and address soft tissue restrictions to attain remaining goals. []  See Plan of Care 
[]  See progress note/recertification 
[]  See Discharge Summary Progress towards goals / Updated goals: 
Short Term Goals: To be accomplished in 3 weeks: 1. Patient will subjectively report full compliance with prescribed HEP. Eval: HEP provided Current: Goal met: PT reports performing HEP. 10/1/18 2. Patient will demonstrate left/right seated hip flexion MMT 5/5 to improve ease with curb management. Eval: Left Hip Flexion (seated) 4+/5, Right Hip Flexion (seated) 4+/5 3. Patient will demonstrate 30-second-sit-to-stand (without UE assist) >/=10 repetitions to improve ease with functional transfers. Eval: 30-second-sit-to-stand (1 HHA) = 7 repetitions 
   
Long Term Goals: To be accomplished in 6 weeks: 1. Patient will demonstrate a significant functional improvement as demonstrated by a score of >/= 50 on FOTO. Eval: FOTO = 34 
2. Patient will demonstrate TUG (10 feet) without use of AD </=20 seconds to demonstrate a reduced falls risk. Eval: TUG (10 feet) = 29 seconds (no AD; CGA required) 3. Patient will demonstrate left/right SLS >/=20 seconds without UE assist to improve ease with ambulation on uneven ground. Eval: Left SLS 4 sec / Right SLS 1 sec PLAN 
[]  Upgrade activities as tolerated     [x]  Continue plan of care 
[]  Update interventions per flow sheet      
[]  Discharge due to:_ 
[]  Other:_ Evalee Eisenmenger, PTA 10/1/2018  8:46 AM 
 
Future Appointments Date Time Provider Merlin Rosario 10/1/2018 9:00 AM Evalee Eisenmenger, PTA [facility]  
10/4/2018 2:00 PM Farzaneh Ruiz, PT [facility]  
10/8/2018 9:30 AM Evalee Eisenmenger, PTA [facility]  
10/11/2018 12:00 PM Farzaneh Ruiz, PT [facility]  
10/15/2018 9:30 AM Evalee Eisenmenger, PTA [facility]  
10/18/2018 1:30 PM Evalee Eisenmenger, PTA [facility]  
10/22/2018 9:30 AM Evalee Eisenmenger, PTA [facility]  
10/25/2018 10:00 AM Farzaneh Ruiz, PT [facility]  
10/29/2018 9:30 AM Evalee Eisenmenger, PTA [facility]  
11/1/2018 10:00 AM Farzaneh Ruiz, PT [facility]  
11/1/2018 3:20 PM Juan Payan, CORINE [address]  
11/5/2018 10:00 AM Evalee Eisenmenger, PTA [facility]  
11/8/2018 2:00 PM Farzaneh Ruiz, PT [facility]  
11/12/2018 10:00 AM Evalee Eisenmenger, PTA [facility]  
11/15/2018 10:00 AM Farzaneh Ruiz, PT [facility]  
1/3/2019 10:00 AM Adina Mijares MD [id number] [facility]

## 2018-10-04 ENCOUNTER — HOSPITAL ENCOUNTER (OUTPATIENT)
Dept: PHYSICAL THERAPY | Age: 78
Discharge: HOME | End: 2018-10-04
Payer: MEDICARE

## 2018-10-04 PROCEDURE — 97116 GAIT TRAINING THERAPY: CPT

## 2018-10-04 PROCEDURE — 97112 NEUROMUSCULAR REEDUCATION: CPT

## 2018-10-04 NOTE — PROGRESS NOTES
PT DAILY TREATMENT NOTE - [facility]  Patient Name: Ludmila Xiong Date:10/4/2018 : 1940 [x]  Patient  Verified Payor: [place] MEDICARE / Plan: Zoey Rosenberg [facility] / Product Type: Medicare / In [id number]  Out time:0256 Total Treatment Time (min): 57 Total Timed Codes (min): 47 
1:1 Treatment Time ( only): 30 Visit #: 4 of 16 Treatment Area: Low back pain [M54.5] SUBJECTIVE Pain Level (0-10 scale): 0 Any medication changes, allergies to medications, adverse drug reactions, diagnosis change, or new procedure performed?: [x] No    [] Yes (see summary sheet for update) Subjective functional status/changes:   [] No changes reported Patient reports slight catch in left hip after last treatment session with relief within 24 hours. OBJECTIVE Modality rationale: decrease inflammation and decrease pain to improve the patients ability to improve ease with sleep Min Type Additional Details 10 [x]  Ice     []  heat 
[]  Ice massage Position: Supine Location: Lumbar, Post-Tx  
  
20 min Therapeutic Exercise:  [x] See flow sheet : Emphasis placed on improving LE strength Rationale: increase ROM and increase strength to improve the patients ability to improve ease with household ADLs 
  
19 min Neuromuscular Re-education:  [x]  See flow sheet : Emphasis placed on improving static and dynamic LE stability and balance and improving LE proprioceptive and kinesthetic awareness Rationale: increase ROM, increase strength, improve balance and increase proprioception  to improve the patients ability to improve ease with community ambulation 
  
8 min Gait Trainin. Gait Training: x80' with FWW (cane); Emphasis placed on maintenance of an erect posture Rationale: Performed to reduce falls risk With 
 [] TE 
 [] TA 
 [] neuro 
 [] other: Patient Education: [x] Review HEP [] Progressed/Changed HEP based on: [] positioning   [] body mechanics   [] transfers   [] heat/ice application   
[] other:   
 
Pain Level (0-10 scale) post treatment: 0 
 
ASSESSMENT/Changes in Function: Completion of gait training with utilization of patient's cane with patient demonstrating ability to ambulate x160' independently with use of cane safely without assistance. Patient encouraged to initiate ambulation within the home with use of SPC to promote improve balance confidence. Continued severe limitations in right SLS stability demonstrated secondary to reproduction of right hip pain. Patient will continue to benefit from skilled PT services to modify and progress therapeutic interventions, address functional mobility deficits, address ROM deficits, address strength deficits, analyze and address soft tissue restrictions, analyze and cue movement patterns and analyze and modify body mechanics/ergonomics to attain remaining goals. []  See Plan of Care 
[]  See progress note/recertification 
[]  See Discharge Summary Progress towards2 goals / Updated goals: 
 
Short Term Goals: To be accomplished in 3 weeks: 1. Patient will subjectively report full compliance with prescribed HEP. Eval: HEP provided Current: Goal met: PT reports performing HEP. 10/1/18 2. Patient will demonstrate left/right seated hip flexion MMT 5/5 to improve ease with curb management. Eval: Left Hip Flexion (seated) 4+/5, Right Hip Flexion (seated) 4+/5 3. Patient will demonstrate 30-second-sit-to-stand (without UE assist) >/=10 repetitions to improve ease with functional transfers. Eval: 30-second-sit-to-stand (1 HHA) = 7 repetitions 
   
Long Term Goals: To be accomplished in 6 weeks: 1. Patient will demonstrate a significant functional improvement as demonstrated by a score of >/= 50 on FOTO. Eval: FOTO = 34 
2. Patient will demonstrate TUG (10 feet) without use of AD </=20 seconds to demonstrate a reduced falls risk. Eval: TUG (10 feet) = 29 seconds (no AD; CGA required) 3. Patient will demonstrate left/right SLS >/=20 seconds without UE assist to improve ease with ambulation on uneven ground. Eval: Left SLS 4 sec / Right SLS 1 sec Current: Progressing, Left SLS 10 sec / Right SLS 4 sec, 10/4/2018 PLAN [x]  Upgrade activities as tolerated     [x]  Continue plan of care 
[]  Update interventions per flow sheet      
[]  Discharge due to:_ 
[]  Other:_   
 
Cee Valentine PT 10/4/2018  8:01 AM 
 
Future Appointments Date Time Provider Merlin Marlene 10/4/2018 2:00 PM Cee Valentine, PT [facility]  
10/8/2018 9:30 AM Chico Pro, PTA [facility]  
10/11/2018 12:00 PM Cee Valentine, PT [facility]  
10/15/2018 9:30 AM Chico Pro, PTA [facility]  
10/18/2018 1:30 PM Chico Pro, PTA [facility]  
10/22/2018 9:30 AM Chico Pro, PTA [facility]  
10/25/2018 10:00 AM Cee Valentine, PT [facility]  
10/29/2018 9:30 AM Chico Pro, PTA [facility]  
11/1/2018 10:00 AM Cee Valentine, PT [facility]  
11/1/2018 3:20 PM Pearl Vega  [address]  
11/5/2018 10:00 AM Chico Pro, PTA [facility]  
11/8/2018 2:00 PM Cee Valentine, PT [facility]  
11/12/2018 10:00 AM Chico Pro, PTA [facility]  
11/15/2018 10:00 AM Cee Valentine PT [facility]  
1/3/2019 10:00 AM Rolanda Hernandez MD [id number] [place] [address]

## 2018-10-08 ENCOUNTER — HOSPITAL ENCOUNTER (OUTPATIENT)
Dept: PHYSICAL THERAPY | Age: 78
Discharge: HOME | End: 2018-10-08
Payer: MEDICARE

## 2018-10-08 PROCEDURE — 97110 THERAPEUTIC EXERCISES: CPT

## 2018-10-08 PROCEDURE — 97112 NEUROMUSCULAR REEDUCATION: CPT

## 2018-10-08 PROCEDURE — 97116 GAIT TRAINING THERAPY: CPT

## 2018-10-08 NOTE — PROGRESS NOTES
PT DAILY TREATMENT NOTE - [facility]  Patient Name: Stephanie Thornton Date:10/8/2018 : 1940 [x]  Patient  Verified Payor: [place] MEDICARE / Plan: Zoey Bobbyy / Product Type: Medicare / In time:9:31  Out time:10:28 Total Treatment Time (min): 57 Total Timed Codes (min): 47 
1:1 Treatment Time ( only): 40 Visit #: 5 of 16 Treatment Area: Low back pain [M54.5] SUBJECTIVE Pain Level (0-10 scale): 0 Any medication changes, allergies to medications, adverse drug reactions, diagnosis change, or new procedure performed?: [x] No    [] Yes (see summary sheet for update) Subjective functional status/changes:   [] No changes reported Pt reports that she is using her cane a little in the house. OBJECTIVE Modality rationale: decrease pain to improve the patients ability to decrease difficulty while performing tasks. Min Type Additional Details  
 [] Estim:  []Unatt       []IFC  []Premod []Other:  []w/ice   []w/heat Position: Location:  
 [] Estim: []Att    []TENS instruct  []NMES []Other:  []w/US   []w/ice   []w/heat Position: Location:  
 []  Traction: [] Cervical       []Lumbar 
                     [] Prone          []Supine []Intermittent   []Continuous Lbs: 
[] before manual 
[] after manual  
 []  Ultrasound: []Continuous   [] Pulsed []1MHz   []3MHz W/cm2: 
Location:  
 []  Iontophoresis with dexamethasone Location: [] Take home patch  
[] In clinic  
10 [x]  Ice     []  heat 
[]  Ice massage 
[]  Laser  
[]  Anodyne Position:sitting Location:back   
 []  Laser with stim 
[]  Other:  Position: Location:  
 []  Vasopneumatic Device Pressure:       [] lo [] med [] hi  
Temperature: [] lo [] med [] hi  
[] Skin assessment post-treatment:  []intact []redness- no adverse reaction 
  []redness  adverse reaction: 
 
  
25 min Therapeutic Exercise:  [x] See flow sheet :  
 Rationale: increase ROM and increase strength to improve the patients ability to increase tolerance to activities.   
   
10 min Neuromuscular Re-education:  [x]  See flow sheet :balance and correct posture Rationale: improve coordination, improve balance and increase proprioception  to improve the patients ability to increase ease with ADLs.   
  
12 min Gait Trainin feet with SPC device on level surfaces with SBA level of assist  
Rationale: Increase independence with ambulation and increase in safety 
   
     
With 
 [] TE 
 [] TA 
 [] neuro 
 [] other: Patient Education: [x] Review HEP [] Progressed/Changed HEP based on:  
[] positioning   [] body mechanics   [] transfers   [] heat/ice application   
[] other:   
 
Other Objective/Functional Measures:   
 
Pain Level (0-10 scale) post treatment: 0 
 
ASSESSMENT/Changes in Function: Increasing ambulation tolerance with SPC for pt to build endurance, safety, and confidence. Patient will continue to benefit from skilled PT services to modify and progress therapeutic interventions, address functional mobility deficits, address ROM deficits, address strength deficits and analyze and address soft tissue restrictions to attain remaining goals. []  See Plan of Care 
[]  See progress note/recertification 
[]  See Discharge Summary Progress towards goals / Updated goals: 
  
Short Term Goals: To be accomplished in 3 weeks: 1. Patient will subjectively report full compliance with prescribed HEP. Eval: HEP provided Current: Goal met: PT reports performing HEP. 10/1/18 2. Patient will demonstrate left/right seated hip flexion MMT 5/5 to improve ease with curb management. Eval: Left Hip Flexion (seated) 4+/5, Right Hip Flexion (seated) 4+/5 3. Patient will demonstrate 30-second-sit-to-stand (without UE assist) >/=10 repetitions to improve ease with functional transfers. Eval: 30-second-sit-to-stand (1 HHA) = 7 repetitions    
 Long Term Goals: To be accomplished in 6 weeks: 1. Patient will demonstrate a significant functional improvement as demonstrated by a score of >/= 50 on FOTO. Eval: FOTO = 34 
2. Patient will demonstrate TUG (10 feet) without use of AD </=20 seconds to demonstrate a reduced falls risk. Eval: TUG (10 feet) = 29 seconds (no AD; CGA required) 3. Patient will demonstrate left/right SLS >/=20 seconds without UE assist to improve ease with ambulation on uneven ground. Eval: Left SLS 4 sec / Right SLS 1 sec Current: Progressing, Left SLS 10 sec / Right SLS 4 sec, 10/4/2018 PLAN 
[]  Upgrade activities as tolerated     [x]  Continue plan of care 
[]  Update interventions per flow sheet      
[]  Discharge due to:_ 
[]  Other:_ Azul Dick PTA 10/8/2018  9:53 AM 
 
Future Appointments Date Time Provider Merlin Rosario 10/11/2018 12:00 PM Kingston Brown, PT [facility]  
10/15/2018 9:30 AM Azul Dick, PTA [facility]  
10/18/2018 1:30 PM Azul Dick, PTA [facility]  
10/22/2018 9:30 AM Azul Dick, PTA [facility]  
10/25/2018 10:00 AM Kingston Ogdenek, PT [facility]  
10/29/2018 9:30 AM Azul Dick, PTA [facility]  
11/1/2018 10:00 AM Kingston Peek, PT [facility]  
11/1/2018 3:20 PM Zully Craft  [address]  
11/5/2018 10:00 AM Azul Dick, PTA [facility]  
11/8/2018 2:00 PM Kingston Brown, PT [facility]  
11/12/2018 10:00 AM Azul Dick, PTA [facility]  
11/15/2018 10:00 AM Kingston Brown PT [facility]  
1/3/2019 10:00 AM Scottie Callejas MD [id number] [place] [address]

## 2018-10-11 ENCOUNTER — HOSPITAL ENCOUNTER (OUTPATIENT)
Dept: PHYSICAL THERAPY | Age: 78
Discharge: HOME | End: 2018-10-11
Payer: MEDICARE

## 2018-10-11 PROCEDURE — 97110 THERAPEUTIC EXERCISES: CPT

## 2018-10-11 PROCEDURE — 97116 GAIT TRAINING THERAPY: CPT

## 2018-10-11 PROCEDURE — 97112 NEUROMUSCULAR REEDUCATION: CPT

## 2018-10-11 NOTE — PROGRESS NOTES
PT DAILY TREATMENT NOTE - [facility]  Patient Name: Miryam Wesley Date:10/11/2018 : 1940 [x]  Patient  Verified Payor: [place] MEDICARE / Plan: Zoey Juarez / Product Type: Medicare / In time:1149  Out time:1250 Total Treatment Time (min): 61 Total Timed Codes (min): 51 
1:1 Treatment Time ([facility] only): 38 Visit #: 6 of 16 Treatment Area: Low back pain [M54.5] SUBJECTIVE Pain Level (0-10 scale): 0 Any medication changes, allergies to medications, adverse drug reactions, diagnosis change, or new procedure performed?: [x] No    [] Yes (see summary sheet for update) Subjective functional status/changes:   [] No changes reported Patient reports good pain management with almost exclusive utilization of SPC with ambulation within the home with continued use of FWW with ambulation outside of the home. OBJECTIVE Modality rationale: decrease inflammation and decrease pain to improve the patients ability to improve ease with sleep Min Type Additional Details 10 [x]  Ice     []  heat 
[]  Ice massage Position: Reclined Location: Lumbar, Post-Tx 20 min Therapeutic Exercise:  [x] See flow sheet : Emphasis placed on improving LE strength Rationale: increase ROM and increase strength to improve the patients ability to improve ease with household ADLs 
   
23 min Neuromuscular Re-education:  [x]  See flow sheet : Emphasis placed on improving static and dynamic LE stability and balance and improving LE proprioceptive and kinesthetic awareness Rationale: increase ROM, increase strength, improve balance and increase proprioception  to improve the patients ability to improve ease with community ambulation 
   
8 min Gait Trainin. Gait Training: x46' with FWW (cane); Emphasis placed on maintenance of an erect posture Rationale: Performed to reduce falls risk With 
 [] TE 
 [] TA 
 [] neuro 
 [] other: Patient Education: [x] Review HEP   
 [] Progressed/Changed HEP based on:  
[] positioning   [] body mechanics   [] transfers   [] heat/ice application   
[] other:   
 
Pain Level (0-10 scale) post treatment: 0 
 
ASSESSMENT/Changes in Function: Patient demonstrates a significant objective improvement in ambulation tolerance with ability to ambulate within the clinic x360' with [address] and stand-by assist without LoB nor pain reported with completion. Continued diminished activity tolerance with patient benefiting from rest breaks secondarily. Patient will continue to benefit from skilled PT services to modify and progress therapeutic interventions, address functional mobility deficits, address ROM deficits, address strength deficits, analyze and address soft tissue restrictions, analyze and cue movement patterns and analyze and modify body mechanics/ergonomics to attain remaining goals. []  See Plan of Care 
[]  See progress note/recertification 
[]  See Discharge Summary Progress towards goals / Updated goals: 
 
Short Term Goals: To be accomplished in 3 weeks: 1. Patient will subjectively report full compliance with prescribed HEP. Eval: HEP provided Current: Goal met: PT reports performing HEP. 10/1/18 2. Patient will demonstrate left/right seated hip flexion MMT 5/5 to improve ease with curb management. Eval: Left Hip Flexion (seated) 4+/5, Right Hip Flexion (seated) 4+/5 3. Patient will demonstrate 30-second-sit-to-stand (without UE assist) >/=10 repetitions to improve ease with functional transfers. Eval: 30-second-sit-to-stand (1 HHA) = 7 repetitions 
   
Long Term Goals: To be accomplished in 6 weeks: 1. Patient will demonstrate a significant functional improvement as demonstrated by a score of >/= 50 on FOTO. Eval: FOTO = 34 
Current: Progressing, FOTO = 39, 10/11/2018 2. Patient will demonstrate TUG (10 feet) without use of AD </=20 seconds to demonstrate a reduced falls risk. Eval: TUG (10 feet) = 29 seconds (no AD; CGA required) 3. Patient will demonstrate left/right SLS >/=20 seconds without UE assist to improve ease with ambulation on uneven ground. Eval: Left SLS 4 sec / Right SLS 1 sec Current: Progressing, Left SLS 10 sec / Right SLS 4 sec, 10/4/2018 PLAN [x]  Upgrade activities as tolerated     [x]  Continue plan of care 
[]  Update interventions per flow sheet      
[]  Discharge due to:_ 
[]  Other:_   
 
Iliana Leavitt, PT 10/11/2018  8:12 AM 
 
Future Appointments Date Time Provider Merlin Marlene 10/11/2018 12:00 PM Iliana Leavitt, PT [facility]  
10/15/2018 9:30 AM Michelle Martínez, PTA [facility]  
10/18/2018 1:30 PM Michelle Martínez, PTA [facility]  
10/22/2018 9:30 AM Michelle Martínez, PTA [facility]  
10/25/2018 10:00 AM Iliana Leavitt, PT [facility]  
10/29/2018 9:30 AM Michelle Martínez PTA [facility]  
11/1/2018 10:00 AM Iliana Leavitt, PT [facility]  
11/1/2018 3:20 PM Amado Thomas  [address] 11/5/2018 10:00 AM Michelle Martínez PTA [facility]  
11/8/2018 2:00 PM Iliana Leavitt, PT [facility]  
11/12/2018 10:00 AM Michelle Martínez PTA [facility]  
11/15/2018 10:00 AM Iliana Leavitt, PT [facility]  
1/3/2019 10:00 AM Megha Pierre MD [id number] [place] [address]

## 2018-10-15 ENCOUNTER — HOSPITAL ENCOUNTER (OUTPATIENT)
Dept: PHYSICAL THERAPY | Age: 78
Discharge: HOME | End: 2018-10-15
Payer: MEDICARE

## 2018-10-15 PROCEDURE — 97110 THERAPEUTIC EXERCISES: CPT

## 2018-10-15 PROCEDURE — 97116 GAIT TRAINING THERAPY: CPT

## 2018-10-15 PROCEDURE — 97112 NEUROMUSCULAR REEDUCATION: CPT

## 2018-10-15 NOTE — PROGRESS NOTES
PT DAILY TREATMENT NOTE - [facility]  Patient Name: Sushil Bell Date:10/15/2018 : 1940 [x]  Patient  Verified Payor: [place] MEDICARE / Plan: Zoey Bobbyy / Product Type: Medicare / In time:9:27  Out time:10:25 Total Treatment Time (min): 58 Total Timed Codes (min): 48 
1:1 Treatment Time ( only): 38 Visit #: 7 of 16 Treatment Area: Low back pain [M54.5] SUBJECTIVE Pain Level (0-10 scale): 0 Any medication changes, allergies to medications, adverse drug reactions, diagnosis change, or new procedure performed?: [x] No    [] Yes (see summary sheet for update) Subjective functional status/changes:   [] No changes reported Pt reports that she walked all weekend without using her walker. OBJECTIVE Modality rationale: decrease pain to improve the patients ability to decrease difficulty while performing tasks. Min Type Additional Details  
 [] Estim:  []Unatt       []IFC  []Premod []Other:  []w/ice   []w/heat Position: Location:  
 [] Estim: []Att    []TENS instruct  []NMES []Other:  []w/US   []w/ice   []w/heat Position: Location:  
 []  Traction: [] Cervical       []Lumbar 
                     [] Prone          []Supine []Intermittent   []Continuous Lbs: 
[] before manual 
[] after manual  
 []  Ultrasound: []Continuous   [] Pulsed []1MHz   []3MHz W/cm2: 
Location:  
 []  Iontophoresis with dexamethasone Location: [] Take home patch  
[] In clinic  
10 [x]  Ice     []  heat 
[]  Ice massage 
[]  Laser  
[]  Anodyne Position:sitting Location:back   
 []  Laser with stim 
[]  Other:  Position: Location:  
 []  Vasopneumatic Device Pressure:       [] lo [] med [] hi  
Temperature: [] lo [] med [] hi  
[] Skin assessment post-treatment:  []intact []redness- no adverse reaction 
  []redness  adverse reaction:  
 
   
 30 min Therapeutic Exercise:  [x] See flow sheet :  
Rationale: increase ROM and increase strength to improve the patients ability to increase tolerance to activities.    
   
10 min Neuromuscular Re-education:  [x]  See flow sheet :balance and correct posture Rationale: improve coordination, improve balance and increase proprioception  to improve the patients ability to increase ease with ADLs.    
   
8 min Gait Trainin feet with SPC device on level surfaces with SBA level of assist  
Rationale: Increase independence with ambulation and increase in safety 
   
 
       
With 
 [] TE 
 [] TA 
 [] neuro 
 [] other: Patient Education: [x] Review HEP [] Progressed/Changed HEP based on:  
[] positioning   [] body mechanics   [] transfers   [] heat/ice application   
[] other:   
 
Other Objective/Functional Measures:   
 
Pain Level (0-10 scale) post treatment: 0 
 
ASSESSMENT/Changes in Function: Pt was able to step over small hurdles with no LOB. Patient will continue to benefit from skilled PT services to modify and progress therapeutic interventions, address functional mobility deficits, address ROM deficits, address strength deficits and analyze and address soft tissue restrictions to attain remaining goals. []  See Plan of Care 
[]  See progress note/recertification 
[]  See Discharge Summary Progress towards goals / Updated goals: 
Short Term Goals: To be accomplished in 3 weeks: 1. Patient will subjectively report full compliance with prescribed HEP. Eval: HEP provided Current: Goal met: PT reports performing HEP. 10/1/18 2. Patient will demonstrate left/right seated hip flexion MMT 5/5 to improve ease with curb management. Eval: Left Hip Flexion (seated) 4+/5, Right Hip Flexion (seated) 4+/5 3. Patient will demonstrate 30-second-sit-to-stand (without UE assist) >/=10 repetitions to improve ease with functional transfers. Eval: 30-second-sit-to-stand (1 HHA) = 7 repetitions Current: Progressin sec sit to stand (no HHA) = 8 reps 10/15/18 
   
Long Term Goals: To be accomplished in 6 weeks: 1. Patient will demonstrate a significant functional improvement as demonstrated by a score of >/= 50 on FOTO. Eval: FOTO = 34 
Current: Progressing, FOTO = 39, 10/11/2018 2. Patient will demonstrate TUG (10 feet) without use of AD </=20 seconds to demonstrate a reduced falls risk. Eval: TUG (10 feet) = 29 seconds (no AD; CGA required) 3. Patient will demonstrate left/right SLS >/=20 seconds without UE assist to improve ease with ambulation on uneven ground. Eval: Left SLS 4 sec / Right SLS 1 sec Current: Progressing, Left SLS 10 sec / Right SLS 4 sec, 10/4/2018 
  
 
PLAN 
[]  Upgrade activities as tolerated     [x]  Continue plan of care 
[]  Update interventions per flow sheet      
[]  Discharge due to:_ 
[]  Other:_ Tim Mcginnis PTA 10/15/2018  9:09 AM 
 
Future Appointments Date Time Provider Melrin Rosario 10/15/2018 9:30 AM Tim Mcginnis PTA [facility] [address]  
10/18/2018 1:30 PM Tim Mcginnis PTA [facility] [address]  
10/22/2018 9:30 AM Tim Mcginnis PTA [facility] [address]  
10/25/2018 10:00 AM Sondra Evyes, PT [facility] [address]  
10/29/2018 9:30 AM Tim Mcginnis PTA [facility] [address]  
2018 10:00 AM Sondra Evyes, PT [facility] [address]  
2018 3:20 PM Elina Evans, COR[place] [address]  
2018 10:00 AM Tim Mcginnis PTA [facility] [address]  
2018 2:00 PM Sondra Phenes, PT [facility] [address]  
2018 10:00 AM Tim Mcginnis PTA [facility] [address]  
11/15/2018 10:00 AM Sondra Evyes, PT [facility] [address]  
1/3/2019 10:00 AM Whitney Reyna MD [id number] [facility]

## 2018-10-18 ENCOUNTER — HOSPITAL ENCOUNTER (OUTPATIENT)
Dept: PHYSICAL THERAPY | Age: 78
Discharge: HOME | End: 2018-10-18
Payer: MEDICARE

## 2018-10-18 PROCEDURE — 97112 NEUROMUSCULAR REEDUCATION: CPT

## 2018-10-18 PROCEDURE — 97110 THERAPEUTIC EXERCISES: CPT

## 2018-10-18 NOTE — PROGRESS NOTES
PT DAILY TREATMENT NOTE - [facility]  Patient Name: Velasquez Dao Date:10/18/2018 : 1940 [x]  Patient  Verified Payor: [place] MEDICARE / Plan: Zoey Bobbyy / Product Type: Medicare / In time:1:30  Out time:2:15 Total Treatment Time (min): 45 Total Timed Codes (min): 45 
1:1 Treatment Time ( only): 38 Visit #: 8 of 16 Treatment Area: Low back pain [M54.5] SUBJECTIVE Pain Level (0-10 scale): 0 Any medication changes, allergies to medications, adverse drug reactions, diagnosis change, or new procedure performed?: [x] No    [] Yes (see summary sheet for update) Subjective functional status/changes:   [] No changes reported Pt reports she continues to not use her cane and not her walker . OBJECTIVE Min Type Additional Details  
 [] Estim:  []Unatt       []IFC  []Premod []Other:  []w/ice   []w/heat Position: Location:  
 [] Estim: []Att    []TENS instruct  []NMES []Other:  []w/US   []w/ice   []w/heat Position: Location:  
 []  Traction: [] Cervical       []Lumbar 
                     [] Prone          []Supine []Intermittent   []Continuous Lbs: 
[] before manual 
[] after manual  
 []  Ultrasound: []Continuous   [] Pulsed []1MHz   []3MHz W/cm2: 
Location:  
 []  Iontophoresis with dexamethasone Location: [] Take home patch  
[] In clinic  
 []  Ice     []  heat 
[]  Ice massage 
[]  Laser  
[]  Anodyne Position: Location:  
 []  Laser with stim 
[]  Other:  Position: Location:  
 []  Vasopneumatic Device Pressure:       [] lo [] med [] hi  
Temperature: [] lo [] med [] hi  
[] Skin assessment post-treatment:  []intact []redness- no adverse reaction 
  []redness  adverse reaction:  
   
35 min Therapeutic Exercise:  [x] See flow sheet :  
Rationale: increase ROM and increase strength to improve the patients ability to increase tolerance to activities.   
   
   
 10 min Neuromuscular Re-education:  [x]  See flow sheet :balance and correct posture Rationale: improve coordination, improve balance and increase proprioception  to improve the patients ability to increase ease with ADLs.    
    
       
With 
 [] TE 
 [] TA 
 [] neuro 
 [] other: Patient Education: [x] Review HEP [] Progressed/Changed HEP based on:  
[] positioning   [] body mechanics   [] transfers   [] heat/ice application   
[] other:   
 
Other Objective/Functional Measures:   
 
Pain Level (0-10 scale) post treatment: 0 
 
ASSESSMENT/Changes in Function: Held on gait training due to pt confident and safe with using SPC. Patient will continue to benefit from skilled PT services to modify and progress therapeutic interventions, address functional mobility deficits, address ROM deficits, address strength deficits and analyze and address soft tissue restrictions to attain remaining goals. []  See Plan of Care 
[]  See progress note/recertification 
[]  See Discharge Summary Progress towards goals / Updated goals: 
Short Term Goals: To be accomplished in 3 weeks: 1. Patient will subjectively report full compliance with prescribed HEP. Eval: HEP provided Current: Goal met: PT reports performing HEP. 10/1/18 2. Patient will demonstrate left/right seated hip flexion MMT 5/5 to improve ease with curb management. Eval: Left Hip Flexion (seated) 4+/5, Right Hip Flexion (seated) 4+/5 3. Patient will demonstrate 30-second-sit-to-stand (without UE assist) >/=10 repetitions to improve ease with functional transfers. Eval: 30-second-sit-to-stand (1 HHA) = 7 repetitions Current: Progressin sec sit to stand (no HHA) = 8 reps 10/15/18 
   
Long Term Goals: To be accomplished in 6 weeks: 1. Patient will demonstrate a significant functional improvement as demonstrated by a score of >/= 50 on FOTO. Eval: FOTO = 34 
Current: Progressing, FOTO = 39, 10/11/2018 2. Patient will demonstrate TUG (10 feet) without use of AD </=20 seconds to demonstrate a reduced falls risk. Eval: TUG (10 feet) = 29 seconds (no AD; CGA required) 3. Patient will demonstrate left/right SLS >/=20 seconds without UE assist to improve ease with ambulation on uneven ground. Eval: Left SLS 4 sec / Right SLS 1 sec Current: Progressing, Left SLS 10 sec / Right SLS 4 sec, 10/4/2018 PLAN 
[]  Upgrade activities as tolerated     [x]  Continue plan of care 
[]  Update interventions per flow sheet      
[]  Discharge due to:_ 
[]  Other:_ Amelie Eckert PTA 10/18/2018  1:50 PM 
 
Future Appointments Date Time Provider Merlin Rosario 10/22/2018  9:30 AM Khadar Barnes PTA [facility]  
10/25/2018 10:00 AM [facility] PT Wing 1 [facility]  
10/29/2018  9:30 AM Khadar Barnes PTA [facility]  
11/1/2018 10:00 AM Sheyla Cuevas, PT [facility]  
11/1/2018  3:20 PM Jina Alicea,  [address]  
11/5/2018 10:00 AM Khadar Barnes PTA [facility]  
11/8/2018  2:00 PM Sheyla Cuevas, PT [facility]  
11/12/2018 10:00 AM Khadar Barnes PTA [facility]  
11/15/2018 10:00 AM Sheyla Cuevas, PT [facility]  
1/3/2019 10:00 AM Norma Morillo MD [id number] [place] [address]

## 2018-10-22 ENCOUNTER — HOSPITAL ENCOUNTER (OUTPATIENT)
Dept: PHYSICAL THERAPY | Age: 78
Discharge: HOME | End: 2018-10-22
Payer: MEDICARE

## 2018-10-22 PROCEDURE — 97112 NEUROMUSCULAR REEDUCATION: CPT

## 2018-10-22 PROCEDURE — 97110 THERAPEUTIC EXERCISES: CPT

## 2018-10-22 NOTE — PROGRESS NOTES
PT DAILY TREATMENT NOTE - [facility]  Patient Name: Navneet Dennison Date:10/22/2018 : 1940 [x]  Patient  Verified Payor: [place] MEDICARE / Plan: Zoey Juarez / Product Type: Medicare / In time:9:28  Out time:10:24 Total Treatment Time (min): 56 Total Timed Codes (min): 46 
1:1 Treatment Time ( only): 40 Visit #: 9 of 16 Treatment Area: Low back pain [M54.5] SUBJECTIVE Pain Level (0-10 scale): 4 Any medication changes, allergies to medications, adverse drug reactions, diagnosis change, or new procedure performed?: [x] No    [] Yes (see summary sheet for update) Subjective functional status/changes:   [] No changes reported Pt the cold weather has aggravated her arthritis this morning. OBJECTIVE Modality rationale: decrease pain to improve the patients ability to decrease difficulty while performing tasks. Min Type Additional Details  
 [] Estim:  []Unatt       []IFC  []Premod []Other:  []w/ice   []w/heat Position: Location:  
 [] Estim: []Att    []TENS instruct  []NMES []Other:  []w/US   []w/ice   []w/heat Position: Location:  
 []  Traction: [] Cervical       []Lumbar 
                     [] Prone          []Supine []Intermittent   []Continuous Lbs: 
[] before manual 
[] after manual  
 []  Ultrasound: []Continuous   [] Pulsed []1MHz   []3MHz W/cm2: 
Location:  
 []  Iontophoresis with dexamethasone Location: [] Take home patch  
[] In clinic  
10 [x]  Ice     []  heat 
[]  Ice massage 
[]  Laser  
[]  Anodyne Position:sitting Location:left hip.   
 []  Laser with stim 
[]  Other:  Position: Location:  
 []  Vasopneumatic Device Pressure:       [] lo [] med [] hi  
Temperature: [] lo [] med [] hi  
[] Skin assessment post-treatment:  []intact []redness- no adverse reaction 
  []redness  adverse reaction:  
 
   
 21 min Therapeutic Exercise:  [x] See flow sheet :  
Rationale: increase ROM and increase strength to improve the patients ability to increase tolerance to activities.    
   
25 min Neuromuscular Re-education:  [x]  See flow sheet :balance and correct posture Rationale: improve coordination, improve balance and increase proprioception  to improve the patients ability to increase ease with ADLs.    
 
 
 
       
With 
 [] TE 
 [] TA 
 [] neuro 
 [] other: Patient Education: [x] Review HEP [] Progressed/Changed HEP based on:  
[] positioning   [] body mechanics   [] transfers   [] heat/ice application   
[] other:   
 
Other Objective/Functional Measures:   
 
Pain Level (0-10 scale) post treatment:0 
 
ASSESSMENT/Changes in Function: Pt has not progressed in TUG time without us of AD. Pt ambulated with Trendelenburg gait. Patient will continue to benefit from skilled PT services to modify and progress therapeutic interventions, address functional mobility deficits, address ROM deficits, address strength deficits and analyze and address soft tissue restrictions to attain remaining goals. []  See Plan of Care 
[]  See progress note/recertification 
[]  See Discharge Summary Progress towards goals / Updated goals: 
Short Term Goals: To be accomplished in 3 weeks: 1. Patient will subjectively report full compliance with prescribed HEP. Eval: HEP provided Current: Goal met: PT reports performing HEP. 10/1/18 2. Patient will demonstrate left/right seated hip flexion MMT 5/5 to improve ease with curb management. Eval: Left Hip Flexion (seated) 4+/5, Right Hip Flexion (seated) 4+/5 3. Patient will demonstrate 30-second-sit-to-stand (without UE assist) >/=10 repetitions to improve ease with functional transfers. Eval: 30-second-sit-to-stand (1 HHA) = 7 repetitions Current: Progressin sec sit to stand (no HHA) = 8 reps 10/15/18 
   
Long Term Goals: To be accomplished in 6 weeks: 1. Patient will demonstrate a significant functional improvement as demonstrated by a score of >/= 50 on FOTO. Eval: FOTO = 34 
Current: Progressing, FOTO = 39, 10/11/2018 2. Patient will demonstrate TUG (10 feet) without use of AD </=20 seconds to demonstrate a reduced falls risk. Eval: TUG (10 feet) = 29 seconds (no AD; CGA required) Current: Remains: TUG - 29 sec (No AD with SBA.) 10/22/18 3. Patient will demonstrate left/right SLS >/=20 seconds without UE assist to improve ease with ambulation on uneven ground. Eval: Left SLS 4 sec / Right SLS 1 sec Current: Progressing, Left SLS 10 sec / Right SLS 4 sec, 10/4/2018 PLAN 
[]  Upgrade activities as tolerated     [x]  Continue plan of care 
[]  Update interventions per flow sheet      
[]  Discharge due to:_ 
[]  Other:_ London Mays PTA 10/22/2018  9:42 AM 
 
Future Appointments Date Time Provider Merlin Rosario 10/25/2018 10:00 AM [facility] PT [place] 1 [facility]  
10/29/2018  9:30 AM Ashwini Seth PTA [facility]  
11/1/2018 10:00 AM Iwona Diaz PT [facility]  
11/1/2018  3:20 PM Boni See  [address]  
11/5/2018 10:00 AM Ashwini Seth PTA [facility]  
11/8/2018  2:00 PM [facility] Diaz PT [facility]  
11/12/2018 10:00 AM Ashwini Seth PTA [facility]  
11/15/2018 10:00 AM Iwona Diaz PT [facility]  
1/3/2019 10:00 AM Mitrha Vicente MD [id number] [place] [address]

## 2018-10-25 ENCOUNTER — HOSPITAL ENCOUNTER (OUTPATIENT)
Dept: PHYSICAL THERAPY | Age: 78
Discharge: HOME | End: 2018-10-25
Payer: MEDICARE

## 2018-10-25 PROCEDURE — 97110 THERAPEUTIC EXERCISES: CPT

## 2018-10-25 NOTE — PROGRESS NOTES
PT DAILY TREATMENT NOTE 10-18 Patient Name: Sushil Bell Date:10/25/2018 : 1940 [x]  Patient  Verified Payor: [place] MEDICARE / Plan: Zoey Juarez / Product Type: Medicare / In time:10:00  Out time:10:35 Total Treatment Time (min): 35 Visit #: 10 of 16 Medicare/BCBS Only Total Timed Codes (min):  35 1:1 Treatment Time:  30 Treatment Area: Low back pain [M54.5] SUBJECTIVE Pain Level (0-10 scale): 2/10 Any medication changes, allergies to medications, adverse drug reactions, diagnosis change, or new procedure performed?: [x] No    [] Yes (see summary sheet for update) Subjective functional status/changes:   [] No changes reported \"I've had pain in by neck and back afetr I finished therapy last time\". OBJECTIVE 35 min Therapeutic Exercise:  [x] See flow sheet :  
Rationale: increase ROM, increase strength, improve coordination and improve balance to improve the patients ability to ambulate safely without pain. With 
 [x] TE 
 [] TA 
 [] neuro 
 [] other: Patient Education: [x] Review HEP [] Progressed/Changed HEP based on:  
[] positioning   [] body mechanics   [] transfers   [] heat/ice application   
[] other:   
 
Other Objective/Functional Measures: Checked SI alignment Pain Level (0-10 scale) post treatment: 0/10 ASSESSMENT/Changes in Function: Pt reports having pain in her L/S and C/S after last therapy session. She also said her hip and lower back feel \"off\". Her SI alignment was checked and level. She also reports recently hearing a \"squishy\" sound in her ears. She states she had vertigo years ago and went to therapy for it. She has and appointment with her doctor  and she said she will address theses issues. Pt denies dizziness or LOB. Pt completed all TE's without pain. Pt had a decrease in pain by the end of the session.  
 
Patient will continue to benefit from skilled PT services to modify and progress therapeutic interventions, address functional mobility deficits, address ROM deficits and address strength deficits to attain remaining goals. [x]  See Plan of Care 
[]  See progress note/recertification 
[]  See Discharge Summary Progress towards goals / Updated goals: 
Short Term Goals: To be accomplished in 3 weeks: 1. Patient will subjectively report full compliance with prescribed HEP. Eval: HEP provided Current: Goal met: PT reports performing HEP. 10/1/18 2. Patient will demonstrate left/right seated hip flexion MMT 5/5 to improve ease with curb management. Eval: Left Hip Flexion (seated) 4+/5, Right Hip Flexion (seated) 4+/5 3. Patient will demonstrate 30-second-sit-to-stand (without UE assist) >/=10 repetitions to improve ease with functional transfers. Eval: 30-second-sit-to-stand (1 HHA) = 7 repetitions Current: Progressin sec sit to stand (no HHA) = 8 reps 10/15/18 
   
Long Term Goals: To be accomplished in 6 weeks: 1. Patient will demonstrate a significant functional improvement as demonstrated by a score of >/= 50 on FOTO. Eval: FOTO = 34 
Current: Progressing, FOTO = 39, 10/11/2018 2. Patient will demonstrate TUG (10 feet) without use of AD </=20 seconds to demonstrate a reduced falls risk. Eval: TUG (10 feet) = 29 seconds (no AD; CGA required) Current: Remains: TUG - 29 sec (No AD with SBA.) 10/22/18 3. Patient will demonstrate left/right SLS >/=20 seconds without UE assist to improve ease with ambulation on uneven ground. Eval: Left SLS 4 sec / Right SLS 1 sec Current: Progressing, Left SLS 10 sec / Right SLS 4 sec, 10/4/2018 PLAN [x]  Upgrade activities as tolerated     [x]  Continue plan of care [x]  Update interventions per flow sheet      
[]  Discharge due to:_ 
[]  Other:_ Adelaida Mayorga PTA 10/25/2018  10:23 AM 
 
Future Appointments Date Time Provider Merlin Rosario 10/29/2018  9:30 AM Elder Phillips PTA [facility]  
 11/1/2018 10:00 AM Wyman, [address] [facility]  
11/1/2018  3:20 PM Emiliano Avery, CORINE [address]  
11/5/2018 10:00 AM Howie De Souza, PTA [facility]  
11/8/2018  2:00 PM Sam Leigh, PT [facility]  
11/12/2018 10:00 AM Howie De Souza, PTA [facility]  
11/15/2018 10:00 AM Sam Leigh, PT [facility]  
1/3/2019 10:00 AM Angelo Petty MD [id number] [facility]

## 2018-10-29 ENCOUNTER — HOSPITAL ENCOUNTER (OUTPATIENT)
Dept: PHYSICAL THERAPY | Age: 78
Discharge: HOME | End: 2018-10-29
Payer: MEDICARE

## 2018-10-29 PROCEDURE — G8979 MOBILITY GOAL STATUS: HCPCS

## 2018-10-29 PROCEDURE — G8978 MOBILITY CURRENT STATUS: HCPCS

## 2018-10-29 PROCEDURE — 97112 NEUROMUSCULAR REEDUCATION: CPT

## 2018-10-29 PROCEDURE — 97110 THERAPEUTIC EXERCISES: CPT

## 2018-10-29 NOTE — PROGRESS NOTES
[facility] [address] [facility] [place], [address]  
[phone] fax Medicare Progress Report Patient name: Max Tsai Start of Care: 2018 Referral source: Bhanu Matos MD : 1940 Medical/Treatment Diagnosis: Low back pain [M54.5] Payor: Conner Omer / Plan: [place] MEDICARE PART A & B / Product Type: Medicare /  Onset Date:DoS 2018 Prior Hospitalization: see medical history Provider#: [id number] Medications: Verified on Patient Summary List   
Comorbidities: Chronic Right Knee Pain, Allergies, Arthritis, Back Pain, Premature Atrial Contractions, Enlarged Heart, Diabetes Type II, HTN Prior Level of Function: Lives with daughter (works full-time), (I) Functional ADLs, Hx of Neuropathic, (I) Driving, Retired, (I) Self-care ADLs, (I) Ambulation without AD Visits from Start of Care: 11    Missed Visits: 0 Reporting Period: 2018 to 10/29/2018 Subjective Reports:  
 
Short Term Goals: To be accomplished in 3 weeks: 1. Patient will subjectively report full compliance with prescribed HEP. Eval: HEP provided At PN: Goal met: PT reports performing HEP 2. Patient will demonstrate left/right seated hip flexion MMT 5/5 to improve ease with curb management. Eval: Left Hip Flexion (seated) 4+/5, Right Hip Flexion (seated) 4+/5 At PN: Not met: left hip flexion 4-/5, right 4/5 3. Patient will demonstrate 30-second-sit-to-stand (without UE assist) >/=10 repetitions to improve ease with functional transfers. Eval: 30-second-sit-to-stand (1 HHA) = 7 repetitions At PN: Progressin sec sit to stand (no HHA) = 8 reps  
   
Long Term Goals: To be accomplished in 6 weeks: 1. Patient will demonstrate a significant functional improvement as demonstrated by a score of >/= 50 on FOTO. Eval: FOTO = 34 At PN: Progressing, FOTO = 39 
2.  Patient will demonstrate TUG (10 feet) without use of AD </=20 seconds to demonstrate a reduced falls risk. Eval: TUG (10 feet) = 29 seconds (no AD; CGA required) At PN: Remains: TUG - 29 sec (No AD with SBA.) 3. Patient will demonstrate left/right SLS >/=20 seconds without UE assist to improve ease with ambulation on uneven ground. Eval: Left SLS 4 sec / Right SLS 1 sec At PN: Progressing, Left SLS 10 sec / Right SLS 2 sec Key functional changes: See above goals. Problems/ barriers to goal attainment: None. Assessment / Recommendations: 
 
Pt has progressed well toward LTGs. Pt reports she is able to ambulate with SPC for 20 mins before she feels tired and needs to sit. Pt is able to perform 8 sit to  30 sec without use of UE. Pt's TUG score has stayed the same since eval.  
  
Patient will continue to benefit from skilled PT services to modify and progress therapeutic interventions, address functional mobility deficits, address ROM deficits, address strength deficits, analyze and address soft tissue restrictions and address imbalance/dizziness to attain remaining goals. Problem List: pain affecting function, decrease ROM, decrease strength, impaired gait/ balance, decrease ADL/ functional abilitiies, decrease activity tolerance and decrease flexibility/ joint mobility Treatment Plan: Therapeutic exercise, Therapeutic activities, Neuromuscular re-education, Physical agent/modality, Gait/balance training, Manual therapy, Patient education, Self Care training, Functional mobility training, Home safety training and Stair training Updated Goals: Continue with unmet goals above. Frequency / Duration: Patient to be seen 2 times per week for 4 weeks: 
 
G-Codes (GP) Mobility  Current  CL= 60-79%   Goal  CK= 40-59% The severity rating is based on clinical judgment and the FOTO score.  
 
Kateryna Joel, PT 10/29/2018 12:59 PM

## 2018-10-29 NOTE — PROGRESS NOTES
PT DAILY TREATMENT NOTE - [facility]  Patient Name: Saranya Sunshine Date:10/29/2018 : 1940 [x]  Patient  Verified Payor: [place] MEDICARE / Plan: Zoey Bobbyy / Product Type: Medicare / In time:9:25  Out time:10:17 Total Treatment Time (min): 52 Total Timed Codes (min): 42 
1:1 Treatment Time ( only): 42 Visit #: 88 FN 91 Treatment Area: Low back pain [M54.5] SUBJECTIVE Pain Level (0-10 scale): 0 Any medication changes, allergies to medications, adverse drug reactions, diagnosis change, or new procedure performed?: [x] No    [] Yes (see summary sheet for update) Subjective functional status/changes:   [] No changes reported Pt reports she went shopping with her cane and felt she was not able to enjoy herself because she was tired. OBJECTIVE Modality rationale: decrease pain to improve the patients ability to decrease difficulty while performing tasks. Min Type Additional Details  
 [] Estim:  []Unatt       []IFC  []Premod []Other:  []w/ice   []w/heat Position: Location:  
 [] Estim: []Att    []TENS instruct  []NMES []Other:  []w/US   []w/ice   []w/heat Position: Location:  
 []  Traction: [] Cervical       []Lumbar 
                     [] Prone          []Supine []Intermittent   []Continuous Lbs: 
[] before manual 
[] after manual  
 []  Ultrasound: []Continuous   [] Pulsed []1MHz   []3MHz W/cm2: 
Location:  
 []  Iontophoresis with dexamethasone Location: [] Take home patch  
[] In clinic  
10 [x]  Ice     []  heat 
[]  Ice massage 
[]  Laser  
[]  Anodyne Position:sitting Location:back   
 []  Laser with stim 
[]  Other:  Position: Location:  
 []  Vasopneumatic Device Pressure:       [] lo [] med [] hi  
Temperature: [] lo [] med [] hi  
[] Skin assessment post-treatment:  []intact []redness- no adverse reaction 
  []redness  adverse reaction:  
 
   
 17 min Therapeutic Exercise:  [x] See flow sheet :  
Rationale: increase ROM and increase strength to improve the patients ability to increase tolerance to activities.    
   
25 min Neuromuscular Re-education:  [x]  See flow sheet :balance and correct posture Rationale: improve coordination, improve balance and increase proprioception  to improve the patients ability to increase ease with ADLs.    
  
 
 
     
With 
 [] TE 
 [] TA 
 [] neuro 
 [] other: Patient Education: [x] Review HEP [] Progressed/Changed HEP based on:  
[] positioning   [] body mechanics   [] transfers   [] heat/ice application   
[] other:   
 
Other Objective/Functional Measures: see goals Pain Level (0-10 scale) post treatment: 0 
 
ASSESSMENT/Changes in Function: Pt has progressed well toward LTGs. Pt reports she is able to ambulate with SPC for 20 mins before she feels tired and needs to sit. Pt is able to perform 8 sit to  30 sec without use of UE. Pt's TUG score has stayed the same since eval.  
 
Patient will continue to benefit from skilled PT services to modify and progress therapeutic interventions, address functional mobility deficits, address ROM deficits, address strength deficits, analyze and address soft tissue restrictions and address imbalance/dizziness to attain remaining goals. []  See Plan of Care [x]  See progress note/recertification 
[]  See Discharge Summary Progress towards goals / Updated goals: 
Short Term Goals: To be accomplished in 3 weeks: 1. Patient will subjectively report full compliance with prescribed HEP. Eval: HEP provided Current: Goal met: PT reports performing HEP. 10/1/18 2. Patient will demonstrate left/right seated hip flexion MMT 5/5 to improve ease with curb management. Eval: Left Hip Flexion (seated) 4+/5, Right Hip Flexion (seated) 4+/5 Current: Not met: left hip flexion 4-/5, right 4/5. 10/29/18 3. Patient will demonstrate 30-second-sit-to-stand (without UE assist) >/=10 repetitions to improve ease with functional transfers. Eval: 30-second-sit-to-stand (1 HHA) = 7 repetitions Current: Progressin sec sit to stand (no HHA) = 8 reps 10/29/18 
   
Long Term Goals: To be accomplished in 6 weeks: 1. Patient will demonstrate a significant functional improvement as demonstrated by a score of >/= 50 on FOTO. Eval: FOTO = 34 
Current: Progressing, FOTO = 39, 10/11/2018 2. Patient will demonstrate TUG (10 feet) without use of AD </=20 seconds to demonstrate a reduced falls risk. Eval: TUG (10 feet) = 29 seconds (no AD; CGA required) Current: Remains: TUG - 29 sec (No AD with SBA.) 10/22/18 3. Patient will demonstrate left/right SLS >/=20 seconds without UE assist to improve ease with ambulation on uneven ground. Eval: Left SLS 4 sec / Right SLS 1 sec Current: Progressing, Left SLS 10 sec / Right SLS 2 sec, 10/4/2018 
  
 
 
 
PLAN 
[]  Upgrade activities as tolerated     [x]  Continue plan of care 
[]  Update interventions per flow sheet      
[]  Discharge due to:_ 
[]  Other:_ Yudelka Barragan PTA 10/29/2018  9:37 AM 
 
Future Appointments Date Time Provider Merlin Rosario 2018 10:00 AM Garo, [address] [facility]  
2018  3:20 PM Leeroy Benites  [address]  
2018 10:00 AM PUMA [facility]  
2018  2:00 PM MARTHA Schuler [facility]  
2018 10:00 AM PUMA Sorensen [facility]  
11/15/2018 10:00 AM MARTHA Schuler [facility]  
1/3/2019 10:00 AM Cholo Henry MD [id number] [place] [address]

## 2018-11-01 ENCOUNTER — HOSPITAL ENCOUNTER (OUTPATIENT)
Dept: PHYSICAL THERAPY | Age: 78
Discharge: HOME | End: 2018-11-01
Payer: MEDICARE

## 2018-11-01 ENCOUNTER — OFFICE VISIT (OUTPATIENT)
Dept: ORTHOPEDIC SURGERY | Age: 78
End: 2018-11-01

## 2018-11-01 VITALS
BODY MASS INDEX: 32.02 KG/M2 | RESPIRATION RATE: 18 BRPM | TEMPERATURE: 98 F | SYSTOLIC BLOOD PRESSURE: 127 MMHG | HEIGHT: 65 IN | OXYGEN SATURATION: 95 % | HEART RATE: 80 BPM | WEIGHT: 192.2 LBS | DIASTOLIC BLOOD PRESSURE: 54 MMHG

## 2018-11-01 DIAGNOSIS — M48.062 SPINAL STENOSIS OF LUMBAR REGION WITH NEUROGENIC CLAUDICATION: Primary | ICD-10-CM

## 2018-11-01 DIAGNOSIS — Z98.1 S/P LUMBAR FUSION: ICD-10-CM

## 2018-11-01 PROCEDURE — 97112 NEUROMUSCULAR REEDUCATION: CPT

## 2018-11-01 PROCEDURE — 97110 THERAPEUTIC EXERCISES: CPT

## 2018-11-01 RX ORDER — GABAPENTIN 600 MG/1
600 TABLET ORAL 3 TIMES DAILY
Qty: 90 TAB | Refills: 1 | Status: SHIPPED | OUTPATIENT
Start: 2018-11-01 | End: 2018-11-01 | Stop reason: SDUPTHER

## 2018-11-01 RX ORDER — GABAPENTIN 600 MG/1
600 TABLET ORAL
Qty: 90 TAB | Refills: 1 | Status: SHIPPED | OUTPATIENT
Start: 2018-11-01 | End: 2018-11-02 | Stop reason: SDUPTHER

## 2018-11-01 NOTE — PROGRESS NOTES
PT DAILY TREATMENT NOTE - [facility]  Patient Name: Mihaela Pandya Date:2018 : 1940 [x]  Patient  Verified Payor: [place] MEDICARE / Plan: Zoey Rosenberg y / Product Type: Medicare / In [id number]  Out time:1048 Total Treatment Time (min): 53 Total Timed Codes (min): 43 
1:1 Treatment Time (MC only): 40 Visit #: 1 of 8 (signed PN) Treatment Area: Low back pain [M54.5] SUBJECTIVE Pain Level (0-10 scale): 0 Any medication changes, allergies to medications, adverse drug reactions, diagnosis change, or new procedure performed?: [x] No    [] Yes (see summary sheet for update) Subjective functional status/changes:   [] No changes reported Patient reports greatest difficulty with long distance ambulation secondary to right posterior hip pain. OBJECTIVE Modality rationale: decrease inflammation and decrease pain to improve the patients ability to improve ease with sleep Min Type Additional Details 10 [x]  Ice     []  heat 
[]  Ice massage Position: Reclined Location: Lumbar, Post-Tx  
  
20 min Therapeutic Exercise:  [x] See flow sheet : Emphasis placed on improving LE strength Rationale: increase ROM and increase strength to improve the patients ability to improve ease with household ADLs 
   
23 min Neuromuscular Re-education:  [x]  See flow sheet : Emphasis placed on improving static and dynamic LE stability and balance and improving LE proprioceptive and kinesthetic awareness Rationale: increase ROM, increase strength, improve balance and increase proprioception  to improve the patients ability to improve ease with community ambulation 
       
With 
 [] TE 
 [] TA 
 [] neuro 
 [] other: Patient Education: [x] Review HEP [] Progressed/Changed HEP based on:  
[] positioning   [] body mechanics   [] transfers   [] heat/ice application   
[] other:   
 
Pain Level (0-10 scale) post treatment: 0 
 
 ASSESSMENT/Changes in Function: Patient educated to further discuss with MD right posterior deep hip pain with patient with greatest functional weightbearing limitations secondary to this pain with requirement secondarily to utilize the [facility]. Patient will continue to benefit from skilled PT services to modify and progress therapeutic interventions, address functional mobility deficits, address ROM deficits, address strength deficits, analyze and address soft tissue restrictions, analyze and cue movement patterns and analyze and modify body mechanics/ergonomics to attain remaining goals. []  See Plan of Care 
[]  See progress note/recertification 
[]  See Discharge Summary Progress towards goals / Updated goals: 
 
Short Term Goals: To be accomplished in 3 weeks: 1. Patient will subjectively report full compliance with prescribed HEP. At PN: Goal met: PT reports performing HEP. 10/1/18 2. Patient will demonstrate left/right seated hip flexion MMT 5/5 to improve ease with curb management. At PN: Not met: left hip flexion 4-/5, right 4/5. 10/29/18 3. Patient will demonstrate 30-second-sit-to-stand (without UE assist) >/=10 repetitions to improve ease with functional transfers. At PN: Progressin sec sit to stand (no HHA) = 8 reps 10/29/18 
   
Long Term Goals: To be accomplished in 6 weeks: 1. Patient will demonstrate a significant functional improvement as demonstrated by a score of >/= 50 on FOTO. At PN: Que Hernández = 39, 10/11/2018 Current: Progressing, FOTO = 44, 2018 2. Patient will demonstrate TUG (10 feet) without use of AD </=20 seconds to demonstrate a reduced falls risk. At PN: Remains: TUG - 29 sec (No AD with SBA.) 10/22/18 3. Patient will demonstrate left/right SLS >/=20 seconds without UE assist to improve ease with ambulation on uneven ground. At PN: Progressing, Left SLS 10 sec / Right SLS 2 sec, 10/4/2018 PLAN 
 [x]  Upgrade activities as tolerated     [x]  Continue plan of care 
[]  Update interventions per flow sheet      
[]  Discharge due to:_ 
[]  Other:_   
 
Ivan Rain, PT 11/1/2018  7:51 AM 
 
Future Appointments Date Time Provider Merlin Rosario 11/1/2018 10:00 AM Wyman, [address] [facility]  
11/1/2018  3:20 PM Linden Garcia, CORINE [address]  
11/5/2018 10:00 AM Dorothea Delaney PTA [facility]  
11/8/2018  2:00 PM Eli Gilmore, PT [facility]  
11/12/2018 10:00 AM Dorothea Delaney PTA [facility]  
11/15/2018 10:00 AM Eli Gilmore, PT [facility]  
1/3/2019 10:00 AM Pricilla Martinez MD [address] [place] [address]

## 2018-11-01 NOTE — PROGRESS NOTES
[address]  [address], [address]  [place], [address] [place] [address] Street  Phone: [phone]  Fax: [phone]  PROGRESS NOTE-Post Op  Patient: Susihl Bell                MRN: [id number]       SSN: [id number]  YOB: 1940        AGE: 76 y.o. SEX: female  Body mass index is 31.98 kg/m². PCP: Latisha Durham MD  11/01/18    Chief Complaint   Patient presents with    Back Pain     6 WK FU        HISTORY OF PRESENT ILLNESS:  Sushil Bell is a 76 y.o. female with history of back greater than BLE R>L pain  who had L4-5 PLF surgery three months ago for stenosis and spondylolisthesis. After surgery she had an increase in her RLE radicular pain. We got a CT scan in the hospital, that showed good alignment. Pain prior to surgery was mostly in the back and some BLE leg pain in the L4-5 distribution from buttock to calfs and described as aching, burning, numbing and throbbing. Pain is worse with standing and walking and affects sleep and recreational activities. Pain is better with relaxation, pain medication and lying down. She was doing well at her 6 wk post-op. Today, she continues to improve. She has no leg pain. Her strength is normal on exam. She gets some functional weakness to her right hip with long periods of activity. Patient denies any fevers, wound drainage, bladder/bowel dysfunction, new onset weakness, new neuropathic pain, or other neurological deficits. Pt desires to continue with evaluation of back pain and postoperative care    Current Medications: Gabapentin 800mg TID     ASSESSMENT   Diagnoses and all orders for this visit:    1. Spinal stenosis of lumbar region with neurogenic claudication  -     gabapentin (NEURONTIN) 600 mg tablet; Take 1 Tab by mouth three (3) times daily (with meals). 2. S/P lumbar fusion  -     gabapentin (NEURONTIN) 600 mg tablet; Take 1 Tab by mouth three (3) times daily (with meals).          IMPRESSION AND PLAN:  This is a pt who had a lumbar fusion surgery 3 months ago. She is doing great with resolution of her back and BLE pain. She has some residual right hip functional weakness. I advised her to see her PCP regarding her headaches that she reports-they only occur with activity. 1) Pt was given information on activit  2) Reviewed activity   3) Reduce Gabapentin  4) HEP  5) She reports her GYN usually orders her DEXA  4) Ms. Charity Fang has a reminder for a \"due or due soon\" health maintenance. I have asked that she contact her primary care provider, Leann Frias MD, for follow-up on this health maintenance. 5) We have informed the patient to notify us for immediate appointment if he has any worsening neurogical symptoms or if an emergency situation presents, then call 911  6)  demonstrated consistency with prescribing. 7) Pt will follow-up in 8 wks w/ me.    SUBJECTIVE    Smoking Status non-smoker  Work: retired    Pain Scale: 0 - No pain/10    Pain Assessment  11/1/2018   Location of Pain Back   Pain Location Comment -   Location Modifiers -   Severity of Pain 0   Quality of Pain -   Quality of Pain Comment -   Duration of Pain -   Frequency of Pain Constant   Aggravating Factors -   Aggravating Factors Comment -   Limiting Behavior -   Relieving Factors -   Relieving Factors Comment -   Result of Injury -           REVIEW OF SYSTEMS  Constitutional: Negative for fever, chills, or weight change. Respiratory: Negative for cough or shortness of breath. Cardiovascular: Negative for chest pain or palpitations. Gastrointestinal: Negative for incontinence, acid reflux, change in bowel habits, or constipation. Genitourinary: Negative for incontinence, dysuria and flank pain. Musculoskeletal: Positive for no pain. See HPI. Skin: Negative for rash. Neurological:no radiculopathy. See HPI. Endo/Heme/Allergies: Negative. Psychiatric/Behavioral: Negative.       PHYSICAL EXAMINATION  Visit Vitals  /54   Pulse 80   Temp 98 °F (36.7 °C) (Oral)   Resp 18   Ht 5' 5\" (1.651 m)   Wt 192 lb 3.2 oz (87.2 kg)   SpO2 95%   BMI 31.98 kg/m²         Accompanied by daughter. Constitutional:  Well developed, well nourished, in no acute distress. Psychiatric: Affect and mood are appropriate. Integumentary: No rashes or abrasions noted on exposed areas. Cardiovascular/Peripheral Vascular: +2 radial & pedal pulses. No peripheral edema is noted. Lymphatic:  No evidence of lymphedema. No cervical lymphadenopathy. SPINE/MUSCULOSKELETAL EXAM    Lumbar spine:  No rash, ecchymosis, or gross obliquity. No fasciculations. No focal atrophy is noted. Range of motion is not tested . Tenderness to palpation none. No tenderness to palpation at the sciatic notch. Sensation grossly intact to light touch. Straight leg raise negative  No pain w/ Right hip ROM      MOTOR:  AT Rest   Hip Flex Quads Hamstrings Ankle DF EHL Ankle PF   Right 5/5 5/5 5/5 5/5 5/5 5/5   Left 5/5 5/5 5/5 5/5 5/5 5/5       Favors right hip    Ambulation with single point cane. FWB. PAST MEDICAL HISTORY   Past Medical History:   Diagnosis Date    Cardiomegaly     Essential hypertension, benign     stable    Hypercholesterolemia     Hyperthyroidism     no meds    Obesity, unspecified     Pt has weight loss    Other and unspecified hyperlipidemia     Chol 172, ldl 82, hdl 62, tg 141    Type II or unspecified type diabetes mellitus without mention of complication, not stated as uncontrolled        Past Surgical History:   Procedure Laterality Date    HX GYN      hysterectomy   . MEDICATIONS      Current Outpatient Medications   Medication Sig Dispense Refill    gabapentin (NEURONTIN) 600 mg tablet Take 1 Tab by mouth three (3) times daily (with meals).  90 Tab 1    DILT- mg XR capsule TAKE 1 CAPSULE BY MOUTH ONCE DAILY 90 Cap 1    gluc-condr-om3-dha-epa-fish-st (GLUCOSAMINE CHONDROITIN PLUS) [id number] mg cap Take 1 Tab by mouth daily. Indications: supplement      nitrofurantoin, macrocrystal-monohydrate, (MACROBID) 100 mg capsule Take 100 mg by mouth two (2) times a day. Indications: BACTERIAL URINARY TRACT INFECTION      aspirin 81 mg chewable tablet Take 81 mg by mouth daily. Indications: myocardial infarction prevention      MELATONIN PO Take 5 mg by mouth as needed (sleep).  acetaminophen (TYLENOL) 500 mg tablet Take 500 mg by mouth every six (6) hours as needed for Pain.  losartan (COZAAR) 50 mg tablet TAKE ONE TABLET BY MOUTH ONCE DAILY 90 Tab 3    atenolol (TENORMIN) 50 mg tablet TAKE ONE TABLET BY MOUTH TWICE DAILY (Patient taking differently: Take 50 mg by mouth two (2) times a day. TAKE ONE TABLET BY MOUTH TWICE DAILY  Indications: hypertension) 180 Tab 3    CRANBERRY CONC-ASCORBIC ACID PO Take 500 mg by mouth daily. Indications: UTI prevention      calcium-cholecalciferol, d3, (CALCIUM 600 + D) 600-125 mg-unit tab Take 600 mg by mouth daily. Patient taking 4 times a week  Indications: Osteoporosis      metFORMIN (GLUMETZA ER) 500 mg TG24 24 hour tablet Take 850 mg by mouth daily. Indications: type 2 diabetes mellitus      pravastatin (PRAVACHOL) 40 mg tablet Take 40 mg by mouth daily. Indications: hypercholesterolemia      indapamide (LOZOL) 2.5 mg tablet Take 2.5 mg by mouth daily. Indications: Edema      oxyCODONE IR (ROXICODONE) 5 mg immediate release tablet Take 1 Tab by mouth every eight (8) hours as needed for Pain. Max Daily Amount: 15 mg. 20 Tab 0    Cholecalciferol, Vitamin D3, (VITAMIN D3) 1,000 unit cap Take 1,000 Units by mouth daily.  Indications: Vitamin D Deficiency          ALLERGIES    Allergies   Allergen Reactions    Kenalog [Triamcinolone Acetonide] Anaphylaxis    Penicillin G Hives    Adhesive Tape-Silicones Swelling    Bacitracin Not Reported This Time    Betadine [Povidone-Iodine] Other (comments)     Caused uncomfortable rash on area where placed    Cephalexin Rash    Ciprofloxacin Other (comments)    Cortisone Not Reported This Time    Cymbalta [Duloxetine] Rash    Erythromycin Not Reported This Time    Hydrocortisone Hives    Lisinopril Not Reported This Time    Lortab [Hydrocodone-Acetaminophen] Rash    Macrobid [Nitrofurantoin Monohyd/M-Cryst] Other (comments)     Flushed face,leg swellinig    Methimazole Other (comments)     Neck pain/cough    Penicillins Not Reported This Time    Prednisolone Other (comments)    Prednisone Not Reported This Time    Propylthiouracil Other (comments)     Dizziness,elevated blood pressure    Sulfamethoxazole-Trimethoprim Other (comments)    Tetanus And Diphtheria Toxoids, Adsorbed, Adult Swelling    Tetanus Vaccines And Toxoid Not Reported This Time          SOCIAL HISTORY    Social History     Socioeconomic History    Marital status:      Spouse name: Not on file    Number of children: Not on file    Years of education: Not on file    Highest education level: Not on file   Social Needs    Financial resource strain: Not on file    Food insecurity - worry: Not on file    Food insecurity - inability: Not on file   [employer] needs - medical: Not on file   [employer] needs - non-medical: Not on file   Occupational History    Not on file   Tobacco Use    Smoking status: Never Smoker    Smokeless tobacco: Never Used   Substance and Sexual Activity    Alcohol use: No    Drug use: No    Sexual activity: Not on file   Other Topics Concern    Not on file   Social History Narrative    Not on file       FAMILY HISTORY    Family History   Problem Relation Age of Onset    Heart Disease Other         positive history of ischemic heart disease         Milana Diaz NP

## 2018-11-01 NOTE — PATIENT INSTRUCTIONS
Walking for Exercise: Care Instructions  Your Care Instructions    Walking is one of the easiest ways to get the exercise you need for good health. A brisk, 30-minute walk each day can help you feel better and have more energy. It can help you lower your risk of disease. Walking can help you keep your bones strong and your heart healthy. Check with your doctor before you start a walking plan if you have heart problems, other health issues, or you have not been active in a long time. Follow your doctor's instructions for safe levels of exercise. Follow-up care is a key part of your treatment and safety. Be sure to make and go to all appointments, and call your doctor if you are having problems. It's also a good idea to know your test results and keep a list of the medicines you take. How can you care for yourself at home? Getting started  · Start slowly and set a short-term goal. For example, walk for 5 or 10 minutes every day. · Bit by bit, increase the amount you walk every day. Try for at least 30 minutes on most days of the week. You also may want to swim, bike, or do other activities. · If finding enough time is a problem, it is fine to be active in blocks of 10 minutes or more throughout your day and week. · To get the heart-healthy benefits of walking, you need to walk briskly enough to increase your heart rate and breathing, but not so fast that you cannot talk comfortably. · Wear comfortable shoes that fit well and provide good support for your feet and ankles. Staying with your plan  · After you've made walking a habit, set a longer-term goal. You may want to set a goal of walking briskly for longer or walking farther. Experts say to do 2½ hours of moderate activity a week. A faster heartbeat is what defines moderate-level activity. · To stay motivated, walk with friends, coworkers, or pets. · Use a phone narendra or pedometer to track your steps each day. Set a goal to increase your steps.  Once you get there, set a higher goal. Aim for 10,000 steps a day. · If the weather keeps you from walking outside, go for walks at the mall with a friend. Local schools and churches may have indoor gyms where you can walk. Fitting a walk into your workday  · Park several blocks away from work, or get off the bus a few stops early. · Use the stairs instead of the elevator, at least for a few floors. · Suggest holding meetings with colleagues during a walk inside or outside the building. · Use the restroom that is the farthest from your desk or workstation. · Use your morning and afternoon breaks to take quick 15-minute walks. Staying safe  · Know your surroundings. Walk in a well-lighted, safe place. If it is dark, walk with a partner. Wear light-colored clothing. If you can, buy a vest or jacket that reflects light. · Carry a cell phone for emergencies. · Drink plenty of water. Take a water bottle with you when you walk. This is very important if it is hot out. · Be careful not to slip on wet or icy ground. You can buy \"grippers\" for your shoes to help keep you from slipping. · Pay attention to your walking surface. Use sidewalks and paths. · If you have breathing problems like asthma or COPD, ask your doctor when it is safe for you to walk outdoors. Cold, dry air, smog, pollen, or other things in the air could cause breathing problems. Where can you learn more? Go to http://sherly-mary.info/. Enter R159 in the search box to learn more about \"Walking for Exercise: Care Instructions. \"  Current as of: December 7, 2017  Content Version: 11.8  © 2535-5744 [employer]. Care instructions adapted under license by [employer] (which disclaims liability or warranty for this information).  If you have questions about a medical condition or this instruction, always ask your healthcare professional. [address]  any warranty or liability for your use of this information.

## 2018-11-02 RX ORDER — GABAPENTIN 600 MG/1
600 TABLET ORAL
Qty: 90 TAB | Refills: 1 | Status: SHIPPED | OUTPATIENT
Start: 2018-11-02 | End: 2018-12-27 | Stop reason: SDUPTHER

## 2018-11-05 ENCOUNTER — HOSPITAL ENCOUNTER (OUTPATIENT)
Dept: PHYSICAL THERAPY | Age: 78
Discharge: HOME | End: 2018-11-05
Payer: MEDICARE

## 2018-11-05 PROCEDURE — G8980 MOBILITY D/C STATUS: HCPCS

## 2018-11-05 PROCEDURE — 97110 THERAPEUTIC EXERCISES: CPT

## 2018-11-05 PROCEDURE — G8979 MOBILITY GOAL STATUS: HCPCS

## 2018-11-05 PROCEDURE — 97112 NEUROMUSCULAR REEDUCATION: CPT

## 2018-11-05 NOTE — PROGRESS NOTES
PT DAILY TREATMENT NOTE - [facility]  Patient Name: Jorgito Rutledge Date:2018 : 1940 [x]  Patient  Verified [place]: [place] / Plan: Zoey Bobbyy / Product Type: Medicare / In time:9:49  Out time:10:43 Total Treatment Time (min): 54 Total Timed Codes (min): 44 
1:1 Treatment Time ( only): 40 Visit #: 2 of 8 Treatment Area: Low back pain [M54.5] SUBJECTIVE Pain Level (0-10 scale): 0 Any medication changes, allergies to medications, adverse drug reactions, diagnosis change, or new procedure performed?: [x] No    [] Yes (see summary sheet for update) Subjective functional status/changes:   [] No changes reported Pt reports that the doctors appointment went well. OBJECTIVE Modality rationale: decrease pain to improve the patients ability to decrease difficulty while performing tasks. Type Additional Details 10 [x]  Ice     []  heat 
[]  Ice massage Position: Reclined Location: Lumbar, Post-Tx  
 
 
 
 
  []redness  adverse reaction:  
   
19 min Therapeutic Exercise:  [x] See flow sheet :  
Rationale: increase ROM and increase strength to improve the patients ability to increase tolerance to activities.    
   
25 min Neuromuscular Re-education:  [x]  See flow sheet :balance and correct posture Rationale: improve coordination, improve balance and increase proprioception  to improve the patients ability to increase ease with ADLs.    
  
  
 
 
     
With 
 [] TE 
 [] TA 
 [] neuro 
 [] other: Patient Education: [x] Review HEP [] Progressed/Changed HEP based on:  
[] positioning   [] body mechanics   [] transfers   [] heat/ice application   
[] other:   
 
Other Objective/Functional Measures:   
 
Pain Level (0-10 scale) post treatment: 0 
 
ASSESSMENT/Changes in Function: Pt continue to use SPC with stepping over hurdles.   
 
Patient will continue to benefit from skilled PT services to modify and progress therapeutic interventions, address functional mobility deficits, address ROM deficits, address strength deficits and analyze and address soft tissue restrictions to attain remaining goals. []  See Plan of Care 
[]  See progress note/recertification 
[]  See Discharge Summary Progress towards goals / Updated goals: 
Short Term Goals: To be accomplished in 3 weeks: 1. Patient will subjectively report full compliance with prescribed HEP. At PN: Goal met: PT reports performing HEP. 10/1/18 2. Patient will demonstrate left/right seated hip flexion MMT 5/5 to improve ease with curb management. At PN: Not met: left hip flexion 4-/5, right 4/5. 10/29/18 3. Patient will demonstrate 30-second-sit-to-stand (without UE assist) >/=10 repetitions to improve ease with functional transfers. At PN: Progressin sec sit to stand (no HHA) = 8 reps 10/29/18 
   
Long Term Goals: To be accomplished in 6 weeks: 1. Patient will demonstrate a significant functional improvement as demonstrated by a score of >/= 50 on FOTO. At PN: Tali Ban = 39, 10/11/2018 Current: Progressing, FOTO = 44, 2018 2. Patient will demonstrate TUG (10 feet) without use of AD </=20 seconds to demonstrate a reduced falls risk. At PN: Remains: TUG - 29 sec (No AD with SBA.) 10/22/18 3. Patient will demonstrate left/right SLS >/=20 seconds without UE assist to improve ease with ambulation on uneven ground. At PN: Progressing, Left SLS 10 sec / Right SLS 2 sec, 10/4/2018 Current: Remains: Left SLS 10 sec / Right SLS 2 sec,  18 
  
 
 
 
PLAN 
[]  Upgrade activities as tolerated     [x]  Continue plan of care 
[]  Update interventions per flow sheet      
[]  Discharge due to:_ 
[]  Other:_ Tory Coyne PTA 2018  9:56 AM 
 
Future Appointments Date Time Provider Merlin Rosario 2018 10:00 AM Elder Phillips, PTA [facility]  
2018  2:00 PM Alexa Shah, PT [facility]  
 11/12/2018 10:00 AM Kanchan Johnson, PTA [facility]  
11/15/2018 10:00 AM Maria Isabel Anne, PT [facility]  
12/27/2018  3:00 PM Nato Rogers  [address]  
1/3/2019 10:00 AM Juan Ramon Wilson MD [facility]  
1/17/2019  2:30 PM Sue Nazario MD [facility]

## 2018-11-08 ENCOUNTER — APPOINTMENT (OUTPATIENT)
Dept: PHYSICAL THERAPY | Age: 78
End: 2018-11-08
Payer: MEDICARE

## 2018-11-09 NOTE — PROGRESS NOTES
[facility] [address] [facility], [address]  
[phone] fax Discharge Summary Patient name: Miryam Wesley Start of Care: 2018 Referral source: Julio Walker MD : 1940 Medical/Treatment Diagnosis: Low back pain [M54.5] Payor: Joselyn Torres / Plan: [place] MEDICARE PART A & B / Product Type: Medicare /  Onset Date:DoS 2018 Prior Hospitalization: see medical history Provider#: [id number] Medications: Verified on Patient Summary List   
Comorbidities: Chronic Right Knee Pain, Allergies, Arthritis, Back Pain, Premature Atrial Contractions, Enlarged Heart, Diabetes Type II, HTN 
 Prior Level of Function: Lives with daughter (works full-time), (I) Functional ADLs, Hx of Neuropathic, (I) Driving, Retired, (I) Self-care ADLs, (I) Ambulation without AD Visits from Start of Care: 13    Missed Visits: 0 Reporting Period : 2018 to 2018 Summary of Care: 
 
Short Term Goals: To be accomplished in 3 weeks: 1. Patient will subjectively report full compliance with prescribed HEP. At PN: Goal met: PT reports performing HEP 2. Patient will demonstrate left/right seated hip flexion MMT 5/5 to improve ease with curb management. At PN: Not met: left hip flexion 4-/5, right 4/5 At DC: Inability to assess secondary to patient self-discharge 3. Patient will demonstrate 30-second-sit-to-stand (without UE assist) >/=10 repetitions to improve ease with functional transfers. At PN: Progressin sec sit to stand (no HHA) = 8 reps At DC: Inability to assess secondary to patient self-discharge  
   
Long Term Goals: To be accomplished in 6 weeks: 1. Patient will demonstrate a significant functional improvement as demonstrated by a score of >/= 50 on FOTO. At PN: Progressing, FOTO = 39 At DC: Rosea Maillard = 44 
2.  Patient will demonstrate TUG (10 feet) without use of AD </=20 seconds to demonstrate a reduced falls risk. At PN: Remains: TUG - 29 sec (No AD with SBA.) At DC: Inability to assess secondary to patient self-discharge 3. Patient will demonstrate left/right SLS >/=20 seconds without UE assist to improve ease with ambulation on uneven ground. At PN: Progressing, Left SLS 10 sec / Right SLS 2 sec At DC: Remains: Left SLS 10 sec / Right SLS 2 sec G-Codes (GP) Mobility  Goal  CK= 40-59%  D/C  CK= 40-59% The severity rating is based on clinical judgment and the FOTO Score score. ASSESSMENT/RECOMMENDATIONS: 
 
At this time patient to be discharged in accordance with patient request with patient in contact with clinic 11/7/2018 subjectively reporting desire to cancel all remaining appointments secondary to recent placement in boot by her Podiatrist x6 weeks. Patient to be discharged per her request at this time. Patient has objectively demonstrated good progression towards achieval of long term goals with limitations with cessation of AD use secondary to right posterior hip girdle pain. [x]Discontinue therapy: [x]Patient has reached or is progressing toward set goals []Patient is non-compliant or has abdicated 
    []Due to lack of appreciable progress towards set goals Iliana Leavitt, PT 11/9/2018 1:05 PM

## 2018-11-12 ENCOUNTER — APPOINTMENT (OUTPATIENT)
Dept: PHYSICAL THERAPY | Age: 78
End: 2018-11-12
Payer: MEDICARE

## 2018-11-15 ENCOUNTER — APPOINTMENT (OUTPATIENT)
Dept: PHYSICAL THERAPY | Age: 78
End: 2018-11-15
Payer: MEDICARE

## 2018-12-19 RX ORDER — ATENOLOL 50 MG/1
TABLET ORAL
Qty: 180 TAB | Refills: 3 | Status: SHIPPED | OUTPATIENT
Start: 2018-12-19 | End: 2019-12-20

## 2018-12-19 RX ORDER — LOSARTAN POTASSIUM 50 MG/1
TABLET ORAL
Qty: 90 TAB | Refills: 3 | Status: SHIPPED | OUTPATIENT
Start: 2018-12-19 | End: 2019-03-15 | Stop reason: CLARIF

## 2018-12-27 ENCOUNTER — OFFICE VISIT (OUTPATIENT)
Dept: ORTHOPEDIC SURGERY | Age: 78
End: 2018-12-27

## 2018-12-27 VITALS
OXYGEN SATURATION: 99 % | SYSTOLIC BLOOD PRESSURE: 120 MMHG | TEMPERATURE: 97.9 F | RESPIRATION RATE: 16 BRPM | HEART RATE: 67 BPM | HEIGHT: 65 IN | BODY MASS INDEX: 32.86 KG/M2 | DIASTOLIC BLOOD PRESSURE: 78 MMHG | WEIGHT: 197.2 LBS

## 2018-12-27 DIAGNOSIS — M48.062 SPINAL STENOSIS OF LUMBAR REGION WITH NEUROGENIC CLAUDICATION: ICD-10-CM

## 2018-12-27 DIAGNOSIS — M43.16 SPONDYLOLISTHESIS OF LUMBAR REGION: Primary | ICD-10-CM

## 2018-12-27 DIAGNOSIS — Z98.1 S/P LUMBAR FUSION: ICD-10-CM

## 2018-12-27 RX ORDER — GABAPENTIN 600 MG/1
600 TABLET ORAL
Qty: 270 TAB | Refills: 0 | Status: SHIPPED | OUTPATIENT
Start: 2018-12-27 | End: 2019-03-26 | Stop reason: DRUGHIGH

## 2018-12-27 NOTE — PROGRESS NOTES
[address]  [address], [address]  [place], [address] [place] [address] Street  Phone: [phone]  Fax: [phone]  PROGRESS NOTE-Post Op  Patient: Maynor Wong                MRN: [id number]       SSN: [id number]  YOB: 1940        AGE: [de-identified] y.o. SEX: female  There is no height or weight on file to calculate BMI. PCP: Rajesh Hernadez MD  12/27/18    No chief complaint on file. HISTORY OF PRESENT ILLNESS:  Susan Ramos is a [de-identified] y.o. female with history of back greater than BLE R>L pain  who had L4-5 PLF surgery five months ago for stenosis and spondylolisthesis. After surgery she had an increase in her RLE radicular pain and some hip flexor weakness. We got a CT scan in the hospital, that showed good alignment. Pain prior to surgery was mostly in the back and some BLE leg pain in the L4-5 distribution from buttock to calfs and described as aching, burning, numbing and throbbing. Pain is worse with standing and walking and affects sleep and recreational activities. Pain is better with relaxation, pain medication and lying down. She was doing well at her last OV. She has been to PT with benefit. Domo Espinoza has been struggling with a charcot foot and hast to be in a boot and keep weight off her foot. She had to stop PT due to her foot. She reports increased midline low back pain from not being able to do her exercises and having an unbalanced gait from her boot. She continues to have no leg pain and her RLE strength is now equal to her LLE. Her strength is normal on exam. She does report some Right hand numbness w/ certain activities, indicative of CTS. Patient denies any fevers, wound drainage, bladder/bowel dysfunction, new onset weakness, new neuropathic pain, or other neurological deficits.  Pt desires to continue with evaluation of back pain and postoperative care     Current Medications: Gabapentin 600mg TID       ASSESSMENT   Diagnoses and all orders for this visit:    1. Spondylolisthesis of lumbar region  -     AMB POC XRAY, SPINE, LUMBOSACRAL; 2 O    2. Spinal stenosis of lumbar region with neurogenic claudication  -     AMB POC XRAY, SPINE, LUMBOSACRAL; 2 O    3. S/P lumbar fusion  -     AMB POC XRAY, SPINE, LUMBOSACRAL; 2 O         IMPRESSION AND PLAN:  This is a pt who had a Lumbar fusion surgery 5 months ago. She is doing well with resolution of her BLE pain. Her RLE weakness has essentially resolved. She continues with back pain, recently increased from having to be in a boot. I got a set of x-rays for Dr. Nuñez Palm review    1) Pt was given information on Gabapentin and CTS exercises   2) Reviewed activity, recommended chair yoga, with exercises sitting in chair only due to foot  3) Continue Gabapentin  4) Recommended CTS exercises and wearing a brace PRN  4) Ms. Charity Fang has a reminder for a \"due or due soon\" health maintenance. I have asked that she contact her primary care provider, Leann Frias MD, for follow-up on this health maintenance. 5) We have informed the patient to notify us for immediate appointment if he has any worsening neurogical symptoms or if an emergency situation presents, then call 911  6)  demonstrated consistency with prescribing. 7) Pt will follow-up in 3 MO W/ ME.    SUBJECTIVE    Smoking Status non-smoker  Work: retired    Pain Scale: /10    Pain Assessment  11/1/2018   Location of Pain Back   Pain Location Comment -   Location Modifiers -   Severity of Pain 0   Quality of Pain -   Quality of Pain Comment -   Duration of Pain -   Frequency of Pain Constant   Aggravating Factors -   Aggravating Factors Comment -   Limiting Behavior -   Relieving Factors -   Relieving Factors Comment -   Result of Injury -           REVIEW OF SYSTEMS  Constitutional: Negative for fever, chills, or weight change. Respiratory: Negative for cough or shortness of breath.      Cardiovascular: Negative for chest pain or palpitations. Gastrointestinal: Negative for incontinence, acid reflux, change in bowel habits, or constipation. Genitourinary: Negative for incontinence, dysuria and flank pain. Musculoskeletal: Positive for back and right foot pain. See HPI. Skin: Negative for rash. Neurological:intermittent right hand numbness, No radiculopathy. See HPI. Endo/Heme/Allergies: Negative. Psychiatric/Behavioral: Negative. PHYSICAL EXAMINATION  There were no vitals taken for this visit. Accompanied by daughter. Constitutional:  Well developed, well nourished, in no acute distress. Psychiatric: Affect and mood are appropriate. Integumentary: No rashes or abrasions noted on exposed areas. Cardiovascular/Peripheral Vascular: +2 radial & pedal pulses. No peripheral edema is noted. Lymphatic:  No evidence of lymphedema. No cervical lymphadenopathy. SPINE/MUSCULOSKELETAL EXAM    Lumbar spine:  No rash, ecchymosis, or gross obliquity. No fasciculations. No focal atrophy is noted. Range of motion is intact with flexion, extension, turning right, turning left . Tenderness to palpation none. No tenderness to palpation at the sciatic notch. Sensation grossly intact to light touch. Straight leg raise negative      MOTOR:     Hip Flex Quads Hamstrings Ankle DF EHL Ankle PF   Right +4/5 +4/5 +4/5 +4/5 +4/5 +4/5   Left +4/5 +4/5 +4/5 +4/5 +4/5 +4/5       Non antalgic gait    Ambulation with walker. FWB.       PAST MEDICAL HISTORY   Past Medical History:   Diagnosis Date    Cardiomegaly     Coarctation of aorta determined by echocardiography     Diabetes ([facility] [address]     Dysuria     Essential hypertension, benign     stable    High blood pressure     Hypercholesterolemia     Hyperthyroidism     no meds    Obesity, unspecified     Pt has weight loss    Other and unspecified hyperlipidemia     Chol 172, ldl 82, hdl 62, tg 141    Recurrent UTI     Shingles     Type II or unspecified type diabetes mellitus without mention of complication, not stated as uncontrolled        Past Surgical History:   Procedure Laterality Date    HX BACK SURGERY  08/01/2018    HX GYN      hysterectomy    HX HYSTERECTOMY      HX OTHER SURGICAL      HX OTHER SURGICAL  2007    ankle sure   . MEDICATIONS      Current Outpatient Medications   Medication Sig Dispense Refill    atenolol (TENORMIN) 50 mg tablet TAKE ONE TABLET BY MOUTH TWICE DAILY 180 Tab 3    losartan (COZAAR) 50 mg tablet TAKE ONE TABLET BY MOUTH ONCE DAILY 90 Tab 3    gabapentin (NEURONTIN) 600 mg tablet Take 1 Tab by mouth three (3) times daily (after meals). 90 Tab 1    DILT- mg XR capsule       indapamide (LOZOL) 2.5 mg tablet       metFORMIN (GLUCOPHAGE) 850 mg tablet       nitrofurantoin, macrocrystal-monohydrate, (MACROBID) 100 mg capsule       pravastatin (PRAVACHOL) 40 mg tablet       gabapentin (NEURONTIN) 600 mg tablet Take  by mouth three (3) times daily.  aspirin delayed-release 81 mg tablet Take  by mouth daily.  calcium-cholecalciferol, d3, (CALCIUM 600 + D) 600-125 mg-unit tab Take  by mouth.  cholecalciferol (VITAMIN D3) 1,000 unit cap Take  by mouth daily.  DILT- mg XR capsule TAKE 1 CAPSULE BY MOUTH ONCE DAILY 90 Cap 1    oxyCODONE IR (ROXICODONE) 5 mg immediate release tablet Take 1 Tab by mouth every eight (8) hours as needed for Pain. Max Daily Amount: 15 mg. 20 Tab 0    gluc-condr-om3-dha-epa-fish-st (GLUCOSAMINE CHONDROITIN PLUS) 786-709-10-54 mg cap Take 1 Tab by mouth daily. Indications: supplement      nitrofurantoin, macrocrystal-monohydrate, (MACROBID) 100 mg capsule Take 100 mg by mouth two (2) times a day. Indications: BACTERIAL URINARY TRACT INFECTION      aspirin 81 mg chewable tablet Take 81 mg by mouth daily. Indications: myocardial infarction prevention      MELATONIN PO Take 5 mg by mouth as needed (sleep).       acetaminophen (TYLENOL) 500 mg tablet Take 500 mg by mouth every six (6) hours as needed for Pain.  CRANBERRY CONC-ASCORBIC ACID PO Take 500 mg by mouth daily. Indications: UTI prevention      calcium-cholecalciferol, d3, (CALCIUM 600 + D) 600-125 mg-unit tab Take 600 mg by mouth daily. Patient taking 4 times a week  Indications: Osteoporosis      Cholecalciferol, Vitamin D3, (VITAMIN D3) 1,000 unit cap Take 1,000 Units by mouth daily. Indications: Vitamin D Deficiency      metFORMIN (GLUMETZA ER) 500 mg TG24 24 hour tablet Take 850 mg by mouth daily. Indications: type 2 diabetes mellitus      pravastatin (PRAVACHOL) 40 mg tablet Take 40 mg by mouth daily. Indications: hypercholesterolemia      indapamide (LOZOL) 2.5 mg tablet Take 2.5 mg by mouth daily.  Indications: Edema          ALLERGIES    Allergies   Allergen Reactions    Kenalog [Triamcinolone Acetonide] Anaphylaxis    Penicillin G Hives    Adhesive Tape-Silicones Swelling    Adhesive Other (comments)    Bacitracin Not Reported This Time    Bacitracin Rash    Betadine [Povidone-Iodine] Other (comments)     Caused uncomfortable rash on area where placed    Cephalexin Rash    Ciprofloxacin Other (comments)    Cortane Shortness of Breath    Cortisone Not Reported This Time    Cymbalta [Duloxetine] Rash    Erythromycin Not Reported This Time    Erythromycin Rash    Kenalog [Triamcinolone Acetonide] Shortness of Breath    Lisinopril Not Reported This Time    Lisinopril Cough    Lortab [Hydrocodone-Acetaminophen] Rash    Macrobid [Nitrofurantoin Monohyd/M-Cryst] Other (comments)     Flushed face,leg swellinig    Macrobid [Nitrofurantoin Monohyd/M-Cryst] Other (comments)    Medrol [Methylprednisolone] Swelling    Methimazole Other (comments)     Neck pain/cough    Methimazole Cough    Neosporin [Hydrocortisone] Hives    Penicillins Not Reported This Time    Penicillins Rash    Prednimustine Shortness of Breath    Prednisolone Other (comments)    Prednisone Not Reported This Time    Propylthiouracil Other (comments)     Dizziness,elevated blood pressure    Propylthiouracil Other (comments)    Sulfamethoxazole-Trimethoprim Other (comments)    Tetanus And Diphtheria Toxoids, Adsorbed, Adult Swelling    Tetanus Vaccines And Toxoid Not Reported This Time          SOCIAL HISTORY    Social History     Socioeconomic History    Marital status:      Spouse name: Not on file    Number of children: Not on file    Years of education: Not on file    Highest education level: Not on file   Social Needs    Financial resource strain: Not on file    Food insecurity - worry: Not on file    Food insecurity - inability: Not on file   [employer] needs - medical: Not on file   [employer] needs - non-medical: Not on file   Occupational History    Not on file   Tobacco Use    Smoking status: Never Smoker    Smokeless tobacco: Never Used   Substance and Sexual Activity    Alcohol use: No    Drug use: No    Sexual activity: Not on file   Other Topics Concern    Not on file   Social History Narrative    ** Merged History Encounter **            FAMILY HISTORY    Family History   Problem Relation Age of Onset    Heart Disease Other         positive history of ischemic heart disease         Mary Mackenzie NP

## 2018-12-27 NOTE — PATIENT INSTRUCTIONS
Gabapentin (By mouth)   Gabapentin (randell-a-PEN-tin)  Treats seizures and pain caused by shingles. Brand Name(s): ACTIVE-PAC with Gabapentin, Convenience Juno, Cyclo/Lor 10/300 Pack, FusePaq Fanatrex, Lor-V, Gralise, [address] Pack, Neurontin, SmartRx Lor Kit   There may be other brand names for this medicine. When This Medicine Should Not Be Used: This medicine is not right for everyone. Do not use it if you had an allergic reaction to gabapentin. How to Use This Medicine:   Capsule, Liquid, Tablet  · Take your medicine as directed. Your dose may need to be changed several times to find what works best for you. If you have epilepsy, do not allow more than 12 hours to pass between doses. · Capsule: Swallow the capsule whole with plenty of water. Do not open, crush, or chew it. · Gralise® tablet: Swallow the tablet whole . Do not crush, break, or chew it. · Neurontin® tablet: If you break a tablet into 2 pieces, use the second half as your next dose. If you don't use it within 28 days, throw it away. · Measure the oral liquid medicine with a marked measuring spoon, oral syringe, or medicine cup. · This medicine should come with a Medication Guide. Ask your pharmacist for a copy if you do not have one. · Missed dose: Take a dose as soon as you remember. If it is almost time for your next dose, wait until then and take a regular dose. Do not take extra medicine to make up for a missed dose. · Store the medicine in a closed container at room temperature, away from heat, moisture, and direct light. Store the Neurontin® oral liquid in the refrigerator. Do not freeze. Drugs and Foods to Avoid:   Ask your doctor or pharmacist before using any other medicine, including over-the-counter medicines, vitamins, and herbal products. · Some medicines can affect how gabapentin works.  Tell your doctor if you also use any of the following:   ¨ Hydrocodone  ¨ Morphine  · If you take an antacid, wait at least 2 hours before you take gabapentin. · Tell your doctor if you use anything else that makes you sleepy. Some examples are allergy medicine, narcotic pain medicine, and alcohol. Warnings While Using This Medicine:   · Tell your doctor if you are pregnant or breastfeeding, or if you have kidney problems or are receiving dialysis. Tell your doctor if you have a history of depression or mental health problems. · This medicine may increase depression or thoughts of suicide. Tell your doctor right away if you start to feel more depressed or think about hurting yourself. · This medicine may cause a serious allergic reaction called multiorgan hypersensitivity, which can damage organs and be life-threatening. · Do not stop using this medicine suddenly. Your doctor will need to slowly decrease your dose before you stop it completely. If you take this medicine to prevent seizures, your seizures may return or occur more often if you stop this medicine suddenly. · This medicine may make you dizzy or drowsy. Do not drive or do anything else that could be dangerous until you know how this medicine affects you. · Tell any doctor or dentist who treats you that you are using this medicine. This medicine may affect certain medical test results. · Your doctor will check your progress and the effects of this medicine at regular visits. Keep all appointments. · Keep all medicine out of the reach of children. Never share your medicine with anyone.   Possible Side Effects While Using This Medicine:   Call your doctor right away if you notice any of these side effects:  · Allergic reaction: Itching or hives, swelling in your face or hands, swelling or tingling in your mouth or throat, chest tightness, trouble breathing  · Behavior problems, aggression, restlessness, trouble concentrating, moodiness (especially in children)  · Blistering, peeling, red skin rash  · Change in how much or how often you urinate, bloody or cloudy urine,  · Chest pain, fast heartbeat, trouble breathing  · Dark urine or pale stools, nausea, vomiting, loss of appetite, stomach pain, yellow skin or eyes  · Fever, rash, swollen or tender glands in the neck, armpit, or groin  · Problems with coordination, shakiness, unsteadiness  · Rapid weight gain, swelling in your hands, ankles, or feet  · Unusual moods or behaviors, thoughts of hurting yourself, feeling depressed  If you notice these less serious side effects, talk with your doctor:   · Dizziness, drowsiness, sleepiness, tiredness  If you notice other side effects that you think are caused by this medicine, tell your doctor. Call your doctor for medical advice about side effects. You may report side effects to FDA at 1-321-NEB-9780  © 2017 [facility] Information is for End User's use only and may not be sold, redistributed or otherwise used for commercial purposes. The above information is an  only. It is not intended as medical advice for individual conditions or treatments. Talk to your doctor, nurse or pharmacist before following any medical regimen to see if it is safe and effective for you. Carpal Tunnel Syndrome: Exercises  Your Care Instructions  Here are some examples of typical rehabilitation exercises for your condition. Start each exercise slowly. Ease off the exercise if you start to have pain. Your doctor or your physical or occupational therapist will tell you when you can start these exercises and which ones will work best for you. Warm-up stretches  When you no longer have pain or numbness, you can do exercises to help prevent carpal tunnel syndrome from coming back. Do not do any stretch or movement that is uncomfortable or painful. 1. Rotate your wrist up, down, and from side to side. Repeat 4 times. 2. Stretch your fingers far apart. Relax them, and then stretch them again. Repeat 4 times.   3. Stretch your thumb by pulling it back gently, holding it, and then releasing it. Repeat 4 times. How to do the exercises  Prayer stretch    1. Start with your palms together in front of your chest just below your chin. 2. Slowly lower your hands toward your waistline, keeping your hands close to your stomach and your palms together until you feel a mild to moderate stretch under your forearms. 3. Hold for at least 15 to 30 seconds. Repeat 2 to 4 times. Wrist flexor stretch    1. Extend your arm in front of you with your palm up. 2. Bend your wrist, pointing your hand toward the floor. 3. With your other hand, gently bend your wrist farther until you feel a mild to moderate stretch in your forearm. 4. Hold for at least 15 to 30 seconds. Repeat 2 to 4 times. Wrist extensor stretch    1. Repeat steps 1 through 4 of the stretch above, but begin with your extended hand palm down. Follow-up care is a key part of your treatment and safety. Be sure to make and go to all appointments, and call your doctor if you are having problems. It's also a good idea to know your test results and keep a list of the medicines you take. Where can you learn more? Go to http://sherly-mary.info/. Enter S715 in the search box to learn more about \"Carpal Tunnel Syndrome: Exercises. \"  Current as of: November 29, 2017  Content Version: 11.8  © 6420-1828 [employer]. Care instructions adapted under license by [employer] (which disclaims liability or warranty for this information). If you have questions about a medical condition or this instruction, always ask your healthcare professional. [place] [address] any warranty or liability for your use of this information.

## 2019-01-03 ENCOUNTER — OFFICE VISIT (OUTPATIENT)
Dept: CARDIOLOGY CLINIC | Age: 79
End: 2019-01-03

## 2019-01-03 VITALS
BODY MASS INDEX: 32.99 KG/M2 | DIASTOLIC BLOOD PRESSURE: 68 MMHG | WEIGHT: 198 LBS | HEART RATE: 84 BPM | SYSTOLIC BLOOD PRESSURE: 124 MMHG | HEIGHT: 65 IN

## 2019-01-03 DIAGNOSIS — I10 ESSENTIAL HYPERTENSION, BENIGN: Primary | ICD-10-CM

## 2019-01-03 DIAGNOSIS — E11.9 TYPE 2 DIABETES MELLITUS WITHOUT COMPLICATION, WITHOUT LONG-TERM CURRENT USE OF INSULIN (HCC): ICD-10-CM

## 2019-01-03 DIAGNOSIS — E78.00 HYPERCHOLESTEROLEMIA: ICD-10-CM

## 2019-01-03 DIAGNOSIS — I49.1 PAC (PREMATURE ATRIAL CONTRACTION): ICD-10-CM

## 2019-01-03 NOTE — LETTER
Clau Oneill 1940 
 
1/3/2019 Dear MD Piper Doan MD 
 
I had the pleasure of evaluating  Ms. Dorothy Escobedo in office today. Below are the relevant portions of my assessment and plan of care. ICD-10-CM ICD-9-CM 1. Essential hypertension, benign I10 401.1 Stable continue treatment 2. Hypercholesterolemia E78.00 272.0 Stable 3. PAC (premature atrial contraction) I49.1 427.61 Feels palpitation at times. Continue beta-blocker and diltiazem. Blood pressure control 4. Type 2 diabetes mellitus without complication, without long-term current use of insulin (HCC) E11.9 250.00 Stable Current Outpatient Medications Medication Sig Dispense Refill  OTHER     
 gabapentin (NEURONTIN) 600 mg tablet Take 1 Tab by mouth three (3) times daily (after meals). 270 Tab 0  
 atenolol (TENORMIN) 50 mg tablet TAKE ONE TABLET BY MOUTH TWICE DAILY 180 Tab 3  
 losartan (COZAAR) 50 mg tablet TAKE ONE TABLET BY MOUTH ONCE DAILY 90 Tab 3  
 DILT- mg XR capsule Take 180 mg by mouth daily.  indapamide (LOZOL) 2.5 mg tablet  metFORMIN (GLUCOPHAGE) 850 mg tablet  gabapentin (NEURONTIN) 600 mg tablet Take  by mouth three (3) times daily.  aspirin delayed-release 81 mg tablet Take  by mouth daily.  DILT- mg XR capsule TAKE 1 CAPSULE BY MOUTH ONCE DAILY 90 Cap 1  
 gluc-condr-om3-dha-epa-fish-st (GLUCOSAMINE CHONDROITIN PLUS) 419-263-61-54 mg cap Take 1 Tab by mouth daily. Indications: supplement  MELATONIN PO Take 5 mg by mouth as needed (sleep).  acetaminophen (TYLENOL) 500 mg tablet Take 500 mg by mouth every six (6) hours as needed for Pain.  calcium-cholecalciferol, d3, (CALCIUM 600 + D) 600-125 mg-unit tab Take 600 mg by mouth daily. Patient taking 4 times a week  Indications: Osteoporosis  Cholecalciferol, Vitamin D3, (VITAMIN D3) 1,000 unit cap Take 1,000 Units by mouth daily. Indications: Vitamin D Deficiency  pravastatin (PRAVACHOL) 40 mg tablet Take 40 mg by mouth daily. Indications: hypercholesterolemia  CRANBERRY CONC-ASCORBIC ACID PO Take 500 mg by mouth daily. Indications: UTI prevention  metFORMIN (GLUMETZA ER) 500 mg TG24 24 hour tablet Take 850 mg by mouth daily. Indications: type 2 diabetes mellitus No orders of the defined types were placed in this encounter. If you have questions, please do not hesitate to call me. I look forward to following Ms. Qureshiemir Miltons along with you. Sincerely, Gwen Reese MD

## 2019-01-03 NOTE — PROGRESS NOTES
HISTORY OF PRESENT ILLNESS  Cornel Rosado is a [de-identified] y.o. female. Hypertension   The history is provided by the patient. This is a chronic problem. The problem occurs constantly. The problem has not changed since onset. Palpitations    The history is provided by the patient. This is a recurrent problem. The problem has been gradually worsening. The problem occurs daily. The problem is associated with nothing. Associated symptoms include irregular heartbeat. Pertinent negatives include no exertional chest pressure and no lower extremity edema. Her past medical history is significant for hypertension. Cholesterol Problem   The history is provided by the patient. This is a chronic problem. The problem occurs constantly. The problem has not changed since onset. Review of Systems   Constitutional: Negative for chills. HENT: Negative for nosebleeds. Eyes: Negative for blurred vision and double vision. Respiratory: Negative for wheezing. Cardiovascular: Positive for palpitations. Negative for leg swelling. Gastrointestinal: Negative for heartburn. Musculoskeletal: Negative for myalgias. Skin: Negative for rash. Endo/Heme/Allergies: Does not bruise/bleed easily.      Family History   Problem Relation Age of Onset    Heart Disease Other         positive history of ischemic heart disease       Past Medical History:   Diagnosis Date    Cardiomegaly     Ch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 ([address]     Coarctation of aorta determined by echocardiography     Diabetes [facility]     Dysuria     Essential hypertension, benign     stable    High blood pressure     Hypercholesterolemia     Hyperthyroidism     no meds    Obesity, unspecified     Pt has weight loss    Other and unspecified hyperlipidemia     Chol 172, ldl 82, hdl 62, tg 141    Recurrent UTI     Shingles     Type II or unspecified type diabetes mellitus without mention of complication, not stated as uncontrolled Past Surgical History:   Procedure Laterality Date    HX BACK SURGERY  08/01/2018    HX GYN      hysterectomy    HX HYSTERECTOMY      HX OTHER SURGICAL      HX OTHER SURGICAL  2007    ankle sure       Social History     Tobacco Use    Smoking status: Never Smoker    Smokeless tobacco: Never Used   Substance Use Topics    Alcohol use: No       Allergies   Allergen Reactions    Kenalog [Triamcinolone Acetonide] Anaphylaxis    Penicillin G Hives    Adhesive Tape-Silicones Swelling    Adhesive Other (comments)    Bacitracin Not Reported This Time    Bacitracin Rash    Betadine [Povidone-Iodine] Other (comments)     Caused uncomfortable rash on area where placed    Cephalexin Rash    Ciprofloxacin Other (comments)    Cortane Shortness of Breath    Cortisone Not Reported This Time    Cymbalta [Duloxetine] Rash    Erythromycin Not Reported This Time    Erythromycin Rash    Kenalog [Triamcinolone Acetonide] Shortness of Breath    Lisinopril Not Reported This Time    Lisinopril Cough    Lortab [Hydrocodone-Acetaminophen] Rash    Macrobid [Nitrofurantoin Monohyd/M-Cryst] Other (comments)     Flushed face,leg swellinig    Macrobid [Nitrofurantoin Monohyd/M-Cryst] Other (comments)    Medrol [Methylprednisolone] Swelling    Methimazole Other (comments)     Neck pain/cough    Methimazole Cough    Neosporin [Hydrocortisone] Hives    Penicillins Not Reported This Time    Penicillins Rash    Prednimustine Shortness of Breath    Prednisolone Other (comments)    Prednisone Not Reported This Time    Propylthiouracil Other (comments)     Dizziness,elevated blood pressure    Propylthiouracil Other (comments)    Sulfamethoxazole-Trimethoprim Other (comments)    Tetanus And Diphtheria Toxoids, Adsorbed, Adult Swelling    Tetanus Vaccines And Toxoid Not Reported This Time       Current Outpatient Medications   Medication Sig    OTHER     gabapentin (NEURONTIN) 600 mg tablet Take 1 Tab by mouth three (3) times daily (after meals).  atenolol (TENORMIN) 50 mg tablet TAKE ONE TABLET BY MOUTH TWICE DAILY    losartan (COZAAR) 50 mg tablet TAKE ONE TABLET BY MOUTH ONCE DAILY    DILT- mg XR capsule Take 180 mg by mouth daily.  indapamide (LOZOL) 2.5 mg tablet     metFORMIN (GLUCOPHAGE) 850 mg tablet     gabapentin (NEURONTIN) 600 mg tablet Take  by mouth three (3) times daily.  aspirin delayed-release 81 mg tablet Take  by mouth daily.  DILT- mg XR capsule TAKE 1 CAPSULE BY MOUTH ONCE DAILY    gluc-condr-om3-dha-epa-fish-st (GLUCOSAMINE CHONDROITIN PLUS) 968-779-38-54 mg cap Take 1 Tab by mouth daily. Indications: supplement    MELATONIN PO Take 5 mg by mouth as needed (sleep).  acetaminophen (TYLENOL) 500 mg tablet Take 500 mg by mouth every six (6) hours as needed for Pain.  calcium-cholecalciferol, d3, (CALCIUM 600 + D) 600-125 mg-unit tab Take 600 mg by mouth daily. Patient taking 4 times a week  Indications: Osteoporosis    Cholecalciferol, Vitamin D3, (VITAMIN D3) 1,000 unit cap Take 1,000 Units by mouth daily. Indications: Vitamin D Deficiency    pravastatin (PRAVACHOL) 40 mg tablet Take 40 mg by mouth daily. Indications: hypercholesterolemia    CRANBERRY CONC-ASCORBIC ACID PO Take 500 mg by mouth daily. Indications: UTI prevention    metFORMIN (GLUMETZA ER) 500 mg TG24 24 hour tablet Take 850 mg by mouth daily. Indications: type 2 diabetes mellitus     No current facility-administered medications for this visit. Visit Vitals  /68   Pulse 84   Ht 5' 5\" (1.651 m)   Wt 89.8 kg (198 lb)   BMI 32.95 kg/m²         Physical Exam   Constitutional: She is oriented to person, place, and time. She appears well-developed and well-nourished. obese   HENT:   Head: Normocephalic and atraumatic. Eyes: Conjunctivae are normal.   Neck: Neck supple. No JVD present. No tracheal deviation present. No thyromegaly present. Cardiovascular: Normal rate.  An irregular rhythm present. PMI is not displaced. Exam reveals no gallop and no decreased pulses. Murmur heard. Early systolic murmur is present with a grade of 2/6 at the upper right sternal border. Pulmonary/Chest: No respiratory distress. She has no wheezes. She has no rales. She exhibits no tenderness. Abdominal: Soft. There is no tenderness. Musculoskeletal: She exhibits no edema. Neurological: She is alert and oriented to person, place, and time. Skin: Skin is warm. Psychiatric: She has a normal mood and affect. Ms. Wilfredo Hassan has a reminder for a \"due or due soon\" health maintenance. I have asked that she contact her primary care provider for follow-up on this health maintenance. No flowsheet data found. mri:2015  1. No acute hemorrhage or acute infarction. 2. White matter abnormality is nonspecific but likely ischemic. This does not suggest multiple sclerosis. 3. No other significant intracranial abnormality is appreciated. [id number]  Left ventricle: Systolic function was normal. Ejection fraction was  estimated in the range of 55 % to 60 %. There were no regional wall motion  abnormalities. Doppler parameters were consistent with abnormal left  ventricular relaxation (grade 1 diastolic dysfunction). Left atrium: The atrium was mildly dilated. Mitral valve: There was trivial regurgitation. Aortic valve: The valve was trileaflet. Leaflets exhibited mildly  increased thickness, normal cuspal separation, and sclerosis. Tricuspid valve: There was mild regurgitation. Tricuspid regurgitation  peak velocity: 2.4 m/sec. Pulmonary artery systolic pressure: 26 mmHg. Pulmonic valve: There was trivial regurgitation. 2015:holter  Sr,Frequent pac. No a fib    I have personally reviewed patients ekg done at other facility. 2017  Sr,pac    NUCLEAR [email or web address]     Findings:   1.  Stress images reveal normal Myoview distrubution in all the LV segments in short axis, vertical and horizontal long axis views. 2. Resting images have a normal uptake. 3. Gated images reveal normal wall motion and the ejection fraction is calculated to be 90%. Conclusion:   1. Normal perfusion scan. 2. Normal wall motion and ejection fraction. 3. Low risk scan. Assessment         ICD-10-CM ICD-9-CM    1. Essential hypertension, benign I10 401.1     Stable continue treatment   2. Hypercholesterolemia E78.00 272.0     Stable   3. PAC (premature atrial contraction) I49.1 427.61     Feels palpitation at times. Continue beta-blocker and diltiazem. Blood pressure control   4. Type 2 diabetes mellitus without complication, without long-term current use of insulin (HCC) E11.9 250.00     Stable       Medications Discontinued During This Encounter   Medication Reason    pravastatin (PRAVACHOL) 40 mg tablet Not A Current Medication    aspirin 81 mg chewable tablet Duplicate Order    indapamide (LOZOL) 2.5 mg tablet Duplicate Order    nitrofurantoin, macrocrystal-monohydrate, (MACROBID) 100 mg capsule Not A Current Medication    nitrofurantoin, macrocrystal-monohydrate, (MACROBID) 100 mg capsule Not A Current Medication    calcium-cholecalciferol, d3, (CALCIUM 600 + D) 600-125 mg-unit tab Not A Current Medication    cholecalciferol (VITAMIN D3) 1,000 unit cap Not A Current Medication    pravastatin (PRAVACHOL) 40 mg tablet Not A Current Medication    oxyCODONE IR (ROXICODONE) 5 mg immediate release tablet Not A Current Medication       No orders of the defined types were placed in this encounter. Follow-up Disposition:  Return in about 6 months (around 7/3/2019).

## 2019-01-03 NOTE — PROGRESS NOTES
1. Have you been to the ER, urgent care clinic since your last visit? Hospitalized since your last visit? No     2. Have you seen or consulted any other health care providers outside of the [address] [place] [address] since your last visit? Include any pap smears or colon screening. Yes Where: podiatrist     3. Since your last visit, have you had any of the following symptoms? .          4. Have you had any blood work, X-rays or cardiac testing? 5. Where do you normally have your labs drawn? 6. Do you need any refills today?

## 2019-02-11 ENCOUNTER — CLINICAL SUPPORT (OUTPATIENT)
Dept: VASCULAR SURGERY | Age: 79
End: 2019-02-11

## 2019-02-11 DIAGNOSIS — R60.9 EDEMA, UNSPECIFIED TYPE: ICD-10-CM

## 2019-02-11 DIAGNOSIS — I83.92 ASYMPTOMATIC VARICOSE VEINS OF LEFT LOWER EXTREMITY: ICD-10-CM

## 2019-02-12 LAB
LEFT GSV AT KNEE DIAM: 0.62 CM
LEFT GSV JUNC DIAM: 0.65 CM
LEFT GSV JUNC RFX: 0.9 S
LEFT GSV THIGH DIST DIAM: 0.53 CM
LEFT GSV THIGH DIST RFX: 1.3 S
LEFT GSV THIGH MID DIAM: 0.41 CM
LEFT GSV THIGH PROX DIAM: 0.48 CM
LEFT GSV THIGH PROX RFX: 3.2 S
LEFT GSV THIGHT MID RFX: 2.8 S
LEFT PERFORATOR DIAM: 0.4 CM
LEFT PERFORATOR RFX: 3.7 S
LEFT SSV JUNCTION DIAM: 0.19 CM
LEFT SSV MID DIAM: 0.22 CM

## 2019-02-14 ENCOUNTER — OFFICE VISIT (OUTPATIENT)
Dept: CARDIOLOGY CLINIC | Age: 79
End: 2019-02-14

## 2019-02-14 VITALS
SYSTOLIC BLOOD PRESSURE: 122 MMHG | BODY MASS INDEX: 32.82 KG/M2 | WEIGHT: 197 LBS | DIASTOLIC BLOOD PRESSURE: 72 MMHG | HEART RATE: 91 BPM | HEIGHT: 65 IN

## 2019-02-14 DIAGNOSIS — E78.00 HYPERCHOLESTEROLEMIA: ICD-10-CM

## 2019-02-14 DIAGNOSIS — I49.1 PAC (PREMATURE ATRIAL CONTRACTION): ICD-10-CM

## 2019-02-14 DIAGNOSIS — I10 ESSENTIAL HYPERTENSION, BENIGN: Primary | ICD-10-CM

## 2019-02-14 DIAGNOSIS — E05.90 HYPERTHYROIDISM: ICD-10-CM

## 2019-02-14 NOTE — PROGRESS NOTES
1. Have you been to the ER, urgent care clinic since your last visit? Hospitalized since your last visit? No  
 
2. Have you seen or consulted any other health care providers outside of the [address] [place] [address] since your last visit? Include any pap smears or colon screening. Yes Where: pcp/vascular 3. Since your last visit, have you had any of the following symptoms? .  
 
   
 
4. Have you had any blood work, X-rays or cardiac testing? 5. Where do you normally have your labs drawn? 6. Do you need any refills today?

## 2019-02-14 NOTE — PROGRESS NOTES
HISTORY OF PRESENT ILLNESS Jean Paul Torres is a [de-identified] y.o. female. Hypertension The history is provided by the patient. This is a chronic problem. The problem occurs constantly. The problem has not changed since onset. Palpitations The history is provided by the patient. This is a recurrent problem. The problem has been gradually worsening. The problem occurs daily. The problem is associated with nothing. Associated symptoms include irregular heartbeat. Pertinent negatives include no exertional chest pressure, no PND and no lower extremity edema. Her past medical history is significant for hypertension. Cholesterol Problem The history is provided by the patient. This is a chronic problem. The problem occurs constantly. The problem has not changed since onset. Review of Systems Constitutional: Negative for chills. HENT: Negative for nosebleeds. Eyes: Negative for blurred vision and double vision. Respiratory: Negative for wheezing. Cardiovascular: Positive for palpitations. Negative for leg swelling and PND. Gastrointestinal: Negative for heartburn. Musculoskeletal: Negative for myalgias. Skin: Negative for rash. Endo/Heme/Allergies: Does not bruise/bleed easily. Family History Problem Relation Age of Onset  Heart Disease Other   
     positive history of ischemic heart disease Past Medical History:  
Diagnosis Date  Cardiomegaly  Ch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 ([address]  Coarctation of aorta determined by echocardiography  Diabetes ([facility] [address]  Dysuria  Essential hypertension, benign   
 stable  High blood pressure  Hypercholesterolemia  Hyperthyroidism   
 no meds  Obesity, unspecified Pt has weight loss  Other and unspecified hyperlipidemia Chol 172, ldl 82, hdl 62, tg 141  Recurrent UTI  Shingles  Type II or unspecified type diabetes mellitus without mention of complication, not stated as uncontrolled Past Surgical History:  
Procedure Laterality Date  HX BACK SURGERY  08/01/2018  HX GYN    
 hysterectomy  HX HYSTERECTOMY  HX OTHER SURGICAL    
 HX OTHER SURGICAL  2007  
 ankle sure Social History Tobacco Use  Smoking status: Never Smoker  Smokeless tobacco: Never Used Substance Use Topics  Alcohol use: No  
 
 
Allergies Allergen Reactions  Kenalog [Triamcinolone Acetonide] Anaphylaxis  Penicillin G Hives  Adhesive Tape-Silicones Swelling  Adhesive Other (comments)  Bacitracin Not Reported This Time  Bacitracin Rash  Betadine [Povidone-Iodine] Other (comments) Caused uncomfortable rash on area where placed  Cephalexin Rash  Ciprofloxacin Other (comments)  Cortane Shortness of Breath  Cortisone Not Reported This Time  Cymbalta [Duloxetine] Rash  Erythromycin Not Reported This Time  Erythromycin Rash  Kenalog [Triamcinolone Acetonide] Shortness of Breath  Lisinopril Not Reported This Time  Lisinopril Cough  Lortab [Hydrocodone-Acetaminophen] Rash  Macrobid [Nitrofurantoin Monohyd/M-Cryst] Other (comments) Flushed face,leg swellinig  Macrobid [Nitrofurantoin Monohyd/M-Cryst] Other (comments)  Medrol [Methylprednisolone] Swelling  Methimazole Other (comments) Neck pain/cough  Methimazole Cough  Neosporin [Hydrocortisone] Hives  Penicillins Not Reported This Time  Penicillins Rash  Prednimustine Shortness of Breath  Prednisolone Other (comments)  Prednisone Not Reported This Time  Propylthiouracil Other (comments) Dizziness,elevated blood pressure  Propylthiouracil Other (comments)  Sulfamethoxazole-Trimethoprim Other (comments)  Tetanus And Diphtheria Toxoids, Adsorbed, Adult Swelling  Tetanus Vaccines And Toxoid Not Reported This Time Current Outpatient Medications Medication Sig  
 OTHER   
  gabapentin (NEURONTIN) 600 mg tablet Take 1 Tab by mouth three (3) times daily (after meals).  atenolol (TENORMIN) 50 mg tablet TAKE ONE TABLET BY MOUTH TWICE DAILY  losartan (COZAAR) 50 mg tablet TAKE ONE TABLET BY MOUTH ONCE DAILY  indapamide (LOZOL) 2.5 mg tablet  metFORMIN (GLUCOPHAGE) 850 mg tablet  gabapentin (NEURONTIN) 600 mg tablet Take  by mouth three (3) times daily.  aspirin delayed-release 81 mg tablet Take  by mouth daily.  DILT- mg XR capsule TAKE 1 CAPSULE BY MOUTH ONCE DAILY  gluc-condr-om3-dha-epa-fish-st (GLUCOSAMINE CHONDROITIN PLUS) 149-643-20-54 mg cap Take 1 Tab by mouth daily. Indications: supplement  MELATONIN PO Take 5 mg by mouth as needed (sleep).  acetaminophen (TYLENOL) 500 mg tablet Take 500 mg by mouth every six (6) hours as needed for Pain.  calcium-cholecalciferol, d3, (CALCIUM 600 + D) 600-125 mg-unit tab Take 600 mg by mouth daily. Patient taking 4 times a week  Indications: Osteoporosis  Cholecalciferol, Vitamin D3, (VITAMIN D3) 1,000 unit cap Take 1,000 Units by mouth daily. Indications: Vitamin D Deficiency  pravastatin (PRAVACHOL) 40 mg tablet Take 40 mg by mouth daily. Indications: hypercholesterolemia No current facility-administered medications for this visit. Visit Vitals /72 Pulse 91 Ht 5' 5\" (1.651 m) Wt 89.4 kg (197 lb) BMI 32.78 kg/m² Physical Exam  
Constitutional: She is oriented to person, place, and time. She appears well-developed and well-nourished. obese HENT:  
Head: Normocephalic and atraumatic. Eyes: Conjunctivae are normal.  
Neck: Neck supple. No JVD present. No tracheal deviation present. No thyromegaly present. Cardiovascular: Normal rate. An irregular rhythm present. PMI is not displaced. Exam reveals no gallop and no decreased pulses. Murmur heard. Early systolic murmur is present with a grade of 2/6 at the upper right sternal border. Pulmonary/Chest: No respiratory distress. She has no wheezes. She has no rales. She exhibits no tenderness. Abdominal: Soft. There is no tenderness. Musculoskeletal: She exhibits no edema. Neurological: She is alert and oriented to person, place, and time. Skin: Skin is warm. Psychiatric: She has a normal mood and affect. Ms. Juliet Lisa has a reminder for a \"due or due soon\" health maintenance. I have asked that she contact her primary care provider for follow-up on this health maintenance. No flowsheet data found. mri:2015 1. No acute hemorrhage or acute infarction. 2. White matter abnormality is nonspecific but likely ischemic. This does not suggest multiple sclerosis. 3. No other significant intracranial abnormality is appreciated. [id number] Left ventricle: Systolic function was normal. Ejection fraction was 
estimated in the range of 55 % to 60 %. There were no regional wall motion 
abnormalities. Doppler parameters were consistent with abnormal left 
ventricular relaxation (grade 1 diastolic dysfunction). Left atrium: The atrium was mildly dilated. Mitral valve: There was trivial regurgitation. Aortic valve: The valve was trileaflet. Leaflets exhibited mildly 
increased thickness, normal cuspal separation, and sclerosis. Tricuspid valve: There was mild regurgitation. Tricuspid regurgitation 
peak velocity: 2.4 m/sec. Pulmonary artery systolic pressure: 26 mmHg. Pulmonic valve: There was trivial regurgitation. 2015:holter Sr,Frequent pac. No a fib I have personally reviewed patients ekg done at other facility. 2017 Sr,pac NUCLEAR [id number] 
  
Findings: 1. Stress images reveal normal Myoview distrubution in all the LV segments in short axis, vertical and horizontal long axis views. 2. Resting images have a normal uptake. 3. Gated images reveal normal wall motion and the ejection fraction is calculated to be 90%. Conclusion: 1. Normal perfusion scan. 2. Normal wall motion and ejection fraction. 3. Low risk scan. Assessment ICD-10-CM ICD-9-CM 1. Essential hypertension, benign I10 401.1   
 stable 2. 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  
 recent increase palpitation 
will check holter for A fib 3. Hypercholesterolemia E78.00 272.0   
 stable 4. Hyperthyroidism E05.90 242.90   
 intolerent to medication Medications Discontinued During This Encounter Medication Reason  DILT- mg XR capsule Duplicate Order  metFORMIN (GLUMETZA ER) 500 mg TG24 24 hour tablet Not A Current Medication  CRANBERRY CONC-ASCORBIC ACID PO Not A Current Medication No orders of the defined types were placed in this encounter. Follow-up Disposition: 
Return in about 4 weeks (around 3/14/2019).

## 2019-02-15 ENCOUNTER — OFFICE VISIT (OUTPATIENT)
Dept: VASCULAR SURGERY | Age: 79
End: 2019-02-15

## 2019-02-15 VITALS
SYSTOLIC BLOOD PRESSURE: 142 MMHG | WEIGHT: 197 LBS | RESPIRATION RATE: 16 BRPM | HEIGHT: 65 IN | BODY MASS INDEX: 32.82 KG/M2 | DIASTOLIC BLOOD PRESSURE: 78 MMHG | HEART RATE: 80 BPM

## 2019-02-15 DIAGNOSIS — I83.893 VARICOSE VEINS OF LOWER EXTREMITIES WITH COMPLICATIONS, BILATERAL: Primary | ICD-10-CM

## 2019-02-15 NOTE — PROGRESS NOTES
Hung Rand Marmary kate [facility] Chief Complaint Patient presents with  New Patient  Varicose Veins History and Physical   
Most pleasant 78-year-old female here today for evaluation of painful reflux. She has had chronic swelling with flare formation on the left inner leg also notable on the right as well. She been working with some stocking she has a hard time getting her therapeutic compression stockings on because of arthritis in her hands. She is recently got a zipper stocking and she can try an over-the-counter stocking as well. No history of trauma or DVT. She is currently being worked up for thyroid disease and may need ablation irregular heartbeat and right knee pain as well. She is a non-smoker with diabetes and hypertension no history of dialysis. Past Medical History:  
Diagnosis Date  Cardiomegaly  Ch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 ([address]  Coarctation of aorta determined by echocardiography  Diabetes ([facility]  Dysuria  Essential hypertension, benign   
 stable  High blood pressure  Hypercholesterolemia  Hyperthyroidism   
 no meds  Obesity, unspecified Pt has weight loss  Other and unspecified hyperlipidemia Chol 172, ldl 82, hdl 62, tg 141  Recurrent UTI  Shingles  Type II or unspecified type diabetes mellitus without mention of complication, not stated as uncontrolled Patient Active Problem List  
Diagnosis Code  Essential hypertension, benign I10  
 Cardiomegaly I51.7  Obesity, unspecified E66.9  Other and unspecified hyperlipidemia E78.5  Type II or unspecified type diabetes mellitus without mention of complication, not stated as uncontrolled E11.9  Hypercholesterolemia E78.00  Palpitation R00.2  Hyperthyroidism E05.90  PAC (premature atrial contraction) I49.1  Type 2 diabetes mellitus without complication, without long-term current use of insulin ([facility]) E11.9  Low back pain without sciatica M54.5  Lumbosacral spondylosis without myelopathy M47.817  
 DDD (degenerative disc disease), lumbar M51.36  
 Lumbar radiculopathy M54.16  
 Right knee pain M25.561  
 Osteoarthritis of both knees M17.0  Lumbar herniated disc M51.26  
 Spinal stenosis, lumbar region without neurogenic claudication M48.061  Spinal stenosis of lumbar region with neurogenic claudication M48.062  Spondylolisthesis of lumbar region M43.16  
 Long-term current use of opiate analgesic Z79.891  Spondylisthesis M43.10  Spinal stenosis M48.00  
 S/P lumbar fusion Z98.1  Left leg swelling M79.89  Type 2 diabetes mellitus with diabetic neuropathy (HCC) E11.40 Past Surgical History:  
Procedure Laterality Date  HX BACK SURGERY  08/01/2018  HX GYN    
 hysterectomy  HX HYSTERECTOMY  HX OTHER SURGICAL    
 HX OTHER SURGICAL  2007  
 ankle sure Current Outpatient Medications Medication Sig Dispense Refill  OTHER     
 gabapentin (NEURONTIN) 600 mg tablet Take 1 Tab by mouth three (3) times daily (after meals). 270 Tab 0  
 atenolol (TENORMIN) 50 mg tablet TAKE ONE TABLET BY MOUTH TWICE DAILY 180 Tab 3  
 losartan (COZAAR) 50 mg tablet TAKE ONE TABLET BY MOUTH ONCE DAILY 90 Tab 3  
 indapamide (LOZOL) 2.5 mg tablet  metFORMIN (GLUCOPHAGE) 850 mg tablet  gabapentin (NEURONTIN) 600 mg tablet Take  by mouth three (3) times daily.  aspirin delayed-release 81 mg tablet Take  by mouth daily.  DILT- mg XR capsule TAKE 1 CAPSULE BY MOUTH ONCE DAILY 90 Cap 1  
 gluc-condr-om3-dha-epa-fish-st (GLUCOSAMINE CHONDROITIN PLUS) 251-596-46-54 mg cap Take 1 Tab by mouth daily. Indications: supplement  MELATONIN PO Take 5 mg by mouth as needed (sleep).  acetaminophen (TYLENOL) 500 mg tablet Take 500 mg by mouth every six (6) hours as needed for Pain.     
 calcium-cholecalciferol, d3, (CALCIUM 600 + D) 600-125 mg-unit tab Take 600 mg by mouth daily. Patient taking 4 times a week  Indications: Osteoporosis  Cholecalciferol, Vitamin D3, (VITAMIN D3) 1,000 unit cap Take 1,000 Units by mouth daily. Indications: Vitamin D Deficiency  pravastatin (PRAVACHOL) 40 mg tablet Take 40 mg by mouth daily. Indications: hypercholesterolemia Allergies Allergen Reactions  Kenalog [Triamcinolone Acetonide] Anaphylaxis  Penicillin G Hives  Adhesive Tape-Silicones Swelling  Adhesive Other (comments)  Bacitracin Not Reported This Time  Bacitracin Rash  Betadine [Povidone-Iodine] Other (comments) Caused uncomfortable rash on area where placed  Cephalexin Rash  Ciprofloxacin Other (comments)  Cortane Shortness of Breath  Cortisone Not Reported This Time  Cymbalta [Duloxetine] Rash  Erythromycin Not Reported This Time  Erythromycin Rash  Kenalog [Triamcinolone Acetonide] Shortness of Breath  Lisinopril Not Reported This Time  Lisinopril Cough  Lortab [Hydrocodone-Acetaminophen] Rash  Macrobid [Nitrofurantoin Monohyd/M-Cryst] Other (comments) Flushed face,leg swellinig  Macrobid [Nitrofurantoin Monohyd/M-Cryst] Other (comments)  Medrol [Methylprednisolone] Swelling  Methimazole Other (comments) Neck pain/cough  Methimazole Cough  Neosporin [Hydrocortisone] Hives  Penicillins Not Reported This Time  Penicillins Rash  Prednimustine Shortness of Breath  Prednisolone Other (comments)  Prednisone Not Reported This Time  Propylthiouracil Other (comments) Dizziness,elevated blood pressure  Propylthiouracil Other (comments)  Sulfamethoxazole-Trimethoprim Other (comments)  Tetanus And Diphtheria Toxoids, Adsorbed, Adult Swelling  Tetanus Vaccines And Toxoid Not Reported This Time Social History Socioeconomic History  Marital status:  Spouse name: Not on file  Number of children: Not on file  Years of education: Not on file  Highest education level: Not on file Social Needs  Financial resource strain: Not on file  Food insecurity - worry: Not on file  Food insecurity - inability: Not on file  Transportation needs - medical: Not on file  Transportation needs - non-medical: Not on file Occupational History  Not on file Tobacco Use  Smoking status: Never Smoker  Smokeless tobacco: Never Used Substance and Sexual Activity  Alcohol use: No  
 Drug use: No  
 Sexual activity: Not on file Other Topics Concern [address] Service Not Asked  Blood Transfusions Not Asked  Caffeine Concern Not Asked  Occupational Exposure Not Asked [employer] Hazards Not Asked  Sleep Concern Not Asked  Stress Concern Not Asked  Weight Concern Not Asked  Special Diet Not Asked  Back Care Not Asked  Exercise Not Asked  Bike Helmet Not Asked  Seat Belt Not Asked  Self-Exams Not Asked Social History Narrative ** Merged History Encounter ** Family History Problem Relation Age of Onset  Heart Disease Other   
     positive history of ischemic heart disease Review of Systems Positive workup for thyroid disorder going on considering ablation going for second opinion next week, no history of seizures stroke, treated for diabetes, hypertension, irregular heartbeat, chronic spinal arthritis chronic arthritis of knees and hips No history of DVT history of long time leg swelling, no history of claudication, no chronic skin conditions, history of neuropathy Physical Exam:   
Visit Vitals /78 (BP 1 Location: Left arm, BP Patient Position: Sitting) Pulse 80 Resp 16 Ht 5' 5\" (1.651 m) Wt 197 lb (89.4 kg) BMI 32.78 kg/m² Pleasant affect good spirits no distress Head is normocephalic no facial symmetry Neck no JVD Chest clear bilateral 
Cardiac regular Abdomen soft flat nontender Lower extremities have excellent pulses no signs of arterial insufficiency Scattered varicose veins bilateral lower extremity appearance of venous insufficiency with inner calf flare appearance I reviewed her Doppler she has dilated greater saphenous vein on the left with saphenous reflux at the saphenofemoral junction and throughout the saphenous vein on the left, varicosities and edema notable Impression and Plan: ICD-10-CM ICD-9-CM 1. Varicose veins of lower extremities with complications, bilateral I83.893 454.8 Recommend left greater saphenous vein closure procedure, considered glue but with her multiple allergies may be steer away from the glue procedure. I think she will do well with the thermal ablation for treatment of complex venous reflux with symptoms. She is had  progression of symptoms with stockings elevation and over-the-counter medications. No orders of the defined types were placed in this encounter. Follow-up Disposition: Not on File Heber Britton MD 
 
PLEASE NOTE: 
This document has been produced using voice recognition software. Unrecognized errors in transcription may be present.

## 2019-03-06 DIAGNOSIS — I48.91 ATRIAL FIBRILLATION, UNSPECIFIED TYPE (HCC): Primary | ICD-10-CM

## 2019-03-06 NOTE — TELEPHONE ENCOUNTER
Requested Prescriptions     Pending Prescriptions Disp Refills    apixaban (ELIQUIS) 5 mg tablet 60 Tab 3     Sig: Take 1 Tab by mouth two (2) times a day.

## 2019-03-06 NOTE — TELEPHONE ENCOUNTER
Spoke with patient regarding [employer] monitor per [facility] BHANU FLORES [facility] NP, Afib seen on monitor, C2 V5 needs AC- Please start Eliquis 5 mg BID, has f/u on Monday with Oziel. Patient was given instructions and She voices understanding and acceptance of this advice and will call back if any further questions or concerns.

## 2019-03-11 ENCOUNTER — OFFICE VISIT (OUTPATIENT)
Dept: CARDIOLOGY CLINIC | Age: 79
End: 2019-03-11

## 2019-03-11 VITALS
HEART RATE: 80 BPM | DIASTOLIC BLOOD PRESSURE: 57 MMHG | BODY MASS INDEX: 32.45 KG/M2 | WEIGHT: 195 LBS | SYSTOLIC BLOOD PRESSURE: 124 MMHG

## 2019-03-11 DIAGNOSIS — I48.19 PERSISTENT ATRIAL FIBRILLATION (HCC): Primary | ICD-10-CM

## 2019-03-11 DIAGNOSIS — I10 ESSENTIAL HYPERTENSION, BENIGN: ICD-10-CM

## 2019-03-11 DIAGNOSIS — E78.00 HYPERCHOLESTEROLEMIA: ICD-10-CM

## 2019-03-11 DIAGNOSIS — Z79.01 ANTICOAGULANT LONG-TERM USE: ICD-10-CM

## 2019-03-11 NOTE — LETTER
Lani Taylor 1940 
 
3/11/2019 Dear MD Kelvin Lozada MD Feliz Som, DPM 
 
I had the pleasure of evaluating  Ms. Mireya Andrews in office today. Below are the relevant portions of my assessment and plan of care. ICD-10-CM ICD-9-CM 1. Persistent atrial fibrillation (HCC) I48.1 427.31 ECHO ADULT COMPLETE Now on Eliquis. Rate is controlled with atenolol and Cardizem continue current therapy will plan to continue rate control strategy as patient has hyperthyroidi 2. Essential hypertension, benign I10 401.1 ECHO ADULT COMPLETE Blood pressure controlled 3. Hypercholesterolemia E78.00 272.0 Continue treatment 4. Anticoagulant long-term use Z79.01 V58.61 Started Eliquis for A. fib. Current Outpatient Medications Medication Sig Dispense Refill  apixaban (ELIQUIS) 5 mg tablet Take 1 Tab by mouth two (2) times a day. 60 Tab 3  
 OTHER     
 gabapentin (NEURONTIN) 600 mg tablet Take 1 Tab by mouth three (3) times daily (after meals). 270 Tab 0  
 atenolol (TENORMIN) 50 mg tablet TAKE ONE TABLET BY MOUTH TWICE DAILY 180 Tab 3  
 losartan (COZAAR) 50 mg tablet TAKE ONE TABLET BY MOUTH ONCE DAILY 90 Tab 3  
 indapamide (LOZOL) 2.5 mg tablet  metFORMIN (GLUCOPHAGE) 850 mg tablet  DILT- mg XR capsule TAKE 1 CAPSULE BY MOUTH ONCE DAILY 90 Cap 1  
 gluc-condr-om3-dha-epa-fish-st (GLUCOSAMINE CHONDROITIN PLUS) 617-528-46-54 mg cap Take 1 Tab by mouth daily. Indications: supplement  MELATONIN PO Take 5 mg by mouth as needed (sleep).  acetaminophen (TYLENOL) 500 mg tablet Take 500 mg by mouth every six (6) hours as needed for Pain.  calcium-cholecalciferol, d3, (CALCIUM 600 + D) 600-125 mg-unit tab Take 600 mg by mouth daily. Patient taking 4 times a week  Indications: Osteoporosis  Cholecalciferol, Vitamin D3, (VITAMIN D3) 1,000 unit cap Take 1,000 Units by mouth daily. Indications: Vitamin D Deficiency  pravastatin (PRAVACHOL) 40 mg tablet Take 40 mg by mouth daily. Indications: hypercholesterolemia  aspirin delayed-release 81 mg tablet Take  by mouth daily. No orders of the defined types were placed in this encounter. If you have questions, please do not hesitate to call me. I look forward to following Ms. Rivera Client along with you. Sincerely, Alon Coffey MD

## 2019-03-11 NOTE — PROGRESS NOTES
HISTORY OF PRESENT ILLNESS  Chasity Serrano is a [de-identified] y.o. female. Hypertension   The history is provided by the patient. This is a chronic problem. The problem occurs constantly. The problem has not changed since onset. Palpitations    The history is provided by the patient. This is a recurrent problem. The problem has been gradually worsening. The problem occurs daily. The problem is associated with nothing. Associated symptoms include irregular heartbeat. Pertinent negatives include no exertional chest pressure, no PND and no lower extremity edema. Her past medical history is significant for hypertension. Cholesterol Problem   The history is provided by the patient. This is a chronic problem. The problem occurs constantly. The problem has not changed since onset. Review of Systems   Constitutional: Negative for chills. HENT: Negative for nosebleeds. Eyes: Negative for blurred vision and double vision. Respiratory: Negative for wheezing. Cardiovascular: Positive for palpitations. Negative for leg swelling and PND. Gastrointestinal: Negative for heartburn. Musculoskeletal: Negative for myalgias. Skin: Negative for rash. Endo/Heme/Allergies: Does not bruise/bleed easily.      Family History   Problem Relation Age of Onset    Heart Disease Other         positive history of ischemic heart disease       Past Medical History:   Diagnosis Date    Cardiomegaly     Ch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 ([address]     Coarctation of aorta determined by echocardiography     Diabetes [facility]     Dysuria     Essential hypertension, benign     stable    High blood pressure     Hypercholesterolemia     Hyperthyroidism     no meds    Obesity, unspecified     Pt has weight loss    Other and unspecified hyperlipidemia     Chol 172, ldl 82, hdl 62, tg 141    Recurrent UTI     Shingles     Type II or unspecified type diabetes mellitus without mention of complication, not stated as uncontrolled        Past Surgical History:   Procedure Laterality Date    HX BACK SURGERY  08/01/2018    HX GYN      hysterectomy    HX HYSTERECTOMY      HX OTHER SURGICAL      HX OTHER SURGICAL  2007    ankle sure       Social History     Tobacco Use    Smoking status: Never Smoker    Smokeless tobacco: Never Used   Substance Use Topics    Alcohol use: No       Allergies   Allergen Reactions    Kenalog [Triamcinolone Acetonide] Anaphylaxis    Penicillin G Hives    Adhesive Tape-Silicones Swelling    Adhesive Other (comments)    Bacitracin Not Reported This Time    Bacitracin Rash    Betadine [Povidone-Iodine] Other (comments)     Caused uncomfortable rash on area where placed    Cephalexin Rash    Ciprofloxacin Other (comments)    Cortane Shortness of Breath    Cortisone Not Reported This Time    Cymbalta [Duloxetine] Rash    Erythromycin Not Reported This Time    Erythromycin Rash    Kenalog [Triamcinolone Acetonide] Shortness of Breath    Lisinopril Not Reported This Time    Lisinopril Cough    Lortab [Hydrocodone-Acetaminophen] Rash    Macrobid [Nitrofurantoin Monohyd/M-Cryst] Other (comments)     Flushed face,leg swellinig    Macrobid [Nitrofurantoin Monohyd/M-Cryst] Other (comments)    Medrol [Methylprednisolone] Swelling    Methimazole Other (comments)     Neck pain/cough    Methimazole Cough    Neosporin [Hydrocortisone] Hives    Penicillins Not Reported This Time    Penicillins Rash    Prednimustine Shortness of Breath    Prednisolone Other (comments)    Prednisone Not Reported This Time    Propylthiouracil Other (comments)     Dizziness,elevated blood pressure    Propylthiouracil Other (comments)    Sulfamethoxazole-Trimethoprim Other (comments)    Tetanus And Diphtheria Toxoids, Adsorbed, Adult Swelling    Tetanus Vaccines And Toxoid Not Reported This Time       Current Outpatient Medications   Medication Sig    apixaban (ELIQUIS) 5 mg tablet Take 1 Tab by mouth two (2) times a day.  OTHER     gabapentin (NEURONTIN) 600 mg tablet Take 1 Tab by mouth three (3) times daily (after meals).  atenolol (TENORMIN) 50 mg tablet TAKE ONE TABLET BY MOUTH TWICE DAILY    losartan (COZAAR) 50 mg tablet TAKE ONE TABLET BY MOUTH ONCE DAILY    indapamide (LOZOL) 2.5 mg tablet     metFORMIN (GLUCOPHAGE) 850 mg tablet     DILT- mg XR capsule TAKE 1 CAPSULE BY MOUTH ONCE DAILY    gluc-condr-om3-dha-epa-fish-st (GLUCOSAMINE CHONDROITIN PLUS) 256-800-52-54 mg cap Take 1 Tab by mouth daily. Indications: supplement    MELATONIN PO Take 5 mg by mouth as needed (sleep).  acetaminophen (TYLENOL) 500 mg tablet Take 500 mg by mouth every six (6) hours as needed for Pain.  calcium-cholecalciferol, d3, (CALCIUM 600 + D) 600-125 mg-unit tab Take 600 mg by mouth daily. Patient taking 4 times a week  Indications: Osteoporosis    Cholecalciferol, Vitamin D3, (VITAMIN D3) 1,000 unit cap Take 1,000 Units by mouth daily. Indications: Vitamin D Deficiency    pravastatin (PRAVACHOL) 40 mg tablet Take 40 mg by mouth daily. Indications: hypercholesterolemia    aspirin delayed-release 81 mg tablet Take  by mouth daily. No current facility-administered medications for this visit. Visit Vitals  /57   Pulse 80   Wt 88.5 kg (195 lb)   BMI 32.45 kg/m²         Physical Exam   Constitutional: She is oriented to person, place, and time. She appears well-developed and well-nourished. obese   HENT:   Head: Normocephalic and atraumatic. Eyes: Conjunctivae are normal.   Neck: Neck supple. No JVD present. No tracheal deviation present. No thyromegaly present. Cardiovascular: Normal rate. An irregular rhythm present. PMI is not displaced. Exam reveals no gallop and no decreased pulses. Murmur heard. Early systolic murmur is present with a grade of 2/6 at the upper right sternal border. Pulmonary/Chest: No respiratory distress. She has no wheezes. She has no rales.  She exhibits no tenderness. Abdominal: Soft. There is no tenderness. Musculoskeletal: She exhibits no edema. Neurological: She is alert and oriented to person, place, and time. Skin: Skin is warm. Psychiatric: She has a normal mood and affect. Ms. Rivera Client has a reminder for a \"due or due soon\" health maintenance. I have asked that she contact her primary care provider for follow-up on this health maintenance. No flowsheet data found. mri:2015  1. No acute hemorrhage or acute infarction. 2. White matter abnormality is nonspecific but likely ischemic. This does not suggest multiple sclerosis. 3. No other significant intracranial abnormality is appreciated. [id number]  Left ventricle: Systolic function was normal. Ejection fraction was  estimated in the range of 55 % to 60 %. There were no regional wall motion  abnormalities. Doppler parameters were consistent with abnormal left  ventricular relaxation (grade 1 diastolic dysfunction). Left atrium: The atrium was mildly dilated. Mitral valve: There was trivial regurgitation. Aortic valve: The valve was trileaflet. Leaflets exhibited mildly  increased thickness, normal cuspal separation, and sclerosis. Tricuspid valve: There was mild regurgitation. Tricuspid regurgitation  peak velocity: 2.4 m/sec. Pulmonary artery systolic pressure: 26 mmHg. Pulmonic valve: There was trivial regurgitation. 2015:holter  Sr,Frequent pac. No a fib    I have personally reviewed patients ekg done at other facility. 2017  Sr,pac    NUCLEAR IMAGIN2017     Findings:   1. Stress images reveal normal Myoview distrubution in all the LV segments in short axis, vertical and horizontal long axis views. 2. Resting images have a normal uptake. 3. Gated images reveal normal wall motion and the ejection fraction is calculated to be 90%. Conclusion:   1. Normal perfusion scan. 2. Normal wall motion and ejection fraction. 3.  Low risk scan.  Assessment         ICD-10-CM ICD-9-CM    1. Persistent atrial fibrillation (HCC) I48.1 427.31 ECHO ADULT COMPLETE    Now on Eliquis. Rate is controlled with atenolol and Cardizem continue current therapy will plan to continue rate control strategy as patient has hyperthyroidi   2. Essential hypertension, benign I10 401.1 ECHO ADULT COMPLETE    Blood pressure controlled   3. Hypercholesterolemia E78.00 272.0     Continue treatment   4. Anticoagulant long-term use Z79.01 V58.61     Started Eliquis for A. fib.      3/2019  Atrial fibrillation controlled ventricular rate. Anticoagulation started. Likely to undergo ablation for hyperthyroidism. Follow-up after that. Echo for LV function. Currently continue with rate control strategy  Medications Discontinued During This Encounter   Medication Reason    gabapentin (NEURONTIN) 016 mg tablet Duplicate Order       No orders of the defined types were placed in this encounter. Follow-up Disposition:  Return in about 3 months (around 6/11/2019).

## 2019-03-11 NOTE — PATIENT INSTRUCTIONS
[employer] Activation    Thank you for requesting access to [employer] [facility]. Please follow the instructions below to securely access and download your online medical record. [facility] allows you to send messages to your doctor, view your test results, renew your prescriptions, schedule appointments, and more. How Do I Sign Up? 1. In your internet browser, go to [email or web address]. 2. Click on the First Time User? Click Here link in the Sign In box. You will see the New Member Sign Up page. 3. Enter your [facility] Access Code exactly as it appears below. You will not need to use this code after youve completed the sign-up process. If you do not sign up before the expiration date, you must request a new code. [facility] Access Code: Activation code not generated  Current [facility] Status: Active (This is the date your [facility] access code will )    4. Enter the last four digits of your Social Security Number (xxxx) and Date of Birth (mm/dd/yyyy) as indicated and click Submit. You will be taken to the next sign-up page. 5. Create a [facility] ID. This will be your [facility] login ID and cannot be changed, so think of one that is secure and easy to remember. 6. Create a [facility] password. You can change your password at any time. 7. Enter your Password Reset Question and Answer. This can be used at a later time if you forget your password. 8. Enter your e-mail address. You will receive e-mail notification when new information is available in [address] 9. Click Sign Up. You can now view and download portions of your medical record. 10. Click the Download Summary menu link to download a portable copy of your medical information. Additional Information    If you have questions, please visit the Frequently Asked Questions section of the [employer] [facility] website at [email or web address] Remember, [facility] is NOT to be used for urgent needs. For medical emergencies, dial 911.

## 2019-03-12 DIAGNOSIS — I49.1 PAC (PREMATURE ATRIAL CONTRACTION): ICD-10-CM

## 2019-03-15 ENCOUNTER — TELEPHONE (OUTPATIENT)
Dept: CARDIOLOGY CLINIC | Age: 79
End: 2019-03-15

## 2019-03-15 DIAGNOSIS — I10 ESSENTIAL HYPERTENSION, BENIGN: Primary | ICD-10-CM

## 2019-03-15 RX ORDER — OLMESARTAN MEDOXOMIL 20 MG/1
20 TABLET ORAL DAILY
COMMUNITY
End: 2019-03-15 | Stop reason: CLARIF

## 2019-03-15 RX ORDER — LOSARTAN POTASSIUM 25 MG/1
25 TABLET ORAL 2 TIMES DAILY
Qty: 60 TAB | Refills: 6 | Status: CANCELLED | OUTPATIENT
Start: 2019-03-15 | End: 2019-04-23 | Stop reason: SDUPTHER

## 2019-03-15 RX ORDER — OLMESARTAN MEDOXOMIL 20 MG/1
20 TABLET ORAL DAILY
Qty: 30 TAB | Refills: 6 | OUTPATIENT
Start: 2019-03-15

## 2019-03-15 RX ORDER — OLMESARTAN MEDOXOMIL 20 MG/1
20 TABLET ORAL DAILY
Qty: 30 TAB | Refills: 2 | Status: SHIPPED | OUTPATIENT
Start: 2019-03-15 | End: 2019-03-15

## 2019-03-15 NOTE — TELEPHONE ENCOUNTER
Patient was notified and made aware to start benicar 20mg daily and she stated she understood instauration

## 2019-03-15 NOTE — TELEPHONE ENCOUNTER
Patient called and stated that the losartan 50mg is on recall and she would like to know what should she continue to take it. Please advise.

## 2019-03-15 NOTE — TELEPHONE ENCOUNTER
Per Dr. Cain Friends written order, Benicar 20 mg daily ordered. Patient called and made aware of medication change, verbalized understanding of all information. Patient reminded to call the office if any questions or concerns.

## 2019-03-15 NOTE — TELEPHONE ENCOUNTER
Patient called back stating that she is unable to to get the Benicar with her insurance it will cost her $97.47 a month for this medication and patient has just been started on Eliquis and can not afford all these high copays for mediations. Patient called [facility] and they have an old rx of the losartan 25 mg through a different manufacture an they said we could call in losartan 25 mg BID for her and she can get it cheaper. Please Advise.

## 2019-03-26 ENCOUNTER — OFFICE VISIT (OUTPATIENT)
Dept: ORTHOPEDIC SURGERY | Age: 79
End: 2019-03-26

## 2019-03-26 VITALS
RESPIRATION RATE: 17 BRPM | WEIGHT: 198 LBS | HEART RATE: 94 BPM | BODY MASS INDEX: 32.99 KG/M2 | TEMPERATURE: 97.4 F | OXYGEN SATURATION: 96 % | HEIGHT: 65 IN | DIASTOLIC BLOOD PRESSURE: 84 MMHG | SYSTOLIC BLOOD PRESSURE: 144 MMHG

## 2019-03-26 DIAGNOSIS — M54.16 LUMBAR RADICULOPATHY: Primary | ICD-10-CM

## 2019-03-26 DIAGNOSIS — Z98.1 S/P LUMBAR FUSION: ICD-10-CM

## 2019-03-26 RX ORDER — GABAPENTIN 300 MG/1
300 CAPSULE ORAL 3 TIMES DAILY
Qty: 270 CAP | Refills: 1 | Status: SHIPPED | OUTPATIENT
Start: 2019-03-26 | End: 2019-05-20 | Stop reason: DRUGHIGH

## 2019-03-26 NOTE — PROGRESS NOTES
[address]  [address], [address]  [place], [address] [place] [address] Street  Phone: [phone]  Fax: [phone]    Hemanth Hurst  : 1940  PCP: Carey Knox MD    PROGRESS NOTE    HISTORY OF PRESENT ILLNESS:  Chief Complaint   Patient presents with    Back Pain     Lumbar     Follow-up     3 mo f/u     Terryl Range is a [de-identified] y.o.  female with history of back greater than BLE R>L pain  who had L4-5 PLF surgery 8 months ago for stenosis and spondylolisthesis. After surgery she had an increase in her RLE radicular pain and some hip flexor weakness. We got a CT scan in the hospital, that showed good alignment. Pain prior to surgery was mostly in the back and some BLE leg pain in the L4-5 distribution from buttock to calfs and described as aching, burning, numbing and throbbing. Pain is worse with standing and walking and affects sleep and recreational activities. Pain is better with relaxation, pain medication and lying down. She was doing well at her last OV. She has been to PT with benefit. She was taking gabapentin 600 mg TID. NP Mariely recommended CTS exercising and a brace. Today, she  States she is doing so so. She is doing ok from her back surgery. She does have some residual chronic pain. She realizes she will likely always need a walker. She has been wearing a boot for her foot and this has caused some back pain. She is in need of a RTK in the future. She has A FIB and is now on eliquis. Denies bladder/bowel dysfunction, saddle paresthesia, weakness, gait disturbance, or other neurological deficit. Pt at this time desires to continue with current care/proceed with medication evaluation. ASSESSMENT  [de-identified] y.o. female with lumbar pain. Diagnoses and all orders for this visit:    1. Lumbar radiculopathy  -     gabapentin (NEURONTIN) 300 mg capsule; Take 1 Cap by mouth three (3) times daily.     2. S/P lumbar fusion  -     gabapentin (NEURONTIN) 300 mg capsule; Take 1 Cap by mouth three (3) times daily. IMPRESSION/PLAN    1) Pt was given information on lumbar exercises. 2) can call for Lumbar PT whenever she is cleared from her boot. 3) continue gabapentin. She will try tapering to gabapentin 300 MG TID. 4) Ms. Lakisha Downs has a reminder for a \"due or due soon\" health maintenance. I have asked that she contact her primary care provider, Yuliana Valle MD, for follow-up on this health maintenance. 5) We have informed patient to notify us for immediate appointment if he has any worsening neurogical symptoms or if an emergency situation presents, then call 911  6) Pt will follow-up in 4 months on the anniversary of her surgery. Risks and benefits of ongoing therapy have been reviewed with the patient.  is appropriate. 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 ([address]     Coarctation of aorta determined by echocardiography     Diabetes ([facility] [address] [facility]     Dysuria     Essential hypertension, benign     stable    High blood pressure     Hypercholesterolemia     Hyperthyroidism     no meds    Obesity, unspecified     Pt has weight loss    Other and unspecified hyperlipidemia     Chol 172, ldl 82, hdl 62, tg 141    Recurrent UTI     Shingles     Type II or unspecified type diabetes mellitus without mention of complication, not stated as uncontrolled         MEDICATIONS  Current Outpatient Medications   Medication Sig Dispense Refill    gabapentin (NEURONTIN) 300 mg capsule Take 1 Cap by mouth three (3) times daily. 270 Cap 1    apixaban (ELIQUIS) 5 mg tablet Take 1 Tab by mouth two (2) times a day. 60 Tab 3    atenolol (TENORMIN) 50 mg tablet TAKE ONE TABLET BY MOUTH TWICE DAILY 180 Tab 3    indapamide (LOZOL) 2.5 mg tablet       metFORMIN (GLUCOPHAGE) 850 mg tablet       aspirin delayed-release 81 mg tablet Take  by mouth daily.       DILT- mg XR capsule TAKE 1 CAPSULE BY MOUTH ONCE DAILY 90 Cap 1    gluc-condr-om3-dha-epa-fish-st (GLUCOSAMINE CHONDROITIN PLUS) [id number] mg cap Take 1 Tab by mouth daily. Indications: supplement      MELATONIN PO Take 5 mg by mouth as needed (sleep).  acetaminophen (TYLENOL) 500 mg tablet Take 500 mg by mouth every six (6) hours as needed for Pain.  calcium-cholecalciferol, d3, (CALCIUM 600 + D) 600-125 mg-unit tab Take 600 mg by mouth daily. Patient taking 4 times a week  Indications: Osteoporosis      Cholecalciferol, Vitamin D3, (VITAMIN D3) 1,000 unit cap Take 1,000 Units by mouth daily. Indications: Vitamin D Deficiency      pravastatin (PRAVACHOL) 40 mg tablet Take 40 mg by mouth daily.  Indications: hypercholesterolemia      OTHER          ALLERGIES  Allergies   Allergen Reactions    Kenalog [Triamcinolone Acetonide] Anaphylaxis    Penicillin G Hives    Adhesive Tape-Silicones Swelling    Adhesive Other (comments)    Bacitracin Not Reported This Time    Bacitracin Rash    Betadine [Povidone-Iodine] Other (comments)     Caused uncomfortable rash on area where placed    Cephalexin Rash    Ciprofloxacin Other (comments)    Cortane Shortness of Breath    Cortisone Not Reported This Time    Cymbalta [Duloxetine] Rash    Erythromycin Not Reported This Time    Erythromycin Rash    Kenalog [Triamcinolone Acetonide] Shortness of Breath    Lisinopril Not Reported This Time    Lisinopril Cough    Lortab [Hydrocodone-Acetaminophen] Rash    Macrobid [Nitrofurantoin Monohyd/M-Cryst] Other (comments)     Flushed face,leg swellinig    Macrobid [Nitrofurantoin Monohyd/M-Cryst] Other (comments)    Medrol [Methylprednisolone] Swelling    Methimazole Other (comments)     Neck pain/cough    Methimazole Cough    Neosporin [Hydrocortisone] Hives    Penicillins Not Reported This Time    Penicillins Rash    Prednimustine Shortness of Breath    Prednisolone Other (comments)    Prednisone Not Reported This Time    Propylthiouracil Other (comments)     Dizziness,elevated blood pressure    Propylthiouracil Other (comments)    Sulfamethoxazole-Trimethoprim Other (comments)    Tetanus And Diphtheria Toxoids, Adsorbed, Adult Swelling    Tetanus Vaccines And Toxoid Not Reported This Time       SOCIAL HISTORY    Social History     Socioeconomic History    Marital status:      Spouse name: Not on file    Number of children: Not on file    Years of education: Not on file    Highest education level: Not on file   Occupational History    Not on file   Social Needs    Financial resource strain: Not on file    Food insecurity:     Worry: Not on file     Inability: Not on file    Transportation needs:     Medical: Not on file     Non-medical: Not on file   Tobacco Use    Smoking status: Never Smoker    Smokeless tobacco: Never Used   Substance and Sexual Activity    Alcohol use: No    Drug use: No    Sexual activity: Not on file   Lifestyle    Physical activity:     Days per week: Not on file     Minutes per session: Not on file    Stress: Not on file   Relationships    Social connections:     Talks on phone: Not on file     Gets together: Not on file     Attends Spiritism service: Not on file     Active member of club or organization: Not on file     Attends meetings of clubs or organizations: Not on file     Relationship status: Not on file    Intimate partner violence:     Fear of current or ex partner: Not on file     Emotionally abused: Not on file     Physically abused: Not on file     Forced sexual activity: Not on file   Other Topics Concern     Service Not Asked    Blood Transfusions Not Asked    Caffeine Concern Not Asked    Occupational Exposure Not Asked   Dustin Olguin Not Asked    Sleep Concern Not Asked    Stress Concern Not Asked    Weight Concern Not Asked    Special Diet Not Asked    Back Care Not Asked    Exercise Not Asked    Bike Helmet Not Asked   Tracy Not Asked    Self-Exams Not Asked   Social History Narrative    ** Merged History Encounter **            SUBJECTIVE    Work retired     Pain Scale: 4/10    Pain Assessment  3/26/2019   Location of Pain Back   Pain Location Comment -   Location Modifiers -   Severity of Pain 4   Quality of Pain Aching   Quality of Pain Comment -   Duration of Pain -   Frequency of Pain Constant   Aggravating Factors Bending;Standing;Walking   Aggravating Factors Comment -   Limiting Behavior -   Relieving Factors Rest   Relieving Factors Comment -   Result of Injury -       Accompanied by family member. REVIEW OF SYSTEMS  ROS    Constitutional: Negative for fever, chills, or weight change. Respiratory: Negative for cough or shortness of breath. Cardiovascular: Negative for chest pain or palpitations. Gastrointestinal: Negative for acid reflux, change in bowel habits, or constipation. Genitourinary: Negative for incontinence, dysuria and flank pain. Musculoskeletal: Positive for lumbar pain. Skin: Negative for rash. Neurological: Negative for headaches, dizziness, or numbness. Endo/Heme/Allergies: Negative . Psychiatric/Behavioral: Negative. PHYSICAL EXAMINATION  Visit Vitals  /84   Pulse 94   Temp 97.4 °F (36.3 °C)   Resp 17   Ht 5' 5\" (1.651 m)   Wt 198 lb (89.8 kg)   SpO2 96%   BMI 32.95 kg/m²       Constitutional: Well developed,  well nourished,  awake, alert, and in no acute distress. Neurological:  Sensation to light touch is intact. Psychiatric: Affect and mood are appropriate. Integumentary: No rashes or abrasions noted on exposed areas,  warm, dry and intact. Cardiovascular/Peripheral Vascular:  No peripheral edema is noted. Lymphatic:  No evidence of lymphedema. No cervical lymphadenopathy. SPINE/MUSCULOSKELETAL EXAM    Lumbar spine:  No rash, ecchymosis, or gross obliquity. No fasciculations. No focal atrophy is noted. Range of motion is intact.  Mild Tenderness to palpation to lumbar spine. SI joints non-tender. Trochanters non tender. Musculoskeletal:  No pain with extension, axial loading, or forward flexion. No pain with internal or external rotation of her hips. MOTOR     Hip Flex  Quads Hamstrings Ankle DF EHL Ankle PF   Right +4/5 +4/5 +4/5 +4/5 +4/5 +4/5   Left +4/5 +4/5 +4/5 +4/5 +4/5 +4/5       Straight Leg raise - bilaterally. normal gait and station    Ambulation with walker. full weight bearing, non-antalgic gait.     Orville Salinas, CORINE

## 2019-04-08 RX ORDER — DILTIAZEM HYDROCHLORIDE 180 MG/1
CAPSULE, EXTENDED RELEASE ORAL
Qty: 90 CAP | Refills: 1 | Status: SHIPPED | OUTPATIENT
Start: 2019-04-08 | End: 2019-09-27 | Stop reason: SDUPTHER

## 2019-04-23 DIAGNOSIS — I10 ESSENTIAL HYPERTENSION, BENIGN: Primary | ICD-10-CM

## 2019-04-23 RX ORDER — LOSARTAN POTASSIUM 25 MG/1
25 TABLET ORAL 2 TIMES DAILY
Qty: 60 TAB | Refills: 6 | Status: SHIPPED | OUTPATIENT
Start: 2019-04-23 | End: 2020-01-24

## 2019-04-23 NOTE — TELEPHONE ENCOUNTER
Patient called requesting refill of losartan 25 mg tablets to be sent ot the [address], in [address]

## 2019-05-19 ENCOUNTER — PATIENT MESSAGE (OUTPATIENT)
Dept: ORTHOPEDIC SURGERY | Age: 79
End: 2019-05-19

## 2019-05-19 DIAGNOSIS — M54.16 LUMBAR RADICULOPATHY: Primary | ICD-10-CM

## 2019-05-20 RX ORDER — GABAPENTIN 600 MG/1
600 TABLET ORAL 3 TIMES DAILY
Qty: 270 TAB | Refills: 1 | Status: SHIPPED | OUTPATIENT
Start: 2019-05-20 | End: 2019-07-23 | Stop reason: DRUGHIGH

## 2019-05-20 NOTE — TELEPHONE ENCOUNTER
From: Miranda New  Sent: 5/19/2019 10:15 PM EDT  Subject: Prescription Question    I last saw CORINE Robertson in March. My Gabapentin was reduced to 300 mg three times daily from 600 mg three times daily. Since the reduction in my dose, I have noticed increasing symptoms such as nerve pain in my right leg as well as my hands being numb and increasing pain in my right foot. She told me to let her know if I needed to be put back on the 600 mg three times daily dose. I would like for her to consider reinstating the 600 mg three times daily dose. If she agrees, I need a new prescription sent to the [facility] [address] [employer] in [facility].

## 2019-06-21 ENCOUNTER — OFFICE VISIT (OUTPATIENT)
Dept: CARDIOLOGY CLINIC | Age: 79
End: 2019-06-21

## 2019-06-21 VITALS
BODY MASS INDEX: 32.99 KG/M2 | HEART RATE: 71 BPM | HEIGHT: 65 IN | WEIGHT: 198 LBS | SYSTOLIC BLOOD PRESSURE: 126 MMHG | DIASTOLIC BLOOD PRESSURE: 50 MMHG

## 2019-06-21 DIAGNOSIS — E78.00 HYPERCHOLESTEROLEMIA: ICD-10-CM

## 2019-06-21 DIAGNOSIS — Z79.01 ANTICOAGULANT LONG-TERM USE: ICD-10-CM

## 2019-06-21 DIAGNOSIS — E05.90 HYPERTHYROIDISM: ICD-10-CM

## 2019-06-21 DIAGNOSIS — I48.19 PERSISTENT ATRIAL FIBRILLATION (HCC): Primary | ICD-10-CM

## 2019-06-21 DIAGNOSIS — I10 ESSENTIAL HYPERTENSION, BENIGN: ICD-10-CM

## 2019-06-21 NOTE — PROGRESS NOTES
1. Have you been to the ER, urgent care clinic since your last visit? Hospitalized since your last visit? No    2. Have you seen or consulted any other health care providers outside of the [address] [place] [address] since your last visit? Include any pap smears or colon screening.  Yes Where: Urologist/Physical Therapy/Endocrinologist

## 2019-06-21 NOTE — PROGRESS NOTES
HISTORY OF PRESENT ILLNESS  Tammy Gleason is a [de-identified] y.o. female. Hypertension   The history is provided by the patient. This is a chronic problem. The problem occurs constantly. The problem has not changed since onset. Palpitations    The history is provided by the patient. This is a recurrent problem. The problem has been gradually worsening. The problem occurs daily. The problem is associated with nothing. Associated symptoms include irregular heartbeat. Pertinent negatives include no exertional chest pressure, no PND and no lower extremity edema. Her past medical history is significant for hypertension. Cholesterol Problem   The history is provided by the patient. This is a chronic problem. The problem occurs constantly. The problem has not changed since onset. Review of Systems   Constitutional: Negative for chills. HENT: Negative for nosebleeds. Eyes: Negative for blurred vision and double vision. Respiratory: Negative for wheezing. Cardiovascular: Positive for palpitations. Negative for leg swelling and PND. Gastrointestinal: Negative for heartburn. Musculoskeletal: Negative for myalgias. Skin: Negative for rash. Endo/Heme/Allergies: Does not bruise/bleed easily.      Family History   Problem Relation Age of Onset    Heart Disease Other         positive history of ischemic heart disease       Past Medical History:   Diagnosis Date    Cardiomegaly     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     Coarctation of aorta determined by echocardiography     Diabetes ([facility] [facility]     Dysuria     Essential hypertension, benign     stable    High blood pressure     Hypercholesterolemia     Hyperthyroidism     no meds    Obesity, unspecified     Pt has weight loss    Other and unspecified hyperlipidemia     Chol 172, ldl 82, hdl 62, tg 141    Recurrent UTI     Shingles     Type II or unspecified type diabetes mellitus without mention of complication, not stated as uncontrolled        Past Surgical History:   Procedure Laterality Date    HX BACK SURGERY  08/01/2018    HX GYN      hysterectomy    HX HYSTERECTOMY      HX OTHER SURGICAL      HX OTHER SURGICAL  2007    ankle sure       Social History     Tobacco Use    Smoking status: Never Smoker    Smokeless tobacco: Never Used   Substance Use Topics    Alcohol use: No       Allergies   Allergen Reactions    Kenalog [Triamcinolone Acetonide] Anaphylaxis    Penicillin G Hives    Adhesive Tape-Silicones Swelling    Adhesive Other (comments)    Bacitracin Not Reported This Time and Hives    Bacitracin Rash    Betadine [Povidone-Iodine] Other (comments)     Caused uncomfortable rash on area where placed    Cephalexin Rash and Itching    Ciprofloxacin Other (comments)    Cortane Shortness of Breath    Cortisone Not Reported This Time and Hives    Duloxetine Rash and Itching    Erythromycin Not Reported This Time and Hives    Erythromycin Rash    Kenalog [Triamcinolone Acetonide] Shortness of Breath    Lisinopril Not Reported This Time    Lisinopril Cough    Lortab [Hydrocodone-Acetaminophen] Rash    Macrobid [Nitrofurantoin Monohyd/M-Cryst] Other (comments)     Flushed face,leg swellinig    Macrobid [Nitrofurantoin Monohyd/M-Cryst] Other (comments)    Medrol [Methylprednisolone] Swelling    Methimazole Other (comments)     Neck pain/cough    Methimazole Cough    Neosporin [Hydrocortisone] Hives    Penicillins Not Reported This Time    Penicillins Rash    Prednimustine Shortness of Breath    Prednisolone Other (comments)    Prednisone Not Reported This Time    Propylthiouracil Other (comments)     Dizziness,elevated blood pressure    Propylthiouracil Other (comments)    Sulfamethoxazole-Trimethoprim Other (comments)    Tetanus And Diphtheria Toxoids, Adsorbed, Adult Swelling    Tetanus Vaccines And Toxoid Not Reported This Time       Current Outpatient Medications   Medication Sig    gabapentin (NEURONTIN) 600 mg tablet Take 1 Tab by mouth three (3) times daily.  losartan (COZAAR) 25 mg tablet Take 1 Tab by mouth two (2) times a day.  estradiol (ESTRACE) 0.01 % (0.1 mg/gram) vaginal cream     DILT- mg XR capsule TAKE 1 CAPSULE BY MOUTH ONCE DAILY    apixaban (ELIQUIS) 5 mg tablet Take 1 Tab by mouth two (2) times a day.  OTHER     atenolol (TENORMIN) 50 mg tablet TAKE ONE TABLET BY MOUTH TWICE DAILY    indapamide (LOZOL) 2.5 mg tablet     metFORMIN (GLUCOPHAGE) 850 mg tablet     gluc-condr-om3-dha-epa-fish-st (GLUCOSAMINE CHONDROITIN PLUS) 392-068-33-54 mg cap Take 1 Tab by mouth daily. Indications: supplement    MELATONIN PO Take 5 mg by mouth as needed (sleep).  acetaminophen (TYLENOL) 500 mg tablet Take 500 mg by mouth every six (6) hours as needed for Pain.  calcium-cholecalciferol, d3, (CALCIUM 600 + D) 600-125 mg-unit tab Take 600 mg by mouth daily. Patient taking 4 times a week  Indications: Osteoporosis    Cholecalciferol, Vitamin D3, (VITAMIN D3) 1,000 unit cap Take 1,000 Units by mouth daily. Indications: Vitamin D Deficiency    pravastatin (PRAVACHOL) 40 mg tablet Take 40 mg by mouth daily. Indications: hypercholesterolemia     No current facility-administered medications for this visit. Visit Vitals  /50   Pulse 71   Ht 5' 5\" (1.651 m)   Wt 89.8 kg (198 lb)   BMI 32.95 kg/m²         Physical Exam   Constitutional: She is oriented to person, place, and time. She appears well-developed and well-nourished. obese   HENT:   Head: Normocephalic and atraumatic. Eyes: Conjunctivae are normal.   Neck: Neck supple. No JVD present. No tracheal deviation present. No thyromegaly present. Cardiovascular: Normal rate. An irregular rhythm present. PMI is not displaced. Exam reveals no gallop and no decreased pulses. Murmur heard. Early systolic murmur is present with a grade of 2/6 at the upper right sternal border.   Pulmonary/Chest: No respiratory distress. She has no wheezes. She has no rales. She exhibits no tenderness. Abdominal: Soft. There is no tenderness. Musculoskeletal: She exhibits no edema. Neurological: She is alert and oriented to person, place, and time. Skin: Skin is warm. Psychiatric: She has a normal mood and affect. Ms. Elizabeth Russo has a reminder for a \"due or due soon\" health maintenance. I have asked that she contact her primary care provider for follow-up on this health maintenance. No flowsheet data found. mri:2015  1. No acute hemorrhage or acute infarction. 2. White matter abnormality is nonspecific but likely ischemic. This does not suggest multiple sclerosis. 3. No other significant intracranial abnormality is appreciated. EOTYNHJ:710:ZVPA  Left ventricle: Systolic function was normal. Ejection fraction was  estimated in the range of 55 % to 60 %. There were no regional wall motion  abnormalities. Doppler parameters were consistent with abnormal left  ventricular relaxation (grade 1 diastolic dysfunction). Left atrium: The atrium was mildly dilated. Mitral valve: There was trivial regurgitation. Aortic valve: The valve was trileaflet. Leaflets exhibited mildly  increased thickness, normal cuspal separation, and sclerosis. Tricuspid valve: There was mild regurgitation. Tricuspid regurgitation  peak velocity: 2.4 m/sec. Pulmonary artery systolic pressure: 26 mmHg. Pulmonic valve: There was trivial regurgitation. 2015:holter  Sr,Frequent pac. No a fib    I have personally reviewed patients ekg done at other facility. 2017  Sr,pac    NUCLEAR IMAGIN2017     Findings:   1. Stress images reveal normal Myoview distrubution in all the LV segments in short axis, vertical and horizontal long axis views. 2. Resting images have a normal uptake. 3. Gated images reveal normal wall motion and the ejection fraction is calculated to be 90%. Conclusion:   1. Normal perfusion scan. 2. Normal wall motion and ejection fraction. 3. Low risk scan. Interpretation Summary 3/2019       · Estimated left ventricular ejection fraction is 56 - 60%. Left ventricular mild concentric hypertrophy. No regional wall motion abnormality noted. Mild (grade 1) left ventricular diastolic dysfunction. · Left atrial cavity size is moderately dilated. · Normal right ventricular size and function. · Mild aortic valve sclerosis. · Mild mitral valve regurgitation. · Mild tricuspid valve regurgitation. Pulmonary arterial systolic pressure is 02.8 mmHg. Mild pulmonary hypertension. · Mild pulmonic valve regurgitation is present. · Moderately elevated central venous pressure (10-15 mmHg); IVC diameter is larger than 21 mm and collapses more than 50% with respiration. Assessment         ICD-10-CM ICD-9-CM    1. Persistent atrial fibrillation (HCC) I48.1 427.31     Significant improvement in symptom after radioablation of hypothyroidism. Will continue rate control and anticoagulation. Remains in A. fib today   2. Essential hypertension, benign I10 401.1     Stable continue treatment   3. Hypercholesterolemia E78.00 272.0     Continue treatment lab with PCP   4. Anticoagulant long-term use Z79.01 V58.61     For A. fib continue treatment   5. Hyperthyroidism E05.90 242.90     Status post radioablation. Being monitored by endocrine     3/2019  Atrial fibrillation controlled ventricular rate. Anticoagulation started. Likely to undergo ablation for hyperthyroidism. Follow-up after that. Echo for LV function. Currently continue with rate control strategy    6/2019  Status post radioablation of hyperthyroidism. Clinically feeling significantly better. We will continue rate control strategy. Remains in A. fib today.   On anticoagulation      Medications Discontinued During This Encounter   Medication Reason    aspirin delayed-release 81 mg tablet Not A Current Medication       No orders of the defined types were placed in this encounter. Follow-up and Dispositions    · Return in about 6 months (around 12/21/2019).

## 2019-06-28 DIAGNOSIS — I48.91 ATRIAL FIBRILLATION, UNSPECIFIED TYPE (HCC): ICD-10-CM

## 2019-06-28 RX ORDER — APIXABAN 5 MG/1
TABLET, FILM COATED ORAL
Qty: 60 TAB | Refills: 2 | Status: SHIPPED | OUTPATIENT
Start: 2019-06-28 | End: 2019-09-21 | Stop reason: SDUPTHER

## 2019-07-23 ENCOUNTER — OFFICE VISIT (OUTPATIENT)
Dept: ORTHOPEDIC SURGERY | Age: 79
End: 2019-07-23

## 2019-07-23 VITALS
BODY MASS INDEX: 33.62 KG/M2 | DIASTOLIC BLOOD PRESSURE: 69 MMHG | OXYGEN SATURATION: 99 % | HEART RATE: 89 BPM | TEMPERATURE: 98 F | RESPIRATION RATE: 16 BRPM | WEIGHT: 201.8 LBS | HEIGHT: 65 IN | SYSTOLIC BLOOD PRESSURE: 103 MMHG

## 2019-07-23 DIAGNOSIS — G89.29 CHRONIC RIGHT-SIDED LOW BACK PAIN WITH RIGHT-SIDED SCIATICA: ICD-10-CM

## 2019-07-23 DIAGNOSIS — Z98.1 S/P LUMBAR FUSION: Primary | ICD-10-CM

## 2019-07-23 DIAGNOSIS — M54.41 CHRONIC RIGHT-SIDED LOW BACK PAIN WITH RIGHT-SIDED SCIATICA: ICD-10-CM

## 2019-07-23 RX ORDER — GABAPENTIN 300 MG/1
300 CAPSULE ORAL 3 TIMES DAILY
Qty: 270 CAP | Refills: 1 | Status: SHIPPED | OUTPATIENT
Start: 2019-07-23

## 2019-07-23 NOTE — PATIENT INSTRUCTIONS
Back Pain: Care Instructions  Your Care Instructions    Back pain has many possible causes. It is often related to problems with muscles and ligaments of the back. It may also be related to problems with the nerves, discs, or bones of the back. Moving, lifting, standing, sitting, or sleeping in an awkward way can strain the back. Sometimes you don't notice the injury until later. Arthritis is another common cause of back pain. Although it may hurt a lot, back pain usually improves on its own within several weeks. Most people recover in 12 weeks or less. Using good home treatment and being careful not to stress your back can help you feel better sooner. Follow-up care is a key part of your treatment and safety. Be sure to make and go to all appointments, and call your doctor if you are having problems. It's also a good idea to know your test results and keep a list of the medicines you take. How can you care for yourself at home? · Sit or lie in positions that are most comfortable and reduce your pain. Try one of these positions when you lie down:  ? Lie on your back with your knees bent and supported by large pillows. ? Lie on the floor with your legs on the seat of a sofa or chair. ? Lie on your side with your knees and hips bent and a pillow between your legs. ? Lie on your stomach if it does not make pain worse. · Do not sit up in bed, and avoid soft couches and twisted positions. Bed rest can help relieve pain at first, but it delays healing. Avoid bed rest after the first day of back pain. · Change positions every 30 minutes. If you must sit for long periods of time, take breaks from sitting. Get up and walk around, or lie in a comfortable position. · Try using a heating pad on a low or medium setting for 15 to 20 minutes every 2 or 3 hours. Try a warm shower in place of one session with the heating pad. · You can also try an ice pack for 10 to 15 minutes every 2 to 3 hours.  Put a thin cloth between the ice pack and your skin. · Take pain medicines exactly as directed. ? If the doctor gave you a prescription medicine for pain, take it as prescribed. ? If you are not taking a prescription pain medicine, ask your doctor if you can take an over-the-counter medicine. · Take short walks several times a day. You can start with 5 to 10 minutes, 3 or 4 times a day, and work up to longer walks. Walk on level surfaces and avoid hills and stairs until your back is better. · Return to work and other activities as soon as you can. Continued rest without activity is usually not good for your back. · To prevent future back pain, do exercises to stretch and strengthen your back and stomach. Learn how to use good posture, safe lifting techniques, and proper body mechanics. When should you call for help? Call your doctor now or seek immediate medical care if:    · You have new or worsening numbness in your legs.     · You have new or worsening weakness in your legs. (This could make it hard to stand up.)     · You lose control of your bladder or bowels.    Watch closely for changes in your health, and be sure to contact your doctor if:    · You have a fever, lose weight, or don't feel well.     · You do not get better as expected. Where can you learn more? Go to http://sherly-mary.info/. Enter E674 in the search box to learn more about \"Back Pain: Care Instructions. \"  Current as of: September 20, 2018  Content Version: 12.1  © 4115-7914 [employer]. Care instructions adapted under license by [employer] (which disclaims liability or warranty for this information). If you have questions about a medical condition or this instruction, always ask your healthcare professional. [place] [address] any warranty or liability for your use of this information.

## 2019-07-23 NOTE — PROGRESS NOTES
[address]  [address], [address]  [place], [address] [place] [address] Street  Phone: [phone]  Fax: [phone]    Liliya Cuevas  : 1940  PCP: Danis Martinez MD    PROGRESS NOTE    HISTORY OF PRESENT ILLNESS:  Chief Complaint   Patient presents with    Back Pain     low back pain     Elva jJ is a [de-identified] y.o.  female with history of back greater than BLE R>L pain  who had L4-5 PLF surgery 12 months ago for stenosis and spondylolisthesis. After surgery she had an increase in her RLE radicular pain and some hip flexor weakness. We got a CT scan in the hospital, that showed good alignment. Pain prior to surgery was mostly in the back and some BLE leg pain in the L4-5 distribution from buttock to calfs and described as aching, burning, numbing and throbbing. Pain is worse with standing and walking and affects sleep and recreational activities. Pain is better with relaxation, pain medication and lying down. She was doing well at her last OV. She has been to PT with benefit. She was taking gabapentin 600 mg TID. she is in need of a RTK in the future. She has A FIB and is now on eliquis. NP Mariely recommended CTS exercising and a brace. At her last visit she tried to treduce her gabapentin to 300 mg but was not able. Today, she states her back is doing pretty good. She states her right leg pain/ sciatica is acting up. She is still on gabapentin 300 mg TID and can not increase this due to her kidneys. Denies bladder/bowel dysfunction, saddle paresthesia, weakness, gait disturbance, or other neurological deficit. Pt at this time desires to continue with current care/proceed with medication evaluation. ASSESSMENT  [de-identified] y.o. female with back pain. Diagnoses and all orders for this visit:    1. S/P lumbar fusion  -     gabapentin (NEURONTIN) 300 mg capsule; Take 1 Cap by mouth three (3) times daily.  Max Daily Amount: 900 mg.    2. Chronic right-sided low back pain with right-sided sciatica  -     gabapentin (NEURONTIN) 300 mg capsule; Take 1 Cap by mouth three (3) times daily. Max Daily Amount: 900 mg. IMPRESSION/PLAN    1) Pt was given information on back care. 2) cont gabapentin. Cont tylenol PRN. 3) can call for PT for right sciatic pain. Would recommend aqua pt. 4) Ms. Omar Severino has a reminder for a \"due or due soon\" health maintenance. I have asked that she contact her primary care provider, Ami Garcias MD, for follow-up on this health maintenance. 5) We have informed patient to notify us for immediate appointment if he has any worsening neurogical symptoms or if an emergency situation presents, then call 911  6) Pt will follow-up in 6 months for med fu unless her PCP will prescribe. Risks and benefits of ongoing therapy have been reviewed with the patient.  is appropriate. 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 ([facility] [address]     Coarctation of aorta determined by echocardiography     Diabetes ([facility]     Dysuria     Essential hypertension, benign     stable    High blood pressure     Hypercholesterolemia     Hyperthyroidism     no meds    Obesity, unspecified     Pt has weight loss    Other and unspecified hyperlipidemia     Chol 172, ldl 82, hdl 62, tg 141    Recurrent UTI     Shingles     Type II or unspecified type diabetes mellitus without mention of complication, not stated as uncontrolled         MEDICATIONS  Current Outpatient Medications   Medication Sig Dispense Refill    gabapentin (NEURONTIN) 300 mg capsule Take 1 Cap by mouth three (3) times daily. Max Daily Amount: 900 mg. 270 Cap 1    ELIQUIS 5 mg tablet TAKE ONE TABLET BY MOUTH TWICE A DAY 60 Tab 2    losartan (COZAAR) 25 mg tablet Take 1 Tab by mouth two (2) times a day.  60 Tab 6    estradiol (ESTRACE) 0.01 % (0.1 mg/gram) vaginal cream       DILT- mg XR capsule TAKE 1 CAPSULE BY MOUTH ONCE DAILY 90 Cap 1    OTHER       atenolol (TENORMIN) 50 mg tablet TAKE ONE TABLET BY MOUTH TWICE DAILY 180 Tab 3    indapamide (LOZOL) 2.5 mg tablet       metFORMIN (GLUCOPHAGE) 850 mg tablet       gluc-condr-om3-dha-epa-fish-st (GLUCOSAMINE CHONDROITIN PLUS) [id number] mg cap Take 1 Tab by mouth daily. Indications: supplement      MELATONIN PO Take 5 mg by mouth as needed (sleep).  acetaminophen (TYLENOL) 500 mg tablet Take 500 mg by mouth every six (6) hours as needed for Pain.  calcium-cholecalciferol, d3, (CALCIUM 600 + D) 600-125 mg-unit tab Take 600 mg by mouth daily. Patient taking 4 times a week  Indications: Osteoporosis      Cholecalciferol, Vitamin D3, (VITAMIN D3) 1,000 unit cap Take 1,000 Units by mouth daily. Indications: Vitamin D Deficiency      pravastatin (PRAVACHOL) 40 mg tablet Take 40 mg by mouth daily.  Indications: hypercholesterolemia         ALLERGIES  Allergies   Allergen Reactions    Kenalog [Triamcinolone Acetonide] Anaphylaxis    Penicillin G Hives    Adhesive Tape-Silicones Swelling    Adhesive Other (comments)    Bacitracin Not Reported This Time and Hives    Bacitracin Rash    Betadine [Povidone-Iodine] Other (comments)     Caused uncomfortable rash on area where placed    Cephalexin Rash and Itching    Ciprofloxacin Other (comments)    Cortane Shortness of Breath    Cortisone Not Reported This Time and Hives    Duloxetine Rash and Itching    Erythromycin Not Reported This Time and Hives    Erythromycin Rash    Kenalog [Triamcinolone Acetonide] Shortness of Breath    Lisinopril Not Reported This Time    Lisinopril Cough    Lortab [Hydrocodone-Acetaminophen] Rash    Macrobid [Nitrofurantoin Monohyd/M-Cryst] Other (comments)     Flushed face,leg swellinig    Macrobid [Nitrofurantoin Monohyd/M-Cryst] Other (comments)    Medrol [Methylprednisolone] Swelling    Methimazole Other (comments)     Neck pain/cough    Methimazole Cough    Neosporin [Hydrocortisone] Hives    Penicillins Not Reported This Time    Penicillins Rash    Prednimustine Shortness of Breath    Prednisolone Other (comments)    Prednisone Not Reported This Time    Propylthiouracil Other (comments)     Dizziness,elevated blood pressure    Propylthiouracil Other (comments)    Sulfamethoxazole-Trimethoprim Other (comments)    Tetanus And Diphtheria Toxoids, Adsorbed, Adult Swelling    Tetanus Vaccines And Toxoid Not Reported This Time       SOCIAL HISTORY    Social History     Socioeconomic History    Marital status:      Spouse name: Not on file    Number of children: Not on file    Years of education: Not on file    Highest education level: Not on file   Occupational History    Not on file   Social Needs    Financial resource strain: Not on file    Food insecurity:     Worry: Not on file     Inability: Not on file    Transportation needs:     Medical: Not on file     Non-medical: Not on file   Tobacco Use    Smoking status: Never Smoker    Smokeless tobacco: Never Used   Substance and Sexual Activity    Alcohol use: No    Drug use: No    Sexual activity: Not on file   Lifestyle    Physical activity:     Days per week: Not on file     Minutes per session: Not on file    Stress: Not on file   Relationships    Social connections:     Talks on phone: Not on file     Gets together: Not on file     Attends Taoism service: Not on file     Active member of club or organization: Not on file     Attends meetings of clubs or organizations: Not on file     Relationship status: Not on file    Intimate partner violence:     Fear of current or ex partner: Not on file     Emotionally abused: Not on file     Physically abused: Not on file     Forced sexual activity: Not on file   Other Topics Concern     Service Not Asked    Blood Transfusions Not Asked    Caffeine Concern Not Asked    Occupational Exposure Not Asked   Maura Dolores Hazards Not Asked    Sleep Concern Not Asked    Stress Concern Not Asked    Weight Concern Not Asked    Special Diet Not Asked    Back Care Not Asked    Exercise Not Asked    Bike Helmet Not Asked    Seat Belt Not Asked    Self-Exams Not Asked   Social History Narrative    ** Merged History Encounter **            SUBJECTIVE      Pain Scale: 3/10    Pain Assessment  7/23/2019   Location of Pain Back   Pain Location Comment -   Location Modifiers -   Severity of Pain 3   Quality of Pain Throbbing; Tori Saran; Aching   Quality of Pain Comment -   Duration of Pain Persistent   Frequency of Pain Constant   Aggravating Factors Bending;Walking;Stretching;Standing   Aggravating Factors Comment -   Limiting Behavior Yes   Relieving Factors NSAID   Relieving Factors Comment -   Result of Injury No       Accompanied by family member. REVIEW OF SYSTEMS  ROS    Constitutional: Negative for fever, chills, or weight change. Respiratory: Negative for cough or shortness of breath. Cardiovascular: Negative for chest pain or palpitations. Gastrointestinal: Negative for acid reflux, change in bowel habits, or constipation. Genitourinary: Negative for incontinence, dysuria and flank pain. Musculoskeletal: Positive for lumbar pain. Skin: Negative for rash. Neurological: Negative for headaches, dizziness, or numbness. Endo/Heme/Allergies: Negative . Psychiatric/Behavioral: Negative. PHYSICAL EXAMINATION  Visit Vitals  /69   Pulse 89   Temp 98 °F (36.7 °C)   Resp 16   Ht 5' 5\" (1.651 m)   Wt 201 lb 12.8 oz (91.5 kg)   SpO2 99%   BMI 33.58 kg/m²       Constitutional: Well developed,  well nourished,  awake, alert, and in no acute distress. Neurological:  Sensation to light touch is intact. Psychiatric: Affect and mood are appropriate. Integumentary: No rashes or abrasions noted on exposed areas,  warm, dry and intact. Cardiovascular/Peripheral Vascular:  No peripheral edema is noted. Lymphatic:  No evidence of lymphedema. No cervical lymphadenopathy. SPINE/MUSCULOSKELETAL EXAM    Lumbar spine:  No rash, ecchymosis, or gross obliquity. No fasciculations. No focal atrophy is noted. Range of motion is intact. Mild Tenderness to palpation to lumbar spine. SI joints non-tender. Trochanters non tender.       Musculoskeletal:  No pain with extension, axial loading, or forward flexion. No pain with internal or external rotation of her hips.      MOTOR       Hip Flex  Quads Hamstrings Ankle DF EHL Ankle PF   Right +4/5 +4/5 +4/5 +4/5 +4/5 +4/5   Left +4/5 +4/5 +4/5 +4/5 +4/5 +4/5         Straight Leg raise - bilaterally.      normal gait and station     Ambulation with walker. full weight bearing, non-antalgic gait.     Nabil Ling, NP

## 2019-09-21 DIAGNOSIS — I48.91 ATRIAL FIBRILLATION, UNSPECIFIED TYPE (HCC): ICD-10-CM

## 2019-09-23 RX ORDER — APIXABAN 5 MG/1
TABLET, FILM COATED ORAL
Qty: 60 TAB | Refills: 1 | Status: SHIPPED | OUTPATIENT
Start: 2019-09-23 | End: 2019-11-24 | Stop reason: SDUPTHER

## 2019-09-27 RX ORDER — DILTIAZEM HYDROCHLORIDE 180 MG/1
CAPSULE, EXTENDED RELEASE ORAL
Qty: 90 CAP | Refills: 1 | Status: SHIPPED | OUTPATIENT
Start: 2019-09-27 | End: 2020-03-21

## 2019-11-24 DIAGNOSIS — I48.91 ATRIAL FIBRILLATION, UNSPECIFIED TYPE (HCC): ICD-10-CM

## 2019-11-25 RX ORDER — APIXABAN 5 MG/1
TABLET, FILM COATED ORAL
Qty: 60 TAB | Refills: 0 | Status: SHIPPED | OUTPATIENT
Start: 2019-11-25 | End: 2019-12-24

## 2019-12-17 ENCOUNTER — OFFICE VISIT (OUTPATIENT)
Dept: CARDIOLOGY CLINIC | Age: 79
End: 2019-12-17

## 2019-12-17 VITALS
HEART RATE: 76 BPM | BODY MASS INDEX: 34.66 KG/M2 | WEIGHT: 208 LBS | DIASTOLIC BLOOD PRESSURE: 53 MMHG | OXYGEN SATURATION: 98 % | SYSTOLIC BLOOD PRESSURE: 125 MMHG | HEIGHT: 65 IN

## 2019-12-17 DIAGNOSIS — Z79.01 ANTICOAGULANT LONG-TERM USE: ICD-10-CM

## 2019-12-17 DIAGNOSIS — I10 ESSENTIAL HYPERTENSION, BENIGN: ICD-10-CM

## 2019-12-17 DIAGNOSIS — E78.00 HYPERCHOLESTEROLEMIA: ICD-10-CM

## 2019-12-17 DIAGNOSIS — I48.19 PERSISTENT ATRIAL FIBRILLATION (HCC): Primary | ICD-10-CM

## 2019-12-17 NOTE — PROGRESS NOTES
1. Have you been to the ER, urgent care clinic since your last visit? Hospitalized since your last visit? No    2. Have you seen or consulted any other health care providers outside of the [address] [place] [address] since your last visit? Include any pap smears or colon screening.  Yes Where: PCP

## 2019-12-17 NOTE — PROGRESS NOTES
HISTORY OF PRESENT ILLNESS  Ioana Bob is a 78 y.o. female. Hypertension   The history is provided by the patient. This is a chronic problem. The problem occurs constantly. The problem has not changed since onset. Palpitations    The history is provided by the patient. This is a recurrent problem. The problem has been gradually worsening. The problem occurs daily. The problem is associated with nothing. Associated symptoms include irregular heartbeat. Pertinent negatives include no exertional chest pressure, no PND and no lower extremity edema. Her past medical history is significant for hypertension. Cholesterol Problem   The history is provided by the patient. This is a chronic problem. The problem occurs constantly. The problem has not changed since onset. Review of Systems   Constitutional: Negative for chills. HENT: Negative for nosebleeds. Eyes: Negative for blurred vision and double vision. Respiratory: Negative for wheezing. Cardiovascular: Positive for palpitations. Negative for leg swelling and PND. Gastrointestinal: Negative for heartburn. Musculoskeletal: Negative for myalgias. Skin: Negative for rash. Endo/Heme/Allergies: Does not bruise/bleed easily.      Family History   Problem Relation Age of Onset    Heart Disease Other         positive history of ischemic heart disease       Past Medical History:   Diagnosis Date    Cardiomegaly     Ch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 ([address]     Coarctation of aorta determined by echocardiography     Diabetes [facility] [address]     Dysuria     Essential hypertension, benign     stable    High blood pressure     Hypercholesterolemia     Hyperthyroidism     no meds    Obesity, unspecified     Pt has weight loss    Other and unspecified hyperlipidemia     Chol 172, ldl 82, hdl 62, tg 141    Recurrent UTI     Shingles     Type II or unspecified type diabetes mellitus without mention of complication, not stated as uncontrolled        Past Surgical History:   Procedure Laterality Date    HX BACK SURGERY  08/01/2018    HX GYN      hysterectomy    HX HYSTERECTOMY      HX OTHER SURGICAL      HX OTHER SURGICAL  2007    ankle sure       Social History     Tobacco Use    Smoking status: Never Smoker    Smokeless tobacco: Never Used   Substance Use Topics    Alcohol use: No       Allergies   Allergen Reactions    Kenalog [Triamcinolone Acetonide] Anaphylaxis    Penicillin G Hives    Adhesive Tape-Silicones Swelling    Adhesive Other (comments)    Bacitracin Not Reported This Time and Hives    Bacitracin Rash    Betadine [Povidone-Iodine] Other (comments)     Caused uncomfortable rash on area where placed    Cephalexin Rash and Itching    Ciprofloxacin Other (comments)    Cortane Shortness of Breath    Cortisone Not Reported This Time and Hives    Duloxetine Rash and Itching    Erythromycin Not Reported This Time and Hives    Erythromycin Rash    Kenalog [Triamcinolone Acetonide] Shortness of Breath    Lisinopril Not Reported This Time    Lisinopril Cough    Lortab [Hydrocodone-Acetaminophen] Rash    Macrobid [Nitrofurantoin Monohyd/M-Cryst] Other (comments)     Flushed face,leg swellinig    Macrobid [Nitrofurantoin Monohyd/M-Cryst] Other (comments)    Medrol [Methylprednisolone] Swelling    Methimazole Other (comments)     Neck pain/cough    Methimazole Cough    Neosporin [Hydrocortisone] Hives    Penicillins Not Reported This Time    Penicillins Rash    Prednimustine Shortness of Breath    Prednisolone Other (comments)    Prednisone Not Reported This Time    Propylthiouracil Other (comments)     Dizziness,elevated blood pressure    Propylthiouracil Other (comments)    Sulfamethoxazole-Trimethoprim Other (comments)    Tetanus And Diphtheria Toxoids, Adsorbed, Adult Swelling    Tetanus Vaccines And Toxoid Not Reported This Time       Current Outpatient Medications   Medication Sig    ELIQUIS 5 mg tablet TAKE ONE TABLET BY MOUTH TWICE A DAY    levothyroxine (SYNTHROID) 25 mcg tablet Take 25 mcg by mouth.  DILT- mg XR capsule TAKE 1 CAPSULE BY MOUTH ONCE DAILY    gabapentin (NEURONTIN) 300 mg capsule Take 1 Cap by mouth three (3) times daily. Max Daily Amount: 900 mg.    losartan (COZAAR) 25 mg tablet Take 1 Tab by mouth two (2) times a day.  estradiol (ESTRACE) 0.01 % (0.1 mg/gram) vaginal cream     OTHER     atenolol (TENORMIN) 50 mg tablet TAKE ONE TABLET BY MOUTH TWICE DAILY    indapamide (LOZOL) 2.5 mg tablet     metFORMIN (GLUCOPHAGE) 850 mg tablet     gluc-condr-om3-dha-epa-fish-st (GLUCOSAMINE CHONDROITIN PLUS) 357-234-96-54 mg cap Take 1 Tab by mouth daily. Indications: supplement    MELATONIN PO Take 5 mg by mouth as needed (sleep).  acetaminophen (TYLENOL) 500 mg tablet Take 500 mg by mouth every six (6) hours as needed for Pain.  calcium-cholecalciferol, d3, (CALCIUM 600 + D) 600-125 mg-unit tab Take 600 mg by mouth daily. Patient taking 4 times a week  Indications: Osteoporosis    Cholecalciferol, Vitamin D3, (VITAMIN D3) 1,000 unit cap Take 1,000 Units by mouth daily. Indications: Vitamin D Deficiency    pravastatin (PRAVACHOL) 40 mg tablet Take 40 mg by mouth daily. Indications: hypercholesterolemia     No current facility-administered medications for this visit. Visit Vitals  /53 (BP 1 Location: Right arm, BP Patient Position: Sitting)   Pulse 76   Ht 5' 5\" (1.651 m)   Wt 94.3 kg (208 lb)   SpO2 98%   BMI 34.61 kg/m²         Physical Exam   Constitutional: She is oriented to person, place, and time. She appears well-developed and well-nourished. obese   HENT:   Head: Normocephalic and atraumatic. Eyes: Conjunctivae are normal.   Neck: Neck supple. No JVD present. No tracheal deviation present. No thyromegaly present. Cardiovascular: Normal rate. An irregular rhythm present. PMI is not displaced. Exam reveals no gallop and no decreased pulses. Murmur heard. Early systolic murmur is present with a grade of 2/6 at the upper right sternal border. Pulmonary/Chest: No respiratory distress. She has no wheezes. She has no rales. She exhibits no tenderness. Abdominal: Soft. There is no tenderness. Musculoskeletal:         General: No edema. Neurological: She is alert and oriented to person, place, and time. Skin: Skin is warm. Psychiatric: She has a normal mood and affect. Ms. Tanya Pacheco has a reminder for a \"due or due soon\" health maintenance. I have asked that she contact her primary care provider for follow-up on this health maintenance. No flowsheet data found. mri:2015  1. No acute hemorrhage or acute infarction. 2. White matter abnormality is nonspecific but likely ischemic. This does not suggest multiple sclerosis. 3. No other significant intracranial abnormality is appreciated. [id number]:JUAC  Left ventricle: Systolic function was normal. Ejection fraction was  estimated in the range of 55 % to 60 %. There were no regional wall motion  abnormalities. Doppler parameters were consistent with abnormal left  ventricular relaxation (grade 1 diastolic dysfunction). Left atrium: The atrium was mildly dilated. Mitral valve: There was trivial regurgitation. Aortic valve: The valve was trileaflet. Leaflets exhibited mildly  increased thickness, normal cuspal separation, and sclerosis. Tricuspid valve: There was mild regurgitation. Tricuspid regurgitation  peak velocity: 2.4 m/sec. Pulmonary artery systolic pressure: 26 mmHg. Pulmonic valve: There was trivial regurgitation. 2015:holter  Sr,Frequent pac. No a fib    I have personally reviewed patients ekg done at other facility. 2017  Sr,pac    NUCLEAR IMAGIN2017     Findings:   1. Stress images reveal normal Myoview distrubution in all the LV segments in short axis, vertical and horizontal long axis views. 2. Resting images have a normal uptake.    3. Gated images reveal normal wall motion and the ejection fraction is calculated to be 90%. Conclusion:   1. Normal perfusion scan. 2. Normal wall motion and ejection fraction. 3. Low risk scan. Interpretation Summary 3/2019       · Estimated left ventricular ejection fraction is 56 - 60%. Left ventricular mild concentric hypertrophy. No regional wall motion abnormality noted. Mild (grade 1) left ventricular diastolic dysfunction. · Left atrial cavity size is moderately dilated. · Normal right ventricular size and function. · Mild aortic valve sclerosis. · Mild mitral valve regurgitation. · Mild tricuspid valve regurgitation. Pulmonary arterial systolic pressure is 72.6 mmHg. Mild pulmonary hypertension. · Mild pulmonic valve regurgitation is present. · Moderately elevated central venous pressure (10-15 mmHg); IVC diameter is larger than 21 mm and collapses more than 50% with respiration. Assessment         ICD-10-CM ICD-9-CM    1. Persistent atrial fibrillation I48.19 427.31     . Controlled rate continue anticoagulation okay for knee surgery if required   2. Essential hypertension, benign I10 401.1     Stable continue treatment   3. Hypercholesterolemia E78.00 272.0     Continue treatment lab with PCP   4. Anticoagulant long-term use Z79.01 V58.61     On Eliquis for A. fib     3/2019  Atrial fibrillation controlled ventricular rate. Anticoagulation started. Likely to undergo ablation for hyperthyroidism. Follow-up after that. Echo for LV function. Currently continue with rate control strategy    6/2019  Status post radioablation of hyperthyroidism. Clinically feeling significantly better. We will continue rate control strategy. Remains in A. fib today. On anticoagulation  12/2019  Cardiac status stable. If needed patient may go through knee surgery with interruption of anticoagulation in the perioperative. There are no discontinued medications.     No orders of the defined types were placed in this encounter. Follow-up and Dispositions    · Return in about 6 months (around 6/17/2020).

## 2020-01-24 DIAGNOSIS — I10 ESSENTIAL HYPERTENSION, BENIGN: ICD-10-CM

## 2020-01-24 RX ORDER — LOSARTAN POTASSIUM 25 MG/1
TABLET ORAL
Qty: 180 TAB | Refills: 0 | Status: SHIPPED | OUTPATIENT
Start: 2020-01-24 | End: 2020-04-22

## 2020-03-21 RX ORDER — DILTIAZEM HYDROCHLORIDE 180 MG/1
CAPSULE, EXTENDED RELEASE ORAL
Qty: 90 CAP | Refills: 0 | Status: SHIPPED | OUTPATIENT
Start: 2020-03-21 | End: 2020-06-26

## 2020-04-22 DIAGNOSIS — I10 ESSENTIAL HYPERTENSION, BENIGN: ICD-10-CM

## 2020-04-22 RX ORDER — LOSARTAN POTASSIUM 25 MG/1
TABLET ORAL
Qty: 180 TAB | Refills: 0 | Status: SHIPPED | OUTPATIENT
Start: 2020-04-22 | End: 2020-07-20

## 2020-04-30 DIAGNOSIS — I48.91 ATRIAL FIBRILLATION, UNSPECIFIED TYPE (HCC): ICD-10-CM

## 2020-06-18 ENCOUNTER — OFFICE VISIT (OUTPATIENT)
Dept: CARDIOLOGY CLINIC | Age: 80
End: 2020-06-18

## 2020-06-18 VITALS
BODY MASS INDEX: 34.66 KG/M2 | HEIGHT: 65 IN | WEIGHT: 208 LBS | DIASTOLIC BLOOD PRESSURE: 73 MMHG | HEART RATE: 71 BPM | SYSTOLIC BLOOD PRESSURE: 133 MMHG

## 2020-06-18 DIAGNOSIS — I48.19 PERSISTENT ATRIAL FIBRILLATION (HCC): Primary | ICD-10-CM

## 2020-06-18 DIAGNOSIS — I10 ESSENTIAL HYPERTENSION, BENIGN: ICD-10-CM

## 2020-06-18 DIAGNOSIS — R12 HEARTBURN: ICD-10-CM

## 2020-06-18 DIAGNOSIS — Z79.01 ANTICOAGULANT LONG-TERM USE: ICD-10-CM

## 2020-06-18 DIAGNOSIS — E78.00 HYPERCHOLESTEROLEMIA: ICD-10-CM

## 2020-06-18 NOTE — PROGRESS NOTES
HISTORY OF PRESENT ILLNESS  Mak Cintron is a 78 y.o. female. 6/2020    Patient seen for follow-up. She has been having indigestion type feeling in substernal area. This is going on for about a month. GI evaluation did abdominal ultrasound showing hepatic steatosis. Symptoms are persistent. Denies any other complaint. No clear relation to activity or exertion. Hypertension   The history is provided by the patient. This is a chronic problem. The problem occurs constantly. The problem has not changed since onset. Palpitations    The history is provided by the patient. This is a recurrent problem. The problem has been gradually worsening. The problem occurs daily. The problem is associated with nothing. Associated symptoms include irregular heartbeat. Pertinent negatives include no exertional chest pressure, no PND and no lower extremity edema. Her past medical history is significant for hypertension. Cholesterol Problem   The history is provided by the patient. This is a chronic problem. The problem occurs constantly. The problem has not changed since onset. Review of Systems   Constitutional: Negative for chills. HENT: Negative for nosebleeds. Eyes: Negative for blurred vision and double vision. Respiratory: Negative for wheezing. Cardiovascular: Positive for palpitations. Negative for leg swelling and PND. Gastrointestinal: Negative for heartburn. Musculoskeletal: Negative for myalgias. Skin: Negative for rash. Endo/Heme/Allergies: Does not bruise/bleed easily.      Family History   Problem Relation Age of Onset    Heart Disease Other         positive history of ischemic heart disease       Past Medical History:   Diagnosis Date    Cardiomegaly     Ch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 ([address]     Coarctation of aorta determined by echocardiography     Diabetes ([address]     Dysuria     Essential hypertension, benign     stable    High blood pressure     Hypercholesterolemia     Hyperthyroidism     no meds    Obesity, unspecified     Pt has weight loss    Other and unspecified hyperlipidemia     Chol 172, ldl 82, hdl 62, tg 141    Recurrent UTI     Shingles     Type II or unspecified type diabetes mellitus without mention of complication, not stated as uncontrolled        Past Surgical History:   Procedure Laterality Date    HX BACK SURGERY  08/01/2018    HX GYN      hysterectomy    HX HYSTERECTOMY      HX OTHER SURGICAL      HX OTHER SURGICAL  2007    ankle sure       Social History     Tobacco Use    Smoking status: Never Smoker    Smokeless tobacco: Never Used   Substance Use Topics    Alcohol use: No       Allergies   Allergen Reactions    Kenalog [Triamcinolone Acetonide] Anaphylaxis    Penicillin G Hives    Adhesive Tape-Silicones Swelling    Adhesive Other (comments)    Bacitracin Not Reported This Time and Hives    Bacitracin Rash    Betadine [Povidone-Iodine] Other (comments)     Caused uncomfortable rash on area where placed    Cephalexin Rash and Itching    Ciprofloxacin Other (comments)    Cortane Shortness of Breath    Cortisone Not Reported This Time and Hives    Duloxetine Rash and Itching    Erythromycin Not Reported This Time and Hives    Erythromycin Rash    Kenalog [Triamcinolone Acetonide] Shortness of Breath    Lisinopril Not Reported This Time    Lisinopril Cough    Lortab [Hydrocodone-Acetaminophen] Rash    Macrobid [Nitrofurantoin Monohyd/M-Cryst] Other (comments)     Flushed face,leg swellinig    Macrobid [Nitrofurantoin Monohyd/M-Cryst] Other (comments)    Medrol [Methylprednisolone] Swelling    Methimazole Other (comments)     Neck pain/cough    Methimazole Cough    Neosporin [Hydrocortisone] Hives    Penicillins Not Reported This Time    Penicillins Rash    Prednimustine Shortness of Breath    Prednisolone Other (comments)    Prednisone Not Reported This Time    Propylthiouracil Other (comments) Dizziness,elevated blood pressure    Propylthiouracil Other (comments)    Sulfamethoxazole-Trimethoprim Other (comments)    Tetanus And Diphtheria Toxoids, Adsorbed, Adult Swelling    Tetanus Vaccines And Toxoid Not Reported This Time       Current Outpatient Medications   Medication Sig    apixaban (Eliquis) 5 mg tablet Take 1 Tab by mouth two (2) times a day.  losartan (COZAAR) 25 mg tablet TAKE ONE TABLET BY MOUTH TWICE A DAY    DILT- mg XR capsule Take 1 capsule by mouth once daily    atenolol (TENORMIN) 50 mg tablet TAKE 1 TABLET BY MOUTH TWICE DAILY    levothyroxine (SYNTHROID) 25 mcg tablet Take 25 mcg by mouth.  gabapentin (NEURONTIN) 300 mg capsule Take 1 Cap by mouth three (3) times daily. Max Daily Amount: 900 mg.  estradiol (ESTRACE) 0.01 % (0.1 mg/gram) vaginal cream     OTHER     indapamide (LOZOL) 2.5 mg tablet     metFORMIN (GLUCOPHAGE) 850 mg tablet daily.  gluc-condr-om3-dha-epa-fish-st (GLUCOSAMINE CHONDROITIN PLUS) 096-291-90-54 mg cap Take 1 Tab by mouth daily. Indications: supplement    MELATONIN PO Take 5 mg by mouth as needed (sleep).  acetaminophen (TYLENOL) 500 mg tablet Take 500 mg by mouth every six (6) hours as needed for Pain.  calcium-cholecalciferol, d3, (CALCIUM 600 + D) 600-125 mg-unit tab Take 600 mg by mouth daily. Patient taking 4 times a week  Indications: Osteoporosis    Cholecalciferol, Vitamin D3, (VITAMIN D3) 1,000 unit cap Take 1,000 Units by mouth daily. Indications: Vitamin D Deficiency    pravastatin (PRAVACHOL) 40 mg tablet Take 40 mg by mouth daily. Indications: hypercholesterolemia     No current facility-administered medications for this visit. Visit Vitals  /73   Pulse 71   Ht 5' 5\" (1.651 m)   Wt 94.3 kg (208 lb)   BMI 34.61 kg/m²         Physical Exam   Constitutional: She is oriented to person, place, and time. She appears well-developed and well-nourished.    obese   HENT:   Head: Normocephalic and atraumatic. Eyes: Conjunctivae are normal.   Neck: Neck supple. No JVD present. No tracheal deviation present. No thyromegaly present. Cardiovascular: Normal rate. An irregular rhythm present. PMI is not displaced. Exam reveals no gallop and no decreased pulses. Murmur heard. Early systolic murmur is present with a grade of 2/6 at the upper right sternal border. Pulmonary/Chest: No respiratory distress. She has no wheezes. She has no rales. She exhibits no tenderness. Abdominal: Soft. There is no abdominal tenderness. Musculoskeletal:         General: No edema. Neurological: She is alert and oriented to person, place, and time. Skin: Skin is warm. Psychiatric: She has a normal mood and affect. Ms. Vickey Fernandez has a reminder for a \"due or due soon\" health maintenance. I have asked that she contact her primary care provider for follow-up on this health maintenance. No flowsheet data found. mri:8/2015  1. No acute hemorrhage or acute infarction. 2. White matter abnormality is nonspecific but likely ischemic. This does not suggest multiple sclerosis. 3. No other significant intracranial abnormality is appreciated. [id number]:4/0641:DVGO  Left ventricle: Systolic function was normal. Ejection fraction was  estimated in the range of 55 % to 60 %. There were no regional wall motion  abnormalities. Doppler parameters were consistent with abnormal left  ventricular relaxation (grade 1 diastolic dysfunction). Left atrium: The atrium was mildly dilated. Mitral valve: There was trivial regurgitation. Aortic valve: The valve was trileaflet. Leaflets exhibited mildly  increased thickness, normal cuspal separation, and sclerosis. Tricuspid valve: There was mild regurgitation. Tricuspid regurgitation  peak velocity: 2.4 m/sec. Pulmonary artery systolic pressure: 26 mmHg. Pulmonic valve: There was trivial regurgitation. 8/2015:holter  Sr,Frequent pac. No a fib    I have personally reviewed patients ekg done at other facility. 2017  ,pac    NUCLEAR IMAGIN2017     Findings:   1. Stress images reveal normal Myoview distrubution in all the LV segments in short axis, vertical and horizontal long axis views. 2. Resting images have a normal uptake. 3. Gated images reveal normal wall motion and the ejection fraction is calculated to be 90%. Conclusion:   1. Normal perfusion scan. 2. Normal wall motion and ejection fraction. 3. Low risk scan. Interpretation Summary 3/2019       · Estimated left ventricular ejection fraction is 56 - 60%. Left ventricular mild concentric hypertrophy. No regional wall motion abnormality noted. Mild (grade 1) left ventricular diastolic dysfunction. · Left atrial cavity size is moderately dilated. · Normal right ventricular size and function. · Mild aortic valve sclerosis. · Mild mitral valve regurgitation. · Mild tricuspid valve regurgitation. Pulmonary arterial systolic pressure is 31.6 mmHg. Mild pulmonary hypertension. · Mild pulmonic valve regurgitation is present. · Moderately elevated central venous pressure (10-15 mmHg); IVC diameter is larger than 21 mm and collapses more than 50% with respiration. I Have personally reviewed recent relevant labs available and discussed with patient  Lipids2019      Assessment         ICD-10-CM ICD-9-CM    1. Persistent atrial fibrillation (HCC) I48.19 427.31 NUCLEAR CARDIAC STRESS TEST    Stable continue current treatment on anticoagulation   2. Essential hypertension, benign I10 401.1     Stable continue current treatment   3. Hypercholesterolemia E78.00 272.0     Continue treatment monitor lab with PCP   4. Anticoagulant long-term use Z79.01 V58.61     For A. fib   5. Heartburn R12 787.1 NUCLEAR CARDIAC STRESS TEST     3/2019  Atrial fibrillation controlled ventricular rate. Anticoagulation started. Likely to undergo ablation for hyperthyroidism. Follow-up after that.   Echo for LV function. Currently continue with rate control strategy    6/2019  Status post radioablation of hyperthyroidism. Clinically feeling significantly better. We will continue rate control strategy. Remains in A. fib today. On anticoagulation  12/2019  Cardiac status stable. If needed patient may go through knee surgery with interruption of anticoagulation in the perioperative. There are no discontinued medications. No orders of the defined types were placed in this encounter. Follow-up and Dispositions    · Return for F/u after tests.

## 2020-06-18 NOTE — PROGRESS NOTES
1. Have you been to the ER, urgent care clinic since your last visit? Hospitalized since your last visit? Yes     2. Have you seen or consulted any other health care providers outside of the [address] [place] [address] since your last visit? Include any pap smears or colon screening. Yes Where: pcp     3. Since your last visit, have you had any of the following symptoms?  no

## 2020-06-23 ENCOUNTER — OFFICE VISIT (OUTPATIENT)
Dept: CARDIOLOGY CLINIC | Age: 80
End: 2020-06-23

## 2020-06-23 VITALS
BODY MASS INDEX: 35.65 KG/M2 | HEIGHT: 65 IN | WEIGHT: 214 LBS | DIASTOLIC BLOOD PRESSURE: 60 MMHG | SYSTOLIC BLOOD PRESSURE: 146 MMHG | HEART RATE: 66 BPM

## 2020-06-23 DIAGNOSIS — I10 ESSENTIAL HYPERTENSION, BENIGN: ICD-10-CM

## 2020-06-23 DIAGNOSIS — I48.19 PERSISTENT ATRIAL FIBRILLATION (HCC): Primary | ICD-10-CM

## 2020-06-23 DIAGNOSIS — R12 HEARTBURN: ICD-10-CM

## 2020-06-23 NOTE — PROGRESS NOTES
HISTORY OF PRESENT ILLNESS  Manny Calero is a 78 y.o. female. 6/2020    Patient seen for follow-up. She has been having indigestion type feeling in substernal area. This is going on for about a month. GI evaluation did abdominal ultrasound showing hepatic steatosis. Symptoms are persistent. Denies any other complaint. No clear relation to activity or exertion. 6/23/2020  Patient is here for follow up of diagnostic tests. Results will be discussed. Hypertension   The history is provided by the patient. This is a chronic problem. The problem occurs constantly. The problem has not changed since onset. Palpitations    The history is provided by the patient. This is a recurrent problem. The problem has been gradually worsening. The problem occurs daily. The problem is associated with nothing. Associated symptoms include irregular heartbeat. Pertinent negatives include no exertional chest pressure, no PND and no lower extremity edema. Her past medical history is significant for hypertension. Cholesterol Problem   The history is provided by the patient. This is a chronic problem. The problem occurs constantly. The problem has not changed since onset. Review of Systems   Constitutional: Negative for chills. HENT: Negative for nosebleeds. Eyes: Negative for blurred vision and double vision. Respiratory: Negative for wheezing. Cardiovascular: Positive for palpitations. Negative for leg swelling and PND. Gastrointestinal: Negative for heartburn. Musculoskeletal: Negative for myalgias. Skin: Negative for rash. Endo/Heme/Allergies: Does not bruise/bleed easily.      Family History   Problem Relation Age of Onset    Heart Disease Other         positive history of ischemic heart disease       Past Medical History:   Diagnosis Date    Cardiomegaly     [facility]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 ([facility]     Coarctation of aorta determined by echocardiography     Diabetes ([facility]     Dysuria  Essential hypertension, benign     stable    High blood pressure     Hypercholesterolemia     Hyperthyroidism     no meds    Obesity, unspecified     Pt has weight loss    Other and unspecified hyperlipidemia     Chol 172, ldl 82, hdl 62, tg 141    Recurrent UTI     Shingles     Type II or unspecified type diabetes mellitus without mention of complication, not stated as uncontrolled        Past Surgical History:   Procedure Laterality Date    HX BACK SURGERY  08/01/2018    HX GYN      hysterectomy    HX HYSTERECTOMY      HX OTHER SURGICAL      HX OTHER SURGICAL  2007    ankle sure       Social History     Tobacco Use    Smoking status: Never Smoker    Smokeless tobacco: Never Used   Substance Use Topics    Alcohol use: No       Allergies   Allergen Reactions    Kenalog [Triamcinolone Acetonide] Anaphylaxis    Penicillin G Hives    Adhesive Tape-Silicones Swelling    Adhesive Other (comments)    Bacitracin Not Reported This Time and Hives    Bacitracin Rash    Betadine [Povidone-Iodine] Other (comments)     Caused uncomfortable rash on area where placed    Cephalexin Rash and Itching    Ciprofloxacin Other (comments)    Cortane Shortness of Breath    Cortisone Not Reported This Time and Hives    Duloxetine Rash and Itching    Erythromycin Not Reported This Time and Hives    Erythromycin Rash    Kenalog [Triamcinolone Acetonide] Shortness of Breath    Lisinopril Not Reported This Time    Lisinopril Cough    Lortab [Hydrocodone-Acetaminophen] Rash    Macrobid [Nitrofurantoin Monohyd/M-Cryst] Other (comments)     Flushed face,leg swellinig    Macrobid [Nitrofurantoin Monohyd/M-Cryst] Other (comments)    Medrol [Methylprednisolone] Swelling    Methimazole Other (comments)     Neck pain/cough    Methimazole Cough    Neosporin [Hydrocortisone] Hives    Penicillins Not Reported This Time    Penicillins Rash    Prednimustine Shortness of Breath    Prednisolone Other (comments)  Prednisone Not Reported This Time    Propylthiouracil Other (comments)     Dizziness,elevated blood pressure    Propylthiouracil Other (comments)    Sulfamethoxazole-Trimethoprim Other (comments)    Tetanus And Diphtheria Toxoids, Adsorbed, Adult Swelling    Tetanus Vaccines And Toxoid Not Reported This Time       Current Outpatient Medications   Medication Sig    apixaban (Eliquis) 5 mg tablet Take 1 Tab by mouth two (2) times a day.  losartan (COZAAR) 25 mg tablet TAKE ONE TABLET BY MOUTH TWICE A DAY    DILT- mg XR capsule Take 1 capsule by mouth once daily    atenolol (TENORMIN) 50 mg tablet TAKE 1 TABLET BY MOUTH TWICE DAILY    levothyroxine (SYNTHROID) 25 mcg tablet Take 25 mcg by mouth.  gabapentin (NEURONTIN) 300 mg capsule Take 1 Cap by mouth three (3) times daily. Max Daily Amount: 900 mg.  estradiol (ESTRACE) 0.01 % (0.1 mg/gram) vaginal cream     OTHER     indapamide (LOZOL) 2.5 mg tablet     metFORMIN (GLUCOPHAGE) 850 mg tablet daily.  gluc-condr-om3-dha-epa-fish-st (GLUCOSAMINE CHONDROITIN PLUS) 906-837-61-54 mg cap Take 1 Tab by mouth daily. Indications: supplement    MELATONIN PO Take 5 mg by mouth as needed (sleep).  acetaminophen (TYLENOL) 500 mg tablet Take 500 mg by mouth every six (6) hours as needed for Pain.  calcium-cholecalciferol, d3, (CALCIUM 600 + D) 600-125 mg-unit tab Take 600 mg by mouth daily. Patient taking 4 times a week  Indications: Osteoporosis    Cholecalciferol, Vitamin D3, (VITAMIN D3) 1,000 unit cap Take 1,000 Units by mouth daily. Indications: Vitamin D Deficiency    pravastatin (PRAVACHOL) 40 mg tablet Take 40 mg by mouth daily. Indications: hypercholesterolemia     No current facility-administered medications for this visit.         Visit Vitals  /60 (BP 1 Location: Left arm, BP Patient Position: Sitting)   Pulse 66   Ht 5' 5\" (1.651 m)   Wt 97.1 kg (214 lb)   BMI 35.61 kg/m²         Physical Exam   Constitutional: She is oriented to person, place, and time. She appears well-developed and well-nourished. obese   HENT:   Head: Normocephalic and atraumatic. Eyes: Conjunctivae are normal.   Neck: Neck supple. No JVD present. No tracheal deviation present. No thyromegaly present. Cardiovascular: Normal rate. An irregular rhythm present. PMI is not displaced. Exam reveals no gallop and no decreased pulses. Murmur heard. Early systolic murmur is present with a grade of 2/6 at the upper right sternal border. Pulmonary/Chest: No respiratory distress. She has no wheezes. She has no rales. She exhibits no tenderness. Abdominal: Soft. There is no abdominal tenderness. Musculoskeletal:         General: No edema. Neurological: She is alert and oriented to person, place, and time. Skin: Skin is warm. Psychiatric: She has a normal mood and affect. Ms. Ariadne Ford has a reminder for a \"due or due soon\" health maintenance. I have asked that she contact her primary care provider for follow-up on this health maintenance. No flowsheet data found. mri:8/2015  1. No acute hemorrhage or acute infarction. 2. White matter abnormality is nonspecific but likely ischemic. This does not suggest multiple sclerosis. 3. No other significant intracranial abnormality is appreciated. ZOPFZMM:5/1333:DYGS  Left ventricle: Systolic function was normal. Ejection fraction was  estimated in the range of 55 % to 60 %. There were no regional wall motion  abnormalities. Doppler parameters were consistent with abnormal left  ventricular relaxation (grade 1 diastolic dysfunction). Left atrium: The atrium was mildly dilated. Mitral valve: There was trivial regurgitation. Aortic valve: The valve was trileaflet. Leaflets exhibited mildly  increased thickness, normal cuspal separation, and sclerosis. Tricuspid valve: There was mild regurgitation. Tricuspid regurgitation  peak velocity: 2.4 m/sec.  Pulmonary artery systolic pressure: 26 mmHg.    Pulmonic valve: There was trivial regurgitation. 2015:holter  Sr,Frequent pac. No a fib    I have personally reviewed patients ekg done at other facility. 2017  Sr,pac    NUCLEAR IMAGIN2017     Findings:   1. Stress images reveal normal Myoview distrubution in all the LV segments in short axis, vertical and horizontal long axis views. 2. Resting images have a normal uptake. 3. Gated images reveal normal wall motion and the ejection fraction is calculated to be 90%. Conclusion:   1. Normal perfusion scan. 2. Normal wall motion and ejection fraction. 3. Low risk scan. Interpretation Summary 3/2019       · Estimated left ventricular ejection fraction is 56 - 60%. Left ventricular mild concentric hypertrophy. No regional wall motion abnormality noted. Mild (grade 1) left ventricular diastolic dysfunction. · Left atrial cavity size is moderately dilated. · Normal right ventricular size and function. · Mild aortic valve sclerosis. · Mild mitral valve regurgitation. · Mild tricuspid valve regurgitation. Pulmonary arterial systolic pressure is 58.4 mmHg. Mild pulmonary hypertension. · Mild pulmonic valve regurgitation is present. · Moderately elevated central venous pressure (10-15 mmHg); IVC diameter is larger than 21 mm and collapses more than 50% with respiration. I Have personally reviewed recent relevant labs available and discussed with patient  Lipids2020  Nuclear stress test normal perfusion. Normal ejection fraction  Assessment         ICD-10-CM ICD-9-CM    1. Persistent atrial fibrillation (HCC) I48.19 427.31     Rate controlled continue anticoagulation   2. Essential hypertension, benign I10 401.1     Stable monitor   3. Heartburn R12 787.1     Negative nuclear stress test.  Continue treatment will monitor clinically     3/2019  Atrial fibrillation controlled ventricular rate. Anticoagulation started.   Likely to undergo ablation for hyperthyroidism. Follow-up after that. Echo for LV function. Currently continue with rate control strategy    6/2019  Status post radioablation of hyperthyroidism. Clinically feeling significantly better. We will continue rate control strategy. Remains in A. fib today. On anticoagulation  12/2019  Cardiac status stable. If needed patient may go through knee surgery with interruption of anticoagulation in the perioperative. 6/22/2020  Negative nuclear stress test continue to monitor clinically. There are no discontinued medications. No orders of the defined types were placed in this encounter. Follow-up and Dispositions    · Return in about 6 months (around 12/23/2020).

## 2020-06-26 RX ORDER — DILTIAZEM HYDROCHLORIDE 180 MG/1
CAPSULE, EXTENDED RELEASE ORAL
Qty: 90 CAP | Refills: 0 | Status: SHIPPED | OUTPATIENT
Start: 2020-06-26 | End: 2020-09-25

## 2020-07-20 DIAGNOSIS — I10 ESSENTIAL HYPERTENSION, BENIGN: ICD-10-CM

## 2020-07-20 RX ORDER — LOSARTAN POTASSIUM 25 MG/1
TABLET ORAL
Qty: 180 TAB | Refills: 0 | Status: SHIPPED | OUTPATIENT
Start: 2020-07-20 | End: 2020-10-19

## 2020-09-16 ENCOUNTER — OFFICE VISIT (OUTPATIENT)
Dept: ORTHOPEDIC SURGERY | Age: 80
End: 2020-09-16

## 2020-09-16 VITALS
TEMPERATURE: 97.5 F | SYSTOLIC BLOOD PRESSURE: 141 MMHG | RESPIRATION RATE: 14 BRPM | DIASTOLIC BLOOD PRESSURE: 78 MMHG | HEART RATE: 82 BPM | OXYGEN SATURATION: 97 %

## 2020-09-16 DIAGNOSIS — G89.29 CHRONIC RIGHT-SIDED LOW BACK PAIN WITH RIGHT-SIDED SCIATICA: Primary | ICD-10-CM

## 2020-09-16 DIAGNOSIS — Z98.1 S/P LUMBAR FUSION: ICD-10-CM

## 2020-09-16 DIAGNOSIS — M54.41 CHRONIC RIGHT-SIDED LOW BACK PAIN WITH RIGHT-SIDED SCIATICA: Primary | ICD-10-CM

## 2020-09-16 NOTE — PROGRESS NOTES
[address]  [address], [address]  [place]  Phone: [phone]  Fax: [phone]  PROGRESS NOTE  Patient: Candis Narvaez                MRN: [id number]       SSN: [id number]  YOB: 1940        AGE: 78 y.o. SEX: female  There is no height or weight on file to calculate BMI. PCP: Landon Duvall MD  09/16/20    Chief Complaint   Patient presents with    Back Pain     fu     Leg Pain       HISTORY OF PRESENT ILLNESS:  Serafin Ramos is a 78 y.o.  female with history of back greater than BLE R>L pain  who had L4-5 PLF surgery 8/2018 for stenosis and spondylolisthesis. After surgery she had an increase in her RLE radicular pain and some hip flexor weakness. We got a CT scan in the hospital, that showed good alignment. Pain prior to surgery was mostly in the back and some BLE leg pain in the L4-5 distribution from buttock to calfs and described as aching, burning, numbing and throbbing. Pain is worse with standing and walking and affects sleep and recreational activities. Pain is better with relaxation, pain medication and lying down. She was doing well at her last OV, that was a yr ago. She has been to PT with benefit. Today, she comes in w/ a 3mo progression of R-sided LBP pain around the L4-5 region. She does have some chronic RLE pain, it did get better after her surgery, her leg pain is about the same. pain is aching, burning and numbing, pain is worse with walking and affects sleep. Pain is better with relaxation and pain medication. Denies bladder/bowel dysfunction, saddle paresthesia, weakness, gait disturbance, or other neurological deficits. Medications: Gabapentin 300mg TID with moderate, relief On Eliquis    PMHx: DM, Hyperthyroid-w/ radio-active Tx, A-Fib, overactive bladder. ASSESSMENT   Diagnoses and all orders for this visit:    1.  Chronic right-sided low back pain with right-sided sciatica  - AMB POC XRAY, SPINE, LUMBOSACRAL; 2 O    2. S/P lumbar fusion  -     AMB POC XRAY, SPINE, LUMBOSACRAL; 2 O         IMPRESSION AND PLAN:  This is a pt with stenosis was doing well until about 3 mo w/ an increase in pain due to decreased activity w/ COVID.     > Pt was given information on BACK PAIN   > Take 1 Lor in am and 2QHS  > May use OTC Voltaren  > Aqua Therapy   > Ms. Sony Wren has a reminder for a \"due or due soon\" health maintenance. I have asked that she contact her primary care provider, Colton Tripathi MD, for follow-up on this health maintenance.  > We have informed patient to notify us for immediate appointment if he has any worsening neurogical symptoms or if an emergency situation presents, then call 911  >  has been reviewed and is appropriate  > Pt will follow-up in 6 wks. Subjective      Pain Scale: 4/10    Pain Assessment  9/16/2020   Location of Pain Back;Leg   Pain Location Comment -   Location Modifiers Right   Severity of Pain 4   Quality of Pain Aching   Quality of Pain Comment -   Duration of Pain Persistent   Frequency of Pain Constant   Aggravating Factors Walking;Standing   Aggravating Factors Comment sitting too long   Limiting Behavior Some   Relieving Factors Rest;Ice   Relieving Factors Comment tylenol   Result of Injury -         REVIEW OF SYSTEMS  Constitutional: Negative for fever, chills, or weight change. Respiratory: Negative for cough or shortness of breath. Cardiovascular: Negative for chest pain or palpitations. Gastrointestinal: Negative for incontinence, acid reflux, change in bowel habits, or constipation. Genitourinary: Negative for incontinence, dysuria and flank pain. Musculoskeletal: Positive for back and RLE pain. See HPI. Skin: Negative for rash. Neurological:RLE L4-5  radiculopathy. See HPI. Endo/Heme/Allergies: Negative. Psychiatric/Behavioral: Negative.       PHYSICAL EXAMINATION  Visit Vitals  BP (!) 141/78 (BP 1 Location: Left arm, BP Patient Position: Sitting)   Pulse 82   Temp 97.5 °F (36.4 °C) (Temporal)   Resp 14   SpO2 97%         Accompanied by Daughter. Constitutional:  Well developed, well nourished, in no acute distress. Psychiatric: Affect and mood are appropriate. Integumentary: No rashes or abrasions noted on exposed areas. Cardiovascular/Peripheral Vascular: +2 radial & pedal pulses. No peripheral edema is noted. Lymphatic:  No evidence of lymphedema. No cervical lymphadenopathy. SPINE/MUSCULOSKELETAL EXAM     Lumbar spine:  No rash, ecchymosis, or gross obliquity. No fasciculations. No focal atrophy is noted. Range of motion is NT. Tenderness to palpation R low back L4-5 region. No tenderness to palpation at the sciatic notch. SI joints non-tender. Trochanters non tender. Straight leg raise neg  Hip Impingement neg    Sensation grossly intact to light touch. MOTOR:     Hip Flex Quads Hamstrings Ankle DF EHL Ankle PF   Right +4/5 +4/5 +4/5 +4/5 +4/5 +4/5   Left +4/5 +4/5 +4/5 +4/5 +4/5 +4/5         Ambulation seated walker. FWB.    + 's cart        PAST MEDICAL HISTORY   Past Medical History:   Diagnosis Date    Cardiomegaly     [address]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 ([address]     Coarctation of aorta determined by echocardiography     Diabetes ([address]     Dysuria     Essential hypertension, benign     stable    High blood pressure     Hypercholesterolemia     Hyperthyroidism     no meds    Obesity, unspecified     Pt has weight loss    Other and unspecified hyperlipidemia     Chol 172, ldl 82, hdl 62, tg 141    Recurrent UTI     Shingles     Type II or unspecified type diabetes mellitus without mention of complication, not stated as uncontrolled        Past Surgical History:   Procedure Laterality Date    HX BACK SURGERY  08/01/2018    HX GYN      hysterectomy    HX HYSTERECTOMY      HX OTHER SURGICAL      HX OTHER SURGICAL  2007    ankle sure   .       MEDICATIONS      Current Outpatient Medications   Medication Sig Dispense Refill    losartan (COZAAR) 25 mg tablet TAKE ONE TABLET BY MOUTH TWICE A  Tab 0    DILT- mg capsule Take 1 capsule by mouth once daily 90 Cap 0    apixaban (Eliquis) 5 mg tablet Take 1 Tab by mouth two (2) times a day. 180 Tab 2    atenolol (TENORMIN) 50 mg tablet TAKE 1 TABLET BY MOUTH TWICE DAILY 180 Tab 2    levothyroxine (SYNTHROID) 25 mcg tablet Take 25 mcg by mouth.  gabapentin (NEURONTIN) 300 mg capsule Take 1 Cap by mouth three (3) times daily. Max Daily Amount: 900 mg. 270 Cap 1    estradiol (ESTRACE) 0.01 % (0.1 mg/gram) vaginal cream       OTHER       indapamide (LOZOL) 2.5 mg tablet       metFORMIN (GLUCOPHAGE) 850 mg tablet daily.  gluc-condr-om3-dha-epa-fish-st (GLUCOSAMINE CHONDROITIN PLUS) 378-115-22-54 mg cap Take 1 Tab by mouth daily. Indications: supplement      MELATONIN PO Take 5 mg by mouth as needed (sleep).  acetaminophen (TYLENOL) 500 mg tablet Take 500 mg by mouth every six (6) hours as needed for Pain.  calcium-cholecalciferol, d3, (CALCIUM 600 + D) 600-125 mg-unit tab Take 600 mg by mouth daily. Patient taking 4 times a week  Indications: Osteoporosis      Cholecalciferol, Vitamin D3, (VITAMIN D3) 1,000 unit cap Take 1,000 Units by mouth daily. Indications: Vitamin D Deficiency      pravastatin (PRAVACHOL) 40 mg tablet Take 40 mg by mouth daily.  Indications: hypercholesterolemia          ALLERGIES    Allergies   Allergen Reactions    Kenalog [Triamcinolone Acetonide] Anaphylaxis    Penicillin G Hives    Adhesive Tape-Silicones Swelling    Adhesive Other (comments)    Bacitracin Not Reported This Time and Hives    Bacitracin Rash    Betadine [Povidone-Iodine] Other (comments)     Caused uncomfortable rash on area where placed    Cephalexin Rash and Itching    Ciprofloxacin Other (comments)    Cortane Shortness of Breath    Cortisone Not Reported This Time and Hives    Duloxetine Rash and Itching  Erythromycin Not Reported This Time and Hives    Erythromycin Rash    Kenalog [Triamcinolone Acetonide] Shortness of Breath    Lisinopril Not Reported This Time    Lisinopril Cough    Lortab [Hydrocodone-Acetaminophen] Rash    Macrobid [Nitrofurantoin Monohyd/M-Cryst] Other (comments)     Flushed face,leg swellinig    Macrobid [Nitrofurantoin Monohyd/M-Cryst] Other (comments)    Medrol [Methylprednisolone] Swelling    Methimazole Other (comments)     Neck pain/cough    Methimazole Cough    Neosporin [Hydrocortisone] Hives    Penicillins Not Reported This Time    Penicillins Rash    Prednimustine Shortness of Breath    Prednisolone Other (comments)    Prednisone Not Reported This Time    Propylthiouracil Other (comments)     Dizziness,elevated blood pressure    Propylthiouracil Other (comments)    Sulfamethoxazole-Trimethoprim Other (comments)    Tetanus And Diphtheria Toxoids, Adsorbed, Adult Swelling    Tetanus Vaccines And Toxoid Not Reported This Time          SOCIAL HISTORY    Social History     Socioeconomic History    Marital status:      Spouse name: Not on file    Number of children: Not on file    Years of education: Not on file    Highest education level: Not on file   Occupational History    Not on file   Social Needs    Financial resource strain: Not on file    Food insecurity     Worry: Not on file     Inability: Not on file    Transportation needs     Medical: Not on file     Non-medical: Not on file   Tobacco Use    Smoking status: Never Smoker    Smokeless tobacco: Never Used   Substance and Sexual Activity    Alcohol use: No    Drug use: No    Sexual activity: Not on file   Lifestyle    Physical activity     Days per week: Not on file     Minutes per session: Not on file    Stress: Not on file   Relationships    Social connections     Talks on phone: Not on file     Gets together: Not on file     Attends [employer] service: Not on file     Active member of club or organization: Not on file     Attends meetings of clubs or organizations: Not on file     Relationship status: Not on file    Intimate partner violence     Fear of current or ex partner: Not on file     Emotionally abused: Not on file     Physically abused: Not on file     Forced sexual activity: Not on file   Other Topics Concern     Service Not Asked    Blood Transfusions Not Asked    Caffeine Concern Not Asked    Occupational Exposure Not Asked   Denette Kaylyn Hazards Not Asked    Sleep Concern Not Asked    Stress Concern Not Asked    Weight Concern Not Asked    Special Diet Not Asked    Back Care Not Asked    Exercise Not Asked    Bike Helmet Not Asked   [address] Not Asked    Self-Exams Not Asked   Social History Narrative    ** Merged History Encounter **            FAMILY HISTORY    Family History   Problem Relation Age of Onset    Heart Disease Other         positive history of ischemic heart disease         Chris Haynes, NP

## 2020-09-16 NOTE — PATIENT INSTRUCTIONS
Try taking 1 tab of Gabapentin in am and 2 Tab in pm 
 
May use OTC Voltaren gel or OTC Icy/hot or Beng[place]

## 2020-09-16 NOTE — PROGRESS NOTES
Purnima Palomino presents today for   Chief Complaint   Patient presents with    Back Pain     fu     Leg Pain       Is someone accompanying this pt? YES    Is the patient using any DME equipment during OV? YES, rollator    Depression Screening:  3 most recent PHQ Screens 9/16/2020   Little interest or pleasure in doing things Not at all   Feeling down, depressed, irritable, or hopeless Not at all   Total Score PHQ 2 0       Learning Assessment:  Learning Assessment 2/15/2019   PRIMARY LEARNER Patient   PRIMARY LANGUAGE ENGLISH   LEARNER PREFERENCE PRIMARY LISTENING   ANSWERED BY patient   RELATIONSHIP SELF       Abuse Screening:  No flowsheet data found. Fall Risk  Fall Risk Assessment, last 12 mths 9/16/2020   Able to walk? Yes   Fall in past 12 months? No       OPIOID RISK TOOL  Opioid Risk Tool 7/3/2018   Family history of alcohol abuse? 1   Family history of illegal drug abuse? 0   Family history of prescription drug abuse? 0   Personal history of alcohol abuse? 0   Personal history of illegal drug abuse? 0   Personal history of prescription drug abuse? 0   Age range between 17-45? 0   History of preadolescent sexual abuse? 0   ADD, OCD, bipolar, schizophrenia? 0   Depression? 0   Opioid Risk Total Score 1       Coordination of Care:  1. Have you been to the ER, urgent care clinic since your last visit? NO  Hospitalized since your last visit? NO    2. Have you seen or consulted any other health care providers outside of the [address] [place] [address] since your last visit? NO Include any pap smears or colon screening.  NO    Last  Checked 9/16/2020

## 2020-09-25 RX ORDER — DILTIAZEM HYDROCHLORIDE 180 MG/1
CAPSULE, EXTENDED RELEASE ORAL
Qty: 90 CAP | Refills: 0 | Status: SHIPPED | OUTPATIENT
Start: 2020-09-25 | End: 2020-12-26 | Stop reason: SDUPTHER

## 2020-10-02 ENCOUNTER — OFFICE VISIT (OUTPATIENT)
Dept: ORTHOPEDIC SURGERY | Age: 80
End: 2020-10-02
Payer: MEDICARE

## 2020-10-02 VITALS — BODY MASS INDEX: 35.82 KG/M2 | HEIGHT: 65 IN | WEIGHT: 215 LBS

## 2020-10-02 DIAGNOSIS — M17.12 PRIMARY OSTEOARTHRITIS OF LEFT KNEE: ICD-10-CM

## 2020-10-02 DIAGNOSIS — M25.561 ACUTE PAIN OF BOTH KNEES: Primary | ICD-10-CM

## 2020-10-02 DIAGNOSIS — M25.562 ACUTE PAIN OF BOTH KNEES: Primary | ICD-10-CM

## 2020-10-02 DIAGNOSIS — M17.11 PRIMARY OSTEOARTHRITIS OF RIGHT KNEE: ICD-10-CM

## 2020-10-02 DIAGNOSIS — E66.01 SEVERE OBESITY (HCC): ICD-10-CM

## 2020-10-02 PROCEDURE — G8417 CALC BMI ABV UP PARAM F/U: HCPCS | Performed by: ORTHOPAEDIC SURGERY

## 2020-10-02 PROCEDURE — G8536 NO DOC ELDER MAL SCRN: HCPCS | Performed by: ORTHOPAEDIC SURGERY

## 2020-10-02 PROCEDURE — 1101F PT FALLS ASSESS-DOCD LE1/YR: CPT | Performed by: ORTHOPAEDIC SURGERY

## 2020-10-02 PROCEDURE — G8510 SCR DEP NEG, NO PLAN REQD: HCPCS | Performed by: ORTHOPAEDIC SURGERY

## 2020-10-02 PROCEDURE — G8756 NO BP MEASURE DOC: HCPCS | Performed by: ORTHOPAEDIC SURGERY

## 2020-10-02 PROCEDURE — 20610 DRAIN/INJ JOINT/BURSA W/O US: CPT | Performed by: ORTHOPAEDIC SURGERY

## 2020-10-02 PROCEDURE — G8427 DOCREV CUR MEDS BY ELIG CLIN: HCPCS | Performed by: ORTHOPAEDIC SURGERY

## 2020-10-02 PROCEDURE — 1090F PRES/ABSN URINE INCON ASSESS: CPT | Performed by: ORTHOPAEDIC SURGERY

## 2020-10-02 PROCEDURE — G8400 PT W/DXA NO RESULTS DOC: HCPCS | Performed by: ORTHOPAEDIC SURGERY

## 2020-10-02 PROCEDURE — 99203 OFFICE O/P NEW LOW 30 MIN: CPT | Performed by: ORTHOPAEDIC SURGERY

## 2020-10-02 RX ORDER — HYALURONATE SODIUM 10 MG/ML
25 SYRINGE (ML) INTRAARTICULAR ONCE
Status: COMPLETED | OUTPATIENT
Start: 2020-10-02 | End: 2020-10-02

## 2020-10-02 RX ADMIN — Medication 25 MG: at 17:00

## 2020-10-02 NOTE — PROGRESS NOTES
Providers protocol for the intake nurse to complete in patient's chart:    Petrina Goltz presents today for   Chief Complaint   Patient presents with    Knee Pain     bilateral       Reason for visit - from intake sheet  Pain assessment  -  from intake sheet  Height - from intake sheet  Weight - from intake sheet  Medical Conditions - from intake sheet  Family medical history - from intake sheet  Social History, alcohol, smoking - from the intake sheet  The PHQ2 only questions - from the intake sheet  Learning Assessment - from the intake sheet    Temperature is taken by ETHEL DARLING - written on intake sheet  Travel Screening done by ETHEL CASTILLO [facility] [place]    Provider will complete patient's chart

## 2020-10-02 NOTE — PATIENT INSTRUCTIONS
Knee Pain or Injury: Care Instructions Your Care Instructions Injuries are a common cause of knee problems. Sudden (acute) injuries may be caused by a direct blow to the knee. They can also be caused by abnormal twisting, bending, or falling on the knee. Pain, bruising, or swelling may be severe, and may start within minutes of the injury. Overuse is another cause of knee pain. Other causes are climbing stairs, kneeling, and other activities that use the knee. Everyday wear and tear, especially as you get older, also can cause knee pain. Rest, along with home treatment, often relieves pain and allows your knee to heal. If you have a serious knee injury, you may need tests and treatment. Follow-up care is a key part of your treatment and safety. Be sure to make and go to all appointments, and call your doctor if you are having problems. It's also a good idea to know your test results and keep a list of the medicines you take. How can you care for yourself at home? · Be safe with medicines. Read and follow all instructions on the label. ? If the doctor gave you a prescription medicine for pain, take it as prescribed. ? If you are not taking a prescription pain medicine, ask your doctor if you can take an over-the-counter medicine. · Rest and protect your knee. Take a break from any activity that may cause pain. · Put ice or a cold pack on your knee for 10 to 20 minutes at a time. Put a thin cloth between the ice and your skin. · Prop up a sore knee on a pillow when you ice it or anytime you sit or lie down for the next 3 days. Try to keep it above the level of your heart. This will help reduce swelling. · If your knee is not swollen, you can put moist heat, a heating pad, or a warm cloth on your knee. · If your doctor recommends an elastic bandage, sleeve, or other type of support for your knee, wear it as directed.  
· Follow your doctor's instructions about how much weight you can put on your leg. Use a cane, crutches, or a walker as instructed. · Follow your doctor's instructions about activity during your healing process. If you can do mild exercise, slowly increase your activity. · Reach and stay at a healthy weight. Extra weight can strain the joints, especially the knees and hips, and make the pain worse. Losing even a few pounds may help. When should you call for help? Call 911 anytime you think you may need emergency care. For example, call if: 
  · You have symptoms of a blood clot in your lung (called a pulmonary embolism). These may include: 
? Sudden chest pain. ? Trouble breathing. ? Coughing up blood. Call your doctor now or seek immediate medical care if: 
  · You have severe or increasing pain.  
  · Your leg or foot turns cold or changes color.  
  · You cannot stand or put weight on your knee.  
  · Your knee looks twisted or bent out of shape.  
  · You cannot move your knee.  
  · You have signs of infection, such as: 
? Increased pain, swelling, warmth, or redness. ? Red streaks leading from the knee. ? Pus draining from a place on your knee. ? A fever.  
  · You have signs of a blood clot in your leg (called a deep vein thrombosis), such as: 
? Pain in your calf, back of the knee, thigh, or groin. ? Redness and swelling in your leg or groin. Watch closely for changes in your health, and be sure to contact your doctor if: 
  · You have tingling, weakness, or numbness in your knee.  
  · You have any new symptoms, such as swelling.  
  · You have bruises from a knee injury that last longer than 2 weeks.  
  · You do not get better as expected. Where can you learn more? Go to http://www.gray.com/ Enter K195 in the search box to learn more about \"Knee Pain or Injury: Care Instructions. \" Current as of: June 26, 2019               Content Version: 12.6 © 7350-2137 [employer]. Care instructions adapted under license by [employer] (which disclaims liability or warranty for this information). If you have questions about a medical condition or this instruction, always ask your healthcare professional. [address] any warranty or liability for your use of this information.

## 2020-10-02 NOTE — PROGRESS NOTES
Name: Oumou Shannon    : 1940     Service Dept: [address] [facility]    Patient's Pharmacies:    Pascual Kowalski [address] [facility], [address] [place] [address]  [address]  [address] [address]  Phone: [phone] Fax: [address] [id number] [place] [address]  Phone: [phone] Fax: [phone]       Chief Complaint   Patient presents with    Knee Pain     bilateral        Visit Vitals  Ht 5' 5\" (1.651 m)   Wt 215 lb (97.5 kg)   BMI 35.78 kg/m²        Allergies   Allergen Reactions    Kenalog [Triamcinolone Acetonide] Anaphylaxis    Penicillin G Hives    Adhesive Tape-Silicones Swelling    Adhesive Other (comments)    Bacitracin Not Reported This Time and Hives    Bacitracin Rash    Betadine [Povidone-Iodine] Other (comments)     Caused uncomfortable rash on area where placed    Cephalexin Rash and Itching    Ciprofloxacin Other (comments)    Cortane Shortness of Breath    Cortisone Not Reported This Time and Hives    Duloxetine Rash and Itching    Erythromycin Not Reported This Time and Hives    Erythromycin Rash    Kenalog [Triamcinolone Acetonide] Shortness of Breath    Lisinopril Not Reported This Time    Lisinopril Cough    Lortab [Hydrocodone-Acetaminophen] Rash    Macrobid [Nitrofurantoin Monohyd/M-Cryst] Other (comments)     Flushed face,leg swellinig    Macrobid [Nitrofurantoin Monohyd/M-Cryst] Other (comments)    Medrol [Methylprednisolone] Swelling    Methimazole Other (comments)     Neck pain/cough    Methimazole Cough    Neosporin [Hydrocortisone] Hives    Penicillins Not Reported This Time    Penicillins Rash    Prednimustine Shortness of Breath    Prednisolone Other (comments)    Prednisone Not Reported This Time    Propylthiouracil Other (comments)     Dizziness,elevated blood pressure    Propylthiouracil Other (comments)    Sulfamethoxazole-Trimethoprim Other (comments)    Tetanus And Diphtheria Toxoids, Adsorbed, Adult Swelling    Tetanus Vaccines And Toxoid Not Reported This Time        Current Outpatient Medications   Medication Sig Dispense Refill    DILT- mg capsule Take 1 capsule by mouth once daily 90 Cap 0    losartan (COZAAR) 25 mg tablet TAKE ONE TABLET BY MOUTH TWICE A  Tab 0    apixaban (Eliquis) 5 mg tablet Take 1 Tab by mouth two (2) times a day. 180 Tab 2    atenolol (TENORMIN) 50 mg tablet TAKE 1 TABLET BY MOUTH TWICE DAILY 180 Tab 2    levothyroxine (SYNTHROID) 25 mcg tablet Take 25 mcg by mouth.  gabapentin (NEURONTIN) 300 mg capsule Take 1 Cap by mouth three (3) times daily. Max Daily Amount: 900 mg. 270 Cap 1    estradiol (ESTRACE) 0.01 % (0.1 mg/gram) vaginal cream       OTHER       indapamide (LOZOL) 2.5 mg tablet       metFORMIN (GLUCOPHAGE) 850 mg tablet daily.  gluc-condr-om3-dha-epa-fish-st (GLUCOSAMINE CHONDROITIN PLUS) 602-045-55-54 mg cap Take 1 Tab by mouth daily. Indications: supplement      acetaminophen (TYLENOL) 500 mg tablet Take 500 mg by mouth every six (6) hours as needed for Pain.  calcium-cholecalciferol, d3, (CALCIUM 600 + D) 600-125 mg-unit tab Take 600 mg by mouth daily. Patient taking 4 times a week  Indications: Osteoporosis      Cholecalciferol, Vitamin D3, (VITAMIN D3) 1,000 unit cap Take 1,000 Units by mouth daily. Indications: Vitamin D Deficiency      pravastatin (PRAVACHOL) 40 mg tablet Take 40 mg by mouth daily. Indications: hypercholesterolemia      MELATONIN PO Take 5 mg by mouth as needed (sleep).        Current Facility-Administered Medications   Medication Dose Route Frequency Provider Last Rate Last Dose    sodium hyaluronate (SUPARTZ FX/HYALGAN/GENIVSC) 10 mg/mL injection syrg 25 mg  25 mg Intra artICUlar ONCE Jose Ludwig MD        sodium hyaluronate (SUPARTZ FX/HYALGAN/GENIVSC) 10 mg/mL injection syrg 25 mg  25 mg Intra artICUlar ONCE Carolee Pérez MD            Patient Active Problem List   Diagnosis Code    Essential hypertension, benign I10    Cardiomegaly I51.7    Obesity, unspecified E66.9    Other and unspecified hyperlipidemia E78.5    Type II or unspecified type diabetes mellitus without mention of complication, not stated as uncontrolled E11.9    Hypercholesterolemia E78.00    Palpitation R00.2    Hyperthyroidism E05.90    PAC (premature atrial contraction) I49.1    Type 2 diabetes mellitus without complication, without long-term current use of insulin (HCC) E11.9    Low back pain without sciatica M54.5    Lumbosacral spondylosis without myelopathy M47.817    DDD (degenerative disc disease), lumbar M51.36    Lumbar radiculopathy M54.16    Right knee pain M25.561    Osteoarthritis of both knees M17.0    Lumbar herniated disc M51.26    Spinal stenosis, lumbar region without neurogenic claudication M48.061    Spinal stenosis of lumbar region with neurogenic claudication M48.062    Spondylolisthesis of lumbar region M43.16    Long-term current use of opiate analgesic Z79.891    Spondylisthesis M43.10    Spinal stenosis M48.00    S/P lumbar fusion Z98.1    Left leg swelling M79.89    Type 2 diabetes mellitus with diabetic neuropathy (HCC) E11.40    Anticoagulant long-term use Z79.01        Family History   Problem Relation Age of Onset    Heart Disease Other         positive history of ischemic heart disease    Heart Disease Mother     Heart Disease Father     Heart Disease Brother     Cancer Brother         Social History     Socioeconomic History    Marital status:      Spouse name: Not on file    Number of children: Not on file    Years of education: Not on file    Highest education level: Not on file   Tobacco Use    Smoking status: Never Smoker    Smokeless tobacco: Never Used   Substance and Sexual Activity    Alcohol use: No    Drug use: No   Other Topics Concern   Social History Narrative    ** Merged History Encounter **             Past Surgical History:   Procedure Laterality Date    HX BACK SURGERY  08/01/2018    HX GYN      hysterectomy    HX HYSTERECTOMY      HX OTHER SURGICAL      HX OTHER SURGICAL  2007    ankle sure        Past Medical History:   Diagnosis Date    Cardiomegaly     [facility]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     Coarctation of aorta determined by echocardiography     Diabetes ([facility]     Dysuria     Essential hypertension, benign     stable    High blood pressure     Hypercholesterolemia     Hyperthyroidism     no meds    Obesity, unspecified     Pt has weight loss    Other and unspecified hyperlipidemia     Chol 172, ldl 82, hdl 62, tg 141    Recurrent UTI     Shingles     Type II or unspecified type diabetes mellitus without mention of complication, not stated as uncontrolled         I have reviewed and agree with [address] [place] [address] Nw and ROS and intake form in chart and the record. Review of Systems:   Patient is a pleasant appearing individual, appropriately dressed, well hydrated, well nourished, who is alert, appropriately oriented for age, and in no acute distress with a normal gait and normal affect who does not appear to be in any significant pain. Physical Exam:  Left Knee -Decrease range of motion with flexion, Knee arc of greater than 50 degrees, Some crepitation, Grossly neurovascularly intact, Good cap refill, No skin lesion, Moderate swelling, No gross instability, Some quadriceps weakness Kellgren and Mervin at least grade 3    Right Knee -Decrease range of motion with flexion, Some crepitation, Grossly neurovascularly intact, Good cap refill, No skin lesion, Moderate swelling, No gross instability, Some quadriceps weaknessKellgren and Mervin at least grade 3    The sites of injections were sterilely prepped.  The injections of 40 mg Kenalog and Lidocaine were administered appropriately in each space and the patient tolerated it well. No site reaction was identified. Appropriate dressing were placed. Consent was obtained for the injections. Encounter Diagnoses     ICD-10-CM ICD-9-CM   1. Acute pain of both knees  M25.561 338.19    M25.562 719.46   2. Primary osteoarthritis of right knee  M17.11 715.16   3. Primary osteoarthritis of left knee  M17.12 715.16          HPI:  The patient is here with a chief complaint of bilateral knee pain, sharp, throbbing pain, progressively getting worse. Ice has helped. Pain is 5/10. ROS:  10-point review of systems is positive for joint swelling. X-rays are positive for severe OA of both knees. Assessment/Plan:  1. Bilateral knee severe OA. Cortisone injection gives her flushing reaction. Plan will be for Supartz both knees, first one today. We will see the patient back in 1 week for second Supartz of both knees and go from there. Return to Office: Follow-up and Dispositions    · Return in about 1 week (around 10/9/2020) for for 2nd bilateral supartz. Scribed by Kaur Valiente LPN as dictated by [facility] GILDA Arguello MD.    Documentation True and Accepted Jose Arguello MD

## 2020-10-02 NOTE — LETTER
Ham Ryan 1940  
[id number]  
 
 
10/2/2020 I hereby authorize and direct Jose Og MD, Devon Hagen, and whomever he may designate as his associate to perform upon myself the following procedure: 
 
Injection of: Kenalog, Supartz, Euflexxa, Orthovisc in the Right/Left ____________________. If any unforeseen condition arises in the course of the procedure, I further authorize him and his associated and/or assistant(s) to do whatever he/she deems advisable. The nature, purpose, benefits, risks, side effects, likelihood of achieving goals, and potential problems that might occur during recuperation, risks for not receiving the proposed care, treatment and services and alternatives of the procedure have been fully explained to me by my physician including, but not limited to: 
 
Swelling, joint pain, skin pigment changes, worsening of condition, and failure to improve. I acknowledge that no guarantee or assurance has been made to me as to the results that may be obtained or the likelihood of success. _______________________________________ Signature of patient or authorized representative [employer] [facility] fax: [phone]

## 2020-10-05 PROBLEM — E66.01 SEVERE OBESITY (HCC): Status: ACTIVE | Noted: 2020-10-05

## 2020-10-09 ENCOUNTER — OFFICE VISIT (OUTPATIENT)
Dept: ORTHOPEDIC SURGERY | Age: 80
End: 2020-10-09
Payer: MEDICARE

## 2020-10-09 DIAGNOSIS — M25.561 PAIN IN BOTH KNEES, UNSPECIFIED CHRONICITY: Primary | ICD-10-CM

## 2020-10-09 DIAGNOSIS — M17.11 OSTEOARTHRITIS OF RIGHT KNEE, UNSPECIFIED OSTEOARTHRITIS TYPE: ICD-10-CM

## 2020-10-09 DIAGNOSIS — M17.12 OSTEOARTHRITIS OF LEFT KNEE, UNSPECIFIED OSTEOARTHRITIS TYPE: ICD-10-CM

## 2020-10-09 DIAGNOSIS — M25.562 PAIN IN BOTH KNEES, UNSPECIFIED CHRONICITY: Primary | ICD-10-CM

## 2020-10-09 PROCEDURE — 20611 DRAIN/INJ JOINT/BURSA W/US: CPT | Performed by: ORTHOPAEDIC SURGERY

## 2020-10-09 RX ORDER — HYALURONATE SODIUM 10 MG/ML
25 SYRINGE (ML) INTRAARTICULAR ONCE
Status: COMPLETED | OUTPATIENT
Start: 2020-10-09 | End: 2020-10-09

## 2020-10-09 RX ADMIN — Medication 25 MG: at 14:54

## 2020-10-09 NOTE — PROGRESS NOTES
Name: Ian Yanez    : 1940     Service Dept: [address] [facility]    Patient's Pharmacies:    Erin Valverde [address], [address]  [address]  [address] [address]  Phone: [phone] Fax: [address] [address] [address]  [address] [place] [address]  Phone: [phone] Fax: [phone]       Chief Complaint   Patient presents with    Knee Pain        There were no vitals taken for this visit.      Allergies   Allergen Reactions    Kenalog [Triamcinolone Acetonide] Anaphylaxis    Penicillin G Hives    Adhesive Tape-Silicones Swelling    Adhesive Other (comments)    Bacitracin Not Reported This Time and Hives    Bacitracin Rash    Betadine [Povidone-Iodine] Other (comments)     Caused uncomfortable rash on area where placed    Cephalexin Rash and Itching    Ciprofloxacin Other (comments)    Cortane Shortness of Breath    Cortisone Not Reported This Time and Hives    Duloxetine Rash and Itching    Erythromycin Not Reported This Time and Hives    Erythromycin Rash    Kenalog [Triamcinolone Acetonide] Shortness of Breath    Lisinopril Not Reported This Time    Lisinopril Cough    Lortab [Hydrocodone-Acetaminophen] Rash    Macrobid [Nitrofurantoin Monohyd/M-Cryst] Other (comments)     Flushed face,leg swellinig    Macrobid [Nitrofurantoin Monohyd/M-Cryst] Other (comments)    Medrol [Methylprednisolone] Swelling    Methimazole Other (comments)     Neck pain/cough    Methimazole Cough    Neosporin [Hydrocortisone] Hives    Penicillins Not Reported This Time    Penicillins Rash    Prednimustine Shortness of Breath    Prednisolone Other (comments)    Prednisone Not Reported This Time    Propylthiouracil Other (comments)     Dizziness,elevated blood pressure    Propylthiouracil Other (comments)    Sulfamethoxazole-Trimethoprim Other (comments)    Tetanus And Diphtheria Toxoids, Adsorbed, Adult Swelling    Tetanus Vaccines And Toxoid Not Reported This Time        Current Outpatient Medications   Medication Sig Dispense Refill    DILT- mg capsule Take 1 capsule by mouth once daily 90 Cap 0    losartan (COZAAR) 25 mg tablet TAKE ONE TABLET BY MOUTH TWICE A  Tab 0    apixaban (Eliquis) 5 mg tablet Take 1 Tab by mouth two (2) times a day. 180 Tab 2    atenolol (TENORMIN) 50 mg tablet TAKE 1 TABLET BY MOUTH TWICE DAILY 180 Tab 2    levothyroxine (SYNTHROID) 25 mcg tablet Take 25 mcg by mouth.  gabapentin (NEURONTIN) 300 mg capsule Take 1 Cap by mouth three (3) times daily. Max Daily Amount: 900 mg. 270 Cap 1    estradiol (ESTRACE) 0.01 % (0.1 mg/gram) vaginal cream       OTHER       indapamide (LOZOL) 2.5 mg tablet       metFORMIN (GLUCOPHAGE) 850 mg tablet daily.  gluc-condr-om3-dha-epa-fish-st (GLUCOSAMINE CHONDROITIN PLUS) 687-482-96-54 mg cap Take 1 Tab by mouth daily. Indications: supplement      MELATONIN PO Take 5 mg by mouth as needed (sleep).  acetaminophen (TYLENOL) 500 mg tablet Take 500 mg by mouth every six (6) hours as needed for Pain.  calcium-cholecalciferol, d3, (CALCIUM 600 + D) 600-125 mg-unit tab Take 600 mg by mouth daily. Patient taking 4 times a week  Indications: Osteoporosis      Cholecalciferol, Vitamin D3, (VITAMIN D3) 1,000 unit cap Take 1,000 Units by mouth daily. Indications: Vitamin D Deficiency      pravastatin (PRAVACHOL) 40 mg tablet Take 40 mg by mouth daily.  Indications: hypercholesterolemia       Current Facility-Administered Medications   Medication Dose Route Frequency Provider Last Rate Last Dose    sodium hyaluronate (SUPARTZ FX/HYALGAN/GENIVSC) 10 mg/mL injection syrg 25 mg  25 mg Intra artICUlar ONCE Jose Ludwig MD        sodium hyaluronate (SUPARTZ FX/HYALGAN/GENIVSC) 10 mg/mL injection syrg 25 mg  25 mg Intra artICUlar ONCE Allan Carter MD            Patient Active Problem List   Diagnosis Code    Essential hypertension, benign I10    Cardiomegaly I51.7    Obesity, unspecified E66.9    Other and unspecified hyperlipidemia E78.5    Type II or unspecified type diabetes mellitus without mention of complication, not stated as uncontrolled E11.9    Hypercholesterolemia E78.00    Palpitation R00.2    Hyperthyroidism E05.90    PAC (premature atrial contraction) I49.1    Type 2 diabetes mellitus without complication, without long-term current use of insulin (HCC) E11.9    Low back pain without sciatica M54.5    Lumbosacral spondylosis without myelopathy M47.817    DDD (degenerative disc disease), lumbar M51.36    Lumbar radiculopathy M54.16    Right knee pain M25.561    Osteoarthritis of both knees M17.0    Lumbar herniated disc M51.26    Spinal stenosis, lumbar region without neurogenic claudication M48.061    Spinal stenosis of lumbar region with neurogenic claudication M48.062    Spondylolisthesis of lumbar region M43.16    Long-term current use of opiate analgesic Z79.891    Spondylisthesis M43.10    Spinal stenosis M48.00    S/P lumbar fusion Z98.1    Left leg swelling M79.89    Type 2 diabetes mellitus with diabetic neuropathy (HCC) E11.40    Anticoagulant long-term use Z79.01    Severe obesity (HCC) E66.01        Family History   Problem Relation Age of Onset    Heart Disease Other         positive history of ischemic heart disease    Heart Disease Mother     Heart Disease Father     Heart Disease Brother     Cancer Brother         Social History     Socioeconomic History    Marital status:      Spouse name: Not on file    Number of children: Not on file    Years of education: Not on file    Highest education level: Not on file   Tobacco Use    Smoking status: Never Smoker    Smokeless tobacco: Never Used   Substance and Sexual Activity    Alcohol use: No    Drug use: No   Other Topics Concern   Social History Narrative    ** Merged History Encounter **             Past Surgical History:   Procedure Laterality Date    HX BACK SURGERY  08/01/2018    HX GYN      hysterectomy    HX HYSTERECTOMY      HX OTHER SURGICAL      HX OTHER SURGICAL  2007    ankle sure        Past Medical History:   Diagnosis Date    Cardiomegaly     [facility]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 ([facility] [address]     Coarctation of aorta determined by echocardiography     Diabetes ([facility] [address]     Dysuria     Essential hypertension, benign     stable    High blood pressure     Hypercholesterolemia     Hyperthyroidism     no meds    Obesity, unspecified     Pt has weight loss    Other and unspecified hyperlipidemia     Chol 172, ldl 82, hdl 62, tg 141    Recurrent UTI     Shingles     Type II or unspecified type diabetes mellitus without mention of complication, not stated as uncontrolled         I have reviewed and agree with [address] [facility] [address] and ROS and intake form in chart and the record. Review of Systems:   Patient is a pleasant appearing individual, appropriately dressed, well hydrated, well nourished, who is alert, appropriately oriented for age, and in no acute distress with a walker gait and normal affect who does not appear to be in any significant pain. Procedure Documentation:    Please note that "[employer] ultrasound was used to perform an ultrasound guided injection into the bilateral knees. A pre-injection ultrasound was taken of bilateral knees. After the needle was placed into the anterolateral portal(s) and while the Supartz was injected another ultrasound picture confirmed the appropriate placement in bilateral knees. The site of injection, bilateral knees, was sterilely prepped. The injection of Supartz was administered appropriately into bilateral knees and the patient tolerated it well. No site reaction was identified. Appropriate dressing was placed. Consent was obtained for the injection. Encounter Diagnoses     ICD-10-CM ICD-9-CM   1.  Pain in both knees, unspecified chronicity  M25.561 719.46    M25.562    2. Osteoarthritis of right knee, unspecified osteoarthritis type  M17.11 715.96   3. Osteoarthritis of left knee, unspecified osteoarthritis type  M17.12 715.96          HPI:  The patient is here with a chief complaint of bilateral knee pain, diagnosed with arthritis. Assessment/Plan:  Plan will be for second Supartz today in bilateral knees. See the patient back in 1 week for third Supartz in bilateral knees and go from there. Return to Office: Follow-up and Dispositions    · Return in about 2 weeks (around 10/23/2020) for 3rd bilateral supartz injection. Scribed by Matthew Rubio LPN as dictated by [facility] [place] GILDA Platt MD.    Documentation True and Accepted Jose Platt MD

## 2020-10-09 NOTE — PATIENT INSTRUCTIONS
Knee Pain or Injury: Care Instructions Your Care Instructions Injuries are a common cause of knee problems. Sudden (acute) injuries may be caused by a direct blow to the knee. They can also be caused by abnormal twisting, bending, or falling on the knee. Pain, bruising, or swelling may be severe, and may start within minutes of the injury. Overuse is another cause of knee pain. Other causes are climbing stairs, kneeling, and other activities that use the knee. Everyday wear and tear, especially as you get older, also can cause knee pain. Rest, along with home treatment, often relieves pain and allows your knee to heal. If you have a serious knee injury, you may need tests and treatment. Follow-up care is a key part of your treatment and safety. Be sure to make and go to all appointments, and call your doctor if you are having problems. It's also a good idea to know your test results and keep a list of the medicines you take. How can you care for yourself at home? · Be safe with medicines. Read and follow all instructions on the label. ? If the doctor gave you a prescription medicine for pain, take it as prescribed. ? If you are not taking a prescription pain medicine, ask your doctor if you can take an over-the-counter medicine. · Rest and protect your knee. Take a break from any activity that may cause pain. · Put ice or a cold pack on your knee for 10 to 20 minutes at a time. Put a thin cloth between the ice and your skin. · Prop up a sore knee on a pillow when you ice it or anytime you sit or lie down for the next 3 days. Try to keep it above the level of your heart. This will help reduce swelling. · If your knee is not swollen, you can put moist heat, a heating pad, or a warm cloth on your knee. · If your doctor recommends an elastic bandage, sleeve, or other type of support for your knee, wear it as directed.  
· Follow your doctor's instructions about how much weight you can put on your leg. Use a cane, crutches, or a walker as instructed. · Follow your doctor's instructions about activity during your healing process. If you can do mild exercise, slowly increase your activity. · Reach and stay at a healthy weight. Extra weight can strain the joints, especially the knees and hips, and make the pain worse. Losing even a few pounds may help. When should you call for help? Call 911 anytime you think you may need emergency care. For example, call if: 
  · You have symptoms of a blood clot in your lung (called a pulmonary embolism). These may include: 
? Sudden chest pain. ? Trouble breathing. ? Coughing up blood. Call your doctor now or seek immediate medical care if: 
  · You have severe or increasing pain.  
  · Your leg or foot turns cold or changes color.  
  · You cannot stand or put weight on your knee.  
  · Your knee looks twisted or bent out of shape.  
  · You cannot move your knee.  
  · You have signs of infection, such as: 
? Increased pain, swelling, warmth, or redness. ? Red streaks leading from the knee. ? Pus draining from a place on your knee. ? A fever.  
  · You have signs of a blood clot in your leg (called a deep vein thrombosis), such as: 
? Pain in your calf, back of the knee, thigh, or groin. ? Redness and swelling in your leg or groin. Watch closely for changes in your health, and be sure to contact your doctor if: 
  · You have tingling, weakness, or numbness in your knee.  
  · You have any new symptoms, such as swelling.  
  · You have bruises from a knee injury that last longer than 2 weeks.  
  · You do not get better as expected. Where can you learn more? Go to http://www.gray.com/ Enter K195 in the search box to learn more about \"Knee Pain or Injury: Care Instructions. \" Current as of: June 26, 2019               Content Version: 12.6 © 2355-6005 [employer]. Care instructions adapted under license by [employer] (which disclaims liability or warranty for this information). If you have questions about a medical condition or this instruction, always ask your healthcare professional. [address] any warranty or liability for your use of this information.

## 2020-10-16 DIAGNOSIS — I10 ESSENTIAL HYPERTENSION, BENIGN: ICD-10-CM

## 2020-10-16 DIAGNOSIS — I10 ESSENTIAL HYPERTENSION, BENIGN: Primary | ICD-10-CM

## 2020-10-19 RX ORDER — ATENOLOL 50 MG/1
TABLET ORAL
Qty: 180 TAB | Refills: 2 | Status: SHIPPED | OUTPATIENT
Start: 2020-10-19 | End: 2020-12-17 | Stop reason: SDUPTHER

## 2020-10-19 RX ORDER — LOSARTAN POTASSIUM 25 MG/1
TABLET ORAL
Qty: 180 TAB | Refills: 0 | Status: SHIPPED | OUTPATIENT
Start: 2020-10-19 | End: 2020-12-17 | Stop reason: SDUPTHER

## 2020-10-19 NOTE — TELEPHONE ENCOUNTER
Requested Prescriptions     Pending Prescriptions Disp Refills    atenoloL (TENORMIN) 50 mg tablet 180 Tab 2     Sig: TAKE 1 TABLET BY MOUTH TWICE DAILY

## 2020-10-23 ENCOUNTER — OFFICE VISIT (OUTPATIENT)
Dept: ORTHOPEDIC SURGERY | Age: 80
End: 2020-10-23
Payer: MEDICARE

## 2020-10-23 DIAGNOSIS — M25.561 RIGHT KNEE PAIN, UNSPECIFIED CHRONICITY: Primary | ICD-10-CM

## 2020-10-23 DIAGNOSIS — M25.561 PAIN IN BOTH KNEES, UNSPECIFIED CHRONICITY: ICD-10-CM

## 2020-10-23 DIAGNOSIS — M17.11 OSTEOARTHRITIS OF RIGHT KNEE, UNSPECIFIED OSTEOARTHRITIS TYPE: ICD-10-CM

## 2020-10-23 DIAGNOSIS — M17.12 OSTEOARTHRITIS OF LEFT KNEE, UNSPECIFIED OSTEOARTHRITIS TYPE: ICD-10-CM

## 2020-10-23 DIAGNOSIS — M25.562 PAIN IN BOTH KNEES, UNSPECIFIED CHRONICITY: ICD-10-CM

## 2020-10-23 PROCEDURE — 20611 DRAIN/INJ JOINT/BURSA W/US: CPT | Performed by: ORTHOPAEDIC SURGERY

## 2020-10-23 RX ORDER — HYALURONATE SODIUM 10 MG/ML
25 SYRINGE (ML) INTRAARTICULAR ONCE
Status: COMPLETED | OUTPATIENT
Start: 2020-10-23 | End: 2020-10-23

## 2020-10-23 RX ADMIN — Medication 25 MG: at 13:33

## 2020-10-23 NOTE — PROGRESS NOTES
Name: Apolinar Rain    : 1940     Service Dept: [address] [facility] and [facility] Medicine    Patient's Pharmacies:    [facility] [address] [facility], [address]  [address]  [address] [facility] [place] [address]  Phone: [phone] Fax: [id number] [place] [address]  Phone: [phone] Fax: [phone]       Chief Complaint   Patient presents with    Knee Pain        There were no vitals taken for this visit.      Allergies   Allergen Reactions    Kenalog [Triamcinolone Acetonide] Anaphylaxis    Penicillin G Hives    Adhesive Tape-Silicones Swelling    Adhesive Other (comments)    Bacitracin Not Reported This Time and Hives    Bacitracin Rash    Betadine [Povidone-Iodine] Other (comments)     Caused uncomfortable rash on area where placed    Cephalexin Rash and Itching    Ciprofloxacin Other (comments)    Clindamycin Other (comments)    Cortane Shortness of Breath    Cortisone Not Reported This Time and Hives    Duloxetine Rash and Itching    Erythromycin Not Reported This Time and Hives    Erythromycin Rash    Kenalog [Triamcinolone Acetonide] Shortness of Breath    Lisinopril Not Reported This Time    Lisinopril Cough    Lortab [Hydrocodone-Acetaminophen] Rash    Macrobid [Nitrofurantoin Monohyd/M-Cryst] Other (comments)     Flushed face,leg swellinig    Macrobid [Nitrofurantoin Monohyd/M-Cryst] Other (comments)    Medrol [Methylprednisolone] Swelling    Methimazole Other (comments)     Neck pain/cough    Methimazole Cough    Neosporin [Hydrocortisone] Hives    Penicillins Not Reported This Time    Penicillins Rash    Prednimustine Shortness of Breath    Prednisolone Other (comments)    Prednisone Not Reported This Time    Propylthiouracil Other (comments)     Dizziness,elevated blood pressure    Propylthiouracil Other (comments)    Sulfamethoxazole-Trimethoprim Other (comments)    Tetanus And Diphtheria Toxoids, Adsorbed, Adult Swelling    Tetanus Vaccines And Toxoid Not Reported This Time        Current Outpatient Medications   Medication Sig Dispense Refill    atenoloL (TENORMIN) 50 mg tablet TAKE 1 TABLET BY MOUTH TWICE DAILY 180 Tab 2    losartan (COZAAR) 25 mg tablet TAKE ONE TABLET BY MOUTH TWICE A  Tab 0    triamcinolone acetonide (KENALOG) 0.1 % topical cream Apply  to affected area three (3) times daily.  DILT- mg capsule Take 1 capsule by mouth once daily 90 Cap 0    apixaban (Eliquis) 5 mg tablet Take 1 Tab by mouth two (2) times a day. 180 Tab 2    levothyroxine (SYNTHROID) 25 mcg tablet Take 25 mcg by mouth.  gabapentin (NEURONTIN) 300 mg capsule Take 1 Cap by mouth three (3) times daily. Max Daily Amount: 900 mg. 270 Cap 1    estradiol (ESTRACE) 0.01 % (0.1 mg/gram) vaginal cream       OTHER       indapamide (LOZOL) 2.5 mg tablet       metFORMIN (GLUCOPHAGE) 850 mg tablet daily.  gluc-condr-om3-dha-epa-fish-st (GLUCOSAMINE CHONDROITIN PLUS) [id number] mg cap Take 1 Tab by mouth daily. Indications: supplement      MELATONIN PO Take 5 mg by mouth as needed (sleep).  acetaminophen (TYLENOL) 500 mg tablet Take 500 mg by mouth every six (6) hours as needed for Pain.  calcium-cholecalciferol, d3, (CALCIUM 600 + D) 600-125 mg-unit tab Take 600 mg by mouth daily. Patient taking 4 times a week  Indications: Osteoporosis      Cholecalciferol, Vitamin D3, (VITAMIN D3) 1,000 unit cap Take 1,000 Units by mouth daily. Indications: Vitamin D Deficiency      pravastatin (PRAVACHOL) 40 mg tablet Take 40 mg by mouth daily.  Indications: hypercholesterolemia          Patient Active Problem List   Diagnosis Code    Essential hypertension, benign I10    Cardiomegaly I51.7    Obesity, unspecified E66.9    Other and unspecified hyperlipidemia E78.5    Type II or unspecified type diabetes mellitus without mention of complication, not stated as uncontrolled E11.9    Hypercholesterolemia E78.00    Palpitation R00.2    Hyperthyroidism E05.90    PAC (premature atrial contraction) I49.1    Type 2 diabetes mellitus without complication, without long-term current use of insulin (HCC) E11.9    Low back pain without sciatica M54.5    Lumbosacral spondylosis without myelopathy M47.817    DDD (degenerative disc disease), lumbar M51.36    Lumbar radiculopathy M54.16    Right knee pain M25.561    Osteoarthritis of both knees M17.0    Lumbar herniated disc M51.26    Spinal stenosis, lumbar region without neurogenic claudication M48.061    Spinal stenosis of lumbar region with neurogenic claudication M48.062    Spondylolisthesis of lumbar region M43.16    Long-term current use of opiate analgesic Z79.891    Spondylisthesis M43.10    Spinal stenosis M48.00    S/P lumbar fusion Z98.1    Left leg swelling M79.89    Type 2 diabetes mellitus with diabetic neuropathy ([facility]) E11.40    Anticoagulant long-term use Z79.01    Severe obesity ([facility]) E66.01        Family History   Problem Relation Age of Onset    Heart Disease Other         positive history of ischemic heart disease    Heart Disease Mother     Heart Disease Father     Heart Disease Brother     Cancer Brother         Social History     Socioeconomic History    Marital status:      Spouse name: Not on file    Number of children: Not on file    Years of education: Not on file    Highest education level: Not on file   Tobacco Use    Smoking status: Never Smoker    Smokeless tobacco: Never Used   Substance and Sexual Activity    Alcohol use: No    Drug use: No   Other Topics Concern   Social History Narrative    ** Merged History Encounter **             Past Surgical History:   Procedure Laterality Date    HX BACK SURGERY  08/01/2018    HX GYN      hysterectomy    HX HYSTERECTOMY      HX OTHER SURGICAL      HX OTHER SURGICAL  2007    ankle sure Past Medical History:   Diagnosis Date    Cardiomegaly     Ch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 [facility]     Coarctation of aorta determined by echocardiography     Diabetes ([facility]     Dysuria     Essential hypertension, benign     stable    High blood pressure     Hypercholesterolemia     Hyperthyroidism     no meds    Obesity, unspecified     Pt has weight loss    Other and unspecified hyperlipidemia     Chol 172, ldl 82, hdl 62, tg 141    Recurrent UTI     Shingles     Type II or unspecified type diabetes mellitus without mention of complication, not stated as uncontrolled         I have reviewed and agree with PFSH and ROS and intake form in chart and the record. Review of Systems:   Left Knee -Decrease range of motion with flexion, Knee arc of greater than 50 degrees, Some crepitation, Grossly neurovascularly intact, Good cap refill, No skin lesion, Moderate swelling, No gross instability, Some quadriceps weakness Kellgren and Mervin at least grade 3    Right Knee -Decrease range of motion with flexion, Some crepitation, Grossly neurovascularly intact, Good cap refill, No skin lesion, Moderate swelling, No gross instability, Some quadriceps weaknessKellgren and Mervin at least grade 3  Procedure Documentation:    Please note that ReadOz ultrasound was used to perform an ultrasound guided injection into the bilateral knees. A pre-injection ultrasound was taken of bilateral knees. After the needle was placed into the anterolateral portal(s) and while the Supartz was injected another ultrasound picture confirmed the appropriate placement in bilateral knees. The site of injection, bilateral knees, was sterilely prepped. The injection of Supartz was administered appropriately into bilateral knees and the patient tolerated it well. No site reaction was identified. Appropriate dressing was placed. Consent was obtained for the injection. Encounter Diagnoses     ICD-10-CM ICD-9-CM   1.  Right knee pain, unspecified chronicity  M25.561 719.46          HPI:  The patient is here with a chief complaint of bilateral knee pain. We are going to go and inject bilateral knee with third Supartz today. Of note, she wants to be evaluated for knee replacement. She is 78, but has some other medical issues. Assessment/Plan:  Plan will be for right total knee replacement, general medical and cardiac clearance, and Dr. Bry Isaac for vascular clearance. We will do [facility] at [facility] as an inpatient setting because of her heart, and she may even look into rehab postoperatively depending on how she is doing, and we will go from there. Return to Office: Follow-up and Dispositions    · Return in about 1 week (around 10/30/2020) for 1 week 4th supartz bilateral.             Scribed by Teresa Moon LPN as dictated by [facility] [place] GILDA Mason MD.    Documentation True and Accepted [facility] GILDA Mason MD

## 2020-10-23 NOTE — LETTER
Sanket [facility] 1940  
[id number]  
 
 
10/23/2020 I hereby authorize and direct Jose Odom MD, Julio Huston, and whomever he may designate as his associate to perform upon myself the following procedure: 
 
Injection of: Kenalog, Supartz, Euflexxa, Orthovisc in the Right/Left ____________________. If any unforeseen condition arises in the course of the procedure, I further authorize him and his associated and/or assistant(s) to do whatever he/she deems advisable. The nature, purpose, benefits, risks, side effects, likelihood of achieving goals, and potential problems that might occur during recuperation, risks for not receiving the proposed care, treatment and services and alternatives of the procedure have been fully explained to me by my physician including, but not limited to: 
 
Swelling, joint pain, skin pigment changes, worsening of condition, and failure to improve. I acknowledge that no guarantee or assurance has been made to me as to the results that may be obtained or the likelihood of success. _______________________________________ Signature of patient or authorized representative [employer] [facility] fax: [phone]

## 2020-10-23 NOTE — PATIENT INSTRUCTIONS
Knee Pain or Injury: Care Instructions Your Care Instructions Injuries are a common cause of knee problems. Sudden (acute) injuries may be caused by a direct blow to the knee. They can also be caused by abnormal twisting, bending, or falling on the knee. Pain, bruising, or swelling may be severe, and may start within minutes of the injury. Overuse is another cause of knee pain. Other causes are climbing stairs, kneeling, and other activities that use the knee. Everyday wear and tear, especially as you get older, also can cause knee pain. Rest, along with home treatment, often relieves pain and allows your knee to heal. If you have a serious knee injury, you may need tests and treatment. Follow-up care is a key part of your treatment and safety. Be sure to make and go to all appointments, and call your doctor if you are having problems. It's also a good idea to know your test results and keep a list of the medicines you take. How can you care for yourself at home? · Be safe with medicines. Read and follow all instructions on the label. ? If the doctor gave you a prescription medicine for pain, take it as prescribed. ? If you are not taking a prescription pain medicine, ask your doctor if you can take an over-the-counter medicine. · Rest and protect your knee. Take a break from any activity that may cause pain. · Put ice or a cold pack on your knee for 10 to 20 minutes at a time. Put a thin cloth between the ice and your skin. · Prop up a sore knee on a pillow when you ice it or anytime you sit or lie down for the next 3 days. Try to keep it above the level of your heart. This will help reduce swelling. · If your knee is not swollen, you can put moist heat, a heating pad, or a warm cloth on your knee. · If your doctor recommends an elastic bandage, sleeve, or other type of support for your knee, wear it as directed.  
· Follow your doctor's instructions about how much weight you can put on your leg. Use a cane, crutches, or a walker as instructed. · Follow your doctor's instructions about activity during your healing process. If you can do mild exercise, slowly increase your activity. · Reach and stay at a healthy weight. Extra weight can strain the joints, especially the knees and hips, and make the pain worse. Losing even a few pounds may help. When should you call for help? Call 911 anytime you think you may need emergency care. For example, call if: 
  · You have symptoms of a blood clot in your lung (called a pulmonary embolism). These may include: 
? Sudden chest pain. ? Trouble breathing. ? Coughing up blood. Call your doctor now or seek immediate medical care if: 
  · You have severe or increasing pain.  
  · Your leg or foot turns cold or changes color.  
  · You cannot stand or put weight on your knee.  
  · Your knee looks twisted or bent out of shape.  
  · You cannot move your knee.  
  · You have signs of infection, such as: 
? Increased pain, swelling, warmth, or redness. ? Red streaks leading from the knee. ? Pus draining from a place on your knee. ? A fever.  
  · You have signs of a blood clot in your leg (called a deep vein thrombosis), such as: 
? Pain in your calf, back of the knee, thigh, or groin. ? Redness and swelling in your leg or groin. Watch closely for changes in your health, and be sure to contact your doctor if: 
  · You have tingling, weakness, or numbness in your knee.  
  · You have any new symptoms, such as swelling.  
  · You have bruises from a knee injury that last longer than 2 weeks.  
  · You do not get better as expected. Where can you learn more? Go to http://www.gray.com/ Enter K195 in the search box to learn more about \"Knee Pain or Injury: Care Instructions. \" Current as of: June 26, 2019               Content Version: 12.6 © 1106-5124 [employer]. Care instructions adapted under license by [employer] (which disclaims liability or warranty for this information). If you have questions about a medical condition or this instruction, always ask your healthcare professional. [address] any warranty or liability for your use of this information.

## 2020-10-27 ENCOUNTER — OFFICE VISIT (OUTPATIENT)
Dept: ORTHOPEDIC SURGERY | Age: 80
End: 2020-10-27
Payer: MEDICARE

## 2020-10-27 VITALS
TEMPERATURE: 98.4 F | SYSTOLIC BLOOD PRESSURE: 153 MMHG | BODY MASS INDEX: 35.84 KG/M2 | RESPIRATION RATE: 14 BRPM | WEIGHT: 215.4 LBS | HEART RATE: 85 BPM | DIASTOLIC BLOOD PRESSURE: 81 MMHG

## 2020-10-27 DIAGNOSIS — M48.062 SPINAL STENOSIS OF LUMBAR REGION WITH NEUROGENIC CLAUDICATION: ICD-10-CM

## 2020-10-27 DIAGNOSIS — M43.16 SPONDYLOLISTHESIS OF LUMBAR REGION: Primary | ICD-10-CM

## 2020-10-27 PROCEDURE — G8432 DEP SCR NOT DOC, RNG: HCPCS | Performed by: NURSE PRACTITIONER

## 2020-10-27 PROCEDURE — G8400 PT W/DXA NO RESULTS DOC: HCPCS | Performed by: NURSE PRACTITIONER

## 2020-10-27 PROCEDURE — G8754 DIAS BP LESS 90: HCPCS | Performed by: NURSE PRACTITIONER

## 2020-10-27 PROCEDURE — G8753 SYS BP > OR = 140: HCPCS | Performed by: NURSE PRACTITIONER

## 2020-10-27 PROCEDURE — 99213 OFFICE O/P EST LOW 20 MIN: CPT | Performed by: NURSE PRACTITIONER

## 2020-10-27 PROCEDURE — 1090F PRES/ABSN URINE INCON ASSESS: CPT | Performed by: NURSE PRACTITIONER

## 2020-10-27 PROCEDURE — G8417 CALC BMI ABV UP PARAM F/U: HCPCS | Performed by: NURSE PRACTITIONER

## 2020-10-27 PROCEDURE — G8427 DOCREV CUR MEDS BY ELIG CLIN: HCPCS | Performed by: NURSE PRACTITIONER

## 2020-10-27 PROCEDURE — 1101F PT FALLS ASSESS-DOCD LE1/YR: CPT | Performed by: NURSE PRACTITIONER

## 2020-10-27 PROCEDURE — G8536 NO DOC ELDER MAL SCRN: HCPCS | Performed by: NURSE PRACTITIONER

## 2020-10-27 NOTE — PROGRESS NOTES
[address]  [address], [address]  [place], [address] [place] [address] Street  Phone: [phone]  Fax: [phone]  PROGRESS NOTE  Patient: Lubna Adkins                MRN: [id number]       SSN: [id number]  YOB: 1940        AGE: 78 y.o. SEX: female  Body mass index is 35.84 kg/m². PCP: Amor Rousseau MD  10/27/20    Chief Complaint   Patient presents with    Back Pain     fu        HISTORY OF PRESENT ILLNESS:  Arina Ramos is a 78 y.o.  female with history of back greater than BLE R>L pain  who had L4-5 PLF surgery 8/2018 for stenosis and spondylolisthesis. She has a known listhesis above and below her surgery. After surgery she had an increase in her RLE radicular pain and some hip flexor weakness. We got a CT scan in the hospital, that showed good alignment. Pain prior to surgery was mostly in the back and some BLE leg pain in the L4-5 distribution from buttock to calfs and described as aching, burning, numbing and throbbing. Pain is worse with standing and walking and affects sleep and recreational activities. Pain is better with relaxation, pain medication and lying down. At her last visit she was having a flare of R-sided back pain. I referred her to aqua therapy. Today, she is doing better, the adjustment in her Gabapentin has allowed her to sleep at night. Her back pain is a 4/10 and is manageable. She maybe getting her R knee replaced. She does have some chronic RLE pain, it did get better after her surgery, her leg pain is about the same. pain is aching, burning and numbing, pain is worse with walking and affects sleep. Pain is better with relaxation and pain medication.     Denies bladder/bowel dysfunction, saddle paresthesia, weakness, gait disturbance, or other neurological deficits.      Medications: Gabapentin 300/600mg with moderate, relief On Eliquis.  Pt had Cymbalta previously w/ benefit and no SEs     PMHx: DM, Hyperthyroid-w/ radio-active Tx, A-Fib, overactive bladder. ASSESSMENT   Diagnoses and all orders for this visit:    1. Spondylolisthesis of lumbar region    2. Spinal stenosis of lumbar region with neurogenic claudication         IMPRESSION AND PLAN:  This is a pt with stenosis and spondy, currently stable. > Pt was given information on back care   > Pt may call for Cymbalta Rx would start on 20mg  > Continue Gabapentin  > Ms. James Daugherty has a reminder for a \"due or due soon\" health maintenance. I have asked that she contact her primary care provider, Tiny Sterling MD, for follow-up on this health maintenance.  > We have informed patient to notify us for immediate appointment if he has any worsening neurogical symptoms or if an emergency situation presents, then call 911  >  has been reviewed and is appropriate  > Pt will follow-up in 3mo . Subjective    Pain Scale: 4/10    Pain Assessment  10/27/2020   Location of Pain Back   Pain Location Comment -   Location Modifiers -   Severity of Pain 4   Quality of Pain Aching   Quality of Pain Comment -   Duration of Pain -   Frequency of Pain Intermittent   Aggravating Factors Walking;Exercise   Aggravating Factors Comment -   Limiting Behavior Some   Relieving Factors Ice;Rest   Relieving Factors Comment tylenol   Result of Injury -         REVIEW OF SYSTEMS  Constitutional: Negative for fever, chills, or weight change. Respiratory: Negative for cough or shortness of breath. Cardiovascular: Negative for chest pain or palpitations. Gastrointestinal: Negative for incontinence, acid reflux, change in bowel habits, or constipation. Genitourinary: Negative for incontinence, dysuria and flank pain. Musculoskeletal: Positive for back and knee pain. See HPI. Skin: Negative for rash. Neurological:RLE L4-5  radiculopathy. See HPI. Endo/Heme/Allergies: Negative. Psychiatric/Behavioral: Negative.       PHYSICAL EXAMINATION  Visit Vitals  BP (!) 153/81   Pulse 85 Temp 98.4 °F (36.9 °C) (Temporal)   Resp 14   Wt 215 lb 6.4 oz (97.7 kg)   BMI 35.84 kg/m²         Accompanied by Daughter. Constitutional:  Well developed, well nourished, in no acute distress. Psychiatric: Affect and mood are appropriate. Integumentary: No rashes or abrasions noted on exposed areas. Cardiovascular/Peripheral Vascular: +2 radial & pedal pulses. No peripheral edema is noted. Lymphatic:  No evidence of lymphedema. No cervical lymphadenopathy. SPINE/MUSCULOSKELETAL EXAM     Lumbar spine:  No rash, ecchymosis, or gross obliquity. No fasciculations. No focal atrophy is noted. Range of motion is decreased with flexion, extension, turning right, turning left. Tenderness to palpation R low back L3-5. No tenderness to palpation at the sciatic notch. SI joints non-tender. Trochanters non tender. Straight leg raise Neg  Hip Impingement neg    Sensation grossly intact to light touch. MOTOR:     Hip Flex Quads Hamstrings Ankle DF EHL Ankle PF   Right +4/5 +4/5 +4/5 +4/5 +4/5 +4/5   Left +4/5 +4/5 +4/5 +4/5 +4/5 +4/5         Ambulation with walker. FWB.     Seated walker, + 's cart        PAST MEDICAL HISTORY   Past Medical History:   Diagnosis Date    Cardiomegaly     [facility]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 [facility] [address]     Coarctation of aorta determined by echocardiography     Diabetes ([facility] [facility]     Dysuria     Essential hypertension, benign     stable    High blood pressure     Hypercholesterolemia     Hyperthyroidism     no meds    Obesity, unspecified     Pt has weight loss    Other and unspecified hyperlipidemia     Chol 172, ldl 82, hdl 62, tg 141    Recurrent UTI     Shingles     Type II or unspecified type diabetes mellitus without mention of complication, not stated as uncontrolled        Past Surgical History:   Procedure Laterality Date    HX BACK SURGERY  08/01/2018    HX GYN      hysterectomy    HX HYSTERECTOMY      HX OTHER SURGICAL      HX OTHER SURGICAL 2007    ankle sure   . MEDICATIONS      Current Outpatient Medications   Medication Sig Dispense Refill    atenoloL (TENORMIN) 50 mg tablet TAKE 1 TABLET BY MOUTH TWICE DAILY 180 Tab 2    losartan (COZAAR) 25 mg tablet TAKE ONE TABLET BY MOUTH TWICE A  Tab 0    triamcinolone acetonide (KENALOG) 0.1 % topical cream Apply  to affected area three (3) times daily.  DILT- mg capsule Take 1 capsule by mouth once daily 90 Cap 0    apixaban (Eliquis) 5 mg tablet Take 1 Tab by mouth two (2) times a day. 180 Tab 2    levothyroxine (SYNTHROID) 25 mcg tablet Take 25 mcg by mouth.  gabapentin (NEURONTIN) 300 mg capsule Take 1 Cap by mouth three (3) times daily. Max Daily Amount: 900 mg. 270 Cap 1    estradiol (ESTRACE) 0.01 % (0.1 mg/gram) vaginal cream       OTHER       indapamide (LOZOL) 2.5 mg tablet       metFORMIN (GLUCOPHAGE) 850 mg tablet daily.  gluc-condr-om3-dha-epa-fish-st (GLUCOSAMINE CHONDROITIN PLUS) 460-381-95-54 mg cap Take 1 Tab by mouth daily. Indications: supplement      MELATONIN PO Take 5 mg by mouth as needed (sleep).  acetaminophen (TYLENOL) 500 mg tablet Take 500 mg by mouth every six (6) hours as needed for Pain.  calcium-cholecalciferol, d3, (CALCIUM 600 + D) 600-125 mg-unit tab Take 600 mg by mouth daily. Patient taking 4 times a week  Indications: Osteoporosis      Cholecalciferol, Vitamin D3, (VITAMIN D3) 1,000 unit cap Take 1,000 Units by mouth daily. Indications: Vitamin D Deficiency      pravastatin (PRAVACHOL) 40 mg tablet Take 40 mg by mouth daily.  Indications: hypercholesterolemia          ALLERGIES    Allergies   Allergen Reactions    Kenalog [Triamcinolone Acetonide] Anaphylaxis    Penicillin G Hives    Adhesive Tape-Silicones Swelling    Adhesive Other (comments)    Bacitracin Not Reported This Time and Hives    Bacitracin Rash    Betadine [Povidone-Iodine] Other (comments)     Caused uncomfortable rash on area where placed    Cephalexin Rash and Itching    Ciprofloxacin Other (comments)    Clindamycin Other (comments)    Cortane Shortness of Breath    Cortisone Not Reported This Time and Hives    Duloxetine Rash and Itching    Erythromycin Not Reported This Time and Hives    Erythromycin Rash    Kenalog [Triamcinolone Acetonide] Shortness of Breath    Lisinopril Not Reported This Time    Lisinopril Cough    Lortab [Hydrocodone-Acetaminophen] Rash    Macrobid [Nitrofurantoin Monohyd/M-Cryst] Other (comments)     Flushed face,leg swellinig    Macrobid [Nitrofurantoin Monohyd/M-Cryst] Other (comments)    Medrol [Methylprednisolone] Swelling    Methimazole Other (comments)     Neck pain/cough    Methimazole Cough    Neosporin [Hydrocortisone] Hives    Penicillins Not Reported This Time    Penicillins Rash    Prednimustine Shortness of Breath    Prednisolone Other (comments)    Prednisone Not Reported This Time    Propylthiouracil Other (comments)     Dizziness,elevated blood pressure    Propylthiouracil Other (comments)    Sulfamethoxazole-Trimethoprim Other (comments)    Tetanus And Diphtheria Toxoids, Adsorbed, Adult Swelling    Tetanus Vaccines And Toxoid Not Reported This Time          SOCIAL HISTORY    Social History     Socioeconomic History    Marital status:      Spouse name: Not on file    Number of children: Not on file    Years of education: Not on file    Highest education level: Not on file   Occupational History    Not on file   Social Needs    Financial resource strain: Not on file    Food insecurity     Worry: Not on file     Inability: Not on file    Transportation needs     Medical: Not on file     Non-medical: Not on file   Tobacco Use    Smoking status: Never Smoker    Smokeless tobacco: Never Used   Substance and Sexual Activity    Alcohol use: No    Drug use: No    Sexual activity: Not on file   Lifestyle    Physical activity     Days per week: Not on file     Minutes per session: Not on file    Stress: Not on file   Relationships    Social connections     Talks on phone: Not on file     Gets together: Not on file     Attends Nondenominational service: Not on file     Active member of club or organization: Not on file     Attends meetings of clubs or organizations: Not on file     Relationship status: Not on file    Intimate partner violence     Fear of current or ex partner: Not on file     Emotionally abused: Not on file     Physically abused: Not on file     Forced sexual activity: Not on file   Other Topics Concern     Service Not Asked    Blood Transfusions Not Asked    Caffeine Concern Not Asked    Occupational Exposure Not Asked   [place] Not Asked    Sleep Concern Not Asked    Stress Concern Not Asked    Weight Concern Not Asked    Special Diet Not Asked    Back Care Not Asked    Exercise Not Asked    Bike Helmet Not Asked    Seat Belt Not Asked    Self-Exams Not Asked   Social History Narrative    ** Merged History Encounter **            FAMILY HISTORY    Family History   Problem Relation Age of Onset    Heart Disease Other         positive history of ischemic heart disease    Heart Disease Mother     Heart Disease Father     Heart Disease Brother     Cancer Brother          Shilpi Donis NP

## 2020-10-27 NOTE — PATIENT INSTRUCTIONS
Back Stretches: Exercises Introduction Here are some examples of exercises for stretching your back. Start each exercise slowly. Ease off the exercise if you start to have pain. Your doctor or physical therapist will tell you when you can start these exercises and which ones will work best for you. How to do the exercises Overhead stretch 1. Stand comfortably with your feet shoulder-width apart. 2. Looking straight ahead, raise both arms over your head and reach toward the ceiling. Do not allow your head to tilt back. 3. Hold for 15 to 30 seconds, then lower your arms to your sides. 4. Repeat 2 to 4 times. Side stretch 1. Stand comfortably with your feet shoulder-width apart. 2. Raise one arm over your head, and then lean to the other side. 3. Slide your hand down your leg as you let the weight of your arm gently stretch your side muscles. Hold for 15 to 30 seconds. 4. Repeat 2 to 4 times on each side. Press-up 1. Lie on your stomach, supporting your body with your forearms. 2. Press your elbows down into the floor to raise your upper back. As you do this, relax your stomach muscles and allow your back to arch without using your back muscles. As your press up, do not let your hips or pelvis come off the floor. 3. Hold for 15 to 30 seconds, then relax. 4. Repeat 2 to 4 times. Relax and rest  
1. Lie on your back with a rolled towel under your neck and a pillow under your knees. Extend your arms comfortably to your sides. 2. Relax and breathe normally. 3. Remain in this position for about 10 minutes. 4. If you can, do this 2 or 3 times each day. Follow-up care is a key part of your treatment and safety. Be sure to make and go to all appointments, and call your doctor if you are having problems. It's also a good idea to know your test results and keep a list of the medicines you take. Where can you learn more? Go to http://www.[employer].com/ Enter V036 in the search box to learn more about \"Back Stretches: Exercises. \" Current as of: March 2, 2020               Content Version: 12.6 © 2006-2020 [employer]. Care instructions adapted under license by [employer] (which disclaims liability or warranty for this information). If you have questions about a medical condition or this instruction, always ask your healthcare professional. [place] [address] any warranty or liability for your use of this information.

## 2020-10-27 NOTE — PROGRESS NOTES
Jeramy Tyler presents today for   Chief Complaint   Patient presents with    Back Pain     fu        Is someone accompanying this pt? YES    Is the patient using any DME equipment during OV? YES, rollator    Depression Screening:  3 most recent PHQ Screens 10/27/2020   Little interest or pleasure in doing things Not at all   Feeling down, depressed, irritable, or hopeless Not at all   Total Score PHQ 2 0       Learning Assessment:  Learning Assessment 10/2/2020   PRIMARY LEARNER Patient   HIGHEST LEVEL OF EDUCATION - PRIMARY LEARNER  GRADUATED HIGH SCHOOL OR GED   BARRIERS PRIMARY LEARNER NONE   PRIMARY LANGUAGE ENGLISH   LEARNER PREFERENCE PRIMARY DEMONSTRATION   ANSWERED BY patient   RELATIONSHIP SELF       Abuse Screening:  No flowsheet data found. Fall Risk  Fall Risk Assessment, last 12 mths 10/27/2020   Able to walk? Yes   Fall in past 12 months? No       OPIOID RISK TOOL  Opioid Risk Tool 7/3/2018   Family history of alcohol abuse? 1   Family history of illegal drug abuse? 0   Family history of prescription drug abuse? 0   Personal history of alcohol abuse? 0   Personal history of illegal drug abuse? 0   Personal history of prescription drug abuse? 0   Age range between 17-45? 0   History of preadolescent sexual abuse? 0   ADD, OCD, bipolar, schizophrenia? 0   Depression? 0   Opioid Risk Total Score 1       Coordination of Care:  1. Have you been to the ER, urgent care clinic since your last visit? NO  Hospitalized since your last visit? NO    2. Have you seen or consulted any other health care providers outside of the [address] [place] [address] since your last visit? NO Include any pap smears or colon screening.  NO    Last  Checked 10/27/2020

## 2020-10-28 ENCOUNTER — PATIENT MESSAGE (OUTPATIENT)
Dept: ORTHOPEDIC SURGERY | Age: 80
End: 2020-10-28

## 2020-10-29 RX ORDER — DULOXETIN HYDROCHLORIDE 20 MG/1
20 CAPSULE, DELAYED RELEASE ORAL DAILY
Qty: 30 CAP | Refills: 1 | Status: SHIPPED | OUTPATIENT
Start: 2020-10-29 | End: 2021-01-28 | Stop reason: SDUPTHER

## 2020-10-29 NOTE — TELEPHONE ENCOUNTER
From: Emiliano Chaney  To: Chayo Gilbert MD  Sent: 10/28/2020 7:01 PM EDT  Subject: Prescription Question    Hi Ulysses Fries. Thank you for taking such good care of me. My family and I appreciate everything you have done. I would like a prescription for Cymbalta sent to [facility] on [facility] in [place]. Thank you for taking care of this.

## 2020-10-30 ENCOUNTER — OFFICE VISIT (OUTPATIENT)
Dept: ORTHOPEDIC SURGERY | Age: 80
End: 2020-10-30
Payer: MEDICARE

## 2020-10-30 DIAGNOSIS — M25.561 PAIN IN BOTH KNEES, UNSPECIFIED CHRONICITY: Primary | ICD-10-CM

## 2020-10-30 DIAGNOSIS — M25.562 PAIN IN BOTH KNEES, UNSPECIFIED CHRONICITY: Primary | ICD-10-CM

## 2020-10-30 DIAGNOSIS — M17.11 OSTEOARTHRITIS OF RIGHT KNEE, UNSPECIFIED OSTEOARTHRITIS TYPE: ICD-10-CM

## 2020-10-30 DIAGNOSIS — M17.12 OSTEOARTHRITIS OF LEFT KNEE, UNSPECIFIED OSTEOARTHRITIS TYPE: ICD-10-CM

## 2020-10-30 PROCEDURE — 20611 DRAIN/INJ JOINT/BURSA W/US: CPT | Performed by: ORTHOPAEDIC SURGERY

## 2020-10-30 RX ORDER — HYALURONATE SODIUM 10 MG/ML
25 SYRINGE (ML) INTRAARTICULAR ONCE
Status: COMPLETED | OUTPATIENT
Start: 2020-10-30 | End: 2020-10-30

## 2020-10-30 RX ADMIN — Medication 25 MG: at 13:30

## 2020-10-30 RX ADMIN — Medication 25 MG: at 13:29

## 2020-10-30 NOTE — LETTER
[place] Murillo 1940  
[id number]  
 
 
10/30/2020 I hereby authorize and direct Jose Guillory Ala, MD, Eliu Schilling, and whomever he may designate as his associate to perform upon myself the following procedure: 
 
Injection of: Kenalog, Supartz, Euflexxa, Orthovisc in the Right/Left ____________________. If any unforeseen condition arises in the course of the procedure, I further authorize him and his associated and/or assistant(s) to do whatever he/she deems advisable. The nature, purpose, benefits, risks, side effects, likelihood of achieving goals, and potential problems that might occur during recuperation, risks for not receiving the proposed care, treatment and services and alternatives of the procedure have been fully explained to me by my physician including, but not limited to: 
 
Swelling, joint pain, skin pigment changes, worsening of condition, and failure to improve. I acknowledge that no guarantee or assurance has been made to me as to the results that may be obtained or the likelihood of success. _______________________________________ Signature of patient or authorized representative [employer] [facility] fax: [phone]

## 2020-10-30 NOTE — PROGRESS NOTES
Name: Berry Prince    : 1940     Service Dept: [address] [facility]    Patient's Pharmacies:    Mary Rutledge [address] [facility], [address]  [address]  8 [facility] [place] [address]  Phone: [phone] Fax: [place] [phone] - Danny Arias [address] [address]  Phone: [phone] Fax: [phone]  [phone] - [place], [place] [address] [place] [address] E  [address] [place] [address]  Phone: [phone] Fax: [phone]       Chief Complaint   Patient presents with    Knee Pain        There were no vitals taken for this visit.      Allergies   Allergen Reactions    Kenalog [Triamcinolone Acetonide] Anaphylaxis    Penicillin G Hives    Adhesive Tape-Silicones Swelling    Adhesive Other (comments)    Bacitracin Not Reported This Time and Hives    Bacitracin Rash    Betadine [Povidone-Iodine] Other (comments)     Caused uncomfortable rash on area where placed    Cephalexin Rash and Itching    Ciprofloxacin Other (comments)    Clindamycin Other (comments)    Cortane Shortness of Breath    Cortisone Not Reported This Time and Hives    Duloxetine Rash and Itching    Erythromycin Not Reported This Time and Hives    Erythromycin Rash    Kenalog [Triamcinolone Acetonide] Shortness of Breath    Lisinopril Not Reported This Time    Lisinopril Cough    Lortab [Hydrocodone-Acetaminophen] Rash    Macrobid [Nitrofurantoin Monohyd/M-Cryst] Other (comments)     Flushed face,leg swellinig    Macrobid [Nitrofurantoin Monohyd/M-Cryst] Other (comments)    Medrol [Methylprednisolone] Swelling    Methimazole Other (comments)     Neck pain/cough    Methimazole Cough    Neosporin [Hydrocortisone] Hives    Penicillins Not Reported This Time    Penicillins Rash    Prednimustine Shortness of Breath    Prednisolone Other (comments)    Prednisone Not Reported This Time    Propylthiouracil Other (comments)     Dizziness,elevated blood pressure    Propylthiouracil Other (comments)    Sulfamethoxazole-Trimethoprim Other (comments)    Tetanus And Diphtheria Toxoids, Adsorbed, Adult Swelling    Tetanus Vaccines And Toxoid Not Reported This Time        Current Outpatient Medications   Medication Sig Dispense Refill    DULoxetine (CYMBALTA) 20 mg capsule Take 1 Cap by mouth daily. 30 Cap 1    atenoloL (TENORMIN) 50 mg tablet TAKE 1 TABLET BY MOUTH TWICE DAILY 180 Tab 2    losartan (COZAAR) 25 mg tablet TAKE ONE TABLET BY MOUTH TWICE A  Tab 0    triamcinolone acetonide (KENALOG) 0.1 % topical cream Apply  to affected area three (3) times daily.  DILT- mg capsule Take 1 capsule by mouth once daily 90 Cap 0    apixaban (Eliquis) 5 mg tablet Take 1 Tab by mouth two (2) times a day. 180 Tab 2    levothyroxine (SYNTHROID) 25 mcg tablet Take 25 mcg by mouth.  gabapentin (NEURONTIN) 300 mg capsule Take 1 Cap by mouth three (3) times daily. Max Daily Amount: 900 mg. 270 Cap 1    estradiol (ESTRACE) 0.01 % (0.1 mg/gram) vaginal cream       OTHER       indapamide (LOZOL) 2.5 mg tablet       metFORMIN (GLUCOPHAGE) 850 mg tablet daily.  gluc-condr-om3-dha-epa-fish-st (GLUCOSAMINE CHONDROITIN PLUS) 959-171-90-54 mg cap Take 1 Tab by mouth daily. Indications: supplement      MELATONIN PO Take 5 mg by mouth as needed (sleep).  acetaminophen (TYLENOL) 500 mg tablet Take 500 mg by mouth every six (6) hours as needed for Pain.  calcium-cholecalciferol, d3, (CALCIUM 600 + D) 600-125 mg-unit tab Take 600 mg by mouth daily. Patient taking 4 times a week  Indications: Osteoporosis      Cholecalciferol, Vitamin D3, (VITAMIN D3) 1,000 unit cap Take 1,000 Units by mouth daily. Indications: Vitamin D Deficiency      pravastatin (PRAVACHOL) 40 mg tablet Take 40 mg by mouth daily.  Indications: hypercholesterolemia       Current Facility-Administered Medications Medication Dose Route Frequency Provider Last Rate Last Dose    sodium hyaluronate (SUPARTZ FX/HYALGAN/GENIVSC) 10 mg/mL injection syrg 25 mg  25 mg Intra artICUlar ONCE Jose Ludwig MD        sodium hyaluronate (SUPARTZ FX/HYALGAN/GENIVSC) 10 mg/mL injection syrg 25 mg  25 mg Intra artICUlar ONCE Asia Johnston MD            Patient Active Problem List   Diagnosis Code    Essential hypertension, benign I10    Cardiomegaly I51.7    Obesity, unspecified E66.9    Other and unspecified hyperlipidemia E78.5    Type II or unspecified type diabetes mellitus without mention of complication, not stated as uncontrolled E11.9    Hypercholesterolemia E78.00    Palpitation R00.2    Hyperthyroidism E05.90    PAC (premature atrial contraction) I49.1    Type 2 diabetes mellitus without complication, without long-term current use of insulin (HCC) E11.9    Low back pain without sciatica M54.5    Lumbosacral spondylosis without myelopathy M47.817    DDD (degenerative disc disease), lumbar M51.36    Lumbar radiculopathy M54.16    Right knee pain M25.561    Osteoarthritis of both knees M17.0    Lumbar herniated disc M51.26    Spinal stenosis, lumbar region without neurogenic claudication M48.061    Spinal stenosis of lumbar region with neurogenic claudication M48.062    Spondylolisthesis of lumbar region M43.16    Long-term current use of opiate analgesic Z79.891    Spondylisthesis M43.10    Spinal stenosis M48.00    S/P lumbar fusion Z98.1    Left leg swelling M79.89    Type 2 diabetes mellitus with diabetic neuropathy (HCC) E11.40    Anticoagulant long-term use Z79.01    Severe obesity (HCC) E66.01        Family History   Problem Relation Age of Onset    Heart Disease Other         positive history of ischemic heart disease    Heart Disease Mother     Heart Disease Father     Heart Disease Brother     Cancer Brother         Social History     Socioeconomic History    Marital status:  Spouse name: Not on file    Number of children: Not on file    Years of education: Not on file    Highest education level: Not on file   Tobacco Use    Smoking status: Never Smoker    Smokeless tobacco: Never Used   Substance and Sexual Activity    Alcohol use: No    Drug use: No   Other Topics Concern   Social History Narrative    ** Merged History Encounter **             Past Surgical History:   Procedure Laterality Date    HX BACK SURGERY  08/01/2018    HX GYN      hysterectomy    HX HYSTERECTOMY      HX OTHER SURGICAL      HX OTHER SURGICAL  2007    ankle sure        Past Medical History:   Diagnosis Date    Cardiomegaly     [facility]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     Coarctation of aorta determined by echocardiography     Diabetes ([facility]     Dysuria     Essential hypertension, benign     stable    High blood pressure     Hypercholesterolemia     Hyperthyroidism     no meds    Obesity, unspecified     Pt has weight loss    Other and unspecified hyperlipidemia     Chol 172, ldl 82, hdl 62, tg 141    Recurrent UTI     Shingles     Type II or unspecified type diabetes mellitus without mention of complication, not stated as uncontrolled         I have reviewed and agree with [address] and ROS and intake form in chart and the record. Review of Systems:   Patient is a pleasant appearing individual, appropriately dressed, well hydrated, well nourished, who is alert, appropriately oriented for age, and in no acute distress with a walker gait and normal affect who does not appear to be in any significant pain.          Physical Exam:  Left Knee -Decrease range of motion with flexion, Knee arc of greater than 50 degrees, Some crepitation, Grossly neurovascularly intact, Good cap refill, No skin lesion, Moderate swelling, No gross instability, Some quadriceps weakness Kellgren and Mervin at least grade 3    Right Knee -Decrease range of motion with flexion, Some crepitation, Grossly neurovascularly intact, Good cap refill, No skin lesion, Moderate swelling, No gross instability, Some quadriceps weaknessKellgren and Mervin at least grade 3. Procedure Documentation:    Please note that [employer] ultrasound was used to perform an ultrasound guided injection into the bilateral knees. A pre-injection ultrasound was taken of bilateral knees. After the needle was placed into the anterolateral portal(s) and while the Supartz was injected another ultrasound picture confirmed the appropriate placement in bilateral knees. The site of injection, bilateral knees, was sterilely prepped. The injection of Supartz was administered appropriately into bilateral knees and the patient tolerated it well. No site reaction was identified. Appropriate dressing was placed. Consent was obtained for the injection. Encounter Diagnoses     ICD-10-CM ICD-9-CM   1. Pain in both knees, unspecified chronicity  M25.561 719.46    M25.562    2. Osteoarthritis of left knee, unspecified osteoarthritis type  M17.12 715.96   3. Osteoarthritis of right knee, unspecified osteoarthritis type  M17.11 715.96          Assessment/Plan: We are going to go ahead and injection bilateral knees with 4th Supartz today. See the patient back in one week for 5th Supartz bilateral knees and go from there. Return to Office:           Scribed by Dot Sings as dictated by [facility] GILDA Haile MD.    Documentation True and Accepted Jose Haile MD

## 2020-10-30 NOTE — PATIENT INSTRUCTIONS
Knee Pain or Injury: Care Instructions Your Care Instructions Injuries are a common cause of knee problems. Sudden (acute) injuries may be caused by a direct blow to the knee. They can also be caused by abnormal twisting, bending, or falling on the knee. Pain, bruising, or swelling may be severe, and may start within minutes of the injury. Overuse is another cause of knee pain. Other causes are climbing stairs, kneeling, and other activities that use the knee. Everyday wear and tear, especially as you get older, also can cause knee pain. Rest, along with home treatment, often relieves pain and allows your knee to heal. If you have a serious knee injury, you may need tests and treatment. Follow-up care is a key part of your treatment and safety. Be sure to make and go to all appointments, and call your doctor if you are having problems. It's also a good idea to know your test results and keep a list of the medicines you take. How can you care for yourself at home? · Be safe with medicines. Read and follow all instructions on the label. ? If the doctor gave you a prescription medicine for pain, take it as prescribed. ? If you are not taking a prescription pain medicine, ask your doctor if you can take an over-the-counter medicine. · Rest and protect your knee. Take a break from any activity that may cause pain. · Put ice or a cold pack on your knee for 10 to 20 minutes at a time. Put a thin cloth between the ice and your skin. · Prop up a sore knee on a pillow when you ice it or anytime you sit or lie down for the next 3 days. Try to keep it above the level of your heart. This will help reduce swelling. · If your knee is not swollen, you can put moist heat, a heating pad, or a warm cloth on your knee. · If your doctor recommends an elastic bandage, sleeve, or other type of support for your knee, wear it as directed.  
· Follow your doctor's instructions about how much weight you can put on your leg. Use a cane, crutches, or a walker as instructed. · Follow your doctor's instructions about activity during your healing process. If you can do mild exercise, slowly increase your activity. · Reach and stay at a healthy weight. Extra weight can strain the joints, especially the knees and hips, and make the pain worse. Losing even a few pounds may help. When should you call for help? Call 911 anytime you think you may need emergency care. For example, call if: 
  · You have symptoms of a blood clot in your lung (called a pulmonary embolism). These may include: 
? Sudden chest pain. ? Trouble breathing. ? Coughing up blood. Call your doctor now or seek immediate medical care if: 
  · You have severe or increasing pain.  
  · Your leg or foot turns cold or changes color.  
  · You cannot stand or put weight on your knee.  
  · Your knee looks twisted or bent out of shape.  
  · You cannot move your knee.  
  · You have signs of infection, such as: 
? Increased pain, swelling, warmth, or redness. ? Red streaks leading from the knee. ? Pus draining from a place on your knee. ? A fever.  
  · You have signs of a blood clot in your leg (called a deep vein thrombosis), such as: 
? Pain in your calf, back of the knee, thigh, or groin. ? Redness and swelling in your leg or groin. Watch closely for changes in your health, and be sure to contact your doctor if: 
  · You have tingling, weakness, or numbness in your knee.  
  · You have any new symptoms, such as swelling.  
  · You have bruises from a knee injury that last longer than 2 weeks.  
  · You do not get better as expected. Where can you learn more? Go to http://www.gray.com/ Enter K195 in the search box to learn more about \"Knee Pain or Injury: Care Instructions. \" Current as of: June 26, 2019               Content Version: 12.6 © 5731-9150 [employer]. Care instructions adapted under license by [employer] (which disclaims liability or warranty for this information). If you have questions about a medical condition or this instruction, always ask your healthcare professional. [address] any warranty or liability for your use of this information.

## 2020-11-06 ENCOUNTER — OFFICE VISIT (OUTPATIENT)
Dept: ORTHOPEDIC SURGERY | Age: 80
End: 2020-11-06
Payer: MEDICARE

## 2020-11-06 DIAGNOSIS — M17.11 PRIMARY OSTEOARTHRITIS OF RIGHT KNEE: Primary | ICD-10-CM

## 2020-11-06 DIAGNOSIS — M17.12 PRIMARY OSTEOARTHRITIS OF LEFT KNEE: ICD-10-CM

## 2020-11-06 PROCEDURE — 20611 DRAIN/INJ JOINT/BURSA W/US: CPT | Performed by: ORTHOPAEDIC SURGERY

## 2020-11-06 RX ORDER — HYALURONATE SODIUM 10 MG/ML
25 SYRINGE (ML) INTRAARTICULAR ONCE
Status: COMPLETED | OUTPATIENT
Start: 2020-11-06 | End: 2020-11-06

## 2020-11-06 RX ADMIN — Medication 25 MG: at 13:16

## 2020-11-06 NOTE — PROGRESS NOTES
Name: Tara Aguirre    : 1940     Service Dept: [address] [facility]    Patient's Pharmacies:    [address] [facility] [address], [address]  [address]  8 [place] [address]  Phone: [phone] Fax: [address] 10 [phone] - [place], [address]  [place] [address]  Phone: [phone] Fax: [phone] - [place], [address]  [address] [address] [address]  Phone: [phone] Fax: [phone]       Chief Complaint   Patient presents with    Injection     Supartz 5th Bilateral    Knee Pain        There were no vitals taken for this visit.      Allergies   Allergen Reactions    Kenalog [Triamcinolone Acetonide] Anaphylaxis    Penicillin G Hives    Adhesive Tape-Silicones Swelling    Adhesive Other (comments)    Bacitracin Not Reported This Time and Hives    Bacitracin Rash    Betadine [Povidone-Iodine] Other (comments)     Caused uncomfortable rash on area where placed    Cephalexin Rash and Itching    Ciprofloxacin Other (comments)    Clindamycin Other (comments)    Cortane Shortness of Breath    Cortisone Not Reported This Time and Hives    Duloxetine Rash and Itching    Erythromycin Not Reported This Time and Hives    Erythromycin Rash    Kenalog [Triamcinolone Acetonide] Shortness of Breath    Lisinopril Not Reported This Time    Lisinopril Cough    Lortab [Hydrocodone-Acetaminophen] Rash    Macrobid [Nitrofurantoin Monohyd/M-Cryst] Other (comments)     Flushed face,leg swellinig    Macrobid [Nitrofurantoin Monohyd/M-Cryst] Other (comments)    Medrol [Methylprednisolone] Swelling    Methimazole Other (comments)     Neck pain/cough    Methimazole Cough    Neosporin [Hydrocortisone] Hives    Penicillins Not Reported This Time    Penicillins Rash    Prednimustine Shortness of Breath    Prednisolone Other (comments)    Prednisone Not Reported This Time    Propylthiouracil Other (comments)     Dizziness,elevated blood pressure    Propylthiouracil Other (comments)    Sulfamethoxazole-Trimethoprim Other (comments)    Tetanus And Diphtheria Toxoids, Adsorbed, Adult Swelling    Tetanus Vaccines And Toxoid Not Reported This Time        Current Outpatient Medications   Medication Sig Dispense Refill    DULoxetine (CYMBALTA) 20 mg capsule Take 1 Cap by mouth daily. 30 Cap 1    atenoloL (TENORMIN) 50 mg tablet TAKE 1 TABLET BY MOUTH TWICE DAILY 180 Tab 2    losartan (COZAAR) 25 mg tablet TAKE ONE TABLET BY MOUTH TWICE A  Tab 0    triamcinolone acetonide (KENALOG) 0.1 % topical cream Apply  to affected area three (3) times daily.  DILT- mg capsule Take 1 capsule by mouth once daily 90 Cap 0    apixaban (Eliquis) 5 mg tablet Take 1 Tab by mouth two (2) times a day. 180 Tab 2    levothyroxine (SYNTHROID) 25 mcg tablet Take 25 mcg by mouth.  gabapentin (NEURONTIN) 300 mg capsule Take 1 Cap by mouth three (3) times daily. Max Daily Amount: 900 mg. 270 Cap 1    estradiol (ESTRACE) 0.01 % (0.1 mg/gram) vaginal cream       OTHER       indapamide (LOZOL) 2.5 mg tablet       metFORMIN (GLUCOPHAGE) 850 mg tablet daily.  gluc-condr-om3-dha-epa-fish-st (GLUCOSAMINE CHONDROITIN PLUS) 157-101-40-54 mg cap Take 1 Tab by mouth daily. Indications: supplement      MELATONIN PO Take 5 mg by mouth as needed (sleep).  acetaminophen (TYLENOL) 500 mg tablet Take 500 mg by mouth every six (6) hours as needed for Pain.  calcium-cholecalciferol, d3, (CALCIUM 600 + D) 600-125 mg-unit tab Take 600 mg by mouth daily. Patient taking 4 times a week  Indications: Osteoporosis      Cholecalciferol, Vitamin D3, (VITAMIN D3) 1,000 unit cap Take 1,000 Units by mouth daily. Indications: Vitamin D Deficiency      pravastatin (PRAVACHOL) 40 mg tablet Take 40 mg by mouth daily.  Indications: hypercholesterolemia          Patient Active Problem List   Diagnosis Code    Essential hypertension, benign I10    Cardiomegaly I51.7    Obesity, unspecified E66.9    Other and unspecified hyperlipidemia E78.5    Type II or unspecified type diabetes mellitus without mention of complication, not stated as uncontrolled E11.9    Hypercholesterolemia E78.00    Palpitation R00.2    Hyperthyroidism E05.90    PAC (premature atrial contraction) I49.1    Type 2 diabetes mellitus without complication, without long-term current use of insulin (HCC) E11.9    Low back pain without sciatica M54.5    Lumbosacral spondylosis without myelopathy M47.817    DDD (degenerative disc disease), lumbar M51.36    Lumbar radiculopathy M54.16    Right knee pain M25.561    Osteoarthritis of both knees M17.0    Lumbar herniated disc M51.26    Spinal stenosis, lumbar region without neurogenic claudication M48.061    Spinal stenosis of lumbar region with neurogenic claudication M48.062    Spondylolisthesis of lumbar region M43.16    Long-term current use of opiate analgesic Z79.891    Spondylisthesis M43.10    Spinal stenosis M48.00    S/P lumbar fusion Z98.1    Left leg swelling M79.89    Type 2 diabetes mellitus with diabetic neuropathy (HCC) E11.40    Anticoagulant long-term use Z79.01    Severe obesity (HCC) E66.01        Family History   Problem Relation Age of Onset    Heart Disease Other         positive history of ischemic heart disease    Heart Disease Mother     Heart Disease Father     Heart Disease Brother     Cancer Brother         Social History     Socioeconomic History    Marital status:      Spouse name: Not on file    Number of children: Not on file    Years of education: Not on file    Highest education level: Not on file   Tobacco Use    Smoking status: Never Smoker    Smokeless tobacco: Never Used   Substance and Sexual Activity    Alcohol use: No    Drug use: No   Other Topics Concern   Social History Narrative    ** Merged History Encounter **             Past Surgical History:   Procedure Laterality Date    HX BACK SURGERY  08/01/2018    HX GYN      hysterectomy    HX HYSTERECTOMY      HX OTHER SURGICAL      HX OTHER SURGICAL  2007    ankle sure        Past Medical History:   Diagnosis Date    Cardiomegaly     [facility]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 ([facility]     Coarctation of aorta determined by echocardiography     Diabetes ([facility]     Dysuria     Essential hypertension, benign     stable    High blood pressure     Hypercholesterolemia     Hyperthyroidism     no meds    Obesity, unspecified     Pt has weight loss    Other and unspecified hyperlipidemia     Chol 172, ldl 82, hdl 62, tg 141    Recurrent UTI     Shingles     Type II or unspecified type diabetes mellitus without mention of complication, not stated as uncontrolled         I have reviewed and agree with [address] [place] [address] Nw and ROS and intake form in chart and the record. Review of Systems:   Patient is a pleasant appearing individual, appropriately dressed, well hydrated, well nourished, who is alert, appropriately oriented for age, and in no acute distress with a walker gait and normal affect who does not appear to be in any significant pain. Physical Exam:  Left Knee -Decrease range of motion with flexion, Knee arc of greater than 50 degrees, Some crepitation, Grossly neurovascularly intact, Good cap refill, No skin lesion, Moderate swelling, No gross instability, Some quadriceps weakness Kellgren and Mervin at least grade 3    Right Knee -Decrease range of motion with flexion, Some crepitation, Grossly neurovascularly intact, Good cap refill, No skin lesion, Moderate swelling, No gross instability, Some quadriceps weaknessKellgren and Mervin at least grade 3    Procedure Documentation:    Please note that [employer] ultrasound was used to perform an ultrasound guided injection into the bilateral knees.  A pre-injection ultrasound was taken of bilateral knees. After the needle was placed into the anterolateral portal(s) and while the Supartz was injected another ultrasound picture confirmed the appropriate placement in bilateral knees. The site of injection, bilateral knees, was sterilely prepped. The injection of Supartz was administered appropriately into bilateral knees and the patient tolerated it well. No site reaction was identified. Appropriate dressing was placed. Consent was obtained for the injection. Encounter Diagnoses     ICD-10-CM ICD-9-CM   1. Primary osteoarthritis of right knee  M17.11 715.16   2. Primary osteoarthritis of left knee  M17.12 715.16          HPI:  The patient is here with a chief complaint of bilateral knee pain. Assessment/Plan: We are going to go and inject bilateral knee with the fifth Supartz today. See the patient back as needed. If she gets worse, she is to give me a call. Return to Office: Follow-up and Dispositions    · Return for PRN. Administrations This Visit     sodium hyaluronate (SUPARTZ FX/HYALGAN/GENIVSC) 10 mg/mL injection syrg 25 mg     Admin Date  11/06/2020 Action  Given Dose  25 mg Route  Intra artICUlar Administered By  Anna Stallings Date  11/06/2020 Action  Given Dose  25 mg Route  Intra artICUlar Administered By  Lacie Robertson                 Scribed by Markus Pacheco as dictated by [facility] GILDA Rojas MD.    Documentation True and Accepted Jose Rojas MD

## 2020-11-06 NOTE — PATIENT INSTRUCTIONS
Knee Pain or Injury: Care Instructions Your Care Instructions Injuries are a common cause of knee problems. Sudden (acute) injuries may be caused by a direct blow to the knee. They can also be caused by abnormal twisting, bending, or falling on the knee. Pain, bruising, or swelling may be severe, and may start within minutes of the injury. Overuse is another cause of knee pain. Other causes are climbing stairs, kneeling, and other activities that use the knee. Everyday wear and tear, especially as you get older, also can cause knee pain. Rest, along with home treatment, often relieves pain and allows your knee to heal. If you have a serious knee injury, you may need tests and treatment. Follow-up care is a key part of your treatment and safety. Be sure to make and go to all appointments, and call your doctor if you are having problems. It's also a good idea to know your test results and keep a list of the medicines you take. How can you care for yourself at home? · Be safe with medicines. Read and follow all instructions on the label. ? If the doctor gave you a prescription medicine for pain, take it as prescribed. ? If you are not taking a prescription pain medicine, ask your doctor if you can take an over-the-counter medicine. · Rest and protect your knee. Take a break from any activity that may cause pain. · Put ice or a cold pack on your knee for 10 to 20 minutes at a time. Put a thin cloth between the ice and your skin. · Prop up a sore knee on a pillow when you ice it or anytime you sit or lie down for the next 3 days. Try to keep it above the level of your heart. This will help reduce swelling. · If your knee is not swollen, you can put moist heat, a heating pad, or a warm cloth on your knee. · If your doctor recommends an elastic bandage, sleeve, or other type of support for your knee, wear it as directed.  
· Follow your doctor's instructions about how much weight you can put on your leg. Use a cane, crutches, or a walker as instructed. · Follow your doctor's instructions about activity during your healing process. If you can do mild exercise, slowly increase your activity. · Reach and stay at a healthy weight. Extra weight can strain the joints, especially the knees and hips, and make the pain worse. Losing even a few pounds may help. When should you call for help? Call 911 anytime you think you may need emergency care. For example, call if: 
  · You have symptoms of a blood clot in your lung (called a pulmonary embolism). These may include: 
? Sudden chest pain. ? Trouble breathing. ? Coughing up blood. Call your doctor now or seek immediate medical care if: 
  · You have severe or increasing pain.  
  · Your leg or foot turns cold or changes color.  
  · You cannot stand or put weight on your knee.  
  · Your knee looks twisted or bent out of shape.  
  · You cannot move your knee.  
  · You have signs of infection, such as: 
? Increased pain, swelling, warmth, or redness. ? Red streaks leading from the knee. ? Pus draining from a place on your knee. ? A fever.  
  · You have signs of a blood clot in your leg (called a deep vein thrombosis), such as: 
? Pain in your calf, back of the knee, thigh, or groin. ? Redness and swelling in your leg or groin. Watch closely for changes in your health, and be sure to contact your doctor if: 
  · You have tingling, weakness, or numbness in your knee.  
  · You have any new symptoms, such as swelling.  
  · You have bruises from a knee injury that last longer than 2 weeks.  
  · You do not get better as expected. Where can you learn more? Go to http://sherly-mary.info/ Enter K195 in the search box to learn more about \"Knee Pain or Injury: Care Instructions. \" Current as of: June 26, 2019               Content Version: 12.6 © 8943-8304 [employer]. Care instructions adapted under license by [employer] (which disclaims liability or warranty for this information). If you have questions about a medical condition or this instruction, always ask your healthcare professional. [address] any warranty or liability for your use of this information.

## 2020-11-06 NOTE — LETTER
Brenda Valiente 1940  
[id number]  
 
 
11/6/2020 I hereby authorize and direct Jose Cárdenas MD, Noreen Hernandez, and whomever he may designate as his associate to perform upon myself the following procedure: 
 
Injection of: Kenalog, Supartz, Euflexxa, Orthovisc in the Right/Left ____________________. If any unforeseen condition arises in the course of the procedure, I further authorize him and his associated and/or assistant(s) to do whatever he/she deems advisable. The nature, purpose, benefits, risks, side effects, likelihood of achieving goals, and potential problems that might occur during recuperation, risks for not receiving the proposed care, treatment and services and alternatives of the procedure have been fully explained to me by my physician including, but not limited to: 
 
Swelling, joint pain, skin pigment changes, worsening of condition, and failure to improve. I acknowledge that no guarantee or assurance has been made to me as to the results that may be obtained or the likelihood of success. _______________________________________ Signature of patient or authorized representative [employer] [facility] fax: [phone]

## 2020-12-04 ENCOUNTER — OFFICE VISIT (OUTPATIENT)
Dept: ORTHOPEDIC SURGERY | Age: 80
End: 2020-12-04
Payer: MEDICARE

## 2020-12-04 DIAGNOSIS — M17.11 OSTEOARTHRITIS OF RIGHT KNEE, UNSPECIFIED OSTEOARTHRITIS TYPE: Primary | ICD-10-CM

## 2020-12-04 DIAGNOSIS — M17.12 OSTEOARTHRITIS OF LEFT KNEE, UNSPECIFIED OSTEOARTHRITIS TYPE: ICD-10-CM

## 2020-12-04 PROCEDURE — 1101F PT FALLS ASSESS-DOCD LE1/YR: CPT | Performed by: ORTHOPAEDIC SURGERY

## 2020-12-04 PROCEDURE — 99213 OFFICE O/P EST LOW 20 MIN: CPT | Performed by: ORTHOPAEDIC SURGERY

## 2020-12-04 PROCEDURE — G8417 CALC BMI ABV UP PARAM F/U: HCPCS | Performed by: ORTHOPAEDIC SURGERY

## 2020-12-04 PROCEDURE — G8400 PT W/DXA NO RESULTS DOC: HCPCS | Performed by: ORTHOPAEDIC SURGERY

## 2020-12-04 PROCEDURE — G8536 NO DOC ELDER MAL SCRN: HCPCS | Performed by: ORTHOPAEDIC SURGERY

## 2020-12-04 PROCEDURE — G8432 DEP SCR NOT DOC, RNG: HCPCS | Performed by: ORTHOPAEDIC SURGERY

## 2020-12-04 PROCEDURE — 1090F PRES/ABSN URINE INCON ASSESS: CPT | Performed by: ORTHOPAEDIC SURGERY

## 2020-12-04 PROCEDURE — G8427 DOCREV CUR MEDS BY ELIG CLIN: HCPCS | Performed by: ORTHOPAEDIC SURGERY

## 2020-12-04 NOTE — PROGRESS NOTES
Name: Emiliano Chaney    : 1940     Service Dept: [address] [facility]    Patient's Pharmacies:    Alveta Seal [address] [facility], [address]  [address]  [address] [facility] [place] [address]  Phone: [phone] Fax: [phone] - [address] [facility] [address] [address]  Phone: [phone] Fax: [phone] - [place] [address] [place] [address] E  [address] [place] [address]  Phone: [phone] Fax: [phone]       Chief Complaint   Patient presents with    Knee Pain        There were no vitals taken for this visit.    Allergies   Allergen Reactions    Kenalog [Triamcinolone Acetonide] Anaphylaxis    Penicillin G Hives    Adhesive Tape-Silicones Swelling    Adhesive Other (comments)    Bacitracin Not Reported This Time and Hives    Bacitracin Rash    Betadine [Povidone-Iodine] Other (comments)     Caused uncomfortable rash on area where placed    Cephalexin Rash and Itching    Ciprofloxacin Other (comments)    Clindamycin Other (comments)    Cortane Shortness of Breath    Cortisone Not Reported This Time and Hives    Duloxetine Rash and Itching    Erythromycin Not Reported This Time and Hives    Erythromycin Rash    Kenalog [Triamcinolone Acetonide] Shortness of Breath    Lisinopril Not Reported This Time    Lisinopril Cough    Lortab [Hydrocodone-Acetaminophen] Rash    Macrobid [Nitrofurantoin Monohyd/M-Cryst] Other (comments)     Flushed face,leg swellinig    Macrobid [Nitrofurantoin Monohyd/M-Cryst] Other (comments)    Medrol [Methylprednisolone] Swelling    Methimazole Other (comments)     Neck pain/cough    Methimazole Cough    Neosporin [Hydrocortisone] Hives    Penicillins Not Reported This Time    Penicillins Rash    Prednimustine Shortness of Breath    Prednisolone Other (comments)    Prednisone Not Reported This Time    Propylthiouracil Other (comments)     Dizziness,elevated blood pressure    Propylthiouracil Other (comments)    Sulfamethoxazole-Trimethoprim Other (comments)    Tetanus And Diphtheria Toxoids, Adsorbed, Adult Swelling    Tetanus Vaccines And Toxoid Not Reported This Time      Current Outpatient Medications   Medication Sig Dispense Refill    DULoxetine (CYMBALTA) 20 mg capsule Take 1 Cap by mouth daily. 30 Cap 1    atenoloL (TENORMIN) 50 mg tablet TAKE 1 TABLET BY MOUTH TWICE DAILY 180 Tab 2    losartan (COZAAR) 25 mg tablet TAKE ONE TABLET BY MOUTH TWICE A  Tab 0    triamcinolone acetonide (KENALOG) 0.1 % topical cream Apply  to affected area three (3) times daily.  DILT- mg capsule Take 1 capsule by mouth once daily 90 Cap 0    apixaban (Eliquis) 5 mg tablet Take 1 Tab by mouth two (2) times a day. 180 Tab 2    levothyroxine (SYNTHROID) 25 mcg tablet Take 25 mcg by mouth.  gabapentin (NEURONTIN) 300 mg capsule Take 1 Cap by mouth three (3) times daily. Max Daily Amount: 900 mg. 270 Cap 1    estradiol (ESTRACE) 0.01 % (0.1 mg/gram) vaginal cream       OTHER       indapamide (LOZOL) 2.5 mg tablet       metFORMIN (GLUCOPHAGE) 850 mg tablet daily.  gluc-condr-om3-dha-epa-fish-st (GLUCOSAMINE CHONDROITIN PLUS) 284-622-16-54 mg cap Take 1 Tab by mouth daily. Indications: supplement      MELATONIN PO Take 5 mg by mouth as needed (sleep).  acetaminophen (TYLENOL) 500 mg tablet Take 500 mg by mouth every six (6) hours as needed for Pain.  calcium-cholecalciferol, d3, (CALCIUM 600 + D) 600-125 mg-unit tab Take 600 mg by mouth daily. Patient taking 4 times a week  Indications: Osteoporosis      Cholecalciferol, Vitamin D3, (VITAMIN D3) 1,000 unit cap Take 1,000 Units by mouth daily. Indications: Vitamin D Deficiency      pravastatin (PRAVACHOL) 40 mg tablet Take 40 mg by mouth daily.  Indications: hypercholesterolemia        Patient Active Problem List   Diagnosis Code    Essential hypertension, benign I10    Cardiomegaly I51.7    Obesity, unspecified E66.9    Other and unspecified hyperlipidemia E78.5    Type II or unspecified type diabetes mellitus without mention of complication, not stated as uncontrolled E11.9    Hypercholesterolemia E78.00    Palpitation R00.2    Hyperthyroidism E05.90    PAC (premature atrial contraction) I49.1    Type 2 diabetes mellitus without complication, without long-term current use of insulin (HCC) E11.9    Low back pain without sciatica M54.5    Lumbosacral spondylosis without myelopathy M47.817    DDD (degenerative disc disease), lumbar M51.36    Lumbar radiculopathy M54.16    Right knee pain M25.561    Osteoarthritis of both knees M17.0    Lumbar herniated disc M51.26    Spinal stenosis, lumbar region without neurogenic claudication M48.061    Spinal stenosis of lumbar region with neurogenic claudication M48.062    Spondylolisthesis of lumbar region M43.16    Long-term current use of opiate analgesic Z79.891    Spondylisthesis M43.10    Spinal stenosis M48.00    S/P lumbar fusion Z98.1    Left leg swelling M79.89    Type 2 diabetes mellitus with diabetic neuropathy (HCC) E11.40    Anticoagulant long-term use Z79.01    Severe obesity (HCC) E66.01      Family History   Problem Relation Age of Onset    Heart Disease Other         positive history of ischemic heart disease    Heart Disease Mother     Heart Disease Father     Heart Disease Brother     Cancer Brother       Social History     Socioeconomic History    Marital status:      Spouse name: Not on file    Number of children: Not on file    Years of education: Not on file    Highest education level: Not on file   Tobacco Use    Smoking status: Never Smoker    Smokeless tobacco: Never Used   Substance and Sexual Activity    Alcohol use: No    Drug use: No   Other Topics Concern   Social History Narrative    ** Merged History Encounter **           Past Surgical History:   Procedure Laterality Date    HX BACK SURGERY  08/01/2018    HX GYN      hysterectomy    HX HYSTERECTOMY      HX OTHER SURGICAL      HX OTHER SURGICAL  2007    ankle sure      Past Medical History:   Diagnosis Date    Cardiomegaly     [facility]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 [facility]     Coarctation of aorta determined by echocardiography     Diabetes ([facility]     Dysuria     Essential hypertension, benign     stable    High blood pressure     Hypercholesterolemia     Hyperthyroidism     no meds    Obesity, unspecified     Pt has weight loss    Other and unspecified hyperlipidemia     Chol 172, ldl 82, hdl 62, tg 141    Recurrent UTI     Shingles     Type II or unspecified type diabetes mellitus without mention of complication, not stated as uncontrolled         I have reviewed and agree with [address] [facility] and ROS and intake form in chart and the record. Review of Systems:   Patient is a pleasant appearing individual, appropriately dressed, well hydrated, well nourished, who is alert, appropriately oriented for age, and in no acute distress with a normal gait and normal affect who does not appear to be in any significant pain. Physical Exam:  Left Knee -Decrease range of motion with flexion, Knee arc of greater than 50 degrees, Some crepitation, Grossly neurovascularly intact, Good cap refill, No skin lesion, Moderate swelling, No gross instability, Some quadriceps weakness Kellgren and Mervin at least grade 3    Right Knee -Decrease range of motion with flexion, Some crepitation, Grossly neurovascularly intact, Good cap refill, No skin lesion, Moderate swelling, No gross instability, Some quadriceps weaknessKellgren and Mervin at least grade 3. Encounter Diagnoses     ICD-10-CM ICD-9-CM   1. Osteoarthritis of right knee, unspecified osteoarthritis type  M17.11 715.96   2.  Osteoarthritis of left knee, unspecified osteoarthritis type  M17.12 715.96       HPI:  The patient is here with a chief complaint of left knee pain. Has been having it for a while now, progressively getting worse, diagnosed with osteoarthritis. We gave her cortisone injection and Supartz. She cannot take injection because of flushing reaction. Supartz did not help. Pain is 7/10. ROS:  10-point review of systems is positive for instability. Assessment/Plan:  Plan at this point, activities as tolerated, weightbearing started. We will get the patient set up for surgery as scheduled and go from there. Return to Office: Follow-up and Dispositions    · Return if symptoms worsen or fail to improve. Scribed by Romain David as dictated by [facility] GILDA Go MD.  Documentation True and Accepted Jose Go MD

## 2020-12-04 NOTE — PATIENT INSTRUCTIONS
Knee Pain or Injury: Care Instructions  Your Care Instructions     Injuries are a common cause of knee problems. Sudden (acute) injuries may be caused by a direct blow to the knee. They can also be caused by abnormal twisting, bending, or falling on the knee. Pain, bruising, or swelling may be severe, and may start within minutes of the injury. Overuse is another cause of knee pain. Other causes are climbing stairs, kneeling, and other activities that use the knee. Everyday wear and tear, especially as you get older, also can cause knee pain. Rest, along with home treatment, often relieves pain and allows your knee to heal. If you have a serious knee injury, you may need tests and treatment. Follow-up care is a key part of your treatment and safety. Be sure to make and go to all appointments, and call your doctor if you are having problems. It's also a good idea to know your test results and keep a list of the medicines you take. How can you care for yourself at home? · Be safe with medicines. Read and follow all instructions on the label. ? If the doctor gave you a prescription medicine for pain, take it as prescribed. ? If you are not taking a prescription pain medicine, ask your doctor if you can take an over-the-counter medicine. · Rest and protect your knee. Take a break from any activity that may cause pain. · Put ice or a cold pack on your knee for 10 to 20 minutes at a time. Put a thin cloth between the ice and your skin. · Prop up a sore knee on a pillow when you ice it or anytime you sit or lie down for the next 3 days. Try to keep it above the level of your heart. This will help reduce swelling. · If your knee is not swollen, you can put moist heat, a heating pad, or a warm cloth on your knee. · If your doctor recommends an elastic bandage, sleeve, or other type of support for your knee, wear it as directed.   · Follow your doctor's instructions about how much weight you can put on your leg. Use a cane, crutches, or a walker as instructed. · Follow your doctor's instructions about activity during your healing process. If you can do mild exercise, slowly increase your activity. · Reach and stay at a healthy weight. Extra weight can strain the joints, especially the knees and hips, and make the pain worse. Losing even a few pounds may help. When should you call for help? Call 911 anytime you think you may need emergency care. For example, call if:    · You have symptoms of a blood clot in your lung (called a pulmonary embolism). These may include:  ? Sudden chest pain. ? Trouble breathing. ? Coughing up blood. Call your doctor now or seek immediate medical care if:    · You have severe or increasing pain.     · Your leg or foot turns cold or changes color.     · You cannot stand or put weight on your knee.     · Your knee looks twisted or bent out of shape.     · You cannot move your knee.     · You have signs of infection, such as:  ? Increased pain, swelling, warmth, or redness. ? Red streaks leading from the knee. ? Pus draining from a place on your knee. ? A fever.     · You have signs of a blood clot in your leg (called a deep vein thrombosis), such as:  ? Pain in your calf, back of the knee, thigh, or groin. ? Redness and swelling in your leg or groin. Watch closely for changes in your health, and be sure to contact your doctor if:    · You have tingling, weakness, or numbness in your knee.     · You have any new symptoms, such as swelling.     · You have bruises from a knee injury that last longer than 2 weeks.     · You do not get better as expected. Where can you learn more? Go to http://www.gray.com/  Enter K195 in the search box to learn more about \"Knee Pain or Injury: Care Instructions. \"  Current as of: June 26, 2019               Content Version: 12.6  © 4530-2952 [employer].    Care instructions adapted under license by [employer] (which disclaims liability or warranty for this information). If you have questions about a medical condition or this instruction, always ask your healthcare professional. [address] any warranty or liability for your use of this information.

## 2020-12-17 ENCOUNTER — OFFICE VISIT (OUTPATIENT)
Dept: CARDIOLOGY CLINIC | Age: 80
End: 2020-12-17
Payer: MEDICARE

## 2020-12-17 VITALS
HEART RATE: 71 BPM | BODY MASS INDEX: 35.65 KG/M2 | TEMPERATURE: 96.4 F | SYSTOLIC BLOOD PRESSURE: 135 MMHG | DIASTOLIC BLOOD PRESSURE: 73 MMHG | HEIGHT: 65 IN | WEIGHT: 214 LBS

## 2020-12-17 DIAGNOSIS — I10 ESSENTIAL HYPERTENSION, BENIGN: ICD-10-CM

## 2020-12-17 DIAGNOSIS — I48.19 PERSISTENT ATRIAL FIBRILLATION (HCC): Primary | ICD-10-CM

## 2020-12-17 DIAGNOSIS — Z79.01 ANTICOAGULANT LONG-TERM USE: ICD-10-CM

## 2020-12-17 DIAGNOSIS — E78.00 HYPERCHOLESTEROLEMIA: ICD-10-CM

## 2020-12-17 DIAGNOSIS — Z01.810 PREOP CARDIOVASCULAR EXAM: ICD-10-CM

## 2020-12-17 PROCEDURE — 99214 OFFICE O/P EST MOD 30 MIN: CPT | Performed by: INTERNAL MEDICINE

## 2020-12-17 PROCEDURE — G8400 PT W/DXA NO RESULTS DOC: HCPCS | Performed by: INTERNAL MEDICINE

## 2020-12-17 PROCEDURE — 1101F PT FALLS ASSESS-DOCD LE1/YR: CPT | Performed by: INTERNAL MEDICINE

## 2020-12-17 PROCEDURE — G8752 SYS BP LESS 140: HCPCS | Performed by: INTERNAL MEDICINE

## 2020-12-17 PROCEDURE — 1090F PRES/ABSN URINE INCON ASSESS: CPT | Performed by: INTERNAL MEDICINE

## 2020-12-17 PROCEDURE — G8536 NO DOC ELDER MAL SCRN: HCPCS | Performed by: INTERNAL MEDICINE

## 2020-12-17 PROCEDURE — G8754 DIAS BP LESS 90: HCPCS | Performed by: INTERNAL MEDICINE

## 2020-12-17 PROCEDURE — G8432 DEP SCR NOT DOC, RNG: HCPCS | Performed by: INTERNAL MEDICINE

## 2020-12-17 PROCEDURE — G8417 CALC BMI ABV UP PARAM F/U: HCPCS | Performed by: INTERNAL MEDICINE

## 2020-12-17 PROCEDURE — G8427 DOCREV CUR MEDS BY ELIG CLIN: HCPCS | Performed by: INTERNAL MEDICINE

## 2020-12-17 RX ORDER — LOSARTAN POTASSIUM 50 MG/1
50 TABLET ORAL 2 TIMES DAILY
COMMUNITY

## 2020-12-17 RX ORDER — ATENOLOL 50 MG/1
75 TABLET ORAL DAILY
COMMUNITY

## 2020-12-17 NOTE — PROGRESS NOTES
HISTORY OF PRESENT ILLNESS  Agnieszka Carballo is a 78 y.o. female. 6/2020    Patient seen for follow-up. She has been having indigestion type feeling in substernal area. This is going on for about a month. GI evaluation did abdominal ultrasound showing hepatic steatosis. Symptoms are persistent. Denies any other complaint. No clear relation to activity or exertion. 6/23/2020  Patient is here for follow up of diagnostic tests. Results will be discussed. Palpitations   The history is provided by the patient. This is a recurrent problem. The problem has been gradually worsening. The problem occurs daily. The problem is associated with nothing. Associated symptoms include irregular heartbeat. Pertinent negatives include no exertional chest pressure, no PND and no lower extremity edema. Her past medical history is significant for hypertension. Hypertension  The history is provided by the patient. This is a chronic problem. The problem occurs constantly. The problem has not changed since onset. Cholesterol Problem  The history is provided by the patient. This is a chronic problem. The problem occurs constantly. The problem has not changed since onset. Review of Systems   Constitutional: Negative for chills. HENT: Negative for nosebleeds. Eyes: Negative for blurred vision and double vision. Respiratory: Negative for wheezing. Cardiovascular: Positive for palpitations. Negative for leg swelling and PND. Gastrointestinal: Negative for heartburn. Musculoskeletal: Negative for myalgias. Skin: Negative for rash. Endo/Heme/Allergies: Does not bruise/bleed easily.      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 [facility])     Coarctation of aorta determined by echocardiography     Diabetes ([facility] [address]     Dysuria     Essential hypertension, benign     stable    High blood pressure     Hypercholesterolemia     Hyperthyroidism     no meds    Obesity, unspecified     Pt has weight loss    Other and unspecified hyperlipidemia     Chol 172, ldl 82, hdl 62, tg 141    Recurrent UTI     Shingles     Type II or unspecified type diabetes mellitus without mention of complication, not stated as uncontrolled        Past Surgical History:   Procedure Laterality Date    HX BACK SURGERY  08/01/2018    HX GYN      hysterectomy    HX HYSTERECTOMY      HX OTHER SURGICAL      HX OTHER SURGICAL  2007    ankle sure       Social History     Tobacco Use    Smoking status: Never Smoker    Smokeless tobacco: Never Used   Substance Use Topics    Alcohol use: No       Allergies   Allergen Reactions    Kenalog [Triamcinolone Acetonide] Anaphylaxis    Penicillin G Hives    Adhesive Tape-Silicones Swelling    Adhesive Other (comments)    Bacitracin Not Reported This Time and Hives    Bacitracin Rash    Betadine [Povidone-Iodine] Other (comments)     Caused uncomfortable rash on area where placed    Cephalexin Rash and Itching    Ciprofloxacin Other (comments)    Clindamycin Other (comments)    Cortane Shortness of Breath    Cortisone Not Reported This Time and Hives    Duloxetine Rash and Itching    Erythromycin Not Reported This Time and Hives    Erythromycin Rash    Kenalog [Triamcinolone Acetonide] Shortness of Breath    Lisinopril Not Reported This Time    Lisinopril Cough    Lortab [Hydrocodone-Acetaminophen] Rash    Macrobid [Nitrofurantoin Monohyd/M-Cryst] Other (comments)     Flushed face,leg swellinig    Macrobid [Nitrofurantoin Monohyd/M-Cryst] Other (comments)    Medrol [Methylprednisolone] Swelling    Methimazole Other (comments)     Neck pain/cough    Methimazole Cough    Neosporin [Hydrocortisone] Hives  Penicillins Not Reported This Time    Penicillins Rash    Prednimustine Shortness of Breath    Prednisolone Other (comments)    Prednisone Not Reported This Time    Propylthiouracil Other (comments)     Dizziness,elevated blood pressure    Propylthiouracil Other (comments)    Sulfamethoxazole-Trimethoprim Other (comments)    Tetanus And Diphtheria Toxoids, Adsorbed, Adult Swelling    Tetanus Vaccines And Toxoid Not Reported This Time       Current Outpatient Medications   Medication Sig    atenoloL (TENORMIN) 50 mg tablet Take 75 mg by mouth daily.  losartan (COZAAR) 50 mg tablet Take 50 mg by mouth two (2) times a day.  DULoxetine (CYMBALTA) 20 mg capsule Take 1 Cap by mouth daily. (Patient taking differently: Take 20 mg by mouth as needed.)    triamcinolone acetonide (KENALOG) 0.1 % topical cream Apply  to affected area three (3) times daily.  DILT- mg capsule Take 1 capsule by mouth once daily    apixaban (Eliquis) 5 mg tablet Take 1 Tab by mouth two (2) times a day.  levothyroxine (SYNTHROID) 25 mcg tablet Take 25 mcg by mouth.  gabapentin (NEURONTIN) 300 mg capsule Take 1 Cap by mouth three (3) times daily. Max Daily Amount: 900 mg.  estradiol (ESTRACE) 0.01 % (0.1 mg/gram) vaginal cream     OTHER     indapamide (LOZOL) 2.5 mg tablet     metFORMIN (GLUCOPHAGE) 850 mg tablet daily.  gluc-condr-om3-dha-epa-fish-st (GLUCOSAMINE CHONDROITIN PLUS) [id number] mg cap Take 1 Tab by mouth daily. Indications: supplement    MELATONIN PO Take 5 mg by mouth as needed (sleep).  acetaminophen (TYLENOL) 500 mg tablet Take 500 mg by mouth every six (6) hours as needed for Pain.  calcium-cholecalciferol, d3, (CALCIUM 600 + D) 600-125 mg-unit tab Take 600 mg by mouth daily. Patient taking 4 times a week  Indications: Osteoporosis    Cholecalciferol, Vitamin D3, (VITAMIN D3) 1,000 unit cap Take 1,000 Units by mouth daily.  Indications: Vitamin D Deficiency    pravastatin (PRAVACHOL) 40 mg tablet Take 40 mg by mouth daily. Indications: hypercholesterolemia     No current facility-administered medications for this visit. Visit Vitals  /73   Pulse 71   Temp (!) 96.4 °F (35.8 °C) (Temporal)   Ht 5' 5\" (1.651 m)   Wt 97.1 kg (214 lb)   BMI 35.61 kg/m²         Physical Exam   Constitutional: She is oriented to person, place, and time. She appears well-developed and well-nourished. obese   HENT:   Head: Normocephalic and atraumatic. Eyes: Conjunctivae are normal.   Neck: Neck supple. No JVD present. No tracheal deviation present. No thyromegaly present. Cardiovascular: Normal rate. An irregular rhythm present. PMI is not displaced. Exam reveals no gallop and no decreased pulses. Murmur heard. Early systolic murmur is present with a grade of 2/6 at the upper right sternal border. Pulmonary/Chest: No respiratory distress. She has no wheezes. She has no rales. She exhibits no tenderness. Abdominal: Soft. There is no abdominal tenderness. Musculoskeletal:         General: No edema. Neurological: She is alert and oriented to person, place, and time. Skin: Skin is warm. Psychiatric: She has a normal mood and affect. Ms. Tammie Curiel has a reminder for a \"due or due soon\" health maintenance. I have asked that she contact her primary care provider for follow-up on this health maintenance. No flowsheet data found. mri:8/2015  1. No acute hemorrhage or acute infarction. 2. White matter abnormality is nonspecific but likely ischemic. This does not suggest multiple sclerosis. 3. No other significant intracranial abnormality is appreciated. MZDKXNL:2/8019:QKLU  Left ventricle: Systolic function was normal. Ejection fraction was  estimated in the range of 55 % to 60 %. There were no regional wall motion  abnormalities. Doppler parameters were consistent with abnormal left  ventricular relaxation (grade 1 diastolic dysfunction).     Left atrium: The atrium was mildly dilated. Mitral valve: There was trivial regurgitation. Aortic valve: The valve was trileaflet. Leaflets exhibited mildly  increased thickness, normal cuspal separation, and sclerosis. Tricuspid valve: There was mild regurgitation. Tricuspid regurgitation  peak velocity: 2.4 m/sec. Pulmonary artery systolic pressure: 26 mmHg. Pulmonic valve: There was trivial regurgitation. 2015:holter  Sr,Frequent pac. No a fib    I have personally reviewed patients ekg done at other facility. 2017  Sr,pac    NUCLEAR IMAGIN2017     Findings:   1. Stress images reveal normal Myoview distrubution in all the LV segments in short axis, vertical and horizontal long axis views. 2. Resting images have a normal uptake. 3. Gated images reveal normal wall motion and the ejection fraction is calculated to be 90%. Conclusion:   1. Normal perfusion scan. 2. Normal wall motion and ejection fraction. 3. Low risk scan. Interpretation Summary 3/2019       · Estimated left ventricular ejection fraction is 56 - 60%. Left ventricular mild concentric hypertrophy. No regional wall motion abnormality noted. Mild (grade 1) left ventricular diastolic dysfunction. · Left atrial cavity size is moderately dilated. · Normal right ventricular size and function. · Mild aortic valve sclerosis. · Mild mitral valve regurgitation. · Mild tricuspid valve regurgitation. Pulmonary arterial systolic pressure is 55.6 mmHg. Mild pulmonary hypertension. · Mild pulmonic valve regurgitation is present. · Moderately elevated central venous pressure (10-15 mmHg); IVC diameter is larger than 21 mm and collapses more than 50% with respiration. I Have personally reviewed recent relevant labs available and discussed with patient  Lipids2020  Nuclear stress test normal perfusion. Normal ejection fraction  Assessment         ICD-10-CM ICD-9-CM    1.  Persistent atrial fibrillation (HCC)  I48.19 427.31 Stable continue treatment monitor   2. Essential hypertension, benign  I10 401.1     Stable continue current treatment monitor   3. Hypercholesterolemia  E78.00 272.0     Continue treatment lab with PCP   4. Anticoagulant long-term use  Z79.01 V58.61     For A. fib monitor   5. Preop cardiovascular exam  Z01.810 V72.81     Stable cardiac status okay to proceed with knee replacement as planned with medium cardiac risk. Okay to hold anticoagulation is needed at that time     3/2019  Atrial fibrillation controlled ventricular rate. Anticoagulation started. Likely to undergo ablation for hyperthyroidism. Follow-up after that. Echo for LV function. Currently continue with rate control strategy    6/2019  Status post radioablation of hyperthyroidism. Clinically feeling significantly better. We will continue rate control strategy. Remains in A. fib today. On anticoagulation  12/2019  Cardiac status stable. If needed patient may go through knee surgery with interruption of anticoagulation in the perioperative. 6/22/2020  Negative nuclear stress test continue to monitor clinically. 12/2020  Cardiac status stable. Blood pressure better controlled. Okay to proceed with knee surgery as planned with medium cardiac risk. Okay to hold anticoagulation as needed  Medications Discontinued During This Encounter   Medication Reason    atenoloL (TENORMIN) 50 mg tablet DUPLICATE ORDER    losartan (COZAAR) 25 mg tablet DUPLICATE ORDER       No orders of the defined types were placed in this encounter. Follow-up and Dispositions    · Return in about 6 months (around 6/17/2021).

## 2020-12-26 DIAGNOSIS — I10 ESSENTIAL HYPERTENSION, BENIGN: Primary | ICD-10-CM

## 2020-12-28 RX ORDER — DILTIAZEM HYDROCHLORIDE 180 MG/1
180 CAPSULE, EXTENDED RELEASE ORAL DAILY
Qty: 90 CAP | Refills: 3 | Status: SHIPPED | OUTPATIENT
Start: 2020-12-28 | End: 2021-05-14 | Stop reason: ALTCHOICE

## 2020-12-28 NOTE — TELEPHONE ENCOUNTER
Requested Prescriptions     Pending Prescriptions Disp Refills    dilTIAZem ER (DILT-XR) 180 mg capsule 90 Cap 3     Sig: Take 1 Cap by mouth daily.

## 2021-01-05 DIAGNOSIS — I48.91 ATRIAL FIBRILLATION, UNSPECIFIED TYPE (HCC): ICD-10-CM

## 2021-01-26 DIAGNOSIS — M25.561 RIGHT KNEE PAIN, UNSPECIFIED CHRONICITY: ICD-10-CM

## 2021-01-28 ENCOUNTER — VIRTUAL VISIT (OUTPATIENT)
Dept: ORTHOPEDIC SURGERY | Age: 81
End: 2021-01-28
Payer: MEDICARE

## 2021-01-28 DIAGNOSIS — M43.16 SPONDYLOLISTHESIS OF LUMBAR REGION: Primary | ICD-10-CM

## 2021-01-28 DIAGNOSIS — M48.062 SPINAL STENOSIS OF LUMBAR REGION WITH NEUROGENIC CLAUDICATION: ICD-10-CM

## 2021-01-28 DIAGNOSIS — Z98.1 S/P LUMBAR FUSION: ICD-10-CM

## 2021-01-28 DIAGNOSIS — M25.561 RIGHT KNEE PAIN, UNSPECIFIED CHRONICITY: ICD-10-CM

## 2021-01-28 PROCEDURE — 99442 PR PHYS/QHP TELEPHONE EVALUATION 11-20 MIN: CPT | Performed by: NURSE PRACTITIONER

## 2021-01-28 RX ORDER — DULOXETIN HYDROCHLORIDE 20 MG/1
20 CAPSULE, DELAYED RELEASE ORAL DAILY
Qty: 30 CAP | Refills: 5 | Status: SHIPPED | OUTPATIENT
Start: 2021-01-28 | End: 2021-05-03

## 2021-01-28 NOTE — PROGRESS NOTES
History of Present Illness:    Consent: Polly Garcia, who was seen by telephone call evaluation, and/or her healthcare decision maker, is aware that this patient-initiated, Telehealth encounter on 1/28/2021 is a billable service, with coverage as determined by her insurance carrier. She is aware that she may receive a bill and has provided verbal consent to proceed: Yes. The provider was located at her home office and the patient was located at their residence. No one else participated in the visit with the patient. The platform used was a phone call evaluation    Patient is 78-year-old female who is evaluated for follow-up of her low back pain. She underwent a L4-L5 laminectomy, medial facetectomy, foraminotomy L4-L5 diskectomy, L4-5 posterolateral fusion, segmental spinal instrumentation L4-L5 with K2M instrumentation on August 1 of 2018. She does feel like the surgery was helpful and resolved her leg pain. She does still have issues with her back pain. She is currently having issues with her bilateral knees right greater than left and is planning to undergo right total knee replacement in February. She does continue to take gabapentin through her PCP and she is taking Cymbalta 20 mg daily through us but is not taking it on a regular basis. She denies fever bowel bladder dysfunction. Physical Exam: Patient is a 78-year-old female who is alert and oriented. She spoke with fluency. She did not appear to be in any distress. Vital Signs: (As obtained by patient/caregiver at home)  There were no vitals taken for this visit. Assessment & Plan: This is a patient who had surgery on her back in 2018 that resolved her leg pain but still has some back pain. Her main issue today is her right greater than left knee pain for which she is can have a total knee replacement in February. I did explain to her the Cymbalta would work better if she would take it daily.   She is going to try and do that.  I have sent in a new prescription of it. We will see her back in 6-month sooner if needed. Diagnoses and all orders for this visit:    1. Spondylolisthesis of lumbar region  -     DULoxetine (CYMBALTA) 20 mg capsule; Take 1 Cap by mouth daily. 2. S/P lumbar fusion  -     DULoxetine (CYMBALTA) 20 mg capsule; Take 1 Cap by mouth daily. 3. Spinal stenosis of lumbar region with neurogenic claudication  -     DULoxetine (CYMBALTA) 20 mg capsule; Take 1 Cap by mouth daily. Pain Scale: /10            We discussed the expected course, resolution and complications of the diagnosis(es) in detail. Medication risks, benefits, costs, interactions, and alternatives were discussed as indicated. I advised her to contact the office if her condition worsens, changes or fails to improve as anticipated. For emergencies or worsening neurological symptoms the patient was instructed to call 911. She expressed understanding with the diagnosis(es) and plan. Follow-up and Dispositions    · Return in about 6 months (around 7/28/2021) for with NP MeryJordin VillaseñorJuana 43 is a 78 y.o. female being evaluated by a telephone call encounter for concerns as above. A caregiver was present when appropriate. Due to this being a TeleHealth encounter ([facility]), evaluation of the following organ systems was limited: Vitals/Constitutional/EENT/Resp/CV/GI//MS/Neuro/Skin/Heme-Lymph-Imm. Pursuant to the emergency declaration under the [facility] waiver authority and the [employer] Act, this Virtual  Visit was conducted, with patient's (and/or legal guardian's) consent, to reduce the patient's risk of exposure to COVID-19 and provide necessary medical care.              CPT Codes 21197-78022 for Established Patients may apply to this Telehealth Visit  Time-based coding, delete if not needed: I spent at least 15 minutes with this established patient, and >50% of the time was spent counseling and/or coordinating care regarding Back pain  Start time: 2:06 PM and Stop time: 2:22 PM.        Due to this being a TeleHealth evaluation, many elements of the physical examination are unable to be assessed. Pursuant to the emergency declaration under the [address] [place] [address] [facility] authority and the [employer] Act, this Virtual  Visit was conducted, with patient's consent, to reduce the patient's risk of exposure to COVID-19 and provide continuity of care for an established patient. Services were provided through a video synchronous discussion virtually to substitute for in-person clinic visit.     Qiana Lauren NP

## 2021-02-15 ENCOUNTER — OFFICE VISIT (OUTPATIENT)
Dept: ORTHOPEDIC SURGERY | Age: 81
End: 2021-02-15
Payer: MEDICARE

## 2021-02-15 DIAGNOSIS — M17.12 PRIMARY OSTEOARTHRITIS OF LEFT KNEE: ICD-10-CM

## 2021-02-15 DIAGNOSIS — M17.11 PRIMARY OSTEOARTHRITIS OF RIGHT KNEE: Primary | ICD-10-CM

## 2021-02-15 DIAGNOSIS — M25.561 RIGHT KNEE PAIN, UNSPECIFIED CHRONICITY: Primary | ICD-10-CM

## 2021-02-15 PROCEDURE — 99214 OFFICE O/P EST MOD 30 MIN: CPT | Performed by: ORTHOPAEDIC SURGERY

## 2021-02-15 PROCEDURE — G8536 NO DOC ELDER MAL SCRN: HCPCS | Performed by: ORTHOPAEDIC SURGERY

## 2021-02-15 PROCEDURE — 1101F PT FALLS ASSESS-DOCD LE1/YR: CPT | Performed by: ORTHOPAEDIC SURGERY

## 2021-02-15 PROCEDURE — G8427 DOCREV CUR MEDS BY ELIG CLIN: HCPCS | Performed by: ORTHOPAEDIC SURGERY

## 2021-02-15 PROCEDURE — G8400 PT W/DXA NO RESULTS DOC: HCPCS | Performed by: ORTHOPAEDIC SURGERY

## 2021-02-15 PROCEDURE — G8510 SCR DEP NEG, NO PLAN REQD: HCPCS | Performed by: ORTHOPAEDIC SURGERY

## 2021-02-15 PROCEDURE — G8417 CALC BMI ABV UP PARAM F/U: HCPCS | Performed by: ORTHOPAEDIC SURGERY

## 2021-02-15 PROCEDURE — 1090F PRES/ABSN URINE INCON ASSESS: CPT | Performed by: ORTHOPAEDIC SURGERY

## 2021-02-15 PROCEDURE — G8756 NO BP MEASURE DOC: HCPCS | Performed by: ORTHOPAEDIC SURGERY

## 2021-02-15 RX ORDER — OXYCODONE AND ACETAMINOPHEN 5; 325 MG/1; MG/1
1 TABLET ORAL
Qty: 30 TAB | Refills: 0 | Status: SHIPPED | OUTPATIENT
Start: 2021-02-15 | End: 2021-03-01

## 2021-02-15 RX ORDER — SULFAMETHOXAZOLE AND TRIMETHOPRIM 800; 160 MG/1; MG/1
1 TABLET ORAL EVERY 12 HOURS
Qty: 3 TAB | Refills: 0 | Status: SHIPPED | OUTPATIENT
Start: 2021-02-15 | End: 2021-02-17

## 2021-02-15 NOTE — PROGRESS NOTES
Name: Nader Nixon    : 1940     Service Dept: [facility]    Patient's Pharmacies:    Rodolfo Kumari [facility] [facility], [address]  [address]  [address] [place] [address]  Phone: [phone] Fax: [address] [phone] - [place], [address] [place] [address]   Phone: [phone] Fax: [phone] - [place], [address]  [address] [place] [address]  Phone: [phone] Fax: [phone]       Chief Complaint   Patient presents with    Knee Pain    Pre-op Exam        There were no vitals taken for this visit.    Allergies   Allergen Reactions    Kenalog [Triamcinolone Acetonide] Anaphylaxis    Penicillin G Hives    Adhesive Tape-Silicones Swelling    Adhesive Other (comments)    Bacitracin Not Reported This Time and Hives    Bacitracin Rash    Betadine [Povidone-Iodine] Other (comments)     Caused uncomfortable rash on area where placed    Cephalexin Rash and Itching    Ciprofloxacin Other (comments)    Clindamycin Other (comments)    Cortane Shortness of Breath    Cortisone Not Reported This Time and Hives    Duloxetine Rash and Itching    Erythromycin Not Reported This Time and Hives    Erythromycin Rash    Kenalog [Triamcinolone Acetonide] Shortness of Breath    Lisinopril Not Reported This Time    Lisinopril Cough    Lortab [Hydrocodone-Acetaminophen] Rash    Macrobid [Nitrofurantoin Monohyd/M-Cryst] Other (comments)     Flushed face,leg swellinig    Macrobid [Nitrofurantoin Monohyd/M-Cryst] Other (comments)    Medrol [Methylprednisolone] Swelling    Methimazole Other (comments)     Neck pain/cough    Methimazole Cough    Neosporin [Hydrocortisone] Hives    Penicillins Not Reported This Time    Penicillins Rash    Prednimustine Shortness of Breath    Prednisolone Other (comments)    Prednisone Not Reported This Time    Propylthiouracil Other (comments)     Dizziness,elevated blood pressure    Propylthiouracil Other (comments)    Sulfamethoxazole-Trimethoprim Other (comments)    Tetanus And Diphtheria Toxoids, Adsorbed, Adult Swelling    Tetanus Vaccines And Toxoid Not Reported This Time      Current Outpatient Medications   Medication Sig Dispense Refill    trimethoprim-sulfamethoxazole (Bactrim DS) 160-800 mg per tablet Take 1 Tab by mouth every twelve (12) hours for 3 doses. Start antibiotics after surgery 3 Tab 0    oxyCODONE-acetaminophen (Percocet) 5-325 mg per tablet Take 1 Tab by mouth every four to six (4-6) hours as needed for Pain for up to 14 days. Max Daily Amount: 6 Tabs. This Rx is to only be used after surgical procedure 30 Tab 0    DULoxetine (CYMBALTA) 20 mg capsule Take 1 Cap by mouth daily. 30 Cap 5    apixaban (Eliquis) 5 mg tablet Take 1 Tab by mouth two (2) times a day. 180 Tab 2    dilTIAZem ER (DILT-XR) 180 mg capsule Take 1 Cap by mouth daily. 90 Cap 3    atenoloL (TENORMIN) 50 mg tablet Take 75 mg by mouth daily.  losartan (COZAAR) 50 mg tablet Take 50 mg by mouth two (2) times a day.  levothyroxine (SYNTHROID) 25 mcg tablet Take 25 mcg by mouth.  gabapentin (NEURONTIN) 300 mg capsule Take 1 Cap by mouth three (3) times daily. Max Daily Amount: 900 mg. 270 Cap 1    estradiol (ESTRACE) 0.01 % (0.1 mg/gram) vaginal cream       OTHER       indapamide (LOZOL) 2.5 mg tablet       metFORMIN (GLUCOPHAGE) 850 mg tablet daily.  gluc-condr-om3-dha-epa-fish-st (GLUCOSAMINE CHONDROITIN PLUS) 979-664-33-54 mg cap Take 1 Tab by mouth daily. Indications: supplement      MELATONIN PO Take 5 mg by mouth as needed (sleep).  acetaminophen (TYLENOL) 500 mg tablet Take 500 mg by mouth every six (6) hours as needed for Pain.  calcium-cholecalciferol, d3, (CALCIUM 600 + D) 600-125 mg-unit tab Take 600 mg by mouth daily.  Patient taking 4 times a week  Indications: Osteoporosis      Cholecalciferol, Vitamin D3, (VITAMIN D3) 1,000 unit cap Take 1,000 Units by mouth daily. Indications: Vitamin D Deficiency      pravastatin (PRAVACHOL) 40 mg tablet Take 40 mg by mouth daily.  Indications: hypercholesterolemia        Patient Active Problem List   Diagnosis Code    Essential hypertension, benign I10    Cardiomegaly I51.7    Obesity, unspecified E66.9    Other and unspecified hyperlipidemia E78.5    Type II or unspecified type diabetes mellitus without mention of complication, not stated as uncontrolled E11.9    Hypercholesterolemia E78.00    Palpitation R00.2    Hyperthyroidism E05.90    PAC (premature atrial contraction) I49.1    Type 2 diabetes mellitus without complication, without long-term current use of insulin (HCC) E11.9    Low back pain without sciatica M54.5    Lumbosacral spondylosis without myelopathy M47.817    DDD (degenerative disc disease), lumbar M51.36    Lumbar radiculopathy M54.16    Right knee pain M25.561    Osteoarthritis of both knees M17.0    Lumbar herniated disc M51.26    Spinal stenosis, lumbar region without neurogenic claudication M48.061    Spinal stenosis of lumbar region with neurogenic claudication M48.062    Spondylolisthesis of lumbar region M43.16    Long-term current use of opiate analgesic Z79.891    Spondylisthesis M43.10    Spinal stenosis M48.00    S/P lumbar fusion Z98.1    Left leg swelling M79.89    Type 2 diabetes mellitus with diabetic neuropathy (HCC) E11.40    Anticoagulant long-term use Z79.01    Severe obesity (HCC) E66.01      Family History   Problem Relation Age of Onset    Heart Disease Other         positive history of ischemic heart disease    Heart Disease Mother     Heart Disease Father     Heart Disease Brother     Cancer Brother       Social History     Socioeconomic History    Marital status:      Spouse name: Not on file    Number of children: Not on file    Years of education: Not on file   Pio Highest education level: Not on file   Tobacco Use    Smoking status: Never Smoker    Smokeless tobacco: Never Used   Substance and Sexual Activity    Alcohol use: No    Drug use: No   Other Topics Concern   Social History Narrative    ** Merged History Encounter **           Past Surgical History:   Procedure Laterality Date    HX BACK SURGERY  08/01/2018    HX GYN      hysterectomy    HX HYSTERECTOMY      HX OTHER SURGICAL      HX OTHER SURGICAL  2007    ankle sure      Past Medical History:   Diagnosis Date    Cardiomegaly     Ch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 ([facility]     Coarctation of aorta determined by echocardiography     Diabetes ([facility]     Dysuria     Essential hypertension, benign     stable    High blood pressure     Hypercholesterolemia     Hyperthyroidism     no meds    Obesity, unspecified     Pt has weight loss    Other and unspecified hyperlipidemia     Chol 172, ldl 82, hdl 62, tg 141    Recurrent UTI     Shingles     Type II or unspecified type diabetes mellitus without mention of complication, not stated as uncontrolled         I have reviewed and agree with [address] and ROS and intake form in chart and the record furthermore I have reviewed prior medical record(s) regarding this patients care during this appointment. Review of Systems:   Patient is a pleasant appearing individual, appropriately dressed, well hydrated, well nourished, who is alert, appropriately oriented for age, and in no acute distress with a normal gait and normal affect who does not appear to be in any significant pain.      Physical Exam:  Left Knee -Decrease range of motion with flexion, Knee arc of greater than 50 degrees, Some crepitation, Grossly neurovascularly intact, Good cap refill, No skin lesion, Moderate swelling, No gross instability, Some quadriceps weakness, Kellgren and Mervin at least grade 3    Right Knee - Full Range of Motion, No crepitation, Grossly neurovascularly intact, Good cap refill, No skin lesion, No swelling, No gross instability, No quadriceps weakness    Inpatient status: The patient has admitted to severe pain in the affected knee and due to such pain they are unable to complete activities of daily living at home and/or work on a regular basis where conservative treatments have failed. After extensive discussion with the patient, they have chosen to receive a total knee replacement with the expectation of inpatient procedure. Their dependent functional status (i.e. lack of capable support and safety at home, pain management, comorbities, or difficulty ambulating with assistive walking devices) would deem them a candidate for an inpatient stay. The patient acknowledges and understand the plan. The risks of surgery were explained to the patient which include but not limited to infection, nerve injury, artery injury, tendon injury, poor result, poor wound healing, unforeseen incidence, bleeding, infection, nerve damage, failure to improve, worsening of symptoms, morbidity, and mortality risks were explained. All questions were answered. Patient was told of no guarantees. Patient accepts all risks and benefits. A consent for surgery will be documented and signed by the patient or a legal guardian. All questions were answered. The procedure was explained in detail. The patient was counseled about the risks of neftaly Covid-19 during their perioperative period and any recovery window from their procedure. The patient was made aware that neftaly Covid-19 may worsen their prognosis for recovering from their procedure and lend to a higher morbidity and/or mortality risk. All material risks, benefits, and reasonable alternatives including postponing the procedure were discussed. The patient DOES wish to proceed with their procedure at this time.          HPI:  The patient is here with a chief complaint of right knee pain, throbbing burning pain, progressively getting worse, failed conservative treatment. Pain is 8/10. ROS:  10-point review of systems is positive for instability and locking. Assessment/Plan:  Plan would be for right total knee replacement. She will do Eliquis postoperatively. We will give her Bactrim and oxycodone for postop pain, outpatient surgery. Encounter Diagnoses     ICD-10-CM ICD-9-CM   1. Primary osteoarthritis of right knee  M17.11 715.16   2. Primary osteoarthritis of left knee  M17.12 715.16         Return to Office: Follow-up and Dispositions    · Return for already junior for surgery. Scribed by Cornelius Au MD as dictated by Soo Heaton. Samuel Kim MD.  Documentation True and Accepted Jose Kim MD

## 2021-02-15 NOTE — PATIENT INSTRUCTIONS
Knee Arthritis: Care Instructions Your Care Instructions Knee arthritis is a breakdown of the cartilage that cushions your knee joint. When the cartilage wears down, your bones rub against each other. This causes pain and stiffness. Knee arthritis tends to get worse with time. Treatment for knee arthritis involves reducing pain, making the leg muscles stronger, and staying at a healthy body weight. The treatment usually does not improve the health of the cartilage, but it can reduce pain and improve how well your knee works. You can take simple measures to protect your knee joints, ease your pain, and help you stay active. Follow-up care is a key part of your treatment and safety. Be sure to make and go to all appointments, and call your doctor if you are having problems. It's also a good idea to know your test results and keep a list of the medicines you take. How can you care for yourself at home? · Know that knee arthritis will cause more pain on some days than on others. · Stay at a healthy weight. Lose weight if you are overweight. When you stand up, the pressure on your knees from every pound of body weight is multiplied four times. So if you lose 10 pounds, you will reduce the pressure on your knees by 40 pounds. · Talk to your doctor or physical therapist about exercises that will help ease joint pain. ? Stretch to help prevent stiffness and to prevent injury before you exercise. You may enjoy gentle forms of yoga to help keep your knee joints and muscles flexible. ? Walk instead of jog. 
? Ride a bike. This makes your thigh muscles stronger and takes pressure off your knee. ? Wear well-fitting and comfortable shoes. ? Exercise in chest-deep water. This can help you exercise longer with less pain. ? Avoid exercises that include squatting or kneeling. They can put a lot of strain on your knees. ? Talk to your doctor to make sure that the exercise you do is not making the arthritis worse. · Do not sit for long periods of time. Try to walk once in a while to keep your knee from getting stiff. · Ask your doctor or physical therapist whether shoe inserts may reduce your arthritis pain. · If you can afford it, get new athletic shoes at least every year. This can help reduce the strain on your knees. · Use a device to help you do everyday activities. ? A cane or walking stick can help you keep your balance when you walk. Hold the cane or walking stick in the hand opposite the painful knee. ? If you feel like you may fall when you walk, try using crutches or a front-wheeled walker. These can prevent falls that could cause more damage to your knee. ? A knee brace may help keep your knee stable and prevent pain. ? You also can use other things to make life easier, such as a higher toilet seat and handrails in the bathtub or shower. · Take pain medicines exactly as directed. ? Do not wait until you are in severe pain. You will get better results if you take it sooner. ? If you are not taking a prescription pain medicine, take an over-the-counter medicine such as acetaminophen (Tylenol), ibuprofen (Advil, Motrin), or naproxen (Aleve). Read and follow all instructions on the label. ? Do not take two or more pain medicines at the same time unless the doctor told you to. Many pain medicines have acetaminophen, which is Tylenol. Too much acetaminophen (Tylenol) can be harmful. ? Tell your doctor if you take a blood thinner, have diabetes, or have allergies to shellfish. · Ask your doctor if you might benefit from a shot of steroid medicine into your knee. This may provide pain relief for several months. · Many people take the supplements glucosamine and chondroitin for osteoarthritis. Some people feel they help, but the medical research does not show that they work. Talk to your doctor before you take these supplements. When should you call for help? Call your doctor now or seek immediate medical care if: 
  · You have sudden swelling, warmth, or pain in your knee.  
  · You have knee pain and a fever or rash.  
  · You have such bad pain that you cannot use your knee. Watch closely for changes in your health, and be sure to contact your doctor if you have any problems. Where can you learn more? Go to http://www.gray.com/ Enter G272 in the search box to learn more about \"Knee Arthritis: Care Instructions. \" Current as of: December 9, 2019               Content Version: 12.6 © 5863-2985 [employer]. Care instructions adapted under license by [employer] (which disclaims liability or warranty for this information). If you have questions about a medical condition or this instruction, always ask your healthcare professional. [address] any warranty or liability for your use of this information.

## 2021-02-18 ENCOUNTER — TELEPHONE (OUTPATIENT)
Dept: ORTHOPEDIC SURGERY | Age: 81
End: 2021-02-18

## 2021-02-23 ENCOUNTER — OFFICE VISIT (OUTPATIENT)
Dept: ORTHOPEDIC SURGERY | Age: 81
End: 2021-02-23
Payer: MEDICARE

## 2021-02-23 DIAGNOSIS — M17.11 PRIMARY OSTEOARTHRITIS OF RIGHT KNEE: Primary | ICD-10-CM

## 2021-02-23 DIAGNOSIS — M17.12 OSTEOARTHRITIS OF LEFT KNEE, UNSPECIFIED OSTEOARTHRITIS TYPE: ICD-10-CM

## 2021-02-23 PROCEDURE — G8536 NO DOC ELDER MAL SCRN: HCPCS | Performed by: ORTHOPAEDIC SURGERY

## 2021-02-23 PROCEDURE — G8417 CALC BMI ABV UP PARAM F/U: HCPCS | Performed by: ORTHOPAEDIC SURGERY

## 2021-02-23 PROCEDURE — G8756 NO BP MEASURE DOC: HCPCS | Performed by: ORTHOPAEDIC SURGERY

## 2021-02-23 PROCEDURE — 99024 POSTOP FOLLOW-UP VISIT: CPT | Performed by: ORTHOPAEDIC SURGERY

## 2021-02-23 PROCEDURE — G8432 DEP SCR NOT DOC, RNG: HCPCS | Performed by: ORTHOPAEDIC SURGERY

## 2021-02-23 PROCEDURE — G8400 PT W/DXA NO RESULTS DOC: HCPCS | Performed by: ORTHOPAEDIC SURGERY

## 2021-02-23 PROCEDURE — 1090F PRES/ABSN URINE INCON ASSESS: CPT | Performed by: ORTHOPAEDIC SURGERY

## 2021-02-23 PROCEDURE — G8427 DOCREV CUR MEDS BY ELIG CLIN: HCPCS | Performed by: ORTHOPAEDIC SURGERY

## 2021-02-23 PROCEDURE — 1101F PT FALLS ASSESS-DOCD LE1/YR: CPT | Performed by: ORTHOPAEDIC SURGERY

## 2021-02-23 NOTE — PROGRESS NOTES
Name: Aubrey Apley    : 1940     Service Dept: 414 [facility]    Patient's Pharmacies:    Trae Mireles [facility], [address]  [address]  [address] [place] [address]  Phone: [phone] Fax: [address] 10 [phone] - [place] [address]  Phone: [phone] Fax: [phone] - [place], [place] [address] [place] [address] [address]  [address] [place] [address]  Phone: [phone] Fax: [phone]       Chief Complaint   Patient presents with    Knee Pain    Surgical Follow-up        There were no vitals taken for this visit.    Allergies   Allergen Reactions    Kenalog [Triamcinolone Acetonide] Anaphylaxis    Penicillin G Hives    Adhesive Tape-Silicones Swelling    Adhesive Other (comments)    Bacitracin Not Reported This Time and Hives    Bacitracin Rash    Betadine [Povidone-Iodine] Other (comments)     Caused uncomfortable rash on area where placed    Cephalexin Rash and Itching    Ciprofloxacin Other (comments)    Clindamycin Other (comments)    Cortane Shortness of Breath    Cortisone Not Reported This Time and Hives    Duloxetine Rash and Itching    Erythromycin Not Reported This Time and Hives    Erythromycin Rash    Kenalog [Triamcinolone Acetonide] Shortness of Breath    Lisinopril Not Reported This Time    Lisinopril Cough    Lortab [Hydrocodone-Acetaminophen] Rash    Macrobid [Nitrofurantoin Monohyd/M-Cryst] Other (comments)     Flushed face,leg swellinig    Macrobid [Nitrofurantoin Monohyd/M-Cryst] Other (comments)    Medrol [Methylprednisolone] Swelling    Methimazole Other (comments)     Neck pain/cough    Methimazole Cough    Neosporin [Hydrocortisone] Hives    Penicillins Not Reported This Time    Penicillins Rash    Prednimustine Shortness of Breath    Prednisolone Other (comments)    Prednisone Not Reported This Time  Propylthiouracil Other (comments)     Dizziness,elevated blood pressure    Propylthiouracil Other (comments)    Sulfamethoxazole-Trimethoprim Other (comments)    Tetanus And Diphtheria Toxoids, Adsorbed, Adult Swelling    Tetanus Vaccines And Toxoid Not Reported This Time      Current Outpatient Medications   Medication Sig Dispense Refill    oxyCODONE-acetaminophen (Percocet) 5-325 mg per tablet Take 1 Tab by mouth every four to six (4-6) hours as needed for Pain for up to 14 days. Max Daily Amount: 6 Tabs. This Rx is to only be used after surgical procedure 30 Tab 0    DULoxetine (CYMBALTA) 20 mg capsule Take 1 Cap by mouth daily. 30 Cap 5    apixaban (Eliquis) 5 mg tablet Take 1 Tab by mouth two (2) times a day. 180 Tab 2    dilTIAZem ER (DILT-XR) 180 mg capsule Take 1 Cap by mouth daily. 90 Cap 3    atenoloL (TENORMIN) 50 mg tablet Take 75 mg by mouth daily.  losartan (COZAAR) 50 mg tablet Take 50 mg by mouth two (2) times a day.  levothyroxine (SYNTHROID) 25 mcg tablet Take 25 mcg by mouth.  gabapentin (NEURONTIN) 300 mg capsule Take 1 Cap by mouth three (3) times daily. Max Daily Amount: 900 mg. 270 Cap 1    estradiol (ESTRACE) 0.01 % (0.1 mg/gram) vaginal cream       OTHER       indapamide (LOZOL) 2.5 mg tablet       metFORMIN (GLUCOPHAGE) 850 mg tablet daily.  gluc-condr-om3-dha-epa-fish-st (GLUCOSAMINE CHONDROITIN PLUS) 382-258-28-54 mg cap Take 1 Tab by mouth daily. Indications: supplement      MELATONIN PO Take 5 mg by mouth as needed (sleep).  acetaminophen (TYLENOL) 500 mg tablet Take 500 mg by mouth every six (6) hours as needed for Pain.  calcium-cholecalciferol, d3, (CALCIUM 600 + D) 600-125 mg-unit tab Take 600 mg by mouth daily. Patient taking 4 times a week  Indications: Osteoporosis      Cholecalciferol, Vitamin D3, (VITAMIN D3) 1,000 unit cap Take 1,000 Units by mouth daily.  Indications: Vitamin D Deficiency      pravastatin (PRAVACHOL) 40 mg tablet Take 40 mg by mouth daily.  Indications: hypercholesterolemia        Patient Active Problem List   Diagnosis Code    Essential hypertension, benign I10    Cardiomegaly I51.7    Obesity, unspecified E66.9    Other and unspecified hyperlipidemia E78.5    Type II or unspecified type diabetes mellitus without mention of complication, not stated as uncontrolled E11.9    Hypercholesterolemia E78.00    Palpitation R00.2    Hyperthyroidism E05.90    PAC (premature atrial contraction) I49.1    Type 2 diabetes mellitus without complication, without long-term current use of insulin (HCC) E11.9    Low back pain without sciatica M54.5    Lumbosacral spondylosis without myelopathy M47.817    DDD (degenerative disc disease), lumbar M51.36    Lumbar radiculopathy M54.16    Right knee pain M25.561    Osteoarthritis of both knees M17.0    Lumbar herniated disc M51.26    Spinal stenosis, lumbar region without neurogenic claudication M48.061    Spinal stenosis of lumbar region with neurogenic claudication M48.062    Spondylolisthesis of lumbar region M43.16    Long-term current use of opiate analgesic Z79.891    Spondylisthesis M43.10    Spinal stenosis M48.00    S/P lumbar fusion Z98.1    Left leg swelling M79.89    Type 2 diabetes mellitus with diabetic neuropathy (HCC) E11.40    Anticoagulant long-term use Z79.01    Severe obesity (HCC) E66.01      Family History   Problem Relation Age of Onset    Heart Disease Other         positive history of ischemic heart disease    Heart Disease Mother     Heart Disease Father     Heart Disease Brother     Cancer Brother       Social History     Socioeconomic History    Marital status:      Spouse name: Not on file    Number of children: Not on file    Years of education: Not on file    Highest education level: Not on file   Tobacco Use    Smoking status: Never Smoker    Smokeless tobacco: Never Used   Substance and Sexual Activity    Alcohol use: No    Drug use: No   Other Topics Concern   Social History Narrative    ** Merged History Encounter **           Past Surgical History:   Procedure Laterality Date    HX BACK SURGERY  08/01/2018    HX GYN      hysterectomy    HX HYSTERECTOMY      HX OTHER SURGICAL      HX OTHER SURGICAL  2007    ankle sure        I have reviewed and agree with [address] and ROS and intake form in chart and the record furthermore I have reviewed prior medical record(s) regarding this patients care during this appointment. Review of Systems:   Patient is a pleasant appearing individual, appropriately dressed, well hydrated, well nourished, who is alert, appropriately oriented for age, and in no acute distress with a normal gait and normal affect who does not appear to be in any significant pain. Physical Exam:  Right knee - Neurovascularly intact with good cap refill, full range of motion and full strength, well healed incision noted, no swelling, no erythema, no instability. Left knee - Decrease range of motion with flexion, Some crepitation, Grossly neurovascularly intact, Good cap refill, No skin lesion, Moderate swelling, No gross instability, Some quadriceps weakness     Encounter Diagnoses     ICD-10-CM ICD-9-CM   1. Primary osteoarthritis of right knee  M17.11 715.16   2. Osteoarthritis of left knee, unspecified osteoarthritis type  M17.12 715.96         HPI:  The patient is here with a chief complaint of left knee pain, throbbing, burning pain, status post right total knee replacement. She is doing well from that standpoint, but the left knee is bothering her. Pain is 7/10. ROS:  10-point review of systems is positive for instability. Assessment/Plan:  Plan at this point, my recommendation would be for left total knee replacement. She is going to be on Eliquis postoperatively as she did in the right knee and we will match up her left knee to the right knee, and also we will use the old clearance.   We will do it on 5/21/2021, outpatient surgery. As part of continued conservative pain management options the patient was advised to utilize Tylenol or OTC NSAIDS as long as it is not medically contraindicated. Return to Office: Follow-up and Dispositions    · Return in about 1 year (around 2/23/2022) for w/ X-rays. Scribed by Elian Vasquez MD as dictated by Twan Ochoa MD.  Documentation True and Accepted Jose Ochoa MD

## 2021-02-23 NOTE — PATIENT INSTRUCTIONS
Knee Arthritis: Care Instructions Your Care Instructions Knee arthritis is a breakdown of the cartilage that cushions your knee joint. When the cartilage wears down, your bones rub against each other. This causes pain and stiffness. Knee arthritis tends to get worse with time. Treatment for knee arthritis involves reducing pain, making the leg muscles stronger, and staying at a healthy body weight. The treatment usually does not improve the health of the cartilage, but it can reduce pain and improve how well your knee works. You can take simple measures to protect your knee joints, ease your pain, and help you stay active. Follow-up care is a key part of your treatment and safety. Be sure to make and go to all appointments, and call your doctor if you are having problems. It's also a good idea to know your test results and keep a list of the medicines you take. How can you care for yourself at home? · Know that knee arthritis will cause more pain on some days than on others. · Stay at a healthy weight. Lose weight if you are overweight. When you stand up, the pressure on your knees from every pound of body weight is multiplied four times. So if you lose 10 pounds, you will reduce the pressure on your knees by 40 pounds. · Talk to your doctor or physical therapist about exercises that will help ease joint pain. ? Stretch to help prevent stiffness and to prevent injury before you exercise. You may enjoy gentle forms of yoga to help keep your knee joints and muscles flexible. ? Walk instead of jog. 
? Ride a bike. This makes your thigh muscles stronger and takes pressure off your knee. ? Wear well-fitting and comfortable shoes. ? Exercise in chest-deep water. This can help you exercise longer with less pain. ? Avoid exercises that include squatting or kneeling. They can put a lot of strain on your knees. ? Talk to your doctor to make sure that the exercise you do is not making the arthritis worse. · Do not sit for long periods of time. Try to walk once in a while to keep your knee from getting stiff. · Ask your doctor or physical therapist whether shoe inserts may reduce your arthritis pain. · If you can afford it, get new athletic shoes at least every year. This can help reduce the strain on your knees. · Use a device to help you do everyday activities. ? A cane or walking stick can help you keep your balance when you walk. Hold the cane or walking stick in the hand opposite the painful knee. ? If you feel like you may fall when you walk, try using crutches or a front-wheeled walker. These can prevent falls that could cause more damage to your knee. ? A knee brace may help keep your knee stable and prevent pain. ? You also can use other things to make life easier, such as a higher toilet seat and handrails in the bathtub or shower. · Take pain medicines exactly as directed. ? Do not wait until you are in severe pain. You will get better results if you take it sooner. ? If you are not taking a prescription pain medicine, take an over-the-counter medicine such as acetaminophen (Tylenol), ibuprofen (Advil, Motrin), or naproxen (Aleve). Read and follow all instructions on the label. ? Do not take two or more pain medicines at the same time unless the doctor told you to. Many pain medicines have acetaminophen, which is Tylenol. Too much acetaminophen (Tylenol) can be harmful. ? Tell your doctor if you take a blood thinner, have diabetes, or have allergies to shellfish. · Ask your doctor if you might benefit from a shot of steroid medicine into your knee. This may provide pain relief for several months. · Many people take the supplements glucosamine and chondroitin for osteoarthritis. Some people feel they help, but the medical research does not show that they work. Talk to your doctor before you take these supplements. When should you call for help? Call your doctor now or seek immediate medical care if: 
  · You have sudden swelling, warmth, or pain in your knee.  
  · You have knee pain and a fever or rash.  
  · You have such bad pain that you cannot use your knee. Watch closely for changes in your health, and be sure to contact your doctor if you have any problems. Where can you learn more? Go to http://www.gray.com/ Enter K394 in the search box to learn more about \"Knee Arthritis: Care Instructions. \" Current as of: December 9, 2019               Content Version: 12.6 © 3339-4694 [employer]. Care instructions adapted under license by [employer] (which disclaims liability or warranty for this information). If you have questions about a medical condition or this instruction, always ask your healthcare professional. [address] any warranty or liability for your use of this information.

## 2021-03-03 ENCOUNTER — TELEPHONE (OUTPATIENT)
Dept: ORTHOPEDIC SURGERY | Age: 81
End: 2021-03-03

## 2021-03-03 DIAGNOSIS — M17.11 PRIMARY OSTEOARTHRITIS OF RIGHT KNEE: Primary | ICD-10-CM

## 2021-03-03 RX ORDER — OXYCODONE AND ACETAMINOPHEN 5; 325 MG/1; MG/1
1 TABLET ORAL
Qty: 30 TAB | Refills: 0 | Status: SHIPPED | OUTPATIENT
Start: 2021-03-03 | End: 2021-03-18 | Stop reason: SDUPTHER

## 2021-03-08 ENCOUNTER — OFFICE VISIT (OUTPATIENT)
Dept: ORTHOPEDIC SURGERY | Age: 81
End: 2021-03-08
Payer: MEDICARE

## 2021-03-08 DIAGNOSIS — M17.11 PRIMARY OSTEOARTHRITIS OF RIGHT KNEE: Primary | ICD-10-CM

## 2021-03-08 PROCEDURE — 99024 POSTOP FOLLOW-UP VISIT: CPT | Performed by: ORTHOPAEDIC SURGERY

## 2021-03-08 RX ORDER — SULFAMETHOXAZOLE AND TRIMETHOPRIM 800; 160 MG/1; MG/1
1 TABLET ORAL 2 TIMES DAILY
Qty: 20 TAB | Refills: 0 | Status: SHIPPED | OUTPATIENT
Start: 2021-03-08 | End: 2021-03-18

## 2021-03-08 NOTE — PROGRESS NOTES
Name: Jd Schaeffer    : 1940     Service Dept: [facility]    Patient's Pharmacies:    [facility] [address]  [address]  [facility] [place] [address]  Phone: [phone] Fax: [phone] - [place], [address] [facility] [address] [address]  Phone: [phone] Fax: [phone] - [place], [place] [address]  [address] [place] [address] [place] Godinez [address]  Phone: [phone] Fax: [phone]       Chief Complaint   Patient presents with    Knee Pain    Surgical Follow-up        There were no vitals taken for this visit.    Allergies   Allergen Reactions    Kenalog [Triamcinolone Acetonide] Anaphylaxis    Penicillin G Hives    Adhesive Tape-Silicones Swelling    Adhesive Other (comments)    Bacitracin Not Reported This Time and Hives    Bacitracin Rash    Betadine [Povidone-Iodine] Other (comments)     Caused uncomfortable rash on area where placed    Cephalexin Rash and Itching    Ciprofloxacin Other (comments)    Clindamycin Other (comments)    Cortane Shortness of Breath    Cortisone Not Reported This Time and Hives    Duloxetine Rash and Itching    Erythromycin Not Reported This Time and Hives    Erythromycin Rash    Kenalog [Triamcinolone Acetonide] Shortness of Breath    Lisinopril Not Reported This Time    Lisinopril Cough    Lortab [Hydrocodone-Acetaminophen] Rash    Macrobid [Nitrofurantoin Monohyd/M-Cryst] Other (comments)     Flushed face,leg swellinig    Macrobid [Nitrofurantoin Monohyd/M-Cryst] Other (comments)    Medrol [Methylprednisolone] Swelling    Methimazole Other (comments)     Neck pain/cough    Methimazole Cough    Neosporin [Hydrocortisone] Hives    Penicillins Not Reported This Time    Penicillins Rash    Prednimustine Shortness of Breath    Prednisolone Other (comments)    Prednisone Not Reported This Time  Propylthiouracil Other (comments)     Dizziness,elevated blood pressure    Propylthiouracil Other (comments)    Sulfamethoxazole-Trimethoprim Other (comments)    Tetanus And Diphtheria Toxoids, Adsorbed, Adult Swelling    Tetanus Vaccines And Toxoid Not Reported This Time      Current Outpatient Medications   Medication Sig Dispense Refill    oxyCODONE-acetaminophen (Percocet) 5-325 mg per tablet Take 1 Tab by mouth every four to six (4-6) hours as needed for Pain for up to 14 days. Max Daily Amount: 6 Tabs. 30 Tab 0    DULoxetine (CYMBALTA) 20 mg capsule Take 1 Cap by mouth daily. 30 Cap 5    apixaban (Eliquis) 5 mg tablet Take 1 Tab by mouth two (2) times a day. 180 Tab 2    dilTIAZem ER (DILT-XR) 180 mg capsule Take 1 Cap by mouth daily. 90 Cap 3    atenoloL (TENORMIN) 50 mg tablet Take 75 mg by mouth daily.  losartan (COZAAR) 50 mg tablet Take 50 mg by mouth two (2) times a day.  levothyroxine (SYNTHROID) 25 mcg tablet Take 25 mcg by mouth.  gabapentin (NEURONTIN) 300 mg capsule Take 1 Cap by mouth three (3) times daily. Max Daily Amount: 900 mg. 270 Cap 1    estradiol (ESTRACE) 0.01 % (0.1 mg/gram) vaginal cream       OTHER       indapamide (LOZOL) 2.5 mg tablet       metFORMIN (GLUCOPHAGE) 850 mg tablet daily.  gluc-condr-om3-dha-epa-fish-st (GLUCOSAMINE CHONDROITIN PLUS) 399-750-65-54 mg cap Take 1 Tab by mouth daily. Indications: supplement      MELATONIN PO Take 5 mg by mouth as needed (sleep).  acetaminophen (TYLENOL) 500 mg tablet Take 500 mg by mouth every six (6) hours as needed for Pain.  calcium-cholecalciferol, d3, (CALCIUM 600 + D) 600-125 mg-unit tab Take 600 mg by mouth daily. Patient taking 4 times a week  Indications: Osteoporosis      Cholecalciferol, Vitamin D3, (VITAMIN D3) 1,000 unit cap Take 1,000 Units by mouth daily. Indications: Vitamin D Deficiency      pravastatin (PRAVACHOL) 40 mg tablet Take 40 mg by mouth daily.  Indications: hypercholesterolemia        Patient Active Problem List   Diagnosis Code    Essential hypertension, benign I10    Cardiomegaly I51.7    Obesity, unspecified E66.9    Other and unspecified hyperlipidemia E78.5    Type II or unspecified type diabetes mellitus without mention of complication, not stated as uncontrolled E11.9    Hypercholesterolemia E78.00    Palpitation R00.2    Hyperthyroidism E05.90    PAC (premature atrial contraction) I49.1    Type 2 diabetes mellitus without complication, without long-term current use of insulin (HCC) E11.9    Low back pain without sciatica M54.5    Lumbosacral spondylosis without myelopathy M47.817    DDD (degenerative disc disease), lumbar M51.36    Lumbar radiculopathy M54.16    Right knee pain M25.561    Osteoarthritis of both knees M17.0    Lumbar herniated disc M51.26    Spinal stenosis, lumbar region without neurogenic claudication M48.061    Spinal stenosis of lumbar region with neurogenic claudication M48.062    Spondylolisthesis of lumbar region M43.16    Long-term current use of opiate analgesic Z79.891    Spondylisthesis M43.10    Spinal stenosis M48.00    S/P lumbar fusion Z98.1    Left leg swelling M79.89    Type 2 diabetes mellitus with diabetic neuropathy (HCC) E11.40    Anticoagulant long-term use Z79.01    Severe obesity (HCC) E66.01      Family History   Problem Relation Age of Onset    Heart Disease Other         positive history of ischemic heart disease    Heart Disease Mother     Heart Disease Father     Heart Disease Brother     Cancer Brother       Social History     Socioeconomic History    Marital status:      Spouse name: Not on file    Number of children: Not on file    Years of education: Not on file    Highest education level: Not on file   Tobacco Use    Smoking status: Never Smoker    Smokeless tobacco: Never Used   Substance and Sexual Activity    Alcohol use: No    Drug use: No   Other Topics Concern   Social History Narrative    ** Merged History Encounter **           Past Surgical History:   Procedure Laterality Date    HX BACK SURGERY  08/01/2018    HX GYN      hysterectomy    HX HYSTERECTOMY      HX OTHER SURGICAL      HX OTHER SURGICAL  2007    ankle sure      Past Medical History:   Diagnosis Date    Cardiomegaly     [facility]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 ([facility] [address]     Coarctation of aorta determined by echocardiography     Diabetes ([facility] [facility]     Dysuria     Essential hypertension, benign     stable    High blood pressure     Hypercholesterolemia     Hyperthyroidism     no meds    Obesity, unspecified     Pt has weight loss    Other and unspecified hyperlipidemia     Chol 172, ldl 82, hdl 62, tg 141    Recurrent UTI     Shingles     Type II or unspecified type diabetes mellitus without mention of complication, not stated as uncontrolled         I have reviewed and agree with PFSH and ROS and intake form in chart and the record furthermore I have reviewed prior medical record(s) regarding this patients care during this appointment. Review of Systems:   Patient is a pleasant appearing individual, appropriately dressed, well hydrated, well nourished, who is alert, appropriately oriented for age, and in no acute distress with a normal gait and normal affect who does not appear to be in any significant pain. Physical Exam:  Right knee - Neurovascularly intact with good cap refill, full range of motion and full strength, well healed incision noted, no swelling, no erythema, no instability. Left knee - Decrease range of motion with flexion, Some crepitation, Grossly neurovascularly intact, Good cap refill, No skin lesion, Moderate swelling, No gross instability, Some quadriceps weakness     Encounter Diagnoses     ICD-10-CM ICD-9-CM   1.  Primary osteoarthritis of right knee  M17.11 715.16       HPI:  The patient is status post right total knee replacement, had a little bit of erythema, doing well otherwise. Assessment/Plan:  Plan at this point, activities as tolerated, weightbearing started, yearly appointment, Bactrim DS just as a precaution. We will see her back as needed and go from there. As part of continued conservative pain management options the patient was advised to utilize Tylenol or OTC NSAIDS as long as it is not medically contraindicated. Return to Office: Follow-up and Dispositions    · Return in about 1 year (around 3/8/2022) for w/ X-rays. Scribed by Mitchell Chueng MD as dictated by Art Barker. Seymour Munguia MD.  Documentation True and Accepted Jose Munguia MD

## 2021-03-08 NOTE — PATIENT INSTRUCTIONS
Knee Arthritis: Care Instructions Your Care Instructions Knee arthritis is a breakdown of the cartilage that cushions your knee joint. When the cartilage wears down, your bones rub against each other. This causes pain and stiffness. Knee arthritis tends to get worse with time. Treatment for knee arthritis involves reducing pain, making the leg muscles stronger, and staying at a healthy body weight. The treatment usually does not improve the health of the cartilage, but it can reduce pain and improve how well your knee works. You can take simple measures to protect your knee joints, ease your pain, and help you stay active. Follow-up care is a key part of your treatment and safety. Be sure to make and go to all appointments, and call your doctor if you are having problems. It's also a good idea to know your test results and keep a list of the medicines you take. How can you care for yourself at home? · Know that knee arthritis will cause more pain on some days than on others. · Stay at a healthy weight. Lose weight if you are overweight. When you stand up, the pressure on your knees from every pound of body weight is multiplied four times. So if you lose 10 pounds, you will reduce the pressure on your knees by 40 pounds. · Talk to your doctor or physical therapist about exercises that will help ease joint pain. ? Stretch to help prevent stiffness and to prevent injury before you exercise. You may enjoy gentle forms of yoga to help keep your knee joints and muscles flexible. ? Walk instead of jog. 
? Ride a bike. This makes your thigh muscles stronger and takes pressure off your knee. ? Wear well-fitting and comfortable shoes. ? Exercise in chest-deep water. This can help you exercise longer with less pain. ? Avoid exercises that include squatting or kneeling. They can put a lot of strain on your knees. ? Talk to your doctor to make sure that the exercise you do is not making the arthritis worse. · Do not sit for long periods of time. Try to walk once in a while to keep your knee from getting stiff. · Ask your doctor or physical therapist whether shoe inserts may reduce your arthritis pain. · If you can afford it, get new athletic shoes at least every year. This can help reduce the strain on your knees. · Use a device to help you do everyday activities. ? A cane or walking stick can help you keep your balance when you walk. Hold the cane or walking stick in the hand opposite the painful knee. ? If you feel like you may fall when you walk, try using crutches or a front-wheeled walker. These can prevent falls that could cause more damage to your knee. ? A knee brace may help keep your knee stable and prevent pain. ? You also can use other things to make life easier, such as a higher toilet seat and handrails in the bathtub or shower. · Take pain medicines exactly as directed. ? Do not wait until you are in severe pain. You will get better results if you take it sooner. ? If you are not taking a prescription pain medicine, take an over-the-counter medicine such as acetaminophen (Tylenol), ibuprofen (Advil, Motrin), or naproxen (Aleve). Read and follow all instructions on the label. ? Do not take two or more pain medicines at the same time unless the doctor told you to. Many pain medicines have acetaminophen, which is Tylenol. Too much acetaminophen (Tylenol) can be harmful. ? Tell your doctor if you take a blood thinner, have diabetes, or have allergies to shellfish. · Ask your doctor if you might benefit from a shot of steroid medicine into your knee. This may provide pain relief for several months. · Many people take the supplements glucosamine and chondroitin for osteoarthritis. Some people feel they help, but the medical research does not show that they work. Talk to your doctor before you take these supplements. When should you call for help? Call your doctor now or seek immediate medical care if: 
  · You have sudden swelling, warmth, or pain in your knee.  
  · You have knee pain and a fever or rash.  
  · You have such bad pain that you cannot use your knee. Watch closely for changes in your health, and be sure to contact your doctor if you have any problems. Where can you learn more? Go to http://www.gray.com/ Enter Y832 in the search box to learn more about \"Knee Arthritis: Care Instructions. \" Current as of: December 9, 2019               Content Version: 12.6 © 5699-1112 [employer]. Care instructions adapted under license by [employer] (which disclaims liability or warranty for this information). If you have questions about a medical condition or this instruction, always ask your healthcare professional. [address] any warranty or liability for your use of this information.

## 2021-05-03 ENCOUNTER — OFFICE VISIT (OUTPATIENT)
Dept: CARDIOLOGY CLINIC | Age: 81
End: 2021-05-03
Payer: MEDICARE

## 2021-05-03 VITALS
HEIGHT: 65 IN | WEIGHT: 211 LBS | HEART RATE: 74 BPM | BODY MASS INDEX: 35.16 KG/M2 | DIASTOLIC BLOOD PRESSURE: 80 MMHG | SYSTOLIC BLOOD PRESSURE: 126 MMHG

## 2021-05-03 DIAGNOSIS — Z79.01 ANTICOAGULANT LONG-TERM USE: ICD-10-CM

## 2021-05-03 DIAGNOSIS — I48.19 PERSISTENT ATRIAL FIBRILLATION (HCC): ICD-10-CM

## 2021-05-03 DIAGNOSIS — I10 ESSENTIAL HYPERTENSION, BENIGN: Primary | ICD-10-CM

## 2021-05-03 DIAGNOSIS — E05.90 HYPERTHYROIDISM: ICD-10-CM

## 2021-05-03 DIAGNOSIS — E78.00 HYPERCHOLESTEROLEMIA: ICD-10-CM

## 2021-05-03 PROCEDURE — 99214 OFFICE O/P EST MOD 30 MIN: CPT | Performed by: INTERNAL MEDICINE

## 2021-05-03 NOTE — PROGRESS NOTES
HISTORY OF PRESENT ILLNESS  Mitch Headley is a 78 y.o. female. 6/2020    Patient seen for follow-up. She has been having indigestion type feeling in substernal area. This is going on for about a month. GI evaluation did abdominal ultrasound showing hepatic steatosis. Symptoms are persistent. Denies any other complaint. No clear relation to activity or exertion. 6/23/2020  Patient is here for follow up of diagnostic tests. Results will be discussed. 5/3/2021  Patient presents for f/u for Persistent atrial fibrillation. She is anticipating left TKR surgery 5/21/2021. Palpitations   The history is provided by the patient. This is a recurrent problem. The problem has been gradually worsening. The problem occurs daily. The problem is associated with nothing. Associated symptoms include irregular heartbeat. Pertinent negatives include no exertional chest pressure, no PND and no lower extremity edema. Her past medical history is significant for hypertension. Hypertension  The history is provided by the patient. This is a chronic problem. The problem occurs constantly. The problem has not changed since onset. Cholesterol Problem  The history is provided by the patient. This is a chronic problem. The problem occurs constantly. The problem has not changed since onset. Review of Systems   Constitutional: Negative for chills. HENT: Negative for nosebleeds. Eyes: Negative for blurred vision and double vision. Respiratory: Negative for wheezing. Cardiovascular: Positive for leg swelling. Negative for palpitations and PND. Gastrointestinal: Negative for heartburn. Musculoskeletal: Negative for myalgias. Skin: Negative for rash. Endo/Heme/Allergies: Does not bruise/bleed easily.      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 ([facility]     Coarctation of aorta determined by echocardiography     Diabetes ([facility]     Dysuria     Essential hypertension, benign     stable    High blood pressure     Hypercholesterolemia     Hyperthyroidism     no meds    Obesity, unspecified     Pt has weight loss    Other and unspecified hyperlipidemia     Chol 172, ldl 82, hdl 62, tg 141    Recurrent UTI     Shingles     Type II or unspecified type diabetes mellitus without mention of complication, not stated as uncontrolled        Past Surgical History:   Procedure Laterality Date    HX BACK SURGERY  08/01/2018    HX GYN      hysterectomy    HX HYSTERECTOMY      HX OTHER SURGICAL      HX OTHER SURGICAL  2007    ankle sure       Social History     Tobacco Use    Smoking status: Never Smoker    Smokeless tobacco: Never Used   Substance Use Topics    Alcohol use: No       Allergies   Allergen Reactions    Kenalog [Triamcinolone Acetonide] Anaphylaxis    Penicillin G Hives    Adhesive Tape-Silicones Swelling    Adhesive Other (comments)    Bacitracin Not Reported This Time and Hives    Bacitracin Rash    Betadine [Povidone-Iodine] Other (comments)     Caused uncomfortable rash on area where placed    Cephalexin Rash and Itching    Ciprofloxacin Other (comments)    Clindamycin Other (comments)    Cortane Shortness of Breath    Cortisone Not Reported This Time and Hives    Duloxetine Rash and Itching    Erythromycin Not Reported This Time and Hives    Erythromycin Rash    Kenalog [Triamcinolone Acetonide] Shortness of Breath    Lisinopril Not Reported This Time    Lisinopril Cough    Lortab [Hydrocodone-Acetaminophen] Rash    Macrobid [Nitrofurantoin Monohyd/M-Cryst] Other (comments)     Flushed face,leg swellinig    Macrobid [Nitrofurantoin Monohyd/M-Cryst] Other (comments)    Medrol [Methylprednisolone] Swelling    Methimazole Other (comments)     Neck pain/cough    Methimazole Cough    Neosporin [Hydrocortisone] Hives    Penicillins Not Reported This Time    Penicillins Rash    Prednimustine Shortness of Breath    Prednisolone Other (comments)    Prednisone Not Reported This Time    Propylthiouracil Other (comments)     Dizziness,elevated blood pressure    Propylthiouracil Other (comments)    Sulfamethoxazole-Trimethoprim Other (comments)    Tetanus And Diphtheria Toxoids, Adsorbed, Adult Swelling    Tetanus Vaccines And Toxoid Not Reported This Time       Current Outpatient Medications   Medication Sig    apixaban (Eliquis) 5 mg tablet Take 1 Tab by mouth two (2) times a day.  dilTIAZem ER (DILT-XR) 180 mg capsule Take 1 Cap by mouth daily.  atenoloL (TENORMIN) 50 mg tablet Take 75 mg by mouth daily.  losartan (COZAAR) 50 mg tablet Take 50 mg by mouth two (2) times a day.  levothyroxine (SYNTHROID) 25 mcg tablet Take 25 mcg by mouth.  gabapentin (NEURONTIN) 300 mg capsule Take 1 Cap by mouth three (3) times daily. Max Daily Amount: 900 mg.  estradiol (ESTRACE) 0.01 % (0.1 mg/gram) vaginal cream     OTHER     metFORMIN (GLUCOPHAGE) 850 mg tablet daily.  MELATONIN PO Take 5 mg by mouth as needed (sleep).  acetaminophen (TYLENOL) 500 mg tablet Take 500 mg by mouth every six (6) hours as needed for Pain.  calcium-cholecalciferol, d3, (CALCIUM 600 + D) 600-125 mg-unit tab Take 600 mg by mouth daily. Patient taking 4 times a week  Indications: Osteoporosis    Cholecalciferol, Vitamin D3, (VITAMIN D3) 1,000 unit cap Take 1,000 Units by mouth daily. Indications: Vitamin D Deficiency    pravastatin (PRAVACHOL) 40 mg tablet Take 40 mg by mouth daily. Indications: hypercholesterolemia     No current facility-administered medications for this visit.         Visit Vitals  /80   Pulse 74   Ht 5' 5\" (1.651 m)   Wt 95.7 kg (211 lb)   BMI 35.11 kg/m²         Physical Exam Constitutional: She is oriented to person, place, and time. She appears well-developed and well-nourished. obese   HENT:   Head: Normocephalic and atraumatic. Eyes: Conjunctivae are normal.   Neck: Neck supple. No JVD present. No tracheal deviation present. No thyromegaly present. Cardiovascular: Normal rate. An irregular rhythm present. PMI is not displaced. Exam reveals no gallop and no decreased pulses. Murmur heard. Early systolic murmur is present with a grade of 2/6 at the upper right sternal border. Pulmonary/Chest: No respiratory distress. She has no wheezes. She has no rales. She exhibits no tenderness. Abdominal: Soft. There is no abdominal tenderness. Musculoskeletal: Normal range of motion. General: Edema (1+ BLE edema) present. Neurological: She is alert and oriented to person, place, and time. Skin: Skin is warm. Psychiatric: She has a normal mood and affect. Nursing note and vitals reviewed. Ms. Sony Wren has a reminder for a \"due or due soon\" health maintenance. I have asked that she contact her primary care provider for follow-up on this health maintenance. No flowsheet data found. mri:8/2015 really worse at night I feel like a soccer [place]   1. No acute hemorrhage or acute infarction. 2. White matter abnormality is nonspecific but likely ischemic. This does not suggest multiple sclerosis. 3. No other significant intracranial abnormality is appreciated. EDMGNOQ:6/6712:ENQE  Left ventricle: Systolic function was normal. Ejection fraction was  estimated in the range of 55 % to 60 %. There were no regional wall motion  abnormalities. Doppler parameters were consistent with abnormal left  ventricular relaxation (grade 1 diastolic dysfunction). Left atrium: The atrium was mildly dilated. Mitral valve: There was trivial regurgitation. Aortic valve: The valve was trileaflet.  Leaflets exhibited mildly  increased thickness, normal cuspal separation, and sclerosis. Tricuspid valve: There was mild regurgitation. Tricuspid regurgitation  peak velocity: 2.4 m/sec. Pulmonary artery systolic pressure: 26 mmHg. Pulmonic valve: There was trivial regurgitation. 2015:holter  Sr,Frequent pac. No a fib    I have personally reviewed patients ekg done at other facility. 2017  Sr,pac    NUCLEAR IMAGIN2017     Findings:   1. Stress images reveal normal Myoview distrubution in all the LV segments in short axis, vertical and horizontal long axis views. 2. Resting images have a normal uptake. 3. Gated images reveal normal wall motion and the ejection fraction is calculated to be 90%. Conclusion:   1. Normal perfusion scan. 2. Normal wall motion and ejection fraction. 3. Low risk scan. Interpretation Summary 3/2019       · Estimated left ventricular ejection fraction is 56 - 60%. Left ventricular mild concentric hypertrophy. No regional wall motion abnormality noted. Mild (grade 1) left ventricular diastolic dysfunction. · Left atrial cavity size is moderately dilated. · Normal right ventricular size and function. · Mild aortic valve sclerosis. · Mild mitral valve regurgitation. · Mild tricuspid valve regurgitation. Pulmonary arterial systolic pressure is 35.8 mmHg. Mild pulmonary hypertension. · Mild pulmonic valve regurgitation is present. · Moderately elevated central venous pressure (10-15 mmHg); IVC diameter is larger than 21 mm and collapses more than 50% with respiration. I Have personally reviewed recent relevant labs available and discussed with patient  Lipids2020  Nuclear stress test normal perfusion. Normal ejection fraction  Assessment         ICD-10-CM ICD-9-CM    1. Essential hypertension, benign  I10 401.1     Stable continue current treatment monitor   2. Persistent atrial fibrillation (HCC)  I48.19 427.31     Stable continue treatment monitor   3.  Hypercholesterolemia  E78.00 272.0     Continue treatment lab with PCP   4. Anticoagulant long-term use  Z79.01 V58.61     Continue Eliquis for A. fib   5. Hyperthyroidism  E05.90 242.90     Continue treatment labs with PCP     3/2019  Atrial fibrillation controlled ventricular rate. Anticoagulation started. Likely to undergo ablation for hyperthyroidism. Follow-up after that. Echo for LV function. Currently continue with rate control strategy    6/2019  Status post radioablation of hyperthyroidism. Clinically feeling significantly better. We will continue rate control strategy. Remains in A. fib today. On anticoagulation  12/2019  Cardiac status stable. If needed patient may go through knee surgery with interruption of anticoagulation in the perioperative. 6/22/2020  Negative nuclear stress test continue to monitor clinically. 12/2020  Cardiac status stable. Blood pressure better controlled. Okay to proceed with knee surgery as planned with medium cardiac risk. Okay to hold anticoagulation as needed  5/3/2021  Cardiac status is stable. Persistent atrial fibrillation is rate controlled continue anticoagulation with Eliquis. May hold anticoagulation 2 to 3 days prior to surgery. May proceed with knee surgery as planned with medium cardiac risk. To new current medications  Medications Discontinued During This Encounter   Medication Reason    indapamide (LOZOL) 2.5 mg tablet Therapy Completed    gluc-condr-om3-dha-epa-fish-st (GLUCOSAMINE CHONDROITIN PLUS) [phone] mg cap Therapy Completed    DULoxetine (CYMBALTA) 20 mg capsule LIST CLEANUP       No orders of the defined types were placed in this encounter. I have personally performed a face-to-face diagnostic evaluation on this patient. My findings are as follows. History and physical exam by me shows: Remains in A. fib.   Otherwise stable  I have personally reviewed all the diagnostic labs, available EKG imaging x-rays and other diagnostic data and incorporated them into my care I discussed. .  My clinical impression for patient's status stable. Okay to proceed with medial surgery as planned. Okay to interrupt Eliquis for surgery as recommended before. Detailed discussion of patient's care plan was discussed. All appropriate treatment options discussed and incorporated. All medications as well as diagnostic testing evaluation that are required for the patient I reviewed and orders are placed under my supervision. Care plan discussed and updated after review.   Paige Ag MD

## 2021-05-03 NOTE — PROGRESS NOTES
1. Have you been to the ER, urgent care clinic since your last visit? Hospitalized since your last visit? Yes ananth  2. Have you seen or consulted any other health care providers outside of the [address] [place] [address] since your last visit? Include any pap smears or colon screening.       No

## 2021-05-13 DIAGNOSIS — M17.12 OSTEOARTHRITIS OF LEFT KNEE, UNSPECIFIED OSTEOARTHRITIS TYPE: Primary | ICD-10-CM

## 2021-05-14 ENCOUNTER — OFFICE VISIT (OUTPATIENT)
Dept: ORTHOPEDIC SURGERY | Age: 81
End: 2021-05-14
Payer: MEDICARE

## 2021-05-14 DIAGNOSIS — M17.12 OSTEOARTHRITIS OF LEFT KNEE, UNSPECIFIED OSTEOARTHRITIS TYPE: Primary | ICD-10-CM

## 2021-05-14 DIAGNOSIS — M25.562 LEFT KNEE PAIN, UNSPECIFIED CHRONICITY: ICD-10-CM

## 2021-05-14 PROCEDURE — 1101F PT FALLS ASSESS-DOCD LE1/YR: CPT | Performed by: ORTHOPAEDIC SURGERY

## 2021-05-14 PROCEDURE — G8756 NO BP MEASURE DOC: HCPCS | Performed by: ORTHOPAEDIC SURGERY

## 2021-05-14 PROCEDURE — G8400 PT W/DXA NO RESULTS DOC: HCPCS | Performed by: ORTHOPAEDIC SURGERY

## 2021-05-14 PROCEDURE — G8536 NO DOC ELDER MAL SCRN: HCPCS | Performed by: ORTHOPAEDIC SURGERY

## 2021-05-14 PROCEDURE — G8417 CALC BMI ABV UP PARAM F/U: HCPCS | Performed by: ORTHOPAEDIC SURGERY

## 2021-05-14 PROCEDURE — 1090F PRES/ABSN URINE INCON ASSESS: CPT | Performed by: ORTHOPAEDIC SURGERY

## 2021-05-14 PROCEDURE — G8427 DOCREV CUR MEDS BY ELIG CLIN: HCPCS | Performed by: ORTHOPAEDIC SURGERY

## 2021-05-14 PROCEDURE — 99214 OFFICE O/P EST MOD 30 MIN: CPT | Performed by: ORTHOPAEDIC SURGERY

## 2021-05-14 PROCEDURE — G8432 DEP SCR NOT DOC, RNG: HCPCS | Performed by: ORTHOPAEDIC SURGERY

## 2021-05-14 RX ORDER — CEPHALEXIN 500 MG/1
500 CAPSULE ORAL EVERY 8 HOURS
Qty: 3 CAP | Refills: 0 | Status: CANCELLED | OUTPATIENT
Start: 2021-05-14 | End: 2021-05-15

## 2021-05-14 RX ORDER — OXYCODONE AND ACETAMINOPHEN 5; 325 MG/1; MG/1
1 TABLET ORAL
Qty: 30 TAB | Refills: 0 | Status: SHIPPED | OUTPATIENT
Start: 2021-05-14 | End: 2021-05-28

## 2021-05-14 RX ORDER — DILTIAZEM HYDROCHLORIDE 240 MG/1
CAPSULE, COATED, EXTENDED RELEASE ORAL
COMMUNITY
Start: 2021-04-18

## 2021-05-14 RX ORDER — ONDANSETRON 4 MG/1
4 TABLET, ORALLY DISINTEGRATING ORAL
Qty: 10 TAB | Refills: 0 | Status: SHIPPED | OUTPATIENT
Start: 2021-05-14 | End: 2021-11-09

## 2021-05-14 RX ORDER — SULFAMETHOXAZOLE AND TRIMETHOPRIM 800; 160 MG/1; MG/1
1 TABLET ORAL EVERY 12 HOURS
Qty: 3 TAB | Refills: 0 | Status: SHIPPED | OUTPATIENT
Start: 2021-05-14 | End: 2021-05-16

## 2021-05-14 NOTE — PROGRESS NOTES
Name: Yudith Peraza    : 1940     Service Dept: [address] [facility]    Patient's Pharmacies:    [facility] [address] [facility], [facility]  [address]  [address] [place] [address]  Phone: [phone] Fax: [address]  - Danny Arias [address] [address]  Phone: [phone] Fax: [phone]    [facility] [phone] [facility], [address] [place] [address]  Phone: [phone] Fax: [phone]       Chief Complaint   Patient presents with    Pre-op Exam    Knee Pain        There were no vitals taken for this visit.    Allergies   Allergen Reactions    Kenalog [Triamcinolone Acetonide] Anaphylaxis    Penicillin G Hives    Adhesive Tape-Silicones Swelling    Adhesive Other (comments)    Bacitracin Not Reported This Time and Hives    Bacitracin Rash    Betadine [Povidone-Iodine] Other (comments)     Caused uncomfortable rash on area where placed    Cephalexin Rash and Itching    Ciprofloxacin Other (comments)    Clindamycin Other (comments)    Cortane Shortness of Breath    Cortisone Not Reported This Time and Hives    Duloxetine Rash and Itching    Erythromycin Not Reported This Time and Hives    Erythromycin Rash    Kenalog [Triamcinolone Acetonide] Shortness of Breath    Lisinopril Not Reported This Time    Lisinopril Cough    Lortab [Hydrocodone-Acetaminophen] Rash    Macrobid [Nitrofurantoin Monohyd/M-Cryst] Other (comments)     Flushed face,leg swellinig    Macrobid [Nitrofurantoin Monohyd/M-Cryst] Other (comments)    Medrol [Methylprednisolone] Swelling    Methimazole Other (comments)     Neck pain/cough    Methimazole Cough    Neosporin [Hydrocortisone] Hives    Penicillins Not Reported This Time    Penicillins Rash    Prednimustine Shortness of Breath    Prednisolone Other (comments)    Prednisone Not Reported This Time    Propylthiouracil Other (comments)     Dizziness,elevated blood pressure    Propylthiouracil Other (comments)    Sulfamethoxazole-Trimethoprim Other (comments)    Tetanus And Diphtheria Toxoids, Adsorbed, Adult Swelling    Tetanus Vaccines And Toxoid Not Reported This Time      Current Outpatient Medications   Medication Sig Dispense Refill    dilTIAZem ER (CARDIZEM CD) 240 mg capsule TAKE 1 CAPSULE BY MOUTH EVERY DAY      apixaban (Eliquis) 5 mg tablet Take 1 Tab by mouth two (2) times a day. 180 Tab 2    atenoloL (TENORMIN) 50 mg tablet Take 75 mg by mouth daily.  losartan (COZAAR) 50 mg tablet Take 50 mg by mouth two (2) times a day.  levothyroxine (SYNTHROID) 25 mcg tablet Take 25 mcg by mouth.  gabapentin (NEURONTIN) 300 mg capsule Take 1 Cap by mouth three (3) times daily. Max Daily Amount: 900 mg. 270 Cap 1    estradiol (ESTRACE) 0.01 % (0.1 mg/gram) vaginal cream       OTHER       metFORMIN (GLUCOPHAGE) 850 mg tablet daily.  MELATONIN PO Take 5 mg by mouth as needed (sleep).  acetaminophen (TYLENOL) 500 mg tablet Take 500 mg by mouth every six (6) hours as needed for Pain.  calcium-cholecalciferol, d3, (CALCIUM 600 + D) 600-125 mg-unit tab Take 600 mg by mouth daily. Patient taking 4 times a week  Indications: Osteoporosis      Cholecalciferol, Vitamin D3, (VITAMIN D3) 1,000 unit cap Take 1,000 Units by mouth daily. Indications: Vitamin D Deficiency      pravastatin (PRAVACHOL) 40 mg tablet Take 40 mg by mouth daily.  Indications: hypercholesterolemia        Patient Active Problem List   Diagnosis Code    Essential hypertension, benign I10    Cardiomegaly I51.7    Obesity, unspecified E66.9    Other and unspecified hyperlipidemia E78.5    Type II or unspecified type diabetes mellitus without mention of complication, not stated as uncontrolled E11.9    Hypercholesterolemia E78.00    Palpitation R00.2    Hyperthyroidism E05.90    PAC (premature atrial contraction) I49.1    Type 2 diabetes mellitus without complication, without long-term current use of insulin (HCC) E11.9    Low back pain without sciatica M54.5    Lumbosacral spondylosis without myelopathy M47.817    DDD (degenerative disc disease), lumbar M51.36    Lumbar radiculopathy M54.16    Right knee pain M25.561    Osteoarthritis of both knees M17.0    Lumbar herniated disc M51.26    Spinal stenosis, lumbar region without neurogenic claudication M48.061    Spinal stenosis of lumbar region with neurogenic claudication M48.062    Spondylolisthesis of lumbar region M43.16    Long-term current use of opiate analgesic Z79.891    Spondylisthesis M43.10    Spinal stenosis M48.00    S/P lumbar fusion Z98.1    Left leg swelling M79.89    Type 2 diabetes mellitus with diabetic neuropathy (HCC) E11.40    Anticoagulant long-term use Z79.01    Severe obesity (HCC) E66.01      Family History   Problem Relation Age of Onset    Heart Disease Other         positive history of ischemic heart disease    Heart Disease Mother     Heart Disease Father     Heart Disease Brother     Cancer Brother       Social History     Socioeconomic History    Marital status:      Spouse name: Not on file    Number of children: Not on file    Years of education: Not on file    Highest education level: Not on file   Tobacco Use    Smoking status: Never Smoker    Smokeless tobacco: Never Used   Substance and Sexual Activity    Alcohol use: No    Drug use: No   Other Topics Concern   Social History Narrative    ** Merged History Encounter **           Past Surgical History:   Procedure Laterality Date    HX BACK SURGERY  08/01/2018    HX GYN      hysterectomy    HX HYSTERECTOMY      HX OTHER SURGICAL      HX OTHER SURGICAL  2007    ankle sure      Past Medical History:   Diagnosis Date    Cardiomegaly     [facility]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 [facility]     Coarctation of aorta determined by echocardiography     Diabetes ([facility]     Dysuria     Essential hypertension, benign     stable    High blood pressure     Hypercholesterolemia     Hyperthyroidism     no meds    Obesity, unspecified     Pt has weight loss    Other and unspecified hyperlipidemia     Chol 172, ldl 82, hdl 62, tg 141    Recurrent UTI     Shingles     Type II or unspecified type diabetes mellitus without mention of complication, not stated as uncontrolled         I have reviewed and agree with [facility] Freda and intake form in chart and the record furthermore I have reviewed prior medical record(s) regarding this patients care during this appointment. Review of Systems:   Patient is a pleasant appearing individual, appropriately dressed, well hydrated, well nourished, who is alert, appropriately oriented for age, and in no acute distress with a normal gait and normal affect who does not appear to be in any significant pain. Physical Exam:  Left Knee -Decrease range of motion with flexion, Knee arc of greater than 50 degrees, Some crepitation, Grossly neurovascularly intact, Good cap refill, No skin lesion, Moderate swelling, No gross instability, Some quadriceps weakness, Kellgren and Mervin at least grade 3    Right Knee - Full Range of Motion, No crepitation, Grossly neurovascularly intact, Good cap refill, No skin lesion, No swelling, No gross instability, No quadriceps weakness     Inpatient status: The patient has admitted to severe pain in the affected knee and due to such pain they are unable to complete activities of daily living at home and/or work on a regular basis where conservative treatments have failed. After extensive discussion with the patient, they have chosen to receive a total knee replacement with the expectation of inpatient procedure.  Their dependent functional status (i.e. lack of capable support and safety at home, pain management, comorbities, or difficulty ambulating with assistive walking devices) would deem them a candidate for an inpatient stay. The patient acknowledges and understand the plan. The risks of surgery were explained to the patient which include but not limited to infection, nerve injury, artery injury, tendon injury, poor result, poor wound healing, unforeseen incidence, bleeding, infection, nerve damage, failure to improve, worsening of symptoms, morbidity, and mortality risks were explained. All questions were answered. Patient was told of no guarantees. Patient accepts all risks and benefits. A consent for surgery will be documented and signed by the patient or a legal guardian. All questions were answered. The procedure was explained in detail. The patient was counseled about the risks of neftaly Covid-19 during their perioperative period and any recovery window from their procedure. The patient was made aware that neftaly Covid-19 may worsen their prognosis for recovering from their procedure and lend to a higher morbidity and/or mortality risk. All material risks, benefits, and reasonable alternatives including postponing the procedure were discussed. The patient DOES wish to proceed with their procedure at this time. Encounter Diagnoses     ICD-10-CM ICD-9-CM   1. Osteoarthritis of left knee, unspecified osteoarthritis type  M17.12 715.96   2. Left knee pain, unspecified chronicity  M25.562 719.46       HPI:  The patient is here with a chief complaint of left knee pain, throbbing, burning pain, progressively getting worse. Pain is 5/10. ROS:  10-point review of systems is unremarkable. X-rays of the left knee are positive for severe OA. Assessment/Plan:  Plan will be for left total knee replacement, Percocet, Zofran, Bactrim DS and Eliquis for DVT prophylaxis. As part of continued conservative pain management options the patient was advised to utilize Tylenol or OTC NSAIDS as long as it is not medically contraindicated.      Return to Office: Follow-up and Dispositions    · Return for already scheduled for surgery. Scribed by Bianca Gary LPN as dictated by [facility] GILDA Hi MD.  Documentation True and Accepted Jose GILDA Hi MD

## 2021-05-14 NOTE — PATIENT INSTRUCTIONS
Knee Pain or Injury: Care Instructions Your Care Instructions Injuries are a common cause of knee problems. Sudden (acute) injuries may be caused by a direct blow to the knee. They can also be caused by abnormal twisting, bending, or falling on the knee. Pain, bruising, or swelling may be severe, and may start within minutes of the injury. Overuse is another cause of knee pain. Other causes are climbing stairs, kneeling, and other activities that use the knee. Everyday wear and tear, especially as you get older, also can cause knee pain. Rest, along with home treatment, often relieves pain and allows your knee to heal. If you have a serious knee injury, you may need tests and treatment. Follow-up care is a key part of your treatment and safety. Be sure to make and go to all appointments, and call your doctor if you are having problems. It's also a good idea to know your test results and keep a list of the medicines you take. How can you care for yourself at home? · Be safe with medicines. Read and follow all instructions on the label. ? If the doctor gave you a prescription medicine for pain, take it as prescribed. ? If you are not taking a prescription pain medicine, ask your doctor if you can take an over-the-counter medicine. · Rest and protect your knee. Take a break from any activity that may cause pain. · Put ice or a cold pack on your knee for 10 to 20 minutes at a time. Put a thin cloth between the ice and your skin. · Prop up a sore knee on a pillow when you ice it or anytime you sit or lie down for the next 3 days. Try to keep it above the level of your heart. This will help reduce swelling. · If your knee is not swollen, you can put moist heat, a heating pad, or a warm cloth on your knee. · If your doctor recommends an elastic bandage, sleeve, or other type of support for your knee, wear it as directed.  
· Follow your doctor's instructions about how much weight you can put on your leg. Use a cane, crutches, or a walker as instructed. · Follow your doctor's instructions about activity during your healing process. If you can do mild exercise, slowly increase your activity. · Reach and stay at a healthy weight. Extra weight can strain the joints, especially the knees and hips, and make the pain worse. Losing even a few pounds may help. When should you call for help? Call 911 anytime you think you may need emergency care. For example, call if: 
  · You have symptoms of a blood clot in your lung (called a pulmonary embolism). These may include: 
? Sudden chest pain. ? Trouble breathing. ? Coughing up blood. Call your doctor now or seek immediate medical care if: 
  · You have severe or increasing pain.  
  · Your leg or foot turns cold or changes color.  
  · You cannot stand or put weight on your knee.  
  · Your knee looks twisted or bent out of shape.  
  · You cannot move your knee.  
  · You have signs of infection, such as: 
? Increased pain, swelling, warmth, or redness. ? Red streaks leading from the knee. ? Pus draining from a place on your knee. ? A fever.  
  · You have signs of a blood clot in your leg (called a deep vein thrombosis), such as: 
? Pain in your calf, back of the knee, thigh, or groin. ? Redness and swelling in your leg or groin. Watch closely for changes in your health, and be sure to contact your doctor if: 
  · You have tingling, weakness, or numbness in your knee.  
  · You have any new symptoms, such as swelling.  
  · You have bruises from a knee injury that last longer than 2 weeks.  
  · You do not get better as expected. Where can you learn more? Go to http://www.gray.com/ Enter K195 in the search box to learn more about \"Knee Pain or Injury: Care Instructions. \" Current as of: February 26, 2020               Content Version: 12.8 © 7758-9740 [email or web address].   
Care instructions adapted under license by [employer] (which disclaims liability or warranty for this information). If you have questions about a medical condition or this instruction, always ask your healthcare professional. [address] any warranty or liability for your use of this information.

## 2021-05-19 LAB — SARS-COV-2, NAA: NOT DETECTED

## 2021-05-25 ENCOUNTER — OFFICE VISIT (OUTPATIENT)
Dept: ORTHOPEDIC SURGERY | Age: 81
End: 2021-05-25
Payer: MEDICARE

## 2021-05-25 DIAGNOSIS — M17.12 OSTEOARTHRITIS OF LEFT KNEE, UNSPECIFIED OSTEOARTHRITIS TYPE: Primary | ICD-10-CM

## 2021-05-25 PROCEDURE — 99024 POSTOP FOLLOW-UP VISIT: CPT | Performed by: ORTHOPAEDIC SURGERY

## 2021-05-25 NOTE — PROGRESS NOTES
Name: Randall Mcmullen    : 1940     Service Dept: [facility]    Patient's Pharmacies:    [facility] [address]  [address]  [facility] [place] [address]  Phone: [phone] Fax: [phone] - [place], [address] [place] [address]  Phone: [phone] Fax: [phone] - [place], [place] [address] [place] [address] E  [address] [place] [address]  Phone: [phone] Fax: [phone]       Chief Complaint   Patient presents with    Surgical Follow-up    Knee Pain        There were no vitals taken for this visit.    Allergies   Allergen Reactions    Kenalog [Triamcinolone Acetonide] Anaphylaxis    Penicillin G Hives    Adhesive Tape-Silicones Swelling    Adhesive Other (comments)    Bacitracin Not Reported This Time and Hives    Bacitracin Rash    Betadine [Povidone-Iodine] Other (comments)     Caused uncomfortable rash on area where placed    Cephalexin Rash and Itching    Ciprofloxacin Other (comments)    Clindamycin Other (comments)    Cortane Shortness of Breath    Cortisone Not Reported This Time and Hives    Duloxetine Rash and Itching    Erythromycin Not Reported This Time and Hives    Erythromycin Rash    Kenalog [Triamcinolone Acetonide] Shortness of Breath    Lisinopril Not Reported This Time    Lisinopril Cough    Lortab [Hydrocodone-Acetaminophen] Rash    Macrobid [Nitrofurantoin Monohyd/M-Cryst] Other (comments)     Flushed face,leg swellinig    Macrobid [Nitrofurantoin Monohyd/M-Cryst] Other (comments)    Medrol [Methylprednisolone] Swelling    Methimazole Other (comments)     Neck pain/cough    Methimazole Cough    Neosporin [Hydrocortisone] Hives    Penicillins Not Reported This Time    Penicillins Rash    Prednimustine Shortness of Breath    Prednisolone Other (comments)    Prednisone Not Reported This Time  Propylthiouracil Other (comments)     Dizziness,elevated blood pressure    Propylthiouracil Other (comments)    Sulfamethoxazole-Trimethoprim Other (comments)    Tetanus And Diphtheria Toxoids, Adsorbed, Adult Swelling    Tetanus Vaccines And Toxoid Not Reported This Time      Current Outpatient Medications   Medication Sig Dispense Refill    dilTIAZem ER (CARDIZEM CD) 240 mg capsule TAKE 1 CAPSULE BY MOUTH EVERY DAY      oxyCODONE-acetaminophen (Percocet) 5-325 mg per tablet Take 1 Tab by mouth every four to six (4-6) hours as needed for Pain for up to 14 days. Max Daily Amount: 6 Tabs. 30 Tab 0    ondansetron (ZOFRAN ODT) 4 mg disintegrating tablet Take 1 Tab by mouth every eight (8) hours as needed for Nausea or Nausea or Vomiting. 10 Tab 0    apixaban (Eliquis) 5 mg tablet Take 1 Tab by mouth two (2) times a day. 180 Tab 2    atenoloL (TENORMIN) 50 mg tablet Take 75 mg by mouth daily.  losartan (COZAAR) 50 mg tablet Take 50 mg by mouth two (2) times a day.  levothyroxine (SYNTHROID) 25 mcg tablet Take 25 mcg by mouth.  gabapentin (NEURONTIN) 300 mg capsule Take 1 Cap by mouth three (3) times daily. Max Daily Amount: 900 mg. 270 Cap 1    estradiol (ESTRACE) 0.01 % (0.1 mg/gram) vaginal cream       OTHER       metFORMIN (GLUCOPHAGE) 850 mg tablet daily.  MELATONIN PO Take 5 mg by mouth as needed (sleep).  acetaminophen (TYLENOL) 500 mg tablet Take 500 mg by mouth every six (6) hours as needed for Pain.  calcium-cholecalciferol, d3, (CALCIUM 600 + D) 600-125 mg-unit tab Take 600 mg by mouth daily. Patient taking 4 times a week  Indications: Osteoporosis      Cholecalciferol, Vitamin D3, (VITAMIN D3) 1,000 unit cap Take 1,000 Units by mouth daily. Indications: Vitamin D Deficiency      pravastatin (PRAVACHOL) 40 mg tablet Take 40 mg by mouth daily.  Indications: hypercholesterolemia        Patient Active Problem List   Diagnosis Code    Essential hypertension, benign I10    Cardiomegaly I51.7    Obesity, unspecified E66.9    Other and unspecified hyperlipidemia E78.5    Type II or unspecified type diabetes mellitus without mention of complication, not stated as uncontrolled E11.9    Hypercholesterolemia E78.00    Palpitation R00.2    Hyperthyroidism E05.90    PAC (premature atrial contraction) I49.1    Type 2 diabetes mellitus without complication, without long-term current use of insulin (HCC) E11.9    Low back pain without sciatica M54.5    Lumbosacral spondylosis without myelopathy M47.817    DDD (degenerative disc disease), lumbar M51.36    Lumbar radiculopathy M54.16    Right knee pain M25.561    Osteoarthritis of both knees M17.0    Lumbar herniated disc M51.26    Spinal stenosis, lumbar region without neurogenic claudication M48.061    Spinal stenosis of lumbar region with neurogenic claudication M48.062    Spondylolisthesis of lumbar region M43.16    Long-term current use of opiate analgesic Z79.891    Spondylisthesis M43.10    Spinal stenosis M48.00    S/P lumbar fusion Z98.1    Left leg swelling M79.89    Type 2 diabetes mellitus with diabetic neuropathy (HCC) E11.40    Anticoagulant long-term use Z79.01    Severe obesity (HCC) E66.01      Family History   Problem Relation Age of Onset    Heart Disease Other         positive history of ischemic heart disease    Heart Disease Mother     Heart Disease Father     Heart Disease Brother     Cancer Brother       Social History     Socioeconomic History    Marital status:      Spouse name: Not on file    Number of children: Not on file    Years of education: Not on file    Highest education level: Not on file   Tobacco Use    Smoking status: Never Smoker    Smokeless tobacco: Never Used   Substance and Sexual Activity    Alcohol use: No    Drug use: No   Other Topics Concern   Social History Narrative    ** Merged History Encounter **          Social Determinants of Health Financial Resource Strain:     Difficulty of Paying Living Expenses:    Food Insecurity:     Worried About Running Out of Food in the Last Year:     [address] in the Last Year:    Transportation Needs:     Lack of Transportation (Medical):  Lack of Transportation (Non-Medical):    Physical Activity:     Days of Exercise per Week:     Minutes of Exercise per Session:    Stress:     Feeling of Stress :    Social Connections:     Frequency of Communication with Friends and Family:     Frequency of Social Gatherings with Friends and Family:     Attends [employer] Services:     Active Member of Clubs or Organizations:     Attends Club or Organization Meetings:     Marital Status:       Past Surgical History:   Procedure Laterality Date    HX BACK SURGERY  08/01/2018    HX GYN      hysterectomy    HX HYSTERECTOMY      HX OTHER SURGICAL      HX OTHER SURGICAL  2007    ankle sure      Past Medical History:   Diagnosis Date    Cardiomegaly     [facility]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 ([facility] [address]     Coarctation of aorta determined by echocardiography     Diabetes ([facility] [address]     Dysuria     Essential hypertension, benign     stable    High blood pressure     Hypercholesterolemia     Hyperthyroidism     no meds    Obesity, unspecified     Pt has weight loss    Other and unspecified hyperlipidemia     Chol 172, ldl 82, hdl 62, tg 141    Recurrent UTI     Shingles     Type II or unspecified type diabetes mellitus without mention of complication, not stated as uncontrolled         I have reviewed and agree with [facility] and ROS and intake form in chart and the record furthermore I have reviewed prior medical record(s) regarding this patients care during this appointment.      Review of Systems:   Patient is a pleasant appearing individual, appropriately dressed, well hydrated, well nourished, who is alert, appropriately oriented for age, and in no acute distress with a walker gait and normal affect who does not appear to be in any significant pain. Physical Exam:  Left knee - Neurovascularly intact with good cap refill, full range of motion and full strength, well healed incision noted, no swelling, no erythema, no instability. Right knee - Decrease range of motion with flexion, Some crepitation, Grossly neurovascularly intact, Good cap refill, No skin lesion, Moderate swelling, No gross instability, Some quadriceps weakness   Encounter Diagnoses     ICD-10-CM ICD-9-CM   1. Osteoarthritis of left knee, unspecified osteoarthritis type  M17.12 715.96       HPI:  The patient is status post left total knee replacement on 5/21/21. Doing well. Has no complaints, just a bit of soreness. Assessment/Plan:  Plan at this point, activities as tolerated started. No restriction. We will see the patient back on a yearly appointment. If she gets worse, she is to give me a call. She is going to continue with Eliquis. As part of continued conservative pain management options the patient was advised to utilize Tylenol or OTC NSAIDS as long as it is not medically contraindicated. Return to Office: Follow-up and Dispositions    · Return in about 1 year (around 5/25/2022). Scribed by Felipa Ordoñez LPN as dictated by [facility] [place] GILDA Gonzalez MD.  Documentation True and Accepted Jose Gonzalez MD

## 2021-05-25 NOTE — PATIENT INSTRUCTIONS
Knee Pain or Injury: Care Instructions Your Care Instructions Injuries are a common cause of knee problems. Sudden (acute) injuries may be caused by a direct blow to the knee. They can also be caused by abnormal twisting, bending, or falling on the knee. Pain, bruising, or swelling may be severe, and may start within minutes of the injury. Overuse is another cause of knee pain. Other causes are climbing stairs, kneeling, and other activities that use the knee. Everyday wear and tear, especially as you get older, also can cause knee pain. Rest, along with home treatment, often relieves pain and allows your knee to heal. If you have a serious knee injury, you may need tests and treatment. Follow-up care is a key part of your treatment and safety. Be sure to make and go to all appointments, and call your doctor if you are having problems. It's also a good idea to know your test results and keep a list of the medicines you take. How can you care for yourself at home? · Be safe with medicines. Read and follow all instructions on the label. ? If the doctor gave you a prescription medicine for pain, take it as prescribed. ? If you are not taking a prescription pain medicine, ask your doctor if you can take an over-the-counter medicine. · Rest and protect your knee. Take a break from any activity that may cause pain. · Put ice or a cold pack on your knee for 10 to 20 minutes at a time. Put a thin cloth between the ice and your skin. · Prop up a sore knee on a pillow when you ice it or anytime you sit or lie down for the next 3 days. Try to keep it above the level of your heart. This will help reduce swelling. · If your knee is not swollen, you can put moist heat, a heating pad, or a warm cloth on your knee. · If your doctor recommends an elastic bandage, sleeve, or other type of support for your knee, wear it as directed.  
· Follow your doctor's instructions about how much weight you can put on your leg. Use a cane, crutches, or a walker as instructed. · Follow your doctor's instructions about activity during your healing process. If you can do mild exercise, slowly increase your activity. · Reach and stay at a healthy weight. Extra weight can strain the joints, especially the knees and hips, and make the pain worse. Losing even a few pounds may help. When should you call for help? Call 911 anytime you think you may need emergency care. For example, call if: 
  · You have symptoms of a blood clot in your lung (called a pulmonary embolism). These may include: 
? Sudden chest pain. ? Trouble breathing. ? Coughing up blood. Call your doctor now or seek immediate medical care if: 
  · You have severe or increasing pain.  
  · Your leg or foot turns cold or changes color.  
  · You cannot stand or put weight on your knee.  
  · Your knee looks twisted or bent out of shape.  
  · You cannot move your knee.  
  · You have signs of infection, such as: 
? Increased pain, swelling, warmth, or redness. ? Red streaks leading from the knee. ? Pus draining from a place on your knee. ? A fever.  
  · You have signs of a blood clot in your leg (called a deep vein thrombosis), such as: 
? Pain in your calf, back of the knee, thigh, or groin. ? Redness and swelling in your leg or groin. Watch closely for changes in your health, and be sure to contact your doctor if: 
  · You have tingling, weakness, or numbness in your knee.  
  · You have any new symptoms, such as swelling.  
  · You have bruises from a knee injury that last longer than 2 weeks.  
  · You do not get better as expected. Where can you learn more? Go to http://www.gray.com/ Enter K195 in the search box to learn more about \"Knee Pain or Injury: Care Instructions. \" Current as of: February 26, 2020               Content Version: 12.8 © 5146-4582 [employer].   
Care instructions adapted under license by [employer] (which disclaims liability or warranty for this information). If you have questions about a medical condition or this instruction, always ask your healthcare professional. [address] any warranty or liability for your use of this information.

## 2021-06-08 ENCOUNTER — OFFICE VISIT (OUTPATIENT)
Dept: ORTHOPEDIC SURGERY | Age: 81
End: 2021-06-08
Payer: MEDICARE

## 2021-06-08 DIAGNOSIS — M25.562 LEFT KNEE PAIN, UNSPECIFIED CHRONICITY: ICD-10-CM

## 2021-06-08 DIAGNOSIS — M17.12 OSTEOARTHRITIS OF LEFT KNEE, UNSPECIFIED OSTEOARTHRITIS TYPE: Primary | ICD-10-CM

## 2021-06-08 PROCEDURE — 99024 POSTOP FOLLOW-UP VISIT: CPT | Performed by: ORTHOPAEDIC SURGERY

## 2021-06-08 RX ORDER — OXYCODONE AND ACETAMINOPHEN 5; 325 MG/1; MG/1
1 TABLET ORAL
Qty: 30 TABLET | Refills: 0 | Status: SHIPPED | OUTPATIENT
Start: 2021-06-08 | End: 2021-07-01 | Stop reason: SDUPTHER

## 2021-06-08 RX ORDER — SULFAMETHOXAZOLE AND TRIMETHOPRIM 800; 160 MG/1; MG/1
1 TABLET ORAL EVERY 12 HOURS
Qty: 14 TABLET | Refills: 0 | Status: SHIPPED | OUTPATIENT
Start: 2021-06-08 | End: 2021-06-15

## 2021-06-08 NOTE — PATIENT INSTRUCTIONS
Knee Pain or Injury: Care Instructions Your Care Instructions Injuries are a common cause of knee problems. Sudden (acute) injuries may be caused by a direct blow to the knee. They can also be caused by abnormal twisting, bending, or falling on the knee. Pain, bruising, or swelling may be severe, and may start within minutes of the injury. Overuse is another cause of knee pain. Other causes are climbing stairs, kneeling, and other activities that use the knee. Everyday wear and tear, especially as you get older, also can cause knee pain. Rest, along with home treatment, often relieves pain and allows your knee to heal. If you have a serious knee injury, you may need tests and treatment. Follow-up care is a key part of your treatment and safety. Be sure to make and go to all appointments, and call your doctor if you are having problems. It's also a good idea to know your test results and keep a list of the medicines you take. How can you care for yourself at home? · Be safe with medicines. Read and follow all instructions on the label. ? If the doctor gave you a prescription medicine for pain, take it as prescribed. ? If you are not taking a prescription pain medicine, ask your doctor if you can take an over-the-counter medicine. · Rest and protect your knee. Take a break from any activity that may cause pain. · Put ice or a cold pack on your knee for 10 to 20 minutes at a time. Put a thin cloth between the ice and your skin. · Prop up a sore knee on a pillow when you ice it or anytime you sit or lie down for the next 3 days. Try to keep it above the level of your heart. This will help reduce swelling. · If your knee is not swollen, you can put moist heat, a heating pad, or a warm cloth on your knee. · If your doctor recommends an elastic bandage, sleeve, or other type of support for your knee, wear it as directed.  
· Follow your doctor's instructions about how much weight you can put on your leg. Use a cane, crutches, or a walker as instructed. · Follow your doctor's instructions about activity during your healing process. If you can do mild exercise, slowly increase your activity. · Reach and stay at a healthy weight. Extra weight can strain the joints, especially the knees and hips, and make the pain worse. Losing even a few pounds may help. When should you call for help? Call 911 anytime you think you may need emergency care. For example, call if: 
  · You have symptoms of a blood clot in your lung (called a pulmonary embolism). These may include: 
? Sudden chest pain. ? Trouble breathing. ? Coughing up blood. Call your doctor now or seek immediate medical care if: 
  · You have severe or increasing pain.  
  · Your leg or foot turns cold or changes color.  
  · You cannot stand or put weight on your knee.  
  · Your knee looks twisted or bent out of shape.  
  · You cannot move your knee.  
  · You have signs of infection, such as: 
? Increased pain, swelling, warmth, or redness. ? Red streaks leading from the knee. ? Pus draining from a place on your knee. ? A fever.  
  · You have signs of a blood clot in your leg (called a deep vein thrombosis), such as: 
? Pain in your calf, back of the knee, thigh, or groin. ? Redness and swelling in your leg or groin. Watch closely for changes in your health, and be sure to contact your doctor if: 
  · You have tingling, weakness, or numbness in your knee.  
  · You have any new symptoms, such as swelling.  
  · You have bruises from a knee injury that last longer than 2 weeks.  
  · You do not get better as expected. Where can you learn more? Go to http://www.gray.com/ Enter K195 in the search box to learn more about \"Knee Pain or Injury: Care Instructions. \" Current as of: February 26, 2020               Content Version: 12.8 © 5805-1399 [employer].   
Care instructions adapted under license by [employer] (which disclaims liability or warranty for this information). If you have questions about a medical condition or this instruction, always ask your healthcare professional. [address] any warranty or liability for your use of this information.

## 2021-06-08 NOTE — PROGRESS NOTES
Name: Amina Shannon    : 1940     Service Dept: [facility] and Sports Medicine    Patient's Pharmacies:    Cris Gipson [facility], [address]  [address]  8 [facility] [place] [address]  Phone: [phone] Fax: J Luis 10 [phone] - [place], [address] [place] [address]  Phone: [phone] Fax: [phone]    [facility] [address]  [address] [address] [address]  Phone: [phone] Fax: [phone]       Chief Complaint   Patient presents with    Surgical Follow-up        There were no vitals taken for this visit.    Allergies   Allergen Reactions    Kenalog [Triamcinolone Acetonide] Anaphylaxis    Penicillin G Hives    Adhesive Tape-Silicones Swelling    Adhesive Other (comments)    Bacitracin Not Reported This Time and Hives    Bacitracin Rash    Betadine [Povidone-Iodine] Other (comments)     Caused uncomfortable rash on area where placed    Cephalexin Rash and Itching    Ciprofloxacin Other (comments)    Clindamycin Other (comments)    Cortane Shortness of Breath    Cortisone Not Reported This Time and Hives    Duloxetine Rash and Itching    Erythromycin Not Reported This Time and Hives    Erythromycin Rash    Kenalog [Triamcinolone Acetonide] Shortness of Breath    Lisinopril Not Reported This Time    Lisinopril Cough    Lortab [Hydrocodone-Acetaminophen] Rash    Macrobid [Nitrofurantoin Monohyd/M-Cryst] Other (comments)     Flushed face,leg swellinig    Macrobid [Nitrofurantoin Monohyd/M-Cryst] Other (comments)    Medrol [Methylprednisolone] Swelling    Methimazole Other (comments)     Neck pain/cough    Methimazole Cough    Neosporin [Hydrocortisone] Hives    Penicillins Not Reported This Time    Penicillins Rash    Prednimustine Shortness of Breath    Prednisolone Other (comments)    Prednisone Not Reported This Time    Propylthiouracil Other (comments)     Dizziness,elevated blood pressure    Propylthiouracil Other (comments)    Sulfamethoxazole-Trimethoprim Other (comments)    Tetanus And Diphtheria Toxoids, Adsorbed, Adult Swelling    Tetanus Vaccines And Toxoid Not Reported This Time      Current Outpatient Medications   Medication Sig Dispense Refill    dilTIAZem ER (CARDIZEM CD) 240 mg capsule TAKE 1 CAPSULE BY MOUTH EVERY DAY      ondansetron (ZOFRAN ODT) 4 mg disintegrating tablet Take 1 Tab by mouth every eight (8) hours as needed for Nausea or Nausea or Vomiting. 10 Tab 0    apixaban (Eliquis) 5 mg tablet Take 1 Tab by mouth two (2) times a day. 180 Tab 2    atenoloL (TENORMIN) 50 mg tablet Take 75 mg by mouth daily.  losartan (COZAAR) 50 mg tablet Take 50 mg by mouth two (2) times a day.  levothyroxine (SYNTHROID) 25 mcg tablet Take 25 mcg by mouth.  gabapentin (NEURONTIN) 300 mg capsule Take 1 Cap by mouth three (3) times daily. Max Daily Amount: 900 mg. 270 Cap 1    estradiol (ESTRACE) 0.01 % (0.1 mg/gram) vaginal cream       OTHER       metFORMIN (GLUCOPHAGE) 850 mg tablet daily.  MELATONIN PO Take 5 mg by mouth as needed (sleep).  acetaminophen (TYLENOL) 500 mg tablet Take 500 mg by mouth every six (6) hours as needed for Pain.  calcium-cholecalciferol, d3, (CALCIUM 600 + D) 600-125 mg-unit tab Take 600 mg by mouth daily. Patient taking 4 times a week  Indications: Osteoporosis      Cholecalciferol, Vitamin D3, (VITAMIN D3) 1,000 unit cap Take 1,000 Units by mouth daily. Indications: Vitamin D Deficiency      pravastatin (PRAVACHOL) 40 mg tablet Take 40 mg by mouth daily.  Indications: hypercholesterolemia        Patient Active Problem List   Diagnosis Code    Essential hypertension, benign I10    Cardiomegaly I51.7    Obesity, unspecified E66.9    Other and unspecified hyperlipidemia E78.5    Type II or unspecified type diabetes mellitus without mention of complication, not stated as uncontrolled E11.9    Hypercholesterolemia E78.00    Palpitation R00.2    Hyperthyroidism E05.90    PAC (premature atrial contraction) I49.1    Type 2 diabetes mellitus without complication, without long-term current use of insulin (HCC) E11.9    Low back pain without sciatica M54.5    Lumbosacral spondylosis without myelopathy M47.817    DDD (degenerative disc disease), lumbar M51.36    Lumbar radiculopathy M54.16    Right knee pain M25.561    Osteoarthritis of both knees M17.0    Lumbar herniated disc M51.26    Spinal stenosis, lumbar region without neurogenic claudication M48.061    Spinal stenosis of lumbar region with neurogenic claudication M48.062    Spondylolisthesis of lumbar region M43.16    Long-term current use of opiate analgesic Z79.891    Spondylisthesis M43.10    Spinal stenosis M48.00    S/P lumbar fusion Z98.1    Left leg swelling M79.89    Type 2 diabetes mellitus with diabetic neuropathy (HCC) E11.40    Anticoagulant long-term use Z79.01    Severe obesity (HCC) E66.01      Family History   Problem Relation Age of Onset    Heart Disease Other         positive history of ischemic heart disease    Heart Disease Mother     Heart Disease Father     Heart Disease Brother     Cancer Brother       Social History     Socioeconomic History    Marital status:      Spouse name: Not on file    Number of children: Not on file    Years of education: Not on file    Highest education level: Not on file   Tobacco Use    Smoking status: Never Smoker    Smokeless tobacco: Never Used   Substance and Sexual Activity    Alcohol use: No    Drug use: No   Other Topics Concern   Social History Narrative    ** Merged History Encounter **          Social Determinants of Health     Financial Resource Strain:     Difficulty of Paying Living Expenses:    Food Insecurity:     Worried About Running Out of Food in the Last Year:     [address] in the Last Year: Transportation Needs:     Lack of Transportation (Medical):  Lack of Transportation (Non-Medical):    Physical Activity:     Days of Exercise per Week:     Minutes of Exercise per Session:    Stress:     Feeling of Stress :    Social Connections:     Frequency of Communication with Friends and Family:     Frequency of Social Gatherings with Friends and Family:     Attends Sikhism Services:     Active Member of Clubs or Organizations:     Attends Club or Organization Meetings:     Marital Status:       Past Surgical History:   Procedure Laterality Date    HX BACK SURGERY  08/01/2018    HX GYN      hysterectomy    HX HYSTERECTOMY      HX OTHER SURGICAL      HX OTHER SURGICAL  2007    ankle sure      Past Medical History:   Diagnosis Date    Cardiomegaly     [facility]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 ([facility]     Coarctation of aorta determined by echocardiography     Diabetes ([facility]     Dysuria     Essential hypertension, benign     stable    High blood pressure     Hypercholesterolemia     Hyperthyroidism     no meds    Obesity, unspecified     Pt has weight loss    Other and unspecified hyperlipidemia     Chol 172, ldl 82, hdl 62, tg 141    Recurrent UTI     Shingles     Type II or unspecified type diabetes mellitus without mention of complication, not stated as uncontrolled         I have reviewed and agree with [facility] and ROS and intake form in chart and the record furthermore I have reviewed prior medical record(s) regarding this patients care during this appointment. Review of Systems:   Patient is a pleasant appearing individual, appropriately dressed, well hydrated, well nourished, who is alert, appropriately oriented for age, and in no acute distress with a walker gait and normal affect who does not appear to be in any significant pain.      Physical Exam:  Left knee - Neurovascularly intact with good cap refill, full range of motion and full strength, well healed incision noted, no swelling, no erythema, no instability. Right knee - Decrease range of motion with flexion, Some crepitation, Grossly neurovascularly intact, Good cap refill, No skin lesion, Moderate swelling, No gross instability, Some quadriceps weakness   Encounter Diagnoses     ICD-10-CM ICD-9-CM   1. Osteoarthritis of left knee, unspecified osteoarthritis type  M17.12 715.96   2. Left knee pain, unspecified chronicity  M25.562 719.46       HPI:  The patient is status post left total knee replacement, doing well. Just a little bit of erythema and swelling in the leg, otherwise unremarkable. Assessment/Plan:  Plan at this point, activities as tolerated, weightbearing started. No restrictions from my standpoint. We will see her back on yearly appointment. We will do Bactrim and also Percocet refill. As part of continued conservative pain management options the patient was advised to utilize Tylenol or OTC NSAIDS as long as it is not medically contraindicated. Return to Office: Follow-up and Dispositions    · Return in about 1 year (around 6/8/2022). Scribed by Mere Shah LPN as dictated by [facility] GILDA Adrian MD.  Documentation True and Accepted Jose Adrian MD

## 2021-07-01 DIAGNOSIS — M17.12 OSTEOARTHRITIS OF LEFT KNEE, UNSPECIFIED OSTEOARTHRITIS TYPE: ICD-10-CM

## 2021-07-01 DIAGNOSIS — M25.562 LEFT KNEE PAIN, UNSPECIFIED CHRONICITY: ICD-10-CM

## 2021-07-02 RX ORDER — OXYCODONE AND ACETAMINOPHEN 5; 325 MG/1; MG/1
1 TABLET ORAL
Qty: 30 TABLET | Refills: 0 | Status: SHIPPED | OUTPATIENT
Start: 2021-07-02 | End: 2021-07-16

## 2021-08-02 ENCOUNTER — TELEPHONE (OUTPATIENT)
Dept: ORTHOPEDIC SURGERY | Age: 81
End: 2021-08-02

## 2021-08-02 RX ORDER — CLINDAMYCIN HYDROCHLORIDE 300 MG/1
300 CAPSULE ORAL AS NEEDED
Qty: 2 CAPSULE | Refills: 0 | Status: SHIPPED | OUTPATIENT
Start: 2021-08-02 | End: 2021-11-09

## 2021-08-02 NOTE — TELEPHONE ENCOUNTER
PT HAS DENTIST APPT ON Wednesday AUGUST 4, 2021. [facility].  [address] [facility]

## 2021-08-12 ENCOUNTER — OFFICE VISIT (OUTPATIENT)
Dept: ORTHOPEDIC SURGERY | Age: 81
End: 2021-08-12
Payer: MEDICARE

## 2021-08-12 DIAGNOSIS — Z96.652 STATUS POST LEFT KNEE REPLACEMENT: Primary | ICD-10-CM

## 2021-08-12 PROCEDURE — 99024 POSTOP FOLLOW-UP VISIT: CPT | Performed by: NURSE PRACTITIONER

## 2021-08-12 NOTE — PROGRESS NOTES
Name: Sami Reyna    : 1940    Weight Loss Metrics 5/3/2021 2020 10/27/2020 10/14/2020 10/2/2020 2020 2020   Today's Wt 211 lb 214 lb 215 lb 6.4 oz 215 lb 215 lb 214 lb 207 lb 14.3 oz   BMI 35.11 kg/m2 35.61 kg/m2 35.84 kg/m2 35.78 kg/m2 35.78 kg/m2 35.61 kg/m2 34.6 kg/m2       There is no height or weight on file to calculate BMI. Service Dept: [address] [place] [address] and Sports Medicine    Patient's Pharmacies:    Murray Kimbrough [id number] [address]  [address]  8 [facility] [address]  Phone: [phone] Fax: Abebeaustkatharina 10 1025 [facility], [address] [place] [address]  Phone: [phone] Fax: [phone] Joe Navarrete [facility]  [address] [place] [address] [place]   [place] [address]  Phone: [phone] Fax: [phone]       Chief Complaint   Patient presents with    Surgical Follow-up       HPI:  Presents today with some concerns regarding her incision. Her left knee replacement was almost 3 months ago. She states that there was some redness around the proximal end of the incision yesterday however that this is clear today. But she just wanted to come in and get checked out. There were no vitals taken for this visit.    Allergies   Allergen Reactions    Kenalog [Triamcinolone Acetonide] Anaphylaxis    Penicillin G Hives    Adhesive Tape-Silicones Swelling    Adhesive Other (comments)    Bacitracin Not Reported This Time and Hives    Bacitracin Rash    Betadine [Povidone-Iodine] Other (comments)     Caused uncomfortable rash on area where placed    Cephalexin Rash and Itching    Ciprofloxacin Other (comments)    Clindamycin Other (comments)    Cortane Shortness of Breath    Cortisone Not Reported This Time and Hives    Duloxetine Rash and Itching    Erythromycin Not Reported This Time and Hives    Erythromycin Rash    Kenalog [Triamcinolone Acetonide] Shortness of Breath    Lisinopril Cough    Lortab [Hydrocodone-Acetaminophen] Rash    Macrobid [Nitrofurantoin Monohyd/M-Cryst] Other (comments)     Flushed face,leg swellinig    Macrobid [Nitrofurantoin Monohyd/M-Cryst] Other (comments)    Medrol [Methylprednisolone] Swelling    Methimazole Other (comments)     Neck pain/cough    Methimazole Cough    Neosporin [Hydrocortisone] Hives    Penicillins Rash    Prednimustine Shortness of Breath    Prednisolone Other (comments)    Prednisone Not Reported This Time    Propylthiouracil Other (comments)     Dizziness,elevated blood pressure    Propylthiouracil Other (comments)    Sulfamethoxazole-Trimethoprim Other (comments)    Tetanus And Diphtheria Toxoids, Adsorbed, Adult Swelling      Current Outpatient Medications   Medication Sig Dispense Refill    clindamycin (CLEOCIN) 300 mg capsule Take 1 Capsule by mouth as needed (1 hour before procedure) for up to 1 dose. Take both tablets one hour before procedure 2 Capsule 0    dilTIAZem ER (CARDIZEM CD) 240 mg capsule TAKE 1 CAPSULE BY MOUTH EVERY DAY      ondansetron (ZOFRAN ODT) 4 mg disintegrating tablet Take 1 Tab by mouth every eight (8) hours as needed for Nausea or Nausea or Vomiting. 10 Tab 0    apixaban (Eliquis) 5 mg tablet Take 1 Tab by mouth two (2) times a day. 180 Tab 2    atenoloL (TENORMIN) 50 mg tablet Take 75 mg by mouth daily.  losartan (COZAAR) 50 mg tablet Take 50 mg by mouth two (2) times a day.  levothyroxine (SYNTHROID) 25 mcg tablet Take 25 mcg by mouth.  gabapentin (NEURONTIN) 300 mg capsule Take 1 Cap by mouth three (3) times daily. Max Daily Amount: 900 mg. 270 Cap 1    estradiol (ESTRACE) 0.01 % (0.1 mg/gram) vaginal cream       OTHER       metFORMIN (GLUCOPHAGE) 850 mg tablet daily.  MELATONIN PO Take 5 mg by mouth as needed (sleep).  acetaminophen (TYLENOL) 500 mg tablet Take 500 mg by mouth every six (6) hours as needed for Pain.       calcium-cholecalciferol, d3, (CALCIUM 600 + D) 600-125 mg-unit tab Take 600 mg by mouth daily. Patient taking 4 times a week  Indications: Osteoporosis      Cholecalciferol, Vitamin D3, (VITAMIN D3) 1,000 unit cap Take 1,000 Units by mouth daily. Indications: Vitamin D Deficiency      pravastatin (PRAVACHOL) 40 mg tablet Take 40 mg by mouth daily.  Indications: hypercholesterolemia        Patient Active Problem List   Diagnosis Code    Essential hypertension, benign I10    Cardiomegaly I51.7    Obesity, unspecified E66.9    Other and unspecified hyperlipidemia E78.5    Type II or unspecified type diabetes mellitus without mention of complication, not stated as uncontrolled E11.9    Hypercholesterolemia E78.00    Palpitation R00.2    Hyperthyroidism E05.90    PAC (premature atrial contraction) I49.1    Type 2 diabetes mellitus without complication, without long-term current use of insulin (HCC) E11.9    Low back pain without sciatica M54.5    Lumbosacral spondylosis without myelopathy M47.817    DDD (degenerative disc disease), lumbar M51.36    Lumbar radiculopathy M54.16    Right knee pain M25.561    Osteoarthritis of both knees M17.0    Lumbar herniated disc M51.26    Spinal stenosis, lumbar region without neurogenic claudication M48.061    Spinal stenosis of lumbar region with neurogenic claudication M48.062    Spondylolisthesis of lumbar region M43.16    Long-term current use of opiate analgesic Z79.891    Spondylisthesis M43.10    Spinal stenosis M48.00    S/P lumbar fusion Z98.1    Left leg swelling M79.89    Type 2 diabetes mellitus with diabetic neuropathy (HCC) E11.40    Anticoagulant long-term use Z79.01    Severe obesity (HCC) E66.01      Family History   Problem Relation Age of Onset    Heart Disease Other         positive history of ischemic heart disease    Heart Disease Mother     Heart Disease Father     Heart Disease Brother     Cancer Brother       Social History Socioeconomic History    Marital status:      Spouse name: Not on file    Number of children: Not on file    Years of education: Not on file    Highest education level: Not on file   Tobacco Use    Smoking status: Never Smoker    Smokeless tobacco: Never Used   Substance and Sexual Activity    Alcohol use: No    Drug use: No   Other Topics Concern   Social History Narrative    ** Merged History Encounter **          Social Determinants of Health     Financial Resource Strain:     Difficulty of Paying Living Expenses:    Food Insecurity:     Worried About Running Out of Food in the Last Year:     Ran Out of Food in the Last Year:    Transportation Needs:     Lack of Transportation (Medical):      Lack of Transportation (Non-Medical):    Physical Activity:     Days of Exercise per Week:     Minutes of Exercise per Session:    Stress:     Feeling of Stress :    Social Connections:     Frequency of Communication with Friends and Family:     Frequency of Social Gatherings with Friends and Family:     Attends Cheondoism Services:     Active Member of Clubs or Organizations:     Attends Club or Organization Meetings:     Marital Status:       Past Surgical History:   Procedure Laterality Date    HX BACK SURGERY  08/01/2018    HX GYN      hysterectomy    HX HYSTERECTOMY      HX OTHER SURGICAL      HX OTHER SURGICAL  2007    ankle sure      Past Medical History:   Diagnosis Date    Cardiomegaly     [facility]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 ([facility]     Coarctation of aorta determined by echocardiography     Diabetes ([facility]     Dysuria     Essential hypertension, benign     stable    High blood pressure     Hypercholesterolemia     Hyperthyroidism     no meds    Obesity, unspecified     Pt has weight loss    Other and unspecified hyperlipidemia     Chol 172, ldl 82, hdl 62, tg 141    Recurrent UTI     Shingles     Type II or unspecified type diabetes mellitus without mention of complication, not stated as uncontrolled         I have reviewed and agree with PFSH and ROS and intake form in chart and the record furthermore I have reviewed prior medical record(s) regarding this patients care during this appointment. Review of Systems:   Patient is pleasant appearing individual, appropriately dressed, well hydrated, well nourished, who is alert, appropriately oriented for age, and in no acute distress with a normal affect who does not appear to be in any significant pain. Physical Exam:  Physical exam today of the left knee incision there does not appear to be any erythema. Swelling around the knee is very mild as well as warmth. The incision itself appears dry and intact. Encounter Diagnoses     ICD-10-CM ICD-9-CM   1. Status post left knee replacement  Z96.652 V43.65       As part of continued conservative pain management options the patient was advised to utilize Tylenol or OTC NSAIDS as long as it is not medically contraindicated. Return to Office:   Today I reassured the patient and her daughter that there did not appear to be any redness indicative of infection around the incision. We discussed how suture abscesses can form but again this does not appear to be the case with her knee today. However she does understand that if she begins to have any areas that look raised or angry or begin to drain that she should contact us immediately and we can certainly put her on oral antibiotics if needed. She voices agreement with this and will follow up for a 1 year postop visit from her right knee replacement with Dr. Rodrigo Herrera in February 2022 or anytime sooner if any other concerns or issues arise.          [address] [facility]

## 2021-09-08 ENCOUNTER — TELEPHONE (OUTPATIENT)
Dept: CARDIOLOGY CLINIC | Age: 81
End: 2021-09-08

## 2021-09-08 NOTE — TELEPHONE ENCOUNTER
Spoke with patient per Diegoi, to take evening dose of Eliquis tonight then resume tomorrow with BID. She voices understanding and acceptance of this advice and will call back if any further questions or concerns.

## 2021-09-08 NOTE — TELEPHONE ENCOUNTER
For the Eliquis 5 mg, patient normally takes this at 6:30 am, however she doesn't remembers if she took it or not this morning, please advise asap

## 2021-10-15 DIAGNOSIS — I48.91 ATRIAL FIBRILLATION, UNSPECIFIED TYPE (HCC): ICD-10-CM

## 2021-10-18 RX ORDER — APIXABAN 5 MG/1
TABLET, FILM COATED ORAL
Qty: 180 TABLET | Refills: 2 | Status: SHIPPED | OUTPATIENT
Start: 2021-10-18 | End: 2022-05-02

## 2021-11-09 ENCOUNTER — OFFICE VISIT (OUTPATIENT)
Dept: CARDIOLOGY CLINIC | Age: 81
End: 2021-11-09
Payer: MEDICARE

## 2021-11-09 VITALS
WEIGHT: 208 LBS | HEART RATE: 66 BPM | SYSTOLIC BLOOD PRESSURE: 138 MMHG | HEIGHT: 65 IN | BODY MASS INDEX: 34.66 KG/M2 | DIASTOLIC BLOOD PRESSURE: 87 MMHG

## 2021-11-09 DIAGNOSIS — I10 ESSENTIAL HYPERTENSION, BENIGN: ICD-10-CM

## 2021-11-09 DIAGNOSIS — E78.00 HYPERCHOLESTEROLEMIA: ICD-10-CM

## 2021-11-09 DIAGNOSIS — I48.19 PERSISTENT ATRIAL FIBRILLATION (HCC): Primary | ICD-10-CM

## 2021-11-09 DIAGNOSIS — Z79.01 ANTICOAGULANT LONG-TERM USE: ICD-10-CM

## 2021-11-09 PROCEDURE — G8510 SCR DEP NEG, NO PLAN REQD: HCPCS | Performed by: INTERNAL MEDICINE

## 2021-11-09 PROCEDURE — G8754 DIAS BP LESS 90: HCPCS | Performed by: INTERNAL MEDICINE

## 2021-11-09 PROCEDURE — 1090F PRES/ABSN URINE INCON ASSESS: CPT | Performed by: INTERNAL MEDICINE

## 2021-11-09 PROCEDURE — G8536 NO DOC ELDER MAL SCRN: HCPCS | Performed by: INTERNAL MEDICINE

## 2021-11-09 PROCEDURE — 99214 OFFICE O/P EST MOD 30 MIN: CPT | Performed by: INTERNAL MEDICINE

## 2021-11-09 PROCEDURE — G8417 CALC BMI ABV UP PARAM F/U: HCPCS | Performed by: INTERNAL MEDICINE

## 2021-11-09 PROCEDURE — G8400 PT W/DXA NO RESULTS DOC: HCPCS | Performed by: INTERNAL MEDICINE

## 2021-11-09 PROCEDURE — G8752 SYS BP LESS 140: HCPCS | Performed by: INTERNAL MEDICINE

## 2021-11-09 PROCEDURE — 1101F PT FALLS ASSESS-DOCD LE1/YR: CPT | Performed by: INTERNAL MEDICINE

## 2021-11-09 PROCEDURE — G8427 DOCREV CUR MEDS BY ELIG CLIN: HCPCS | Performed by: INTERNAL MEDICINE

## 2021-11-09 NOTE — PROGRESS NOTES
1. Have you been to the ER, urgent care clinic since your last visit? Hospitalized since your last visit? No    2. Have you seen or consulted any other health care providers outside of the [address] [place] [address] since your last visit? Include any pap smears or colon screening.       No

## 2021-11-09 NOTE — PROGRESS NOTES
HISTORY OF PRESENT ILLNESS  Delfina Villegas is a 78 y.o. female. 6/2020    Patient seen for follow-up. She has been having indigestion type feeling in substernal area. This is going on for about a month. GI evaluation did abdominal ultrasound showing hepatic steatosis. Symptoms are persistent. Denies any other complaint. No clear relation to activity or exertion. 6/23/2020  Patient is here for follow up of diagnostic tests. Results will be discussed. 5/3/2021  Patient presents for f/u for Persistent atrial fibrillation. She is anticipating left TKR surgery 5/21/2021. Cholesterol Problem  The history is provided by the patient. This is a chronic problem. The problem occurs constantly. The problem has not changed since onset. Hypertension  The history is provided by the patient. This is a chronic problem. The problem occurs constantly. The problem has not changed since onset. Palpitations   The history is provided by the patient. This is a recurrent problem. The problem has been gradually worsening. The problem occurs daily. The problem is associated with nothing. Associated symptoms include irregular heartbeat. Pertinent negatives include no exertional chest pressure, no PND and no lower extremity edema. Her past medical history is significant for hypertension. Follow-up        Review of Systems   Constitutional: Negative for chills. HENT: Negative for nosebleeds. Eyes: Negative for blurred vision and double vision. Respiratory: Negative for wheezing. Cardiovascular: Positive for leg swelling. Negative for palpitations and PND. Gastrointestinal: Negative for heartburn. Musculoskeletal: Negative for myalgias. Skin: Negative for rash. Endo/Heme/Allergies: Does not bruise/bleed easily.      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 ([facility] [address]     Coarctation of aorta determined by echocardiography     Diabetes ([facility] [address]     Dysuria     Essential hypertension, benign     stable    High blood pressure     Hypercholesterolemia     Hyperthyroidism     no meds    Obesity, unspecified     Pt has weight loss    Other and unspecified hyperlipidemia     Chol 172, ldl 82, hdl 62, tg 141    Recurrent UTI     Shingles     Type II or unspecified type diabetes mellitus without mention of complication, not stated as uncontrolled        Past Surgical History:   Procedure Laterality Date    HX BACK SURGERY  08/01/2018    HX GYN      hysterectomy    HX HYSTERECTOMY      HX OTHER SURGICAL      HX OTHER SURGICAL  2007    ankle sure       Social History     Tobacco Use    Smoking status: Never Smoker    Smokeless tobacco: Never Used   Substance Use Topics    Alcohol use: No       Allergies   Allergen Reactions    Kenalog [Triamcinolone Acetonide] Anaphylaxis    Penicillin G Hives    Adhesive Tape-Silicones Swelling    Adhesive Other (comments)    Bacitracin Not Reported This Time and Hives    Bacitracin Rash    Betadine [Povidone-Iodine] Other (comments)     Caused uncomfortable rash on area where placed    Cephalexin Rash and Itching    Ciprofloxacin Other (comments)    Clindamycin Other (comments)    Cortane Shortness of Breath    Cortisone Not Reported This Time and Hives    Duloxetine Rash and Itching    Erythromycin Not Reported This Time and Hives    Erythromycin Rash    Kenalog [Triamcinolone Acetonide] Shortness of Breath    Lisinopril Cough    Lortab [Hydrocodone-Acetaminophen] Rash    Macrobid [Nitrofurantoin Monohyd/M-Cryst] Other (comments)     Flushed face,leg swellinig    Macrobid [Nitrofurantoin Monohyd/M-Cryst] Other (comments)    Medrol [Methylprednisolone] Swelling    Methimazole Other (comments)     Neck pain/cough    Methimazole Cough    Neosporin [Hydrocortisone] Hives    Penicillins Rash    Prednimustine Shortness of Breath    Prednisolone Other (comments)    Prednisone Not Reported This Time    Propylthiouracil Other (comments)     Dizziness,elevated blood pressure    Propylthiouracil Other (comments)    Sulfamethoxazole-Trimethoprim Other (comments)    Tetanus And Diphtheria Toxoids, Adsorbed, Adult Swelling       Current Outpatient Medications   Medication Sig    Eliquis 5 mg tablet TAKE 1 TABLET BY MOUTH TWICE A DAY    dilTIAZem ER (CARDIZEM CD) 240 mg capsule TAKE 1 CAPSULE BY MOUTH EVERY DAY    atenoloL (TENORMIN) 50 mg tablet Take 75 mg by mouth daily.  losartan (COZAAR) 50 mg tablet Take 50 mg by mouth two (2) times a day.  levothyroxine (SYNTHROID) 25 mcg tablet Take 25 mcg by mouth.  gabapentin (NEURONTIN) 300 mg capsule Take 1 Cap by mouth three (3) times daily. Max Daily Amount: 900 mg.  estradiol (ESTRACE) 0.01 % (0.1 mg/gram) vaginal cream     OTHER     metFORMIN (GLUCOPHAGE) 850 mg tablet daily.  MELATONIN PO Take 5 mg by mouth as needed (sleep).  acetaminophen (TYLENOL) 500 mg tablet Take 500 mg by mouth every six (6) hours as needed for Pain.  calcium-cholecalciferol, d3, (CALCIUM 600 + D) 600-125 mg-unit tab Take 600 mg by mouth daily. Patient taking 4 times a week  Indications: Osteoporosis    Cholecalciferol, Vitamin D3, (VITAMIN D3) 1,000 unit cap Take 1,000 Units by mouth daily. Indications: Vitamin D Deficiency    pravastatin (PRAVACHOL) 40 mg tablet Take 40 mg by mouth daily. Indications: hypercholesterolemia     No current facility-administered medications for this visit. Visit Vitals  /87   Pulse 66   Ht 5' 5\" (1.651 m)   Wt 94.3 kg (208 lb)   BMI 34.61 kg/m²         Physical Exam  Vitals and nursing note reviewed. Constitutional:       Appearance: She is well-developed.       Comments: obese   HENT:      Head: Normocephalic and atraumatic. Eyes:      Conjunctiva/sclera: Conjunctivae normal.   Neck:      Thyroid: No thyromegaly. Vascular: No JVD. Trachea: No tracheal deviation. Cardiovascular:      Rate and Rhythm: Normal rate. Rhythm irregular. Chest Wall: PMI is not displaced. Pulses: No decreased pulses. Heart sounds: Murmur heard. Early systolic murmur is present with a grade of 2/6 at the upper right sternal border. No gallop. Pulmonary:      Effort: No respiratory distress. Breath sounds: No wheezing or rales. Chest:      Chest wall: No tenderness. Abdominal:      Palpations: Abdomen is soft. Tenderness: There is no abdominal tenderness. Musculoskeletal:         General: Normal range of motion. Cervical back: Neck supple. Skin:     General: Skin is warm. Neurological:      Mental Status: She is alert and oriented to person, place, and time. Ms. Kiki Solis has a reminder for a \"due or due soon\" health maintenance. I have asked that she contact her primary care provider for follow-up on this health maintenance. No flowsheet data found. mri:8/2015 really worse at night I feel like a soccer [place]   1. No acute hemorrhage or acute infarction. 2. White matter abnormality is nonspecific but likely ischemic. This does not suggest multiple sclerosis. 3. No other significant intracranial abnormality is appreciated. FRZED[facility]:2/1444:XSXR  Left ventricle: Systolic function was normal. Ejection fraction was  estimated in the range of 55 % to 60 %. There were no regional wall motion  abnormalities. Doppler parameters were consistent with abnormal left  ventricular relaxation (grade 1 diastolic dysfunction). Left atrium: The atrium was mildly dilated. Mitral valve: There was trivial regurgitation. Aortic valve: The valve was trileaflet. Leaflets exhibited mildly  increased thickness, normal cuspal separation, and sclerosis.     Tricuspid valve: There was mild regurgitation. Tricuspid regurgitation  peak velocity: 2.4 m/sec. Pulmonary artery systolic pressure: 26 mmHg. Pulmonic valve: There was trivial regurgitation. 2015:holter  Sr,Frequent pac. No a fib    I have personally reviewed patients ekg done at other facility. 2017  Sr,pac    NUCLEAR IMAGIN2017     Findings:   1. Stress images reveal normal Myoview distrubution in all the LV segments in short axis, vertical and horizontal long axis views. 2. Resting images have a normal uptake. 3. Gated images reveal normal wall motion and the ejection fraction is calculated to be 90%. Conclusion:   1. Normal perfusion scan. 2. Normal wall motion and ejection fraction. 3. Low risk scan. Interpretation Summary 3/2019       · Estimated left ventricular ejection fraction is 56 - 60%. Left ventricular mild concentric hypertrophy. No regional wall motion abnormality noted. Mild (grade 1) left ventricular diastolic dysfunction. · Left atrial cavity size is moderately dilated. · Normal right ventricular size and function. · Mild aortic valve sclerosis. · Mild mitral valve regurgitation. · Mild tricuspid valve regurgitation. Pulmonary arterial systolic pressure is 52.4 mmHg. Mild pulmonary hypertension. · Mild pulmonic valve regurgitation is present. · Moderately elevated central venous pressure (10-15 mmHg); IVC diameter is larger than 21 mm and collapses more than 50% with respiration. I Have personally reviewed recent relevant labs available and discussed with patient  Lipids2020  Nuclear stress test normal perfusion. Normal ejection fraction  Assessment         ICD-10-CM ICD-9-CM    1. Persistent atrial fibrillation (HCC)  I48.19 427.31     Stable. Symptom control continue treatment   2. Essential hypertension, benign  I10 401.1     Stable continue treatment   3. Hypercholesterolemia  E78.00 272.0     Continue treatment lab with PCP   4.  Anticoagulant long-term use  Z79.01 V58.61     Continue for A. fib monitor     3/2019  Atrial fibrillation controlled ventricular rate. Anticoagulation started. Likely to undergo ablation for hyperthyroidism. Follow-up after that. Echo for LV function. Currently continue with rate control strategy    6/2019  Status post radioablation of hyperthyroidism. Clinically feeling significantly better. We will continue rate control strategy. Remains in A. fib today. On anticoagulation  12/2019  Cardiac status stable. If needed patient may go through knee surgery with interruption of anticoagulation in the perioperative. 6/22/2020  Negative nuclear stress test continue to monitor clinically. 12/2020  Cardiac status stable. Blood pressure better controlled. Okay to proceed with knee surgery as planned with medium cardiac risk. Okay to hold anticoagulation as needed  5/3/2021  Cardiac status is stable. Persistent atrial fibrillation is rate controlled continue anticoagulation with Eliquis. May hold anticoagulation 2 to 3 days prior to surgery. May proceed with knee surgery as planned with medium cardiac risk. To new current medications  11/2021  Stable cardiac status rate controlled continue current medical management monitor  Medications Discontinued During This Encounter   Medication Reason    clindamycin (CLEOCIN) 300 mg capsule Not A Current Medication    ondansetron (ZOFRAN ODT) 4 mg disintegrating tablet Not A Current Medication       No orders of the defined types were placed in this encounter. Follow-up and Dispositions    · Return in about 6 months (around 5/9/2022).        Miriam Lau MD

## 2022-03-18 PROBLEM — M47.817 LUMBOSACRAL SPONDYLOSIS WITHOUT MYELOPATHY: Status: ACTIVE | Noted: 2018-01-29

## 2022-03-18 PROBLEM — M54.50 LOW BACK PAIN WITHOUT SCIATICA: Status: ACTIVE | Noted: 2018-01-29

## 2022-03-18 PROBLEM — E11.40 TYPE 2 DIABETES MELLITUS WITH DIABETIC NEUROPATHY (HCC): Status: ACTIVE | Noted: 2018-09-18

## 2022-03-18 PROBLEM — E11.9 TYPE 2 DIABETES MELLITUS WITHOUT COMPLICATION, WITHOUT LONG-TERM CURRENT USE OF INSULIN (HCC): Status: ACTIVE | Noted: 2018-01-19

## 2022-03-18 PROBLEM — M17.0 OSTEOARTHRITIS OF BOTH KNEES: Status: ACTIVE | Noted: 2018-04-16

## 2022-03-19 PROBLEM — M43.16 SPONDYLOLISTHESIS OF LUMBAR REGION: Status: ACTIVE | Noted: 2018-05-29

## 2022-03-19 PROBLEM — M79.89 LEFT LEG SWELLING: Status: ACTIVE | Noted: 2018-08-15

## 2022-03-19 PROBLEM — Z98.1 S/P LUMBAR FUSION: Status: ACTIVE | Noted: 2018-08-15

## 2022-03-19 PROBLEM — M48.061 SPINAL STENOSIS, LUMBAR REGION WITHOUT NEUROGENIC CLAUDICATION: Status: ACTIVE | Noted: 2018-05-15

## 2022-03-19 PROBLEM — M25.561 RIGHT KNEE PAIN: Status: ACTIVE | Noted: 2018-03-19

## 2022-03-19 PROBLEM — M48.00 SPINAL STENOSIS: Status: ACTIVE | Noted: 2018-08-01

## 2022-03-19 PROBLEM — M43.10 SPONDYLISTHESIS: Status: ACTIVE | Noted: 2018-08-01

## 2022-03-19 PROBLEM — M48.062 SPINAL STENOSIS OF LUMBAR REGION WITH NEUROGENIC CLAUDICATION: Status: ACTIVE | Noted: 2018-05-29

## 2022-03-19 PROBLEM — M51.26 LUMBAR HERNIATED DISC: Status: ACTIVE | Noted: 2018-05-15

## 2022-03-19 PROBLEM — Z79.891 LONG-TERM CURRENT USE OF OPIATE ANALGESIC: Status: ACTIVE | Noted: 2018-07-03

## 2022-03-20 PROBLEM — E66.01 SEVERE OBESITY (HCC): Status: ACTIVE | Noted: 2020-10-05

## 2022-03-20 PROBLEM — M51.369 DDD (DEGENERATIVE DISC DISEASE), LUMBAR: Status: ACTIVE | Noted: 2018-01-29

## 2022-03-20 PROBLEM — M51.36 DDD (DEGENERATIVE DISC DISEASE), LUMBAR: Status: ACTIVE | Noted: 2018-01-29

## 2022-03-20 PROBLEM — Z79.01 ANTICOAGULANT LONG-TERM USE: Status: ACTIVE | Noted: 2019-03-11

## 2022-03-20 PROBLEM — M54.16 LUMBAR RADICULOPATHY: Status: ACTIVE | Noted: 2018-03-19

## 2022-04-30 DIAGNOSIS — I48.91 ATRIAL FIBRILLATION, UNSPECIFIED TYPE (HCC): ICD-10-CM

## 2022-05-02 RX ORDER — APIXABAN 5 MG/1
TABLET, FILM COATED ORAL
Qty: 180 TABLET | Refills: 2 | Status: SHIPPED | OUTPATIENT
Start: 2022-05-02

## 2022-05-16 ENCOUNTER — OFFICE VISIT (OUTPATIENT)
Dept: CARDIOLOGY CLINIC | Age: 82
End: 2022-05-16
Payer: MEDICARE

## 2022-05-16 VITALS
DIASTOLIC BLOOD PRESSURE: 66 MMHG | OXYGEN SATURATION: 98 % | WEIGHT: 217 LBS | HEIGHT: 65 IN | SYSTOLIC BLOOD PRESSURE: 137 MMHG | HEART RATE: 75 BPM | BODY MASS INDEX: 36.15 KG/M2

## 2022-05-16 DIAGNOSIS — I48.19 PERSISTENT ATRIAL FIBRILLATION (HCC): Primary | ICD-10-CM

## 2022-05-16 DIAGNOSIS — E78.00 HYPERCHOLESTEROLEMIA: ICD-10-CM

## 2022-05-16 DIAGNOSIS — I10 ESSENTIAL HYPERTENSION, BENIGN: ICD-10-CM

## 2022-05-16 DIAGNOSIS — Z79.01 ANTICOAGULANT LONG-TERM USE: ICD-10-CM

## 2022-05-16 PROCEDURE — G8754 DIAS BP LESS 90: HCPCS | Performed by: INTERNAL MEDICINE

## 2022-05-16 PROCEDURE — G8427 DOCREV CUR MEDS BY ELIG CLIN: HCPCS | Performed by: INTERNAL MEDICINE

## 2022-05-16 PROCEDURE — 1090F PRES/ABSN URINE INCON ASSESS: CPT | Performed by: INTERNAL MEDICINE

## 2022-05-16 PROCEDURE — G8400 PT W/DXA NO RESULTS DOC: HCPCS | Performed by: INTERNAL MEDICINE

## 2022-05-16 PROCEDURE — 1101F PT FALLS ASSESS-DOCD LE1/YR: CPT | Performed by: INTERNAL MEDICINE

## 2022-05-16 PROCEDURE — G8536 NO DOC ELDER MAL SCRN: HCPCS | Performed by: INTERNAL MEDICINE

## 2022-05-16 PROCEDURE — 99214 OFFICE O/P EST MOD 30 MIN: CPT | Performed by: INTERNAL MEDICINE

## 2022-05-16 PROCEDURE — G8752 SYS BP LESS 140: HCPCS | Performed by: INTERNAL MEDICINE

## 2022-05-16 PROCEDURE — G8417 CALC BMI ABV UP PARAM F/U: HCPCS | Performed by: INTERNAL MEDICINE

## 2022-05-16 PROCEDURE — G8432 DEP SCR NOT DOC, RNG: HCPCS | Performed by: INTERNAL MEDICINE

## 2022-05-16 RX ORDER — CARBOXYMETHYLCELLULOSE SODIUM 10 MG/ML
2 GEL OPHTHALMIC AS NEEDED
COMMUNITY

## 2022-05-16 NOTE — PROGRESS NOTES
1. Have you been to the ER, urgent care clinic since your last visit? Hospitalized since your last visit? No    2. Have you seen or consulted any other health care providers outside of the [address] [place] [address] since your last visit? Include any pap smears or colon screening.  Yes Where: Dr. Joseph Alcantara for [facility] follow-

## 2022-05-16 NOTE — PROGRESS NOTES
HISTORY OF PRESENT ILLNESS  Marzena Santana is a 78 y.o. female. 6/2020    Patient seen for follow-up. She has been having indigestion type feeling in substernal area. This is going on for about a month. GI evaluation did abdominal ultrasound showing hepatic steatosis. Symptoms are persistent. Denies any other complaint. No clear relation to activity or exertion. 6/23/2020  Patient is here for follow up of diagnostic tests. Results will be discussed. 5/3/2021  Patient presents for f/u for Persistent atrial fibrillation. She is anticipating left TKR surgery 5/21/2021. Follow-up    Cholesterol Problem  The history is provided by the patient. This is a chronic problem. The problem occurs constantly. The problem has not changed since onset. Hypertension  The history is provided by the patient. This is a chronic problem. The problem occurs constantly. The problem has not changed since onset. Palpitations   The history is provided by the patient. This is a recurrent problem. The problem has been gradually worsening. The problem occurs daily. The problem is associated with nothing. Associated symptoms include irregular heartbeat. Pertinent negatives include no exertional chest pressure, no PND and no lower extremity edema. Her past medical history is significant for hypertension. Review of Systems   Constitutional: Negative for chills. HENT: Negative for nosebleeds. Eyes: Negative for blurred vision and double vision. Respiratory: Negative for wheezing. Cardiovascular: Positive for leg swelling. Negative for palpitations and PND. Gastrointestinal: Negative for heartburn. Musculoskeletal: Negative for myalgias. Skin: Negative for rash. Endo/Heme/Allergies: Does not bruise/bleed easily.      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 ([facility] [address]     Coarctation of aorta determined by echocardiography     Diabetes ([facility] [address]     Dysuria     Essential hypertension, benign     stable    High blood pressure     Hypercholesterolemia     Hyperthyroidism     no meds    Obesity, unspecified     Pt has weight loss    Other and unspecified hyperlipidemia     Chol 172, ldl 82, hdl 62, tg 141    Recurrent UTI     Shingles     Type II or unspecified type diabetes mellitus without mention of complication, not stated as uncontrolled        Past Surgical History:   Procedure Laterality Date    HX BACK SURGERY  08/01/2018    HX GYN      hysterectomy    HX HYSTERECTOMY      HX OTHER SURGICAL      HX OTHER SURGICAL  2007    ankle sure       Social History     Tobacco Use    Smoking status: Never Smoker    Smokeless tobacco: Never Used   Substance Use Topics    Alcohol use: No       Allergies   Allergen Reactions    Kenalog [Triamcinolone Acetonide] Anaphylaxis    Penicillin G Hives    Adhesive Tape-Silicones Swelling    Adhesive Other (comments)    Bacitracin Not Reported This Time and Hives    Bacitracin Rash    Betadine [Povidone-Iodine] Other (comments)     Caused uncomfortable rash on area where placed    Cephalexin Rash and Itching    Ciprofloxacin Other (comments)    Clindamycin Other (comments)    Cortane Shortness of Breath    Cortisone Not Reported This Time and Hives    Duloxetine Rash and Itching    Erythromycin Not Reported This Time and Hives    Erythromycin Rash    Kenalog [Triamcinolone Acetonide] Shortness of Breath    Lisinopril Cough    Lortab [Hydrocodone-Acetaminophen] Rash    Macrobid [Nitrofurantoin Monohyd/M-Cryst] Other (comments)     Flushed face,leg swellinig    Macrobid [Nitrofurantoin Monohyd/M-Cryst] Other (comments)    Medrol [Methylprednisolone] Swelling    Methimazole Other (comments)     Neck pain/cough    Methimazole Cough    Neosporin [Hydrocortisone] Hives    Penicillins Rash    Prednimustine Shortness of Breath    Prednisolone Other (comments)    Prednisone Not Reported This Time    Propylthiouracil Other (comments)     Dizziness,elevated blood pressure    Propylthiouracil Other (comments)    Sulfamethoxazole-Trimethoprim Other (comments)    Tetanus And Diphtheria Toxoids, Adsorbed, Adult Swelling       Current Outpatient Medications   Medication Sig    carboxymethylcellulose sodium (REFRESH LIQUIGEL) 1 % dlgl ophthalmic solution Administer 2 Drops to both eyes as needed.  vit C/E/Zn/coppr/lutein/zeaxan (PRESERVISION AREDS-2 PO) Take  by mouth.  Eliquis 5 mg tablet TAKE 1 TABLET BY MOUTH TWICE A DAY    dilTIAZem ER (CARDIZEM CD) 240 mg capsule TAKE 1 CAPSULE BY MOUTH EVERY DAY    atenoloL (TENORMIN) 50 mg tablet Take 75 mg by mouth daily.  losartan (COZAAR) 50 mg tablet Take 50 mg by mouth two (2) times a day.  levothyroxine (SYNTHROID) 25 mcg tablet Take 25 mcg by mouth.  gabapentin (NEURONTIN) 300 mg capsule Take 1 Cap by mouth three (3) times daily. Max Daily Amount: 900 mg.  estradiol (ESTRACE) 0.01 % (0.1 mg/gram) vaginal cream     metFORMIN (GLUCOPHAGE) 850 mg tablet daily.  MELATONIN PO Take 5 mg by mouth as needed (sleep).  acetaminophen (TYLENOL) 500 mg tablet Take 500 mg by mouth every six (6) hours as needed for Pain.  calcium-cholecalciferol, d3, (CALCIUM 600 + D) 600-125 mg-unit tab Take 600 mg by mouth daily. Patient taking 4 times a week  Indications: Osteoporosis    Cholecalciferol, Vitamin D3, (VITAMIN D3) 1,000 unit cap Take 1,000 Units by mouth daily. Indications: Vitamin D Deficiency    pravastatin (PRAVACHOL) 40 mg tablet Take 40 mg by mouth daily. Indications: hypercholesterolemia    OTHER      No current facility-administered medications for this visit.        Visit Vitals  /66 (BP 1 Location: Right upper arm, BP Patient Position: Sitting, BP Cuff Size: Adult)   Pulse 75   Ht 5' 5\" (1.651 m)   Wt 98.4 kg (217 lb)   SpO2 98%   BMI 36.11 kg/m²         Physical Exam  Vitals and nursing note reviewed. Constitutional:       Appearance: She is well-developed. Comments: obese   HENT:      Head: Normocephalic and atraumatic. Eyes:      Conjunctiva/sclera: Conjunctivae normal.   Neck:      Thyroid: No thyromegaly. Vascular: No JVD. Trachea: No tracheal deviation. Cardiovascular:      Rate and Rhythm: Normal rate. Rhythm irregular. Chest Wall: PMI is not displaced. Pulses: No decreased pulses. Heart sounds: Murmur heard. Early systolic murmur is present with a grade of 2/6 at the upper right sternal border. No gallop. Pulmonary:      Effort: No respiratory distress. Breath sounds: No wheezing or rales. Chest:      Chest wall: No tenderness. Abdominal:      Palpations: Abdomen is soft. Tenderness: There is no abdominal tenderness. Musculoskeletal:         General: Normal range of motion. Cervical back: Neck supple. Skin:     General: Skin is warm. Neurological:      Mental Status: She is alert and oriented to person, place, and time. Ms. Ayah Finley has a reminder for a \"due or due soon\" health maintenance. I have asked that she contact her primary care provider for follow-up on this health maintenance. No flowsheet data found. mri:8/2015 really worse at night I feel like a soccer [employer]   1. No acute hemorrhage or acute infarction. 2. White matter abnormality is nonspecific but likely ischemic. This does not suggest multiple sclerosis. 3. No other significant intracranial abnormality is appreciated. [facility]:7/7986:HEJQ  Left ventricle: Systolic function was normal. Ejection fraction was  estimated in the range of 55 % to 60 %. There were no regional wall motion  abnormalities.  Doppler parameters were consistent with abnormal left  ventricular relaxation (grade 1 diastolic dysfunction). Left atrium: The atrium was mildly dilated. Mitral valve: There was trivial regurgitation. Aortic valve: The valve was trileaflet. Leaflets exhibited mildly  increased thickness, normal cuspal separation, and sclerosis. Tricuspid valve: There was mild regurgitation. Tricuspid regurgitation  peak velocity: 2.4 m/sec. Pulmonary artery systolic pressure: 26 mmHg. Pulmonic valve: There was trivial regurgitation. 2015:holter  Sr,Frequent pac. No a fib    I have personally reviewed patients ekg done at other facility. 2017  Sr,pac    NUCLEAR IMAGIN2017     Findings:   1. Stress images reveal normal Myoview distrubution in all the LV segments in short axis, vertical and horizontal long axis views. 2. Resting images have a normal uptake. 3. Gated images reveal normal wall motion and the ejection fraction is calculated to be 90%. Conclusion:   1. Normal perfusion scan. 2. Normal wall motion and ejection fraction. 3. Low risk scan. Interpretation Summary 3/2019       · Estimated left ventricular ejection fraction is 56 - 60%. Left ventricular mild concentric hypertrophy. No regional wall motion abnormality noted. Mild (grade 1) left ventricular diastolic dysfunction. · Left atrial cavity size is moderately dilated. · Normal right ventricular size and function. · Mild aortic valve sclerosis. · Mild mitral valve regurgitation. · Mild tricuspid valve regurgitation. Pulmonary arterial systolic pressure is 24.4 mmHg. Mild pulmonary hypertension. · Mild pulmonic valve regurgitation is present. · Moderately elevated central venous pressure (10-15 mmHg); IVC diameter is larger than 21 mm and collapses more than 50% with respiration.         I Have personally reviewed recent relevant labs available and discussed with patient  Lipids2019lipid, renal, hemoglobin A1c, TSH    2020  Nuclear stress test normal perfusion. Normal ejection fraction  Assessment         ICD-10-CM ICD-9-CM    1. Persistent atrial fibrillation (HCC)  I48.19 427.31     Stable continue treatment monitor   2. Essential hypertension, benign  I10 401.1     Stable monitor   3. Hypercholesterolemia  E78.00 272.0     Continue treatment lab with PCP   4. Anticoagulant long-term use  Z79.01 V58.61     Continue for A. fib     3/2019  Atrial fibrillation controlled ventricular rate. Anticoagulation started. Likely to undergo ablation for hyperthyroidism. Follow-up after that. Echo for LV function. Currently continue with rate control strategy    6/2019  Status post radioablation of hyperthyroidism. Clinically feeling significantly better. We will continue rate control strategy. Remains in A. fib today. On anticoagulation  12/2019  Cardiac status stable. If needed patient may go through knee surgery with interruption of anticoagulation in the perioperative. 6/22/2020  Negative nuclear stress test continue to monitor clinically. 12/2020  Cardiac status stable. Blood pressure better controlled. Okay to proceed with knee surgery as planned with medium cardiac risk. Okay to hold anticoagulation as needed  5/3/2021  Cardiac status is stable. Persistent atrial fibrillation is rate controlled continue anticoagulation with Eliquis. May hold anticoagulation 2 to 3 days prior to surgery. May proceed with knee surgery as planned with medium cardiac risk. To new current medications  11/2021  Stable cardiac status rate controlled continue current medical management monitor  5/2022  Stable cardiac status continue current medical management. Lab reviewed  There are no discontinued medications. No orders of the defined types were placed in this encounter.         Mary Ann Feng MD

## 2022-05-24 ENCOUNTER — OFFICE VISIT (OUTPATIENT)
Dept: ORTHOPEDIC SURGERY | Age: 82
End: 2022-05-24
Payer: MEDICARE

## 2022-05-24 VITALS — WEIGHT: 210 LBS | BODY MASS INDEX: 34.99 KG/M2 | HEIGHT: 65 IN

## 2022-05-24 DIAGNOSIS — M25.561 PAIN IN BOTH KNEES, UNSPECIFIED CHRONICITY: Primary | ICD-10-CM

## 2022-05-24 DIAGNOSIS — M25.562 PAIN IN BOTH KNEES, UNSPECIFIED CHRONICITY: Primary | ICD-10-CM

## 2022-05-24 PROCEDURE — G8417 CALC BMI ABV UP PARAM F/U: HCPCS | Performed by: ORTHOPAEDIC SURGERY

## 2022-05-24 PROCEDURE — G8427 DOCREV CUR MEDS BY ELIG CLIN: HCPCS | Performed by: ORTHOPAEDIC SURGERY

## 2022-05-24 PROCEDURE — 99213 OFFICE O/P EST LOW 20 MIN: CPT | Performed by: ORTHOPAEDIC SURGERY

## 2022-05-24 PROCEDURE — G8536 NO DOC ELDER MAL SCRN: HCPCS | Performed by: ORTHOPAEDIC SURGERY

## 2022-05-24 PROCEDURE — G8400 PT W/DXA NO RESULTS DOC: HCPCS | Performed by: ORTHOPAEDIC SURGERY

## 2022-05-24 PROCEDURE — G8756 NO BP MEASURE DOC: HCPCS | Performed by: ORTHOPAEDIC SURGERY

## 2022-05-24 PROCEDURE — G8432 DEP SCR NOT DOC, RNG: HCPCS | Performed by: ORTHOPAEDIC SURGERY

## 2022-05-24 PROCEDURE — 1123F ACP DISCUSS/DSCN MKR DOCD: CPT | Performed by: ORTHOPAEDIC SURGERY

## 2022-05-24 PROCEDURE — 1090F PRES/ABSN URINE INCON ASSESS: CPT | Performed by: ORTHOPAEDIC SURGERY

## 2022-05-24 PROCEDURE — 1101F PT FALLS ASSESS-DOCD LE1/YR: CPT | Performed by: ORTHOPAEDIC SURGERY

## 2022-05-24 RX ORDER — METRONIDAZOLE 7.5 MG/G
GEL TOPICAL
COMMUNITY
Start: 2022-03-23

## 2022-05-24 NOTE — PROGRESS NOTES
Name: Uche Kraus    : 1940     Service Dept: [address] [facility]    Chief Complaint   Patient presents with    Knee Pain        Visit Vitals  Ht 5' 5\" (1.651 m)   Wt 210 lb (95.3 kg)   BMI 34.95 kg/m²        Allergies   Allergen Reactions    Kenalog [Triamcinolone Acetonide] Anaphylaxis    Penicillin G Hives    Adhesive Tape-Silicones Swelling    Adhesive Other (comments)    Bacitracin Not Reported This Time and Hives    Bacitracin Rash    Betadine [Povidone-Iodine] Other (comments)     Caused uncomfortable rash on area where placed    Cephalexin Rash and Itching    Ciprofloxacin Other (comments)    Clindamycin Other (comments)    Cortane Shortness of Breath    Cortisone Not Reported This Time and Hives    Duloxetine Rash and Itching    Erythromycin Not Reported This Time and Hives    Erythromycin Rash    Kenalog [Triamcinolone Acetonide] Shortness of Breath    Lisinopril Cough    Lortab [Hydrocodone-Acetaminophen] Rash    Macrobid [Nitrofurantoin Monohyd/M-Cryst] Other (comments)     Flushed face,leg swellinig    Macrobid [Nitrofurantoin Monohyd/M-Cryst] Other (comments)    Medrol [Methylprednisolone] Swelling    Methimazole Other (comments)     Neck pain/cough    Methimazole Cough    Neosporin [Hydrocortisone] Hives    Penicillins Rash    Prednimustine Shortness of Breath    Prednisolone Other (comments)    Prednisone Not Reported This Time    Propylthiouracil Other (comments)     Dizziness,elevated blood pressure    Propylthiouracil Other (comments)    Sulfamethoxazole-Trimethoprim Other (comments)    Tetanus And Diphtheria Toxoids, Adsorbed, Adult Swelling        Current Outpatient Medications   Medication Sig Dispense Refill    metroNIDAZOLE (METROGEL) 0.75 % topical gel USE 1 APPLICATION TO AFFECTED AREA ONCE A DAY.       carboxymethylcellulose sodium (REFRESH LIQUIGEL) 1 % dlgl ophthalmic solution Administer 2 Drops to both eyes as needed.  vit C/E/Zn/coppr/lutein/zeaxan (PRESERVISION AREDS-2 PO) Take  by mouth.  Eliquis 5 mg tablet TAKE 1 TABLET BY MOUTH TWICE A  Tablet 2    dilTIAZem ER (CARDIZEM CD) 240 mg capsule TAKE 1 CAPSULE BY MOUTH EVERY DAY      atenoloL (TENORMIN) 50 mg tablet Take 75 mg by mouth daily.  losartan (COZAAR) 50 mg tablet Take 50 mg by mouth two (2) times a day.  levothyroxine (SYNTHROID) 25 mcg tablet Take 25 mcg by mouth.  gabapentin (NEURONTIN) 300 mg capsule Take 1 Cap by mouth three (3) times daily. Max Daily Amount: 900 mg. 270 Cap 1    estradiol (ESTRACE) 0.01 % (0.1 mg/gram) vaginal cream       metFORMIN (GLUCOPHAGE) 850 mg tablet daily.  MELATONIN PO Take 5 mg by mouth as needed (sleep).  acetaminophen (TYLENOL) 500 mg tablet Take 500 mg by mouth every six (6) hours as needed for Pain.  calcium-cholecalciferol, d3, (CALCIUM 600 + D) 600-125 mg-unit tab Take 600 mg by mouth daily. Patient taking 4 times a week  Indications: Osteoporosis      Cholecalciferol, Vitamin D3, (VITAMIN D3) 1,000 unit cap Take 1,000 Units by mouth daily. Indications: Vitamin D Deficiency      pravastatin (PRAVACHOL) 40 mg tablet Take 40 mg by mouth daily.  Indications: hypercholesterolemia        Patient Active Problem List   Diagnosis Code    Essential hypertension, benign I10    Cardiomegaly I51.7    Obesity, unspecified E66.9    Other and unspecified hyperlipidemia E78.5    Type II or unspecified type diabetes mellitus without mention of complication, not stated as uncontrolled E11.9    Hypercholesterolemia E78.00    Palpitation R00.2    Hyperthyroidism E05.90    PAC (premature atrial contraction) I49.1    Type 2 diabetes mellitus without complication, without long-term current use of insulin (HCC) E11.9    Low back pain without sciatica M54.50    Lumbosacral spondylosis without myelopathy M47.817    DDD (degenerative disc disease), lumbar M51.36    Lumbar radiculopathy M54.16    Right knee pain M25.561    Osteoarthritis of both knees M17.0    Lumbar herniated disc M51.26    Spinal stenosis, lumbar region without neurogenic claudication M48.061    Spinal stenosis of lumbar region with neurogenic claudication M48.062    Spondylolisthesis of lumbar region M43.16    Long-term current use of opiate analgesic Z79.891    Spondylisthesis M43.10    Spinal stenosis M48.00    S/P lumbar fusion Z98.1    Left leg swelling M79.89    Type 2 diabetes mellitus with diabetic neuropathy (HCC) E11.40    Anticoagulant long-term use Z79.01    Severe obesity (HCC) E66.01      Family History   Problem Relation Age of Onset    Heart Disease Other         positive history of ischemic heart disease    Heart Disease Mother     Heart Disease Father     Heart Disease Brother     Cancer Brother       Social History     Socioeconomic History    Marital status:    Tobacco Use    Smoking status: Never Smoker    Smokeless tobacco: Never Used   Substance and Sexual Activity    Alcohol use: No    Drug use: No   Social History Narrative    ** Merged History Encounter **           Past Surgical History:   Procedure Laterality Date    HX BACK SURGERY  08/01/2018    HX GYN      hysterectomy    HX HYSTERECTOMY      HX OTHER SURGICAL      HX OTHER SURGICAL  2007    ankle sure      Past Medical History:   Diagnosis Date    Cardiomegaly     [facility]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 [facility] [address]     Coarctation of aorta determined by echocardiography     Diabetes ([facility] [address]     Dysuria     Essential hypertension, benign     stable    High blood pressure     Hypercholesterolemia     Hyperthyroidism     no meds    Obesity, unspecified     Pt has weight loss    Other and unspecified hyperlipidemia     Chol 172, ldl 82, hdl 62, tg 141    Recurrent UTI     Shingles     Type II or unspecified type diabetes mellitus without mention of complication, not stated as uncontrolled         I have reviewed and agree with PFSH and ROS and intake form in chart and the record furthermore I have reviewed prior medical record(s) regarding this patients care during this appointment. Review of Systems:   Patient is a pleasant appearing individual, appropriately dressed, well hydrated, well nourished, who is alert, appropriately oriented for age, and in no acute distress with a walker gait and normal affect who does not appear to be in any significant pain. Physical Exam:  Left knee - Neurovascularly intact with good cap refill, full range of motion and full strength, well healed incision noted, no swelling, no erythema, no instability. Right knee - Neurovascularly intact with good cap refill, full range of motion and full strength, well healed incision noted, no swelling, no erythema, no instability. Encounter Diagnoses     ICD-10-CM ICD-9-CM   1. Pain in both knees, unspecified chronicity  M25.561 719.46    M25.562        HPI:  The patient is here with a chief complaint of bilateral knee pain. Throbbing, burning pain. It is a lot better. Pain is 0/10. X-rays of the bilateral knees are unremarkable except for well-placed prosthesis. Assessment/Plan:  Plan at this point, activities as tolerated started. No restrictions. We will see her back as needed and go from there. As part of continued conservative pain management options the patient was advised to utilize Tylenol or OTC NSAIDS as long as it is not medically contraindicated. Return to Office: Follow-up and Dispositions    · Return if symptoms worsen or fail to improve. Scribed by Siddhartha Zayas LPN as dictated by [facility] [place] GILDA Maurer MD.  Documentation True and Accepted Jose Maurer MD

## 2022-05-24 NOTE — LETTER
5/25/2022    Patient: Toby Moore   YOB: 1940   Date of Visit: 5/24/2022     Mlaly Cole MD  [address] [facility]  [address] [address] [address]  Via Fax: [phone]    Dear Mally Cole MD,      Thank you for referring Ms. Toby Moore to [address] [place] [address] AND [facility] for evaluation. My notes for this consultation are attached. If you have questions, please do not hesitate to call me. I look forward to following your patient along with you.       Sincerely,    Rodney Choudhury MD

## 2022-05-24 NOTE — PATIENT INSTRUCTIONS
Knee Pain or Injury: Care Instructions  Your Care Instructions     Injuries are a common cause of knee problems. Sudden (acute) injuries may be caused by a direct blow to the knee. They can also be caused by abnormal twisting, bending, or falling on the knee. Pain, bruising, or swelling may be severe, and may start within minutes of the injury. Overuse is another cause of knee pain. Other causes are climbing stairs, kneeling, and other activities that use the knee. Everyday wear and tear, especially as you get older, also can cause knee pain. Rest, along with home treatment, often relieves pain and allows your knee to heal. If you have a serious knee injury, you may need tests and treatment. Follow-up care is a key part of your treatment and safety. Be sure to make and go to all appointments, and call your doctor if you are having problems. It's also a good idea to know your test results and keep a list of the medicines you take. How can you care for yourself at home? · Be safe with medicines. Read and follow all instructions on the label. ? If the doctor gave you a prescription medicine for pain, take it as prescribed. ? If you are not taking a prescription pain medicine, ask your doctor if you can take an over-the-counter medicine. · Rest and protect your knee. Take a break from any activity that may cause pain. · Put ice or a cold pack on your knee for 10 to 20 minutes at a time. Put a thin cloth between the ice and your skin. · Prop up a sore knee on a pillow when you ice it or anytime you sit or lie down for the next 3 days. Try to keep it above the level of your heart. This will help reduce swelling. · If your knee is not swollen, you can put moist heat, a heating pad, or a warm cloth on your knee. · If your doctor recommends an elastic bandage, sleeve, or other type of support for your knee, wear it as directed.   · Follow your doctor's instructions about how much weight you can put on your leg. Use a cane, crutches, or a walker as instructed. · Follow your doctor's instructions about activity during your healing process. If you can do mild exercise, slowly increase your activity. · Reach and stay at a healthy weight. Extra weight can strain the joints, especially the knees and hips, and make the pain worse. Losing even a few pounds may help. When should you call for help? Call 911 anytime you think you may need emergency care. For example, call if:    · You have symptoms of a blood clot in your lung (called a pulmonary embolism). These may include:  ? Sudden chest pain. ? Trouble breathing. ? Coughing up blood. Call your doctor now or seek immediate medical care if:    · You have severe or increasing pain.     · Your leg or foot turns cold or changes color.     · You cannot stand or put weight on your knee.     · Your knee looks twisted or bent out of shape.     · You cannot move your knee.     · You have signs of infection, such as:  ? Increased pain, swelling, warmth, or redness. ? Red streaks leading from the knee. ? Pus draining from a place on your knee. ? A fever.     · You have signs of a blood clot in your leg (called a deep vein thrombosis), such as:  ? Pain in your calf, back of the knee, thigh, or groin. ? Redness and swelling in your leg or groin. Watch closely for changes in your health, and be sure to contact your doctor if:    · You have tingling, weakness, or numbness in your knee.     · You have any new symptoms, such as swelling.     · You have bruises from a knee injury that last longer than 2 weeks.     · You do not get better as expected. Where can you learn more? Go to http://www.gray.com/  Enter K195 in the search box to learn more about \"Knee Pain or Injury: Care Instructions. \"  Current as of: July 1, 2021               Content Version: 13.2  © 2645-3779 [employer], [employer].    Care instructions adapted under license by [employer] (which disclaims liability or warranty for this information). If you have questions about a medical condition or this instruction, always ask your healthcare professional. [address] any warranty or liability for your use of this information.

## 2022-11-25 NOTE — PROGRESS NOTES
1. Have you been to the ER, urgent care clinic since your last visit? Hospitalized since your last visit?     no  2. Have you seen or consulted any other health care providers outside of the [address] [place] [address] since your last visit? Include any pap smears or colon screening.       No Discharged

## 2022-11-29 ENCOUNTER — OFFICE VISIT (OUTPATIENT)
Dept: CARDIOLOGY CLINIC | Age: 82
End: 2022-11-29
Payer: MEDICARE

## 2022-11-29 VITALS
HEIGHT: 65 IN | WEIGHT: 218 LBS | HEART RATE: 80 BPM | BODY MASS INDEX: 36.32 KG/M2 | SYSTOLIC BLOOD PRESSURE: 137 MMHG | OXYGEN SATURATION: 98 % | DIASTOLIC BLOOD PRESSURE: 71 MMHG

## 2022-11-29 DIAGNOSIS — I48.19 PERSISTENT ATRIAL FIBRILLATION (HCC): Primary | ICD-10-CM

## 2022-11-29 DIAGNOSIS — E78.00 HYPERCHOLESTEROLEMIA: ICD-10-CM

## 2022-11-29 DIAGNOSIS — Z79.01 ANTICOAGULANT LONG-TERM USE: ICD-10-CM

## 2022-11-29 DIAGNOSIS — I10 ESSENTIAL HYPERTENSION, BENIGN: ICD-10-CM

## 2022-11-29 PROCEDURE — 1123F ACP DISCUSS/DSCN MKR DOCD: CPT | Performed by: INTERNAL MEDICINE

## 2022-11-29 PROCEDURE — G8536 NO DOC ELDER MAL SCRN: HCPCS | Performed by: INTERNAL MEDICINE

## 2022-11-29 PROCEDURE — G8510 SCR DEP NEG, NO PLAN REQD: HCPCS | Performed by: INTERNAL MEDICINE

## 2022-11-29 PROCEDURE — 3074F SYST BP LT 130 MM HG: CPT | Performed by: INTERNAL MEDICINE

## 2022-11-29 PROCEDURE — G8417 CALC BMI ABV UP PARAM F/U: HCPCS | Performed by: INTERNAL MEDICINE

## 2022-11-29 PROCEDURE — G8427 DOCREV CUR MEDS BY ELIG CLIN: HCPCS | Performed by: INTERNAL MEDICINE

## 2022-11-29 PROCEDURE — 1090F PRES/ABSN URINE INCON ASSESS: CPT | Performed by: INTERNAL MEDICINE

## 2022-11-29 PROCEDURE — G8754 DIAS BP LESS 90: HCPCS | Performed by: INTERNAL MEDICINE

## 2022-11-29 PROCEDURE — 3078F DIAST BP <80 MM HG: CPT | Performed by: INTERNAL MEDICINE

## 2022-11-29 PROCEDURE — G8752 SYS BP LESS 140: HCPCS | Performed by: INTERNAL MEDICINE

## 2022-11-29 PROCEDURE — 99214 OFFICE O/P EST MOD 30 MIN: CPT | Performed by: INTERNAL MEDICINE

## 2022-11-29 PROCEDURE — 1101F PT FALLS ASSESS-DOCD LE1/YR: CPT | Performed by: INTERNAL MEDICINE

## 2022-11-29 PROCEDURE — G8400 PT W/DXA NO RESULTS DOC: HCPCS | Performed by: INTERNAL MEDICINE

## 2022-11-29 NOTE — PROGRESS NOTES
1. Have you been to the ER, urgent care clinic since your last visit? Hospitalized since your last visit? No    2. Have you seen or consulted any other health care providers outside of the [address] [place] [address] since your last visit? Include any pap smears or colon screening.  Yes Where: PCP

## 2022-11-29 NOTE — PROGRESS NOTES
HISTORY OF PRESENT ILLNESS  Chayito Yip is a 78 y.o. female. 6/2020    Patient seen for follow-up. She has been having indigestion type feeling in substernal area. This is going on for about a month. GI evaluation did abdominal ultrasound showing hepatic steatosis. Symptoms are persistent. Denies any other complaint. No clear relation to activity or exertion. 6/23/2020  Patient is here for follow up of diagnostic tests. Results will be discussed. 5/3/2021  Patient presents for f/u for Persistent atrial fibrillation. She is anticipating left TKR surgery 5/21/2021. Follow-up    Cholesterol Problem  The history is provided by the Patient. This is a chronic problem. The problem occurs constantly. The problem has not changed since onset. Hypertension  The history is provided by the Patient. This is a chronic problem. The problem occurs constantly. The problem has not changed since onset. Palpitations   The history is provided by the Patient. This is a recurrent problem. The problem has been gradually worsening. The problem occurs daily. The problem is associated with nothing. Associated symptoms include irregular heartbeat. Pertinent negatives include no exertional chest pressure, no PND and no lower extremity edema. Her past medical history is significant for hypertension. Review of Systems   Constitutional:  Negative for chills. HENT:  Negative for nosebleeds. Eyes:  Negative for blurred vision and double vision. Respiratory:  Negative for wheezing. Cardiovascular:  Positive for leg swelling. Negative for palpitations and PND. Gastrointestinal:  Negative for heartburn. Musculoskeletal:  Negative for myalgias. Skin:  Negative for rash. Endo/Heme/Allergies:  Does not bruise/bleed easily.    Family History   Problem Relation Age of Onset    Heart Disease Other         positive history of ischemic heart disease    Heart Disease Mother     Heart Disease Father     Heart Disease Brother     Cancer Brother        Past Medical History:   Diagnosis Date    Cardiomegaly     Ch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 ([facility] [address]     Coarctation of aorta determined by echocardiography     Diabetes ([facility] [address]     Dysuria     Essential hypertension, benign     stable    High blood pressure     Hypercholesterolemia     Hyperthyroidism     no meds    Obesity, unspecified     Pt has weight loss    Other and unspecified hyperlipidemia     Chol 172, ldl 82, hdl 62, tg 141    Recurrent UTI     Shingles     Type II or unspecified type diabetes mellitus without mention of complication, not stated as uncontrolled        Past Surgical History:   Procedure Laterality Date    HX BACK SURGERY  08/01/2018    HX GYN      hysterectomy    HX HYSTERECTOMY      HX OTHER SURGICAL      HX OTHER SURGICAL  2007    ankle sure       Social History     Tobacco Use    Smoking status: Never    Smokeless tobacco: Never   Substance Use Topics    Alcohol use: No       Allergies   Allergen Reactions    Kenalog [Triamcinolone Acetonide] Anaphylaxis    Penicillin G Hives    Adhesive Tape-Silicones Swelling    Bacitracin Not Reported This Time and Hives    Bacitracin Rash    Betadine [Povidone-Iodine] Other (comments)     Caused uncomfortable rash on area where placed    Cephalexin Rash and Itching    Ciprofloxacin Other (comments)    Clindamycin Other (comments)    Cortane Shortness of Breath    Cortisone Not Reported This Time and Hives    Duloxetine Rash and Itching    Erythromycin Not Reported This Time and Hives    Erythromycin Rash    Kenalog [Triamcinolone Acetonide] Shortness of Breath    Lisinopril Cough    Lortab [Hydrocodone-Acetaminophen] Rash    Macrobid [Nitrofurantoin Monohyd/M-Cryst] Other (comments)     Flushed face,leg swellinig    Macrobid [Nitrofurantoin Monohyd/M-Cryst] Other (comments)    Medrol [Methylprednisolone] Swelling    Methimazole Other (comments)     Neck pain/cough    Methimazole Cough    Neosporin [Hydrocortisone] Hives    Penicillins Rash    Prednimustine Shortness of Breath    Prednisolone Other (comments)    Prednisone Not Reported This Time    Propylthiouracil Other (comments)     Dizziness,elevated blood pressure    Propylthiouracil Other (comments)    Sulfamethoxazole-Trimethoprim Other (comments)    Tetanus And Diphtheria Toxoids, Adsorbed, Adult Swelling       Current Outpatient Medications   Medication Sig    metroNIDAZOLE (METROGEL) 0.75 % topical gel USE 1 APPLICATION TO AFFECTED AREA ONCE A DAY. carboxymethylcellulose sodium (REFRESH LIQUIGEL) 1 % dlgl ophthalmic solution Administer 2 Drops to both eyes as needed. vit C/E/Zn/coppr/lutein/zeaxan (PRESERVISION AREDS-2 PO) Take  by mouth. Eliquis 5 mg tablet TAKE 1 TABLET BY MOUTH TWICE A DAY    dilTIAZem ER (CARDIZEM CD) 240 mg capsule TAKE 1 CAPSULE BY MOUTH EVERY DAY    atenoloL (TENORMIN) 50 mg tablet Take 75 mg by mouth daily. losartan (COZAAR) 50 mg tablet Take 50 mg by mouth two (2) times a day. levothyroxine (SYNTHROID) 25 mcg tablet Take 25 mcg by mouth.    gabapentin (NEURONTIN) 300 mg capsule Take 1 Cap by mouth three (3) times daily. Max Daily Amount: 900 mg.    estradiol (ESTRACE) 0.01 % (0.1 mg/gram) vaginal cream     metFORMIN (GLUCOPHAGE) 850 mg tablet daily. MELATONIN PO Take 5 mg by mouth as needed (sleep). acetaminophen (TYLENOL) 500 mg tablet Take 500 mg by mouth every six (6) hours as needed for Pain. calcium-cholecalciferol, d3, 600-125 mg-unit tab Take 600 mg by mouth daily. Patient taking 4 times a week  Indications: Osteoporosis    cholecalciferol (VITAMIN D3) 25 mcg (1,000 unit) cap Take 1,000 Units by mouth daily. Indications: Vitamin D Deficiency    pravastatin (PRAVACHOL) 40 mg tablet Take 40 mg by mouth daily. Indications: hypercholesterolemia     No current facility-administered medications for this visit.        Visit Vitals  /71 (BP 1 Location: Left upper arm, BP Patient Position: Sitting, BP Cuff Size: Adult)   Pulse 80   Ht 5' 5\" (1.651 m)   Wt 98.9 kg (218 lb)   SpO2 98%   BMI 36.28 kg/m²         Physical Exam  Vitals and nursing note reviewed. Constitutional:       Appearance: She is well-developed. Comments: obese   HENT:      Head: Normocephalic and atraumatic. Eyes:      Conjunctiva/sclera: Conjunctivae normal.   Neck:      Thyroid: No thyromegaly. Vascular: No JVD. Trachea: No tracheal deviation. Cardiovascular:      Rate and Rhythm: Normal rate. Rhythm irregular. Chest Wall: PMI is not displaced. Pulses: No decreased pulses. Heart sounds: Murmur heard. Early systolic murmur is present with a grade of 2/6 at the upper right sternal border. No gallop. Pulmonary:      Effort: No respiratory distress. Breath sounds: No wheezing or rales. Chest:      Chest wall: No tenderness. Abdominal:      Palpations: Abdomen is soft. Tenderness: There is no abdominal tenderness. Musculoskeletal:         General: Normal range of motion. Cervical back: Neck supple. Skin:     General: Skin is warm. Neurological:      Mental Status: She is alert and oriented to person, place, and time. Ms. Princess Huertas has a reminder for a \"due or due soon\" health maintenance. I have asked that she contact her primary care provider for follow-up on this health maintenance. No flowsheet data found. mri:8/2015 really worse at night I feel like a soccer [place]   1. No acute hemorrhage or acute infarction. 2. White matter abnormality is nonspecific but likely ischemic. This does not suggest multiple sclerosis. 3. No other significant intracranial abnormality is appreciated. JPNXKSD:3/4169:[facility]  Left ventricle: Systolic function was normal. Ejection fraction was  estimated in the range of 55 % to 60 %. There were no regional wall motion  abnormalities.  Doppler parameters were consistent with abnormal left  ventricular relaxation (grade 1 diastolic dysfunction). Left atrium: The atrium was mildly dilated. Mitral valve: There was trivial regurgitation. Aortic valve: The valve was trileaflet. Leaflets exhibited mildly  increased thickness, normal cuspal separation, and sclerosis. Tricuspid valve: There was mild regurgitation. Tricuspid regurgitation  peak velocity: 2.4 m/sec. Pulmonary artery systolic pressure: 26 mmHg. Pulmonic valve: There was trivial regurgitation. 2015:holter  Sr,Frequent pac. No a fib    I have personally reviewed patients ekg done at other facility. 2017  Sr,pac    NUCLEAR IMAGIN2017     Findings:   1. Stress images reveal normal Myoview distrubution in all the LV segments in short axis, vertical and horizontal long axis views. 2. Resting images have a normal uptake. 3. Gated images reveal normal wall motion and the ejection fraction is calculated to be 90%. Conclusion:   1. Normal perfusion scan. 2. Normal wall motion and ejection fraction. 3. Low risk scan. Interpretation Summary 3/2019       Estimated left ventricular ejection fraction is 56 - 60%. Left ventricular mild concentric hypertrophy. No regional wall motion abnormality noted. Mild (grade 1) left ventricular diastolic dysfunction. Left atrial cavity size is moderately dilated. Normal right ventricular size and function. Mild aortic valve sclerosis. Mild mitral valve regurgitation. Mild tricuspid valve regurgitation. Pulmonary arterial systolic pressure is 54.1 mmHg. Mild pulmonary hypertension. Mild pulmonic valve regurgitation is present. Moderately elevated central venous pressure (10-15 mmHg); IVC diameter is larger than 21 mm and collapses more than 50% with respiration. I Have personally reviewed recent relevant labs available and discussed with patient  Lipids-2019-lipid, renal, hemoglobin A1c, TSH  2022-CBC, hemoglobin A1c, renal function, TSH  2020  Nuclear stress test- normal perfusion.   Normal ejection fraction  Assessment         ICD-10-CM ICD-9-CM    1. Persistent atrial fibrillation (HCC)  I48.19 427.31     Stable continue treatment monitor      2. Essential hypertension, benign  I10 401.1     Stable continue current treatment      3. Hypercholesterolemia  E78.00 272.0     Continue treatment lab with PCP      4. Anticoagulant long-term use  Z79.01 V58.61     Continue for A. fib monitor        3/2019  Atrial fibrillation controlled ventricular rate. Anticoagulation started. Likely to undergo ablation for hyperthyroidism. Follow-up after that. Echo for LV function. Currently continue with rate control strategy    6/2019  Status post radioablation of hyperthyroidism. Clinically feeling significantly better. We will continue rate control strategy. Remains in A. fib today. On anticoagulation  12/2019  Cardiac status stable. If needed patient may go through knee surgery with interruption of anticoagulation in the perioperative. 6/22/2020  Negative nuclear stress test continue to monitor clinically. 12/2020  Cardiac status stable. Blood pressure better controlled. Okay to proceed with knee surgery as planned with medium cardiac risk. Okay to hold anticoagulation as needed  5/3/2021  Cardiac status is stable. Persistent atrial fibrillation is rate controlled continue anticoagulation with Eliquis. May hold anticoagulation 2 to 3 days prior to surgery. May proceed with knee surgery as planned with medium cardiac risk. To new current medications  11/2021  Stable cardiac status rate controlled continue current medical management monitor  5/2022  Stable cardiac status continue current medical management. Lab reviewed  11/2022  Stable cardiac status continue current medical management monitor  There are no discontinued medications. No orders of the defined types were placed in this encounter. Follow-up and Dispositions    Return in about 6 months (around 5/29/2023).        Elida Espinosa MD

## 2022-12-23 DIAGNOSIS — I48.91 ATRIAL FIBRILLATION, UNSPECIFIED TYPE (HCC): ICD-10-CM

## 2023-02-14 ENCOUNTER — HOSPITAL ENCOUNTER (OUTPATIENT)
Facility: HOSPITAL | Age: 83
Discharge: HOME | End: 2023-02-17
Payer: MEDICARE

## 2023-02-14 DIAGNOSIS — M54.50 CHRONIC LOW BACK PAIN, UNSPECIFIED BACK PAIN LATERALITY, UNSPECIFIED WHETHER SCIATICA PRESENT: ICD-10-CM

## 2023-02-14 DIAGNOSIS — G89.29 CHRONIC LOW BACK PAIN, UNSPECIFIED BACK PAIN LATERALITY, UNSPECIFIED WHETHER SCIATICA PRESENT: ICD-10-CM

## 2023-02-14 PROCEDURE — 72110 X-RAY EXAM L-2 SPINE 4/>VWS: CPT

## 2023-06-07 ENCOUNTER — TELEPHONE (OUTPATIENT)
Age: 83
End: 2023-06-07

## 2023-06-07 NOTE — TELEPHONE ENCOUNTER
Patient called and stated for last two days after her dinner meal she has been having chest pains and feeling out of sorts, not able to explain the feeling and d/t to other heart issues and afib wanted to let Dr. Trevor Mcfarlane aware. She states it only happened after her dinner meal and it started 2 days ago.  Please advise

## 2023-06-07 NOTE — TELEPHONE ENCOUNTER
Spoke with patient per Dr. Mahin Wright get in office with ekg with Dennis Soulier. She voices understanding and acceptance of this advice and will call back if any further questions or concerns.

## 2023-06-29 ENCOUNTER — OFFICE VISIT (OUTPATIENT)
Age: 83
End: 2023-06-29
Payer: MEDICARE

## 2023-06-29 VITALS
OXYGEN SATURATION: 98 % | WEIGHT: 213 LBS | BODY MASS INDEX: 35.45 KG/M2 | HEART RATE: 57 BPM | DIASTOLIC BLOOD PRESSURE: 63 MMHG | SYSTOLIC BLOOD PRESSURE: 130 MMHG

## 2023-06-29 DIAGNOSIS — Z79.01 LONG TERM (CURRENT) USE OF ANTICOAGULANTS: ICD-10-CM

## 2023-06-29 DIAGNOSIS — I10 ESSENTIAL (PRIMARY) HYPERTENSION: ICD-10-CM

## 2023-06-29 DIAGNOSIS — E78.00 PURE HYPERCHOLESTEROLEMIA, UNSPECIFIED: ICD-10-CM

## 2023-06-29 DIAGNOSIS — I48.19 OTHER PERSISTENT ATRIAL FIBRILLATION (HCC): Primary | ICD-10-CM

## 2023-06-29 PROCEDURE — 99214 OFFICE O/P EST MOD 30 MIN: CPT | Performed by: INTERNAL MEDICINE

## 2023-06-29 PROCEDURE — 3078F DIAST BP <80 MM HG: CPT | Performed by: INTERNAL MEDICINE

## 2023-06-29 PROCEDURE — 1123F ACP DISCUSS/DSCN MKR DOCD: CPT | Performed by: INTERNAL MEDICINE

## 2023-06-29 PROCEDURE — 3075F SYST BP GE 130 - 139MM HG: CPT | Performed by: INTERNAL MEDICINE

## 2023-06-29 RX ORDER — MV-MIN/FA/VIT K/LUTEIN/ZEAXANT 200MCG-5MG
CAPSULE ORAL
COMMUNITY
Start: 2023-06-11

## 2023-06-29 RX ORDER — POLYETHYLENE GLYCOL 3350 17 G/17G
17 POWDER, FOR SOLUTION ORAL DAILY
COMMUNITY
Start: 2023-06-11

## 2023-06-29 RX ORDER — ACETAMINOPHEN 650 MG/1
SUPPOSITORY RECTAL
COMMUNITY
Start: 2023-06-11

## 2023-06-29 ASSESSMENT — PATIENT HEALTH QUESTIONNAIRE - PHQ9
2. FEELING DOWN, DEPRESSED OR HOPELESS: 0
SUM OF ALL RESPONSES TO PHQ QUESTIONS 1-9: 0
DEPRESSION UNABLE TO ASSESS: FUNCTIONAL CAPACITY MOTIVATION LIMITS ACCURACY
SUM OF ALL RESPONSES TO PHQ QUESTIONS 1-9: 0
SUM OF ALL RESPONSES TO PHQ9 QUESTIONS 1 & 2: 0
SUM OF ALL RESPONSES TO PHQ QUESTIONS 1-9: 0
SUM OF ALL RESPONSES TO PHQ QUESTIONS 1-9: 0
1. LITTLE INTEREST OR PLEASURE IN DOING THINGS: 0

## 2023-10-29 DIAGNOSIS — I10 ESSENTIAL (PRIMARY) HYPERTENSION: ICD-10-CM

## 2023-10-31 RX ORDER — HYDROCHLOROTHIAZIDE 25 MG/1
25 TABLET ORAL EVERY MORNING
Qty: 90 TABLET | Refills: 1 | Status: SHIPPED | OUTPATIENT
Start: 2023-10-31

## 2023-12-12 ENCOUNTER — OFFICE VISIT (OUTPATIENT)
Age: 83
End: 2023-12-12
Payer: MEDICARE

## 2023-12-12 VITALS
DIASTOLIC BLOOD PRESSURE: 62 MMHG | OXYGEN SATURATION: 96 % | HEART RATE: 71 BPM | BODY MASS INDEX: 34.95 KG/M2 | WEIGHT: 210 LBS | SYSTOLIC BLOOD PRESSURE: 133 MMHG

## 2023-12-12 DIAGNOSIS — E78.00 PURE HYPERCHOLESTEROLEMIA, UNSPECIFIED: ICD-10-CM

## 2023-12-12 DIAGNOSIS — I48.19 OTHER PERSISTENT ATRIAL FIBRILLATION (HCC): Primary | ICD-10-CM

## 2023-12-12 DIAGNOSIS — Z79.01 LONG TERM (CURRENT) USE OF ANTICOAGULANTS: ICD-10-CM

## 2023-12-12 DIAGNOSIS — I10 ESSENTIAL (PRIMARY) HYPERTENSION: ICD-10-CM

## 2023-12-12 PROCEDURE — 1090F PRES/ABSN URINE INCON ASSESS: CPT | Performed by: INTERNAL MEDICINE

## 2023-12-12 PROCEDURE — 1123F ACP DISCUSS/DSCN MKR DOCD: CPT | Performed by: INTERNAL MEDICINE

## 2023-12-12 PROCEDURE — G8417 CALC BMI ABV UP PARAM F/U: HCPCS | Performed by: INTERNAL MEDICINE

## 2023-12-12 PROCEDURE — 99214 OFFICE O/P EST MOD 30 MIN: CPT | Performed by: INTERNAL MEDICINE

## 2023-12-12 PROCEDURE — G8428 CUR MEDS NOT DOCUMENT: HCPCS | Performed by: INTERNAL MEDICINE

## 2023-12-12 PROCEDURE — 3078F DIAST BP <80 MM HG: CPT | Performed by: INTERNAL MEDICINE

## 2023-12-12 PROCEDURE — G8400 PT W/DXA NO RESULTS DOC: HCPCS | Performed by: INTERNAL MEDICINE

## 2023-12-12 PROCEDURE — G8484 FLU IMMUNIZE NO ADMIN: HCPCS | Performed by: INTERNAL MEDICINE

## 2023-12-12 PROCEDURE — 3075F SYST BP GE 130 - 139MM HG: CPT | Performed by: INTERNAL MEDICINE

## 2023-12-12 PROCEDURE — 1036F TOBACCO NON-USER: CPT | Performed by: INTERNAL MEDICINE

## 2023-12-12 RX ORDER — DILTIAZEM HYDROCHLORIDE 240 MG/1
CAPSULE, COATED, EXTENDED RELEASE ORAL DAILY
COMMUNITY
Start: 2023-11-16

## 2023-12-12 ASSESSMENT — PATIENT HEALTH QUESTIONNAIRE - PHQ9
SUM OF ALL RESPONSES TO PHQ QUESTIONS 1-9: 0
2. FEELING DOWN, DEPRESSED OR HOPELESS: 0
SUM OF ALL RESPONSES TO PHQ9 QUESTIONS 1 & 2: 0
1. LITTLE INTEREST OR PLEASURE IN DOING THINGS: 0
SUM OF ALL RESPONSES TO PHQ QUESTIONS 1-9: 0
DEPRESSION UNABLE TO ASSESS: FUNCTIONAL CAPACITY MOTIVATION LIMITS ACCURACY

## 2023-12-12 NOTE — PROGRESS NOTES
1. Have you been to the ER, urgent care clinic since your last visit? Hospitalized since your last visit? No    2. Have you seen or consulted any other health care providers outside of the [address] [place] [address] since your last visit? Include any pap smears or colon screening.       No
back: Neck supple. Skin:     General: Skin is warm. Neurological:      Mental Status: She is alert and oriented to person, place, and time. Ms. Epi Mann has a reminder for a \"due or due soon\" health maintenance. I have asked that she contact her primary care provider for follow-up on this health maintenance. No flowsheet data found. mri:2015 really worse at night I feel like a soccer JESSHEIM   1. No acute hemorrhage or acute infarction. 2. White matter abnormality is nonspecific but likely ischemic. This does not suggest multiple sclerosis. 3. No other significant intracranial abnormality is appreciated. [id number]:[facility]  Left ventricle: Systolic function was normal. Ejection fraction was  estimated in the range of 55 % to 60 %. There were no regional wall motion  abnormalities. Doppler parameters were consistent with abnormal left  ventricular relaxation (grade 1 diastolic dysfunction). Left atrium: The atrium was mildly dilated. Mitral valve: There was trivial regurgitation. Aortic valve: The valve was trileaflet. Leaflets exhibited mildly  increased thickness, normal cuspal separation, and sclerosis. Tricuspid valve: There was mild regurgitation. Tricuspid regurgitation  peak velocity: 2.4 m/sec. Pulmonary artery systolic pressure: 26 mmHg. Pulmonic valve: There was trivial regurgitation. 2015:holter  Sr,Frequent pac. No a fib    I have personally reviewed patients ekg done at other facility. 2017  Sr,pac    NUCLEAR IMAGIN2017     Findings:   1. Stress images reveal normal Myoview distrubution in all the LV segments in short axis, vertical and horizontal long axis views. 2. Resting images have a normal uptake. 3. Gated images reveal normal wall motion and the ejection fraction is calculated to be 90%. Conclusion:   1. Normal perfusion scan. 2. Normal wall motion and ejection fraction. 3. Low risk scan.     Interpretation Summary 3/2019       Estimated

## 2024-04-09 DIAGNOSIS — I48.19 OTHER PERSISTENT ATRIAL FIBRILLATION (HCC): Primary | ICD-10-CM

## 2024-04-09 DIAGNOSIS — I10 ESSENTIAL (PRIMARY) HYPERTENSION: ICD-10-CM

## 2024-04-09 RX ORDER — HYDROCHLOROTHIAZIDE 25 MG/1
25 TABLET ORAL EVERY MORNING
Qty: 90 TABLET | Refills: 3 | Status: SHIPPED | OUTPATIENT
Start: 2024-04-09

## 2024-04-09 NOTE — TELEPHONE ENCOUNTER
Requested Prescriptions     Pending Prescriptions Disp Refills    hydroCHLOROthiazide (HYDRODIURIL) 25 MG tablet [Pharmacy Med Name: HYDROCHLOROTHIAZIDE 25 MG TAB] 90 tablet 3     Sig: TAKE 1 TABLET BY MOUTH EVERY DAY IN THE MORNING    apixaban (ELIQUIS) 5 MG TABS tablet 180 tablet 2     Sig: Take 1 tablet by mouth 2 times daily

## 2024-06-07 ENCOUNTER — OFFICE VISIT (OUTPATIENT)
Age: 84
End: 2024-06-07
Payer: MEDICARE

## 2024-06-07 VITALS
HEIGHT: 65 IN | HEART RATE: 68 BPM | DIASTOLIC BLOOD PRESSURE: 61 MMHG | OXYGEN SATURATION: 97 % | WEIGHT: 207 LBS | BODY MASS INDEX: 34.49 KG/M2 | SYSTOLIC BLOOD PRESSURE: 125 MMHG

## 2024-06-07 DIAGNOSIS — I48.21 PERMANENT ATRIAL FIBRILLATION (HCC): Primary | ICD-10-CM

## 2024-06-07 DIAGNOSIS — I10 ESSENTIAL (PRIMARY) HYPERTENSION: ICD-10-CM

## 2024-06-07 DIAGNOSIS — E78.00 PURE HYPERCHOLESTEROLEMIA, UNSPECIFIED: ICD-10-CM

## 2024-06-07 DIAGNOSIS — Z79.01 LONG TERM (CURRENT) USE OF ANTICOAGULANTS: ICD-10-CM

## 2024-06-07 DIAGNOSIS — E66.9 OBESITY (BMI 30.0-34.9): ICD-10-CM

## 2024-06-07 PROCEDURE — G8400 PT W/DXA NO RESULTS DOC: HCPCS | Performed by: INTERNAL MEDICINE

## 2024-06-07 PROCEDURE — 1123F ACP DISCUSS/DSCN MKR DOCD: CPT | Performed by: INTERNAL MEDICINE

## 2024-06-07 PROCEDURE — 1090F PRES/ABSN URINE INCON ASSESS: CPT | Performed by: INTERNAL MEDICINE

## 2024-06-07 PROCEDURE — G8427 DOCREV CUR MEDS BY ELIG CLIN: HCPCS | Performed by: INTERNAL MEDICINE

## 2024-06-07 PROCEDURE — 3074F SYST BP LT 130 MM HG: CPT | Performed by: INTERNAL MEDICINE

## 2024-06-07 PROCEDURE — G8417 CALC BMI ABV UP PARAM F/U: HCPCS | Performed by: INTERNAL MEDICINE

## 2024-06-07 PROCEDURE — 99214 OFFICE O/P EST MOD 30 MIN: CPT | Performed by: INTERNAL MEDICINE

## 2024-06-07 PROCEDURE — 1036F TOBACCO NON-USER: CPT | Performed by: INTERNAL MEDICINE

## 2024-06-07 PROCEDURE — 3078F DIAST BP <80 MM HG: CPT | Performed by: INTERNAL MEDICINE

## 2024-06-07 ASSESSMENT — PATIENT HEALTH QUESTIONNAIRE - PHQ9
1. LITTLE INTEREST OR PLEASURE IN DOING THINGS: NOT AT ALL
DEPRESSION UNABLE TO ASSESS: FUNCTIONAL CAPACITY MOTIVATION LIMITS ACCURACY
SUM OF ALL RESPONSES TO PHQ QUESTIONS 1-9: 0
SUM OF ALL RESPONSES TO PHQ QUESTIONS 1-9: 0
SUM OF ALL RESPONSES TO PHQ9 QUESTIONS 1 & 2: 0
SUM OF ALL RESPONSES TO PHQ QUESTIONS 1-9: 0
2. FEELING DOWN, DEPRESSED OR HOPELESS: NOT AT ALL
SUM OF ALL RESPONSES TO PHQ QUESTIONS 1-9: 0

## 2024-06-07 ASSESSMENT — ENCOUNTER SYMPTOMS
EYES NEGATIVE: 1
GASTROINTESTINAL NEGATIVE: 1
RESPIRATORY NEGATIVE: 1

## 2024-06-07 NOTE — PROGRESS NOTES
1. Have you been to the ER, urgent care clinic since your last visit?  Hospitalized since your last visit?     No      2.  Where do you normally have your labs drawn?   PCP    3. Do you need any refills today?   No    4. Which local pharmacy do you use (enter pharmacy)?   [facility]    5. Which mail order pharmacy do you use (enter pharmacy)?   No     6. Are you here for surgical clearance and if so who will be doing your     procedure/surgery (care team)?   No      
size and function.     * Increased left atrial size.     * Increased right atrial size.     * No hemodynamically significant valve pathology.     * RVSP-N/A.     6/22/2020  Nuclear stress test- normal perfusion.  Normal ejection fraction  Assessment      ASSESSMENT & PLAN    No results found for: \"CHOL\"  No results found for: \"TRIG\"  No results found for: \"HDL\"  No results found for: \"LDL\"  No results found for: \"VLDL\"    Pertinent laboratory and test data reviewed and discussed with patient.      3/2019  Atrial fibrillation controlled ventricular rate.  Anticoagulation started.  Likely to undergo ablation for hyperthyroidism.  Follow-up after that.  Echo for LV function.  Currently continue with rate control strategy    6/2019  Status post radioablation of hyperthyroidism.  Clinically feeling significantly better.  We will continue rate control strategy.  Remains in A. fib today.  On anticoagulation  12/2019  Cardiac status stable.  If needed patient may go through knee surgery with interruption of anticoagulation in the perioperative.  6/22/2020  Negative nuclear stress test continue to monitor clinically.  12/2020  Cardiac status stable.  Blood pressure better controlled.  Okay to proceed with knee surgery as planned with medium cardiac risk.  Okay to hold anticoagulation as needed  5/3/2021  Cardiac status is stable.  Persistent atrial fibrillation is rate controlled continue anticoagulation with Eliquis.  May hold anticoagulation 2 to 3 days prior to surgery.  May proceed with knee surgery as planned with medium cardiac risk.  To new current medications  11/2021  Stable cardiac status rate controlled continue current medical management monitor  5/2022  Stable cardiac status continue current medical management.  Lab reviewed  11/2022  Stable cardiac status continue current medical management monitor  There are no discontinued medications.  6/14/2023  Recent admission for chest pain.  In office EKG A-fib testing

## 2024-10-16 SDOH — HEALTH STABILITY: PHYSICAL HEALTH: ON AVERAGE, HOW MANY DAYS PER WEEK DO YOU ENGAGE IN MODERATE TO STRENUOUS EXERCISE (LIKE A BRISK WALK)?: 0 DAYS

## 2024-10-17 ENCOUNTER — OFFICE VISIT (OUTPATIENT)
Age: 84
End: 2024-10-17
Payer: MEDICARE

## 2024-10-17 DIAGNOSIS — M25.552 LEFT HIP PAIN: Primary | ICD-10-CM

## 2024-10-17 DIAGNOSIS — M16.12 PRIMARY OSTEOARTHRITIS OF LEFT HIP: ICD-10-CM

## 2024-10-17 PROCEDURE — 1090F PRES/ABSN URINE INCON ASSESS: CPT | Performed by: ORTHOPAEDIC SURGERY

## 2024-10-17 PROCEDURE — 1123F ACP DISCUSS/DSCN MKR DOCD: CPT | Performed by: ORTHOPAEDIC SURGERY

## 2024-10-17 PROCEDURE — 1036F TOBACCO NON-USER: CPT | Performed by: ORTHOPAEDIC SURGERY

## 2024-10-17 PROCEDURE — 99213 OFFICE O/P EST LOW 20 MIN: CPT | Performed by: ORTHOPAEDIC SURGERY

## 2024-10-17 PROCEDURE — G8400 PT W/DXA NO RESULTS DOC: HCPCS | Performed by: ORTHOPAEDIC SURGERY

## 2024-10-17 PROCEDURE — G8417 CALC BMI ABV UP PARAM F/U: HCPCS | Performed by: ORTHOPAEDIC SURGERY

## 2024-10-17 PROCEDURE — G8484 FLU IMMUNIZE NO ADMIN: HCPCS | Performed by: ORTHOPAEDIC SURGERY

## 2024-10-17 PROCEDURE — G8427 DOCREV CUR MEDS BY ELIG CLIN: HCPCS | Performed by: ORTHOPAEDIC SURGERY

## 2024-10-17 RX ORDER — TRIAMCINOLONE ACETONIDE 40 MG/ML
40 INJECTION, SUSPENSION INTRA-ARTICULAR; INTRAMUSCULAR ONCE
Status: DISCONTINUED | OUTPATIENT
Start: 2024-10-17 | End: 2024-10-17 | Stop reason: CLARIF

## 2024-10-17 RX ORDER — LIDOCAINE HYDROCHLORIDE 10 MG/ML
9 INJECTION, SOLUTION INFILTRATION; PERINEURAL ONCE
Status: DISCONTINUED | OUTPATIENT
Start: 2024-10-17 | End: 2024-10-17 | Stop reason: CLARIF

## 2024-10-17 NOTE — PROGRESS NOTES
Some weakness, No groin pain, Point tenderness on the greater trochanter.     Right Hip - Normal range of motion, No crepitation, Grossly neurovascularly intact, Good cap refill, No skin lesion, mild swelling, No gross instability, No weakness, No groin pain, No point tenderness on the greater trochanter.        Encounter Diagnoses   Name Primary?    Left hip pain Yes    Primary osteoarthritis of left hip          HPI:  The patient is here with a chief complaint of left hip pain, throbbing, burning pain.  Pain is 8/10.  No history of fracture, but continues to have difficulty.    X-rays of the left hip, AP and lateral are positive for severe OA of the left hip.    Assessment/Plan:  1.  Severe hip OA.    Plan at this point my recommendation will be to get her to a hip specialist, as soon as possible.  I think she is a great candidate.  Cortisone injection will definitely help in the meantime and we will go from there.  If she gets worse, she is to give me a call.     Addendum:    She is allergic to some of the medication.  So, we will get her to a hip specialist sooner than later and we will go from there.      As part of continued conservative pain management options the patient was advised to utilize Tylenol or OTC NSAIDS as long as it is not medically contraindicated.     Return to Office:    Follow-up and Dispositions    Return if symptoms worsen or fail to improve.        Scribed by Cameron Galdamez MD as dictated by Cameron Galdamez MD.  Documentation, performed by, True and Accepted Cameron Galdamez MD

## 2024-11-15 DIAGNOSIS — I48.19 OTHER PERSISTENT ATRIAL FIBRILLATION (HCC): ICD-10-CM

## 2024-11-15 RX ORDER — APIXABAN 5 MG/1
5 TABLET, FILM COATED ORAL 2 TIMES DAILY
Qty: 180 TABLET | Refills: 2 | Status: SHIPPED | OUTPATIENT
Start: 2024-11-15

## 2024-12-03 ENCOUNTER — OFFICE VISIT (OUTPATIENT)
Age: 84
End: 2024-12-03
Payer: MEDICARE

## 2024-12-03 ENCOUNTER — TELEPHONE (OUTPATIENT)
Age: 84
End: 2024-12-03

## 2024-12-03 VITALS
HEART RATE: 69 BPM | HEIGHT: 65 IN | BODY MASS INDEX: 34.16 KG/M2 | WEIGHT: 205 LBS | SYSTOLIC BLOOD PRESSURE: 118 MMHG | DIASTOLIC BLOOD PRESSURE: 64 MMHG | OXYGEN SATURATION: 98 %

## 2024-12-03 DIAGNOSIS — I48.21 PERMANENT ATRIAL FIBRILLATION (HCC): Primary | ICD-10-CM

## 2024-12-03 DIAGNOSIS — Z79.01 LONG TERM (CURRENT) USE OF ANTICOAGULANTS: ICD-10-CM

## 2024-12-03 DIAGNOSIS — Z01.810 PREOP CARDIOVASCULAR EXAM: ICD-10-CM

## 2024-12-03 DIAGNOSIS — I10 ESSENTIAL (PRIMARY) HYPERTENSION: ICD-10-CM

## 2024-12-03 DIAGNOSIS — E78.00 PURE HYPERCHOLESTEROLEMIA, UNSPECIFIED: ICD-10-CM

## 2024-12-03 DIAGNOSIS — E66.811 OBESITY (BMI 30.0-34.9): ICD-10-CM

## 2024-12-03 PROCEDURE — 99214 OFFICE O/P EST MOD 30 MIN: CPT | Performed by: INTERNAL MEDICINE

## 2024-12-03 PROCEDURE — G8417 CALC BMI ABV UP PARAM F/U: HCPCS | Performed by: INTERNAL MEDICINE

## 2024-12-03 PROCEDURE — 1123F ACP DISCUSS/DSCN MKR DOCD: CPT | Performed by: INTERNAL MEDICINE

## 2024-12-03 PROCEDURE — 1090F PRES/ABSN URINE INCON ASSESS: CPT | Performed by: INTERNAL MEDICINE

## 2024-12-03 PROCEDURE — 93000 ELECTROCARDIOGRAM COMPLETE: CPT | Performed by: INTERNAL MEDICINE

## 2024-12-03 PROCEDURE — G8484 FLU IMMUNIZE NO ADMIN: HCPCS | Performed by: INTERNAL MEDICINE

## 2024-12-03 PROCEDURE — 1125F AMNT PAIN NOTED PAIN PRSNT: CPT | Performed by: INTERNAL MEDICINE

## 2024-12-03 PROCEDURE — 1159F MED LIST DOCD IN RCRD: CPT | Performed by: INTERNAL MEDICINE

## 2024-12-03 PROCEDURE — 1160F RVW MEDS BY RX/DR IN RCRD: CPT | Performed by: INTERNAL MEDICINE

## 2024-12-03 PROCEDURE — 1036F TOBACCO NON-USER: CPT | Performed by: INTERNAL MEDICINE

## 2024-12-03 PROCEDURE — G8427 DOCREV CUR MEDS BY ELIG CLIN: HCPCS | Performed by: INTERNAL MEDICINE

## 2024-12-03 ASSESSMENT — PATIENT HEALTH QUESTIONNAIRE - PHQ9
2. FEELING DOWN, DEPRESSED OR HOPELESS: NOT AT ALL
SUM OF ALL RESPONSES TO PHQ QUESTIONS 1-9: 0
SUM OF ALL RESPONSES TO PHQ9 QUESTIONS 1 & 2: 0
SUM OF ALL RESPONSES TO PHQ QUESTIONS 1-9: 0
DEPRESSION UNABLE TO ASSESS: FUNCTIONAL CAPACITY MOTIVATION LIMITS ACCURACY
1. LITTLE INTEREST OR PLEASURE IN DOING THINGS: NOT AT ALL

## 2024-12-03 ASSESSMENT — ENCOUNTER SYMPTOMS
EYES NEGATIVE: 1
GASTROINTESTINAL NEGATIVE: 1
RESPIRATORY NEGATIVE: 1

## 2024-12-03 NOTE — PROGRESS NOTES
Vianey Beaulieu is a 84 y.o. year old female.    Follow-up of atrial fibrillation, hypertension, hyperlipidemia, chronic anticoagulant use    6/2020    Patient seen for follow-up.  She has been having indigestion type feeling in substernal area.  This is going on for about a month.  GI evaluation did abdominal ultrasound showing hepatic steatosis.  Symptoms are persistent.  Denies any other complaint.  No clear relation to activity or exertion.  6/23/2020  Patient is here for follow up of diagnostic tests.Results will be discussed.  5/3/2021  Patient presents for f/u for Persistent atrial fibrillation.  She is anticipating left TKR surgery 5/21/2021.  6/14/2023  Patient seen following recent admission to [facility] for chest pain nuclear stress test and echocardiogram performed.  She reports symptoms have resolved.  He continues to have mild bilateral lower extremity edema.  Denies further episodes of chest pain, denies shortness of breath or palpitations  6/7/2024; 12/3/2024 No significant chest pain, shortness of breath, edema, dizziness, palpitations or loss of consciousness.            Review of Systems   Constitutional: Negative.    HENT: Negative.     Eyes: Negative.    Respiratory: Negative.     Cardiovascular: Negative.    Gastrointestinal: Negative.    Endocrine: Negative.    Genitourinary: Negative.    Musculoskeletal: Negative.    Neurological: Negative.    Psychiatric/Behavioral: Negative.     All other systems reviewed and are negative.        Physical Exam  Vitals and nursing note reviewed.   Constitutional:       Appearance: Normal appearance. She is obese.   HENT:      Head: Normocephalic and atraumatic.      Nose: Nose normal.   Eyes:      Conjunctiva/sclera: Conjunctivae normal.   Cardiovascular:      Rate and Rhythm: Normal rate. Rhythm irregular.      Pulses: Normal pulses.      Heart sounds: Murmur heard.      High-pitched blowing midsystolic murmur is present with a grade of 3/6 at the upper left

## 2024-12-03 NOTE — PROGRESS NOTES
1. Have you been to the ER, urgent care clinic since your last visit?  Hospitalized since your last visit?     No      2.  Where do you normally have your labs drawn?   [facility]    3. Do you need any refills today?   No    4. Which [facility] you use ([facility] pharmacy[facility]    5. Which mail order pharmacy do you use (enter pharmacy)?   No     6. Are you here for surgical clearance and if so who will be doing your     procedure/surgery (care team)?   Yes

## 2024-12-03 NOTE — TELEPHONE ENCOUNTER
----- Message from Amber BARRERA sent at 12/3/2024 11:07 AM EST -----  Regarding: clearance letter  Please send a clearance letter to her orthopedic surgeon for hip replacement    Dr Jose Fleming [facility]

## 2025-01-02 PROBLEM — Z01.810 PREOP CARDIOVASCULAR EXAM: Status: RESOLVED | Noted: 2024-12-03 | Resolved: 2025-01-02

## 2025-03-31 DIAGNOSIS — I10 ESSENTIAL (PRIMARY) HYPERTENSION: ICD-10-CM

## 2025-04-12 DIAGNOSIS — I10 ESSENTIAL (PRIMARY) HYPERTENSION: ICD-10-CM

## 2025-04-14 RX ORDER — HYDROCHLOROTHIAZIDE 25 MG/1
25 TABLET ORAL EVERY MORNING
Qty: 90 TABLET | Refills: 3 | Status: SHIPPED | OUTPATIENT
Start: 2025-04-14 | End: 2025-04-14 | Stop reason: SDUPTHER

## 2025-04-14 RX ORDER — HYDROCHLOROTHIAZIDE 25 MG/1
25 TABLET ORAL EVERY MORNING
Qty: 90 TABLET | Refills: 3 | Status: SHIPPED | OUTPATIENT
Start: 2025-04-14

## 2025-06-03 ENCOUNTER — OFFICE VISIT (OUTPATIENT)
Age: 85
End: 2025-06-03
Payer: MEDICARE

## 2025-06-03 VITALS
WEIGHT: 206 LBS | SYSTOLIC BLOOD PRESSURE: 133 MMHG | BODY MASS INDEX: 34.32 KG/M2 | HEIGHT: 65 IN | OXYGEN SATURATION: 98 % | DIASTOLIC BLOOD PRESSURE: 75 MMHG | HEART RATE: 61 BPM

## 2025-06-03 DIAGNOSIS — E66.811 OBESITY (BMI 30.0-34.9): ICD-10-CM

## 2025-06-03 DIAGNOSIS — I48.21 PERMANENT ATRIAL FIBRILLATION (HCC): ICD-10-CM

## 2025-06-03 DIAGNOSIS — Z79.01 LONG TERM (CURRENT) USE OF ANTICOAGULANTS: ICD-10-CM

## 2025-06-03 DIAGNOSIS — I10 ESSENTIAL (PRIMARY) HYPERTENSION: Primary | ICD-10-CM

## 2025-06-03 DIAGNOSIS — E78.00 PURE HYPERCHOLESTEROLEMIA, UNSPECIFIED: ICD-10-CM

## 2025-06-03 PROCEDURE — 1159F MED LIST DOCD IN RCRD: CPT | Performed by: INTERNAL MEDICINE

## 2025-06-03 PROCEDURE — 1090F PRES/ABSN URINE INCON ASSESS: CPT | Performed by: INTERNAL MEDICINE

## 2025-06-03 PROCEDURE — 1036F TOBACCO NON-USER: CPT | Performed by: INTERNAL MEDICINE

## 2025-06-03 PROCEDURE — G8417 CALC BMI ABV UP PARAM F/U: HCPCS | Performed by: INTERNAL MEDICINE

## 2025-06-03 PROCEDURE — G8427 DOCREV CUR MEDS BY ELIG CLIN: HCPCS | Performed by: INTERNAL MEDICINE

## 2025-06-03 PROCEDURE — 1160F RVW MEDS BY RX/DR IN RCRD: CPT | Performed by: INTERNAL MEDICINE

## 2025-06-03 PROCEDURE — 1126F AMNT PAIN NOTED NONE PRSNT: CPT | Performed by: INTERNAL MEDICINE

## 2025-06-03 PROCEDURE — 99214 OFFICE O/P EST MOD 30 MIN: CPT | Performed by: INTERNAL MEDICINE

## 2025-06-03 PROCEDURE — 1123F ACP DISCUSS/DSCN MKR DOCD: CPT | Performed by: INTERNAL MEDICINE

## 2025-06-03 RX ORDER — DILTIAZEM HYDROCHLORIDE 180 MG/1
180 CAPSULE, COATED, EXTENDED RELEASE ORAL DAILY
Qty: 90 CAPSULE | Refills: 3 | Status: SHIPPED | OUTPATIENT
Start: 2025-06-03

## 2025-06-03 ASSESSMENT — ENCOUNTER SYMPTOMS
GASTROINTESTINAL NEGATIVE: 1
RESPIRATORY NEGATIVE: 1
EYES NEGATIVE: 1

## 2025-06-03 ASSESSMENT — PATIENT HEALTH QUESTIONNAIRE - PHQ9
1. LITTLE INTEREST OR PLEASURE IN DOING THINGS: NOT AT ALL
SUM OF ALL RESPONSES TO PHQ QUESTIONS 1-9: 0
2. FEELING DOWN, DEPRESSED OR HOPELESS: NOT AT ALL

## 2025-06-03 NOTE — PROGRESS NOTES
Vianey Beaulieu is a 84 y.o. year old female.    Follow-up of atrial fibrillation, hypertension, hyperlipidemia, chronic anticoagulant use    6/2020    Patient seen for follow-up.  She has been having indigestion type feeling in substernal area.  This is going on for about a month.  GI evaluation did abdominal ultrasound showing hepatic steatosis.  Symptoms are persistent.  Denies any other complaint.  No clear relation to activity or exertion.  6/23/2020  Patient is here for follow up of diagnostic tests.Results will be discussed.  5/3/2021  Patient presents for f/u for Persistent atrial fibrillation.  She is anticipating left TKR surgery 5/21/2021.  6/14/2023  Patient seen following recent admission to [facility] for chest pain nuclear stress test and echocardiogram performed.  She reports symptoms have resolved.  He continues to have mild bilateral lower extremity edema.  Denies further episodes of chest pain, denies shortness of breath or palpitations  6/7/2024; 12/3/2024 No significant chest pain, shortness of breath, edema, dizziness, palpitations or loss of consciousness.  6/3/2025 no chest pain dyspnea.  Occasional edema.  Status post left hip surgery 1/25.    Not sure supplement likelihood last time had problem know that he is not overly life okay      Review of Systems   Constitutional: Negative.    HENT: Negative.     Eyes: Negative.    Respiratory: Negative.     Cardiovascular: Negative.    Gastrointestinal: Negative.    Endocrine: Negative.    Genitourinary: Negative.    Musculoskeletal: Negative.    Neurological: Negative.    Psychiatric/Behavioral: Negative.     All other systems reviewed and are negative.        Physical Exam  Vitals and nursing note reviewed.   Constitutional:       Appearance: Normal appearance. She is obese.   HENT:      Head: Normocephalic and atraumatic.      Nose: Nose normal.   Eyes:      Conjunctiva/sclera: Conjunctivae normal.   Cardiovascular:      Rate and Rhythm: Normal rate.

## 2025-06-03 NOTE — PATIENT INSTRUCTIONS
The medication list included in this document is our record of what you are currently taking, including any changes that were made at today's visit.  If you find any differences when compared to your medications at home, or have any questions that were not answered at your visit, please contact the office.    Check blood pressure at home every day or every other day after reducing the dose of diltiazem for 1 to 2 weeks and call if blood pressure greater than 135/80

## 2025-06-03 NOTE — PROGRESS NOTES
1. Have you been to the ER, urgent care clinic since your last visit?  Hospitalized since your last visit?     Yes, Urgent Care      2.  Where do you normally have your labs drawn?   [facility]    3. Do you need any refills today?   No    4. Which local pharmacy do you use (enter pharmacy)?   [facility]    5. Which mail order pharmacy do you use (enter pharmacy)?   No     6. Are you here for surgical clearance and if so who will be doing your     procedure/surgery (care team)?   No

## (undated) DEVICE — ARGYLE FRAZIER SURGICAL SUCTION INSTRUMENT 10 FR/CH (3.3 MM): Brand: ARGYLE

## (undated) DEVICE — SUTURE VCRL SZ 1 L18IN ABSRB VLT CTX L48MM 1/2 CIR J765D

## (undated) DEVICE — 3M™ BAIR PAWS FLEX™ WARMING GOWN, STANDARD, 20 PER CASE 81003: Brand: BAIR PAWS™

## (undated) DEVICE — SUTURE VCRL SZ 2-0 L18IN ABSRB VLT CT-1 L36MM 1/2 CIR J739D

## (undated) DEVICE — TRAY CATH OD16FR SIL URIN M STATLOK STBL DEV SURSTP

## (undated) DEVICE — Device

## (undated) DEVICE — INTENDED FOR TISSUE SEPARATION, AND OTHER PROCEDURES THAT REQUIRE A SHARP SURGICAL BLADE TO PUNCTURE OR CUT.: Brand: BARD-PARKER SAFETY BLADES SIZE 20, STERILE

## (undated) DEVICE — INTENDED FOR TISSUE SEPARATION, AND OTHER PROCEDURES THAT REQUIRE A SHARP SURGICAL BLADE TO PUNCTURE OR CUT.: Brand: BARD-PARKER SAFETY BLADES SIZE 15, STERILE

## (undated) DEVICE — SUTURE STRATAFIX SPRL MCRYL + SZ 4-0 L12IN ABSRB UD PS-2 SXMP1B117

## (undated) DEVICE — KENDALL SCD EXPRESS SLEEVES, KNEE LENGTH, MEDIUM: Brand: KENDALL SCD

## (undated) DEVICE — SUTURE STRATAFIX SYMMETRIC PDS + SZ 2-0 L18IN ABSRB VLT SXPP1A403

## (undated) DEVICE — DRAIN SURG W7MMXL20CM SIL FULL PERF HUBLESS FLAT RADPQ STRP

## (undated) DEVICE — STOCKING COMPR L L29-31IN REG 19MMHG ANK 9-10IN CALF

## (undated) DEVICE — STERILE LATEX POWDER-FREE SURGICAL GLOVESWITH NITRILE COATING: Brand: PROTEXIS

## (undated) DEVICE — (D)PREP SKN CHLRAPRP APPL 26ML -- CONVERT TO ITEM 371833

## (undated) DEVICE — KIT CLN UP BON SECOURS MARYV

## (undated) DEVICE — FLEX ADVANTAGE 3000CC: Brand: FLEX ADVANTAGE

## (undated) DEVICE — SUTURE MCRYL SZ 4-0 L18IN ABSRB UD L19MM PS-2 3/8 CIR PRIM Y496G

## (undated) DEVICE — SOLUTION IV 1000ML 0.9% SOD CHL

## (undated) DEVICE — WATERPROOF, BACTERIA PROOF DRESSING WITH ABSORBENT SEE THROUGH PAD: Brand: OPSITE POST-OP VISIBLE 15X10CM CTN 20

## (undated) DEVICE — REM POLYHESIVE ADULT PATIENT RETURN ELECTRODE: Brand: VALLEYLAB

## (undated) DEVICE — KIT POS W/ FOAM ARM CRADL SHEARGUARD CHST PD CVR FOR SPNL

## (undated) DEVICE — SUTURE STRATAFIX SYMMETRIC PDS + SZ 0 L18IN ABSRB L36MM SXPP1A401

## (undated) DEVICE — SUTURE MCRYL SZ 3-0 L27IN ABSRB UD L24MM PS-1 3/8 CIR PRIM Y936H